# Patient Record
Sex: FEMALE | Race: WHITE | NOT HISPANIC OR LATINO | Employment: UNEMPLOYED | URBAN - METROPOLITAN AREA
[De-identification: names, ages, dates, MRNs, and addresses within clinical notes are randomized per-mention and may not be internally consistent; named-entity substitution may affect disease eponyms.]

---

## 2017-01-13 ENCOUNTER — HOSPITAL ENCOUNTER (OUTPATIENT)
Dept: MAMMOGRAPHY | Facility: MEDICAL CENTER | Age: 57
Discharge: HOME | End: 2017-01-13
Payer: COMMERCIAL

## 2017-01-13 DIAGNOSIS — R92.2 INCONCLUSIVE MAMMOGRAM: ICD-10-CM

## 2017-01-13 DIAGNOSIS — Z12.31 VISIT FOR SCREENING MAMMOGRAM: ICD-10-CM

## 2017-01-13 DIAGNOSIS — Z12.31 ENCOUNTER FOR SCREENING MAMMOGRAM FOR MALIGNANT NEOPLASM OF BREAST: ICD-10-CM

## 2017-01-13 PROCEDURE — G0202 SCR MAMMO BI INCL CAD: HCPCS

## 2017-01-13 PROCEDURE — 77063 BREAST TOMOSYNTHESIS BI: CPT

## 2017-01-16 DIAGNOSIS — N63.0 BREAST LUMP: ICD-10-CM

## 2017-01-18 ENCOUNTER — HOSPITAL ENCOUNTER (OUTPATIENT)
Dept: ULTRASOUND IMAGING | Facility: CLINIC | Age: 57
Discharge: HOME | End: 2017-01-18
Payer: COMMERCIAL

## 2017-01-18 DIAGNOSIS — N63.0 BREAST LUMP: ICD-10-CM

## 2017-01-18 PROCEDURE — 76642 ULTRASOUND BREAST LIMITED: CPT

## 2017-01-26 ENCOUNTER — GENERIC CONVERSION - ENCOUNTER (OUTPATIENT)
Dept: OTHER | Facility: OTHER | Age: 57
End: 2017-01-26

## 2017-02-27 ENCOUNTER — ALLSCRIPTS OFFICE VISIT (OUTPATIENT)
Dept: OTHER | Facility: OTHER | Age: 57
End: 2017-02-27

## 2017-03-28 ENCOUNTER — GENERIC CONVERSION - ENCOUNTER (OUTPATIENT)
Dept: OTHER | Facility: OTHER | Age: 57
End: 2017-03-28

## 2017-04-28 ENCOUNTER — LAB (OUTPATIENT)
Dept: LAB | Facility: CLINIC | Age: 57
End: 2017-04-28
Payer: COMMERCIAL

## 2017-04-28 ENCOUNTER — TRANSCRIBE ORDERS (OUTPATIENT)
Dept: LAB | Facility: CLINIC | Age: 57
End: 2017-04-28

## 2017-04-28 DIAGNOSIS — R30.0 DYSURIA: ICD-10-CM

## 2017-04-28 LAB
BACTERIA UR QL AUTO: NORMAL /HPF
BILIRUB UR QL STRIP: NEGATIVE
CLARITY UR: CLEAR
COLOR UR: YELLOW
GLUCOSE UR STRIP-MCNC: NEGATIVE MG/DL
HGB UR QL STRIP.AUTO: NEGATIVE
HYALINE CASTS #/AREA URNS LPF: NORMAL /LPF
KETONES UR STRIP-MCNC: NEGATIVE MG/DL
LEUKOCYTE ESTERASE UR QL STRIP: ABNORMAL
NITRITE UR QL STRIP: NEGATIVE
NON-SQ EPI CELLS URNS QL MICRO: NORMAL /HPF
PH UR STRIP.AUTO: 6.5 [PH] (ref 4.5–8)
PROT UR STRIP-MCNC: NEGATIVE MG/DL
RBC #/AREA URNS AUTO: NORMAL /HPF
SP GR UR STRIP.AUTO: 1.01 (ref 1–1.03)
UROBILINOGEN UR QL STRIP.AUTO: 0.2 E.U./DL
WBC #/AREA URNS AUTO: NORMAL /HPF

## 2017-04-28 PROCEDURE — 87086 URINE CULTURE/COLONY COUNT: CPT

## 2017-04-28 PROCEDURE — 81001 URINALYSIS AUTO W/SCOPE: CPT

## 2017-04-29 LAB — BACTERIA UR CULT: NORMAL

## 2017-05-12 ENCOUNTER — ALLSCRIPTS OFFICE VISIT (OUTPATIENT)
Dept: OTHER | Facility: OTHER | Age: 57
End: 2017-05-12

## 2017-05-25 ENCOUNTER — GENERIC CONVERSION - ENCOUNTER (OUTPATIENT)
Dept: OTHER | Facility: OTHER | Age: 57
End: 2017-05-25

## 2017-06-19 ENCOUNTER — ALLSCRIPTS OFFICE VISIT (OUTPATIENT)
Dept: OTHER | Facility: OTHER | Age: 57
End: 2017-06-19

## 2017-08-09 DIAGNOSIS — M25.551 PAIN IN RIGHT HIP: ICD-10-CM

## 2017-08-09 DIAGNOSIS — Z13.220 ENCOUNTER FOR SCREENING FOR LIPOID DISORDERS: ICD-10-CM

## 2017-08-09 DIAGNOSIS — Z13.1 ENCOUNTER FOR SCREENING FOR DIABETES MELLITUS: ICD-10-CM

## 2017-08-10 ENCOUNTER — GENERIC CONVERSION - ENCOUNTER (OUTPATIENT)
Dept: OTHER | Facility: OTHER | Age: 57
End: 2017-08-10

## 2017-08-10 ENCOUNTER — APPOINTMENT (OUTPATIENT)
Dept: LAB | Facility: CLINIC | Age: 57
End: 2017-08-10
Payer: COMMERCIAL

## 2017-08-10 ENCOUNTER — TRANSCRIBE ORDERS (OUTPATIENT)
Dept: LAB | Facility: CLINIC | Age: 57
End: 2017-08-10

## 2017-08-10 DIAGNOSIS — Z13.1 ENCOUNTER FOR SCREENING FOR DIABETES MELLITUS: ICD-10-CM

## 2017-08-10 DIAGNOSIS — Z13.220 ENCOUNTER FOR SCREENING FOR LIPOID DISORDERS: ICD-10-CM

## 2017-08-10 LAB
CHOLEST SERPL-MCNC: 190 MG/DL (ref 50–200)
EST. AVERAGE GLUCOSE BLD GHB EST-MCNC: 97 MG/DL
HBA1C MFR BLD: 5 % (ref 4.2–6.3)
HDLC SERPL-MCNC: 81 MG/DL (ref 40–60)
LDLC SERPL CALC-MCNC: 100 MG/DL (ref 0–100)
TRIGL SERPL-MCNC: 43 MG/DL

## 2017-08-10 PROCEDURE — 83036 HEMOGLOBIN GLYCOSYLATED A1C: CPT

## 2017-08-10 PROCEDURE — 80061 LIPID PANEL: CPT

## 2017-08-10 PROCEDURE — 36415 COLL VENOUS BLD VENIPUNCTURE: CPT

## 2017-08-17 ENCOUNTER — ALLSCRIPTS OFFICE VISIT (OUTPATIENT)
Dept: OTHER | Facility: OTHER | Age: 57
End: 2017-08-17

## 2017-08-22 ENCOUNTER — APPOINTMENT (OUTPATIENT)
Dept: PHYSICAL THERAPY | Facility: CLINIC | Age: 57
End: 2017-08-22
Payer: COMMERCIAL

## 2017-08-22 DIAGNOSIS — M25.551 PAIN IN RIGHT HIP: ICD-10-CM

## 2017-08-22 PROCEDURE — 97162 PT EVAL MOD COMPLEX 30 MIN: CPT

## 2017-08-24 ENCOUNTER — APPOINTMENT (OUTPATIENT)
Dept: PHYSICAL THERAPY | Facility: CLINIC | Age: 57
End: 2017-08-24
Payer: COMMERCIAL

## 2017-08-24 PROCEDURE — 97110 THERAPEUTIC EXERCISES: CPT

## 2017-08-29 ENCOUNTER — APPOINTMENT (OUTPATIENT)
Dept: PHYSICAL THERAPY | Facility: CLINIC | Age: 57
End: 2017-08-29
Payer: COMMERCIAL

## 2017-09-06 ENCOUNTER — APPOINTMENT (OUTPATIENT)
Dept: PHYSICAL THERAPY | Facility: CLINIC | Age: 57
End: 2017-09-06
Payer: COMMERCIAL

## 2017-09-06 PROCEDURE — 97110 THERAPEUTIC EXERCISES: CPT

## 2017-09-06 PROCEDURE — 97140 MANUAL THERAPY 1/> REGIONS: CPT

## 2017-09-07 ENCOUNTER — APPOINTMENT (OUTPATIENT)
Dept: PHYSICAL THERAPY | Facility: CLINIC | Age: 57
End: 2017-09-07
Payer: COMMERCIAL

## 2017-09-07 PROCEDURE — 97110 THERAPEUTIC EXERCISES: CPT

## 2017-09-07 PROCEDURE — 97140 MANUAL THERAPY 1/> REGIONS: CPT

## 2017-09-08 ENCOUNTER — APPOINTMENT (OUTPATIENT)
Dept: PHYSICAL THERAPY | Facility: CLINIC | Age: 57
End: 2017-09-08
Payer: COMMERCIAL

## 2017-09-12 ENCOUNTER — APPOINTMENT (OUTPATIENT)
Dept: PHYSICAL THERAPY | Facility: CLINIC | Age: 57
End: 2017-09-12
Payer: COMMERCIAL

## 2017-09-12 PROCEDURE — 97140 MANUAL THERAPY 1/> REGIONS: CPT

## 2017-09-12 PROCEDURE — 97110 THERAPEUTIC EXERCISES: CPT

## 2017-09-13 ENCOUNTER — APPOINTMENT (OUTPATIENT)
Dept: PHYSICAL THERAPY | Facility: CLINIC | Age: 57
End: 2017-09-13
Payer: COMMERCIAL

## 2017-09-14 ENCOUNTER — GENERIC CONVERSION - ENCOUNTER (OUTPATIENT)
Dept: OTHER | Facility: OTHER | Age: 57
End: 2017-09-14

## 2017-09-14 ENCOUNTER — APPOINTMENT (OUTPATIENT)
Dept: PHYSICAL THERAPY | Facility: CLINIC | Age: 57
End: 2017-09-14
Payer: COMMERCIAL

## 2017-09-14 PROCEDURE — 97110 THERAPEUTIC EXERCISES: CPT

## 2017-09-18 ENCOUNTER — APPOINTMENT (OUTPATIENT)
Dept: PHYSICAL THERAPY | Facility: CLINIC | Age: 57
End: 2017-09-18
Payer: COMMERCIAL

## 2017-09-18 PROCEDURE — 97110 THERAPEUTIC EXERCISES: CPT

## 2017-09-21 ENCOUNTER — APPOINTMENT (OUTPATIENT)
Dept: PHYSICAL THERAPY | Facility: CLINIC | Age: 57
End: 2017-09-21
Payer: COMMERCIAL

## 2017-09-21 PROCEDURE — G8979 MOBILITY GOAL STATUS: HCPCS

## 2017-09-21 PROCEDURE — G8980 MOBILITY D/C STATUS: HCPCS

## 2017-09-21 PROCEDURE — 97110 THERAPEUTIC EXERCISES: CPT

## 2017-09-22 ENCOUNTER — GENERIC CONVERSION - ENCOUNTER (OUTPATIENT)
Dept: OTHER | Facility: OTHER | Age: 57
End: 2017-09-22

## 2017-10-02 ENCOUNTER — ALLSCRIPTS OFFICE VISIT (OUTPATIENT)
Dept: OTHER | Facility: OTHER | Age: 57
End: 2017-10-02

## 2017-11-17 ENCOUNTER — GENERIC CONVERSION - ENCOUNTER (OUTPATIENT)
Dept: OTHER | Facility: OTHER | Age: 57
End: 2017-11-17

## 2017-11-22 ENCOUNTER — TRANSCRIBE ORDERS (OUTPATIENT)
Dept: ADMINISTRATIVE | Facility: HOSPITAL | Age: 57
End: 2017-11-22

## 2017-11-22 ENCOUNTER — APPOINTMENT (OUTPATIENT)
Dept: LAB | Facility: CLINIC | Age: 57
End: 2017-11-22
Payer: COMMERCIAL

## 2017-11-22 ENCOUNTER — TRANSCRIBE ORDERS (OUTPATIENT)
Dept: LAB | Facility: CLINIC | Age: 57
End: 2017-11-22

## 2017-11-22 DIAGNOSIS — E04.1 NONTOXIC UNINODULAR GOITER: Primary | ICD-10-CM

## 2017-11-22 DIAGNOSIS — E04.1 NONTOXIC UNINODULAR GOITER: ICD-10-CM

## 2017-11-22 LAB — TSH SERPL DL<=0.05 MIU/L-ACNC: 0.51 UIU/ML (ref 0.36–3.74)

## 2017-11-22 PROCEDURE — 84443 ASSAY THYROID STIM HORMONE: CPT

## 2017-11-22 PROCEDURE — 36415 COLL VENOUS BLD VENIPUNCTURE: CPT

## 2017-11-28 ENCOUNTER — HOSPITAL ENCOUNTER (OUTPATIENT)
Dept: ULTRASOUND IMAGING | Facility: CLINIC | Age: 57
Discharge: HOME | End: 2017-11-28
Payer: COMMERCIAL

## 2017-11-28 DIAGNOSIS — E04.1 NONTOXIC UNINODULAR GOITER: ICD-10-CM

## 2017-11-28 PROCEDURE — 76536 US EXAM OF HEAD AND NECK: CPT

## 2018-01-11 NOTE — RESULT NOTES
Message   call, no lyme back yet, otherwise labs ok     Verified Results  (1) COMPREHENSIVE METABOLIC PANEL [id number] 40:84HZ [facility] Order Number: [id number]      National Kidney Disease Education Program recommendations are as follows:  GFR calculation is accurate only with a steady state creatinine  Chronic Kidney disease less than 60 ml/min/1 73 sq  meters  Kidney failure less than 15 ml/min/1 73 sq  meters  Test Name Result Flag Reference   GLUCOSE,RANDM 86 mg/dL     If the patient is fasting, the ADA then defines impaired fasting glucose as > 100 mg/dL and diabetes as > or equal to 123 mg/dL  SODIUM 140 mmol/L  136-145   POTASSIUM 4 2 mmol/L  3 5-5 3   CHLORIDE 106 mmol/L  100-108   CARBON DIOXIDE 29 mmol/L  21-32   ANION GAP (CALC) 5 mmol/L  4-13   BLOOD UREA NITROGEN 15 mg/dL  5-25   CREATININE 0 61 mg/dL  0 60-1 30   Standardized to IDMS reference method   CALCIUM 9 1 mg/dL  8 3-10 1   BILI, TOTAL 0 50 mg/dL  0 20-1 00   ALK PHOSPHATAS 69 U/L     ALT (SGPT) 30 U/L  12-78   AST(SGOT) 12 U/L  5-45   ALBUMIN 3 9 g/dL  3 5-5 0   TOTAL PROTEIN 7 0 g/dL  6 4-8 2   eGFR Non-African American      >60 0 ml/min/1 73sq m     (1) LIPID PANEL, FASTING 10Pwd2770 09:49AM [facility] Order Number: [id number]      Triglyceride:         Normal              <150 mg/dl       Borderline High    150-199 mg/dl       High               200-499 mg/dl       Very High          >499 mg/dl  Cholesterol:         Desirable        <200 mg/dl      Borderline High  200-239 mg/dl      High             >239 mg/dl  HDL Cholesterol:        High    >59 mg/dL      Low     <41 mg/dL  LDL CALCULATED:    This screening LDL is a calculated result  It does not have the accuracy of the Direct Measured LDL in the monitoring of patients with hyperlipidemia and/or statin therapy  Direct Measure LDL (HXK264) must be ordered separately in these patients       Test Name Result Flag Reference   CHOLESTEROL 191 mg/dL    HDL,DIRECT 68 mg/dL H 40-60   LDL CHOLESTEROL CALCULATED 109 mg/dL H 0-100   TRIGLYCERIDES 71 mg/dL  <=150     (1) CBC/PLT/DIFF 74FRU4403 09:49AM [facility] Order Number: [id number]     Order Number: [id number]     Test Name Result Flag Reference   WBC COUNT 4 64 Thousand/uL  4 31-10 16   RBC COUNT 4 57 Million/uL  3 81-5 12   HEMOGLOBIN 14 6 g/dL  11 5-15 4   HEMATOCRIT 43 0 %  34 8-46  1   MCV 94 fL  82-98   MCH 31 9 pg  26 8-34 3   MCHC 34 0 g/dL  31 4-37 4   RDW 12 5 %  11 6-15 1   MPV 10 5 fL  8 9-12 7   PLATELET COUNT 029 Thousands/uL  149-390   nRBC AUTOMATED 0 /100 WBCs     NEUTROPHILS RELATIVE PERCENT 60 %  43-75   LYMPHOCYTES RELATIVE PERCENT 26 %  14-44   MONOCYTES RELATIVE PERCENT 10 %  4-12   EOSINOPHILS RELATIVE PERCENT 4 %  0-6   BASOPHILS RELATIVE PERCENT 0 %  0-1   NEUTROPHILS ABSOLUTE COUNT 2 80 Thousands/µL  1 85-7 62   LYMPHOCYTES ABSOLUTE COUNT 1 19 Thousands/µL  0 60-4 47   MONOCYTES ABSOLUTE COUNT 0 45 Thousand/µL  0 17-1 22   EOSINOPHILS ABSOLUTE COUNT 0 18 Thousand/µL  0 00-0 61   BASOPHILS ABSOLUTE COUNT 0 01 Thousands/µL  0 00-0 10

## 2018-01-12 NOTE — MISCELLANEOUS
Message   Recorded as Task   Date: 12/02/2016 11:17 AM, Created By: Nicole Mack   Task Name: Med Renewal Request   Assigned To: Mine Hahn   Regarding Patient: Jaya Castle, Status: Active   Sabas De Jesus - 02 Dec 2016 11:17 AM     TASK CREATED  Caller: Self; [phone] (Home); [phone] x,,,,, (Work)  requested refills ,sent to [facility] to sign off        Active Problems    1  Abdominal pain (789 00) (R10 9)   2  Anxiety (300 00) (F41 9)   3  Arthropathy (716 90) (M12 9)   4  Dense breasts (793 82) (R92 2)   5  Dysfunction of both eustachian tubes (381 81) (H69 83)   6  Encounter for routine gynecological examination (V72 31) (Z01 419)   7  Encounter for screening colonoscopy (V76 51) (Z12 11)   8  Encounter for screening mammogram for malignant neoplasm of breast (V76 12)   (Z12 31)   9  Gastroenteritis (558 9) (K52 9)   10  Glaucoma (365 9) (H40 9)   11  Ingrown toenail (703 0) (L60 0)   12  Menopause ovarian failure (256 39) (E28 39)   13  Nausea (787 02) (R11 0)   14  Screening for diabetes mellitus (V77 1) (Z13 1)   15  Screening for hyperlipidemia (V77 91) (Z13 220)   16  Seasonal allergies (477 9) (J30 2)   17  Thyroid disorder (246 9) (E07 9)   18  Thyroid goiter (240 9) (E04 9)   19  Vulvar atrophy (624 1) (N90 5)    Current Meds   1  Calcium TABS; Therapy: (Recorded:19Knr0137) to Recorded   2  Centrum Silver TABS; Therapy: (Recorded:48Yan6915) to Recorded   3  Chondroitin Sulfate Complex CAPS; Therapy: (Recorded:78Nlc0250) to Recorded   4  Estrace 0 1 MG/GM Vaginal Cream; USE TWICE WEEKLY AS DIRECTED; Therapy: 81GET5832 to (Last Rx:89Ueq9597)  Requested for: 43Luv4021 Ordered   5  Glucosamine CAPS; Therapy: (Recorded:74Slb1652) to Recorded   6  Hyoscyamine Sulfate 0 125 MG Oral Tablet; 1-2 q 4 h prn abdominal pain; Therapy: 22CID2963 to (Last Rx:39Xmn3766)  Requested for: 67KEA3493 Ordered   7   PARoxetine HCl - 10 MG Oral Tablet; TAKE 1 AND 1/2 TABLETS DAILY Requested for:   46BRV8553; Last Rx:70Kzb3861 Ordered   8  Promethazine HCl - 25 MG Rectal Suppository; INSERT 1 SUPPOSITORY RECTALLY   TWICE DAILY AS NEEDED FOR NAUSEA; Therapy: 46JSZ3256 to (Evaluate:41Lfq4443)  Requested for: 37Hkf4000; Last   Rx:99Fjw5005 Ordered   9  Timolol Maleate 0 25 % Ophthalmic Solution; Therapy: (Recorded:18Jan2016) to Recorded   10   Vagifem 10 MCG Vaginal Tablet; 1 TABLET TWICE A WEEK  Requested for: 77Yfx7129;    Last Rx:37Und0496 Ordered    Allergies    1  miconazole    Plan  Anxiety    · PARoxetine HCl - 10 MG Oral Tablet (Paxil); TAKE 1 AND 1/2 TABLETS DAILY    Signatures   Electronically signed by : Sarah Recinos, ; Dec  2 2016 11:17AM EST                       (Author)

## 2018-01-12 NOTE — MISCELLANEOUS
Message   Recorded as Task   Date: 05/25/2017 10:37 AM, Created By: Janice Kellogg   Task Name: Med Renewal Request   Assigned To: More Gannon   Regarding Patient: Kurt Nunn, Status: Active   Comment:    Janice Kellogg - 25 May 2017 10:37 AM     TASK CREATED  Caller: Self; Renew Medication; [phone] (Home); [phone] x,,,,, (Work)  Patient needs a prescription of her Premarin sent to the [facility] in [facility]  More Gannon - 25 May 2017 10:46 AM     TASK EDITED  resent to pharm, was "recorded" instead of being sent        Active Problems    1  Abdominal pain (789 00) (R10 9)   2  Anxiety (300 00) (F41 9)   3  Arthropathy (716 90) (M12 9)   4  Breast lump (611 72) (N63)   5  Candidiasis of female genitalia (112 1) (B37 3)   6  Dense breasts (793 82) (R92 2)   7  Dermatitis (692 9) (L30 9)   8  Dysfunction of both eustachian tubes (381 81) (H69 83)   9  Dysuria (788 1) (R30 0)   10  Gastroenteritis (558 9) (K52 9)   11  Glaucoma (365 9) (H40 9)   12  Ingrown toenail (703 0) (L60 0)   13  Menopause ovarian failure (256 39) (E28 39)   14  Migraine with aura and without status migrainosus, not intractable (346 00) (G43 109)   15  Nausea (787 02) (R11 0)   16  Seasonal allergies (477 9) (J30 2)   17  Thyroid disorder (246 9) (E07 9)   18  Thyroid goiter (240 9) (E04 9)   19  Travel advice encounter (V65 49) (Z71 89)   20  Vulvar atrophy (624 1) (N90 5)    Current Meds   1  AcetaZOLAMIDE 125 MG Oral Tablet; TAKE 1 TABLET ONCE DAILY; Therapy: 57LGB0048 to (Evaluate:08Apr2017)  Requested for: 94Kks7279; Last   Rx:98Fzg6138 Ordered   2  Calcium TABS; Therapy: (Recorded:56Zqv4798) to Recorded   3  Centrum Silver TABS; Therapy: (Recorded:34Tke4623) to Recorded   4  Chondroitin Sulfate Complex CAPS; Therapy: (Recorded:58Lcd4119) to Recorded   5  Fluconazole 150 MG Oral Tablet (Diflucan); Take 1 tablet now and repeat again in 3   days;    Therapy: 64HXF1788 to (Evaluate:06Aij9807)  Requested for: 41RNG0070; Last   Rx:12May2017 Ordered   6  Glucosamine CAPS; Therapy: (Recorded:25Oyc4091) to Recorded   7  Hyoscyamine Sulfate 0 125 MG Oral Tablet; 1-2 q 4 h prn abdominal pain; Therapy: 30SID6585 to (Last Rx:12Jnv0240)  Requested for: 36FSN3049 Ordered   8  Mometasone Furoate 0 1 % External Cream; APPLY SPARINGLY TO THE AFFECTED   AREA(S) TWICE DAILY; Therapy: 58KXT9215 to (Last Rx:85Bjd0771)  Requested for: 27Feb2017 Ordered   9  Paxil TABS (PARoxetine HCl); 5mg; Therapy: (Recorded:12May2017) to Recorded   10  Premarin 0 625 MG/GM Vaginal Cream; Insert 0 5 g intravaginally twice per week; Therapy: 59ZSL3442 to (Evaluate:01Qzi0019); Last Rx:12May2017 Ordered   11  SUMAtriptan Succinate 50 MG Oral Tablet; take 1 tablet for migraine relief  may repeat    every 2 hours  max 200mg/day; Therapy: 87OCB7854 to (Evaluate:70Dpi4934)  Requested for: 10IIK9163; Last    Rx:02Mar2017 Ordered   12  Timolol Maleate 0 25 % Ophthalmic Solution;     Therapy: (Recorded:18Jan2016) to Recorded    Allergies    1  miconazole    Plan  Candidiasis of female genitalia    · Premarin 0 625 MG/GM Vaginal Cream; Insert 0 5 g intravaginally twice per week    Signatures   Electronically signed by : La Neal, ; May 25 2017 10:46AM EST                       (Author)

## 2018-01-13 VITALS
SYSTOLIC BLOOD PRESSURE: 112 MMHG | WEIGHT: 172 LBS | HEIGHT: 70 IN | DIASTOLIC BLOOD PRESSURE: 74 MMHG | BODY MASS INDEX: 24.62 KG/M2

## 2018-01-13 VITALS
DIASTOLIC BLOOD PRESSURE: 64 MMHG | WEIGHT: 170 LBS | SYSTOLIC BLOOD PRESSURE: 100 MMHG | BODY MASS INDEX: 24.34 KG/M2 | HEIGHT: 70 IN

## 2018-01-13 DIAGNOSIS — Z12.31 ENCOUNTER FOR SCREENING MAMMOGRAM FOR MALIGNANT NEOPLASM OF BREAST: ICD-10-CM

## 2018-01-13 DIAGNOSIS — R92.2 INCONCLUSIVE MAMMOGRAM: ICD-10-CM

## 2018-01-14 VITALS
HEIGHT: 70 IN | SYSTOLIC BLOOD PRESSURE: 110 MMHG | HEART RATE: 68 BPM | OXYGEN SATURATION: 97 % | WEIGHT: 167 LBS | BODY MASS INDEX: 23.91 KG/M2 | DIASTOLIC BLOOD PRESSURE: 60 MMHG

## 2018-01-14 VITALS
RESPIRATION RATE: 14 BRPM | HEIGHT: 70 IN | HEART RATE: 74 BPM | WEIGHT: 168 LBS | DIASTOLIC BLOOD PRESSURE: 60 MMHG | OXYGEN SATURATION: 99 % | BODY MASS INDEX: 24.05 KG/M2 | SYSTOLIC BLOOD PRESSURE: 102 MMHG

## 2018-01-14 NOTE — RESULT NOTES
Verified Results  (1) LIPID PANEL, FASTING 10Aug2017 08:44AM Ivon[facility] Dove Order Number: [id number]     Test Name Result Flag Reference   CHOLESTEROL 190 mg/dL     HDL,DIRECT 81 mg/dL H 40-60   Specimen collection should occur prior to Metamizole administration due to the potential for falsley depressed results  LDL CHOLESTEROL CALCULATED 100 mg/dL  0-100   Triglyceride:        Normal ??? ??? ??? ??? ??? ??? ??? <150 mg/dl   ??? ??? ???Borderline High ??? ??? 150-199 mg/dl   ??? ??? ? ?? High ??? ??? ??? ??? ??? ??? ??? 200-499 mg/dl   ??? ??? ? ??Very High ??? ??? ??? ??? ??? >499 mg/dl      Cholesterol:       Desirable ??? ??? ??? ??? <200 mg/dl   ??? ??? Borderline High ??? 200-239 mg/dl   ??? ??? High ??? ??? ??? ??? ??? ??? >239 mg/dl      HDL Cholesterol:       High ??? ???>59 mg/dL   ??? ??? Low ??? ??? <41 mg/dL      This screening LDL is a calculated result  It does not have the accuracy of the Direct Measured LDL in the monitoring of patients with hyperlipidemia and/or statin therapy  Direct Measure LDL (WVM828) must be ordered separately in these patients  TRIGLYCERIDES 43 mg/dL  <=150   Specimen collection should occur prior to N-Acetylcysteine or Metamizole administration due to the potential for falsely depressed results  (1) HEMOGLOBIN A1C 10Aug2017 08:44AM Ivonne Dove Order Number: [id number]     Test Name Result Flag Reference   HEMOGLOBIN A1C 5 0 %  4 2-6 3   EST  AVG   GLUCOSE 97 mg/dl

## 2018-01-15 ENCOUNTER — HOSPITAL ENCOUNTER (OUTPATIENT)
Dept: MAMMOGRAPHY | Facility: CLINIC | Age: 58
Discharge: HOME | End: 2018-01-15
Payer: COMMERCIAL

## 2018-01-15 DIAGNOSIS — R92.2 INCONCLUSIVE MAMMOGRAM: ICD-10-CM

## 2018-01-15 DIAGNOSIS — Z12.31 ENCOUNTER FOR SCREENING MAMMOGRAM FOR MALIGNANT NEOPLASM OF BREAST: ICD-10-CM

## 2018-01-15 PROCEDURE — 77067 SCR MAMMO BI INCL CAD: CPT

## 2018-01-15 PROCEDURE — 77063 BREAST TOMOSYNTHESIS BI: CPT

## 2018-01-17 NOTE — MISCELLANEOUS
Message   Recorded as Task   Date: 08/01/2016 03:01 PM, Created By: Yue Fabian   Task Name: Med Renewal Request   Assigned To: Erica Lund   Regarding Patient: Sudhakar Hernandez, Status: In Progress   Zack Olsen - 01 Aug 2016 3:01 PM     TASK CREATED  Caller: Self; [phone] (Home); [phone] x,,,,, (Work)  pt lmom - she needs refills on estrace and vagifem  pharmacy is [facility] order  Inocencio Knox - 36 Aug 2016 8:24 AM     TASK IN PROGRESS   Inocencio Knox - 02 Aug 2016 8:26 AM     TASK EDITED                 rx sent to mail order  Inocencio Knox - 44 Aug 2016 8:29 AM     TASK EDITED                 rx sent to [facility] mail order  Active Problems    1  Abdominal pain (789 00) (R10 9)   2  Anxiety (300 00) (F41 9)   3  Arthropathy (716 90) (M12 9)   4  Dense breasts (793 82) (R92 2)   5  Dysfunction of both eustachian tubes (381 81) (H69 83)   6  Encounter for routine gynecological examination (V72 31) (Z01 419)   7  Encounter for screening colonoscopy (V76 51) (Z12 11)   8  Encounter for screening mammogram for malignant neoplasm of breast (V76 12)   (Z12 31)   9  Gastroenteritis (558 9) (K52 9)   10  Glaucoma (365 9) (H40 9)   11  Ingrown toenail (703 0) (L60 0)   12  Menopause ovarian failure (256 39) (E28 39)   13  Nausea (787 02) (R11 0)   14  Screening for diabetes mellitus (V77 1) (Z13 1)   15  Screening for hyperlipidemia (V77 91) (Z13 220)   16  Seasonal allergies (477 9) (J30 2)   17  Thyroid disorder (246 9) (E07 9)   18  Thyroid goiter (240 9) (E04 9)   19  Vulvar atrophy (624 1) (N90 5)    Current Meds   1  Calcium TABS; Therapy: (Recorded:94Tgm1549) to Recorded   2  Centrum Silver TABS; Therapy: (Recorded:20Csj1422) to Recorded   3  Chondroitin Sulfate Complex CAPS; Therapy: (Recorded:48Jpn9682) to Recorded   4  Estrace 0 1 MG/GM Vaginal Cream; USE TWICE WEEKLY AS DIRECTED; Therapy: 90WMU5489 to (Evaluate:10Ppi5859);  Last Rx:06Jul2016 Ordered 5  Glucosamine CAPS; Therapy: (Recorded:01Vbd5191) to Recorded   6  Hyoscyamine Sulfate 0 125 MG Oral Tablet; 1-2 q 4 h prn abdominal pain; Therapy: 22GRA1365 to (Last Rx:82Ogp0088)  Requested for: 58FHU8822 Ordered   7  PARoxetine HCl - 10 MG Oral Tablet (Paxil); TAKE 1 AND 1/2 TABLETS DAILY    Requested for: 23KMS5384; Last Rx:32Eac1007 Ordered   8  Promethazine HCl - 25 MG Rectal Suppository; INSERT 1 SUPPOSITORY RECTALLY   TWICE DAILY AS NEEDED FOR NAUSEA; Therapy: 46KPZ0779 to (Evaluate:70Zhu1393)  Requested for: 13Ugs7989; Last   Rx:95Vdw9646 Ordered   9  Timolol Maleate 0 25 % Ophthalmic Solution; Therapy: (Recorded:39Lby2183) to Recorded   10  Vagifem 10 MCG Vaginal Tablet; 1 TABLET TWICE A WEEK  Requested for: 78Krq8671;    Last Rx:39Mtk7683 Ordered    Allergies    1  miconazole    Plan  Menopause ovarian failure    · Vagifem 10 MCG Vaginal Tablet; 1 TABLET TWICE A WEEK  Vulvar atrophy    · Estrace 0 1 MG/GM Vaginal Cream; USE TWICE WEEKLY AS DIRECTED    Signatures   Electronically signed by : Christoph Otoole LPN;  Aug  2 2016  8:29AM EST                       (Author)

## 2018-01-18 NOTE — MISCELLANEOUS
Message   Recorded as Task   Date: 09/14/2017 11:53 AM, Created By: Elisa Farias   Task Name: Medical Complaint Callback   Assigned To: Lenin Bunn   Regarding Patient: Aleksandra Kennedy, Status: In Progress   Comment:    Janice Basilio - 14 Sep 2017 11:53 AM     TASK CREATED  Pt called office today, left voicemail message  Pt saw Dr Gregoria Mtz on 6/19/17, next appointment scheduled for 10/2/17  Pt was previously diagnosed with Candidiasis of female genitalia  Pt states she believes she is currently experiencing yeast infection symptoms  Pt wants to know if she needs to make an appointment or can she receive a prescription? Pt can be reached @ [phone]  Thank you! Radha Lopez - 14 Sep 2017 11:59 AM     TASK IN PROGRESS   Radha Lopez - 14 Sep 2017 12:42 PM     TASK EDITED  Spoke with pt - Symptoms started Friday after coming back from the beach  She has itching, burning during urination and see a white patch on the inside of her vagina  No discharge or odor  She did use cortisone at first but stopped using it  Has not used anything else since she is allergic to Monistat  She would like an Rx  Please advise  Thanks! Joselin Alaniz - 14 Sep 2017 1:11 PM     TASK REPLIED TO: Previously Assigned To Joselin Alaniz                      terconazole nightly x 3 nights    or diflucon 150 one tablet   Radha Lopez - 14 Sep 2017 1:22 PM     TASK EDITED  Spoke with pt - she would like the cream - Rx'd terconazole to her pharmacy [facility] - gave directions on use  Active Problems    1  Arthropathy (716 90) (M12 9)   2  Dense breasts (793 82) (R92 2)   3  Glaucoma (365 9) (H40 9)   4  Menopause ovarian failure (256 39) (E28 39)   5  Migraine with aura and without status migrainosus, not intractable (346 00) (G43 109)   6  Right hip pain (719 45) (M25 551)   7  Screening for hyperlipidemia (V77 91) (Z13 220)   8  Seasonal allergies (477 9) (J30 2)   9   Special screening examination for diabetes mellitus (V77 1) (Z13 1)   10  Thyroid disorder (246 9) (E07 9)   11  Thyroid goiter (240 9) (E04 9)   12  Travel advice encounter (V65 49) (Z71 89)   13  Vaginal irritation (623 9) (N89 8)   14  Vulvar atrophy (624 1) (N90 5)    Current Meds   1  Calcium TABS; Therapy: (Recorded:96Mnz0831) to Recorded   2  Centrum Silver TABS; Therapy: (Recorded:88Iwl2924) to Recorded   3  Chondroitin Sulfate Complex CAPS; Therapy: (Recorded:14Umn0574) to Recorded   4  Diclofenac-Misoprostol 75-0 2 MG Oral Tablet Delayed Release; Take 1 tablet every 12   hours as needed for pain  Take with food; Therapy: 06ZSJ1080 to (Jairon Wang)  Requested for: 17Aug2017; Last   Rx:17Aug2017 Ordered   5  Diclofenac-Misoprostol 75-0 2 MG Oral Tablet Delayed Release; Take 1 tablet every 12   hours as needed for pain  Take with food; Therapy: 78GVF1288 to (Evaluate:41Crs2936)  Requested for: 17Aug2017; Last   Rx:17Aug2017 Ordered   6  Glucosamine CAPS; Therapy: (Recorded:91Ebi1483) to Recorded   7  Mometasone Furoate 0 1 % External Cream; APPLY SPARINGLY TO THE AFFECTED   AREA(S) TWICE DAILY; Therapy: 85ANI4361 to (Last Rx:85Xhm0087)  Requested for: 44Qjf0587 Ordered   8  Paxil TABS (PARoxetine HCl); 5mg; Therapy: (Recorded:67Xda6787) to Recorded   9  Premarin 0 625 MG/GM Vaginal Cream; Insert 0 5 g intravaginally twice per week; Therapy: 49XEC3623 to (Evaluate:12Ekn7098)  Requested for: 20Jun2017; Last   Rx:89Rci6806 Ordered   10  Rizatriptan Benzoate 5 MG Oral Tablet; TAKE 1 TABLET AT ONSET OF HEADACHE  MAY REPEAT EVERY 2 HOURS AS NEEDED  MAXIMUM 3 TABLETS IN 24 HOURS; Therapy: 39Rdy8846 to (Evaluate:83Aey0518)  Requested for: 11Ufx4926; Last    Rx:55Rgi4448 Ordered   11  Timolol Maleate 0 25 % Ophthalmic Solution;     Therapy: (Recorded:18Jan2016) to Recorded    Allergies    1  miconazole    Plan  Vaginal irritation    · Terconazole 0 8 % Vaginal Cream; INSERT 1 APPLICATORFUL  INTRAVAGINALLY AT BEDTIME NIGHTLY FOR 3 CONSECUTIVE DAYS    Signatures   Electronically signed by : Savanna Judge, ; Sep 14 2017  1:22PM EST                       (Author)

## 2018-01-18 NOTE — PROGRESS NOTES
Chief Complaint  Here for annual flu shot  Given right deltoid without difficulty  Active Problems    1  Glaucoma (365 9) (H40 9)   2  Ingrown toenail (703 0) (L60 0)    Current Meds   1  Timolol Maleate 0 25 % Ophthalmic Solution; Therapy: (Recorded:18Jan2016) to Recorded    Allergies    1  MetroGel-Vaginal GEL    Vitals  Signs [Data Includes: Current Encounter]    Temperature: 98 2 F, Tympanic  Heart Rate: 78  Pulse Quality: Normal  Respiration: 18  Systolic: 647, Sitting  Diastolic: 60, Sitting  Height: 5 ft 10 in  Weight: 165 lb   BMI Calculated: 23 68  BSA Calculated: 1 92  O2 Saturation: 98, RA    Assessment    1  Encounter for preventive health examination (V70 0) (Z00 00)    Plan  Health Maintenance    · Fluzone Quadrivalent 0 5 ML Intramuscular Suspension Prefilled Syringe;  INJECT 0 5  ML Intramuscular;  To Be Done: 00AOE1588    Future Appointments    Date/Time Provider Specialty Site   02/23/2016 02:30 PM Leyla Shore DO Family Medicine [address]     Signatures   Electronically signed by : Lisandra Baker, [facility]; Jan 18 2016  4:40PM EST                       (Author)    Electronically signed by : Scott Cushing, D O ; Jan 19 2016  7:38PM EST                       (Co-author)

## 2018-01-18 NOTE — MISCELLANEOUS
Message   Recorded as Task   Date: 01/26/2017 02:24 PM, Created By: Mandy Werner   Task Name: Call Back   Assigned To: Ashley Seay   Regarding Patient: Aris Ortega, Status: In Progress   Comment:    Nata Renteria - 26 Jan 2017 2:24 PM     TASK CREATED  Caller: Self; Other; [phone] (Home); [phone] x,,,,, (Work)  pt of [facility], she is experiencing some vaginal itching, pls call [phone]   Efe Leija - 26 Jan 2017 3:18 PM     TASK IN PROGRESS   Efe Heladio - 26 Jan 2017 3:23 PM     TASK EDITED  Pt has rednes, irritation and itching outside of vagina  No odor , no dsch  Pt will get hydrocortisone cream - otc and apply it bid  If no better by Mon, ov        Active Problems    1  Abdominal pain (789 00) (R10 9)   2  Anxiety (300 00) (F41 9)   3  Arthropathy (716 90) (M12 9)   4  Breast lump (611 72) (N63)   5  Dense breasts (793 82) (R92 2)   6  Dysfunction of both eustachian tubes (381 81) (H69 83)   7  Encounter for routine gynecological examination (V72 31) (Z01 419)   8  Encounter for screening colonoscopy (V76 51) (Z12 11)   9  Encounter for screening mammogram for malignant neoplasm of breast (V76 12)   (Z12 31)   10  Gastroenteritis (558 9) (K52 9)   11  Glaucoma (365 9) (H40 9)   12  Ingrown toenail (703 0) (L60 0)   13  Menopause ovarian failure (256 39) (E28 39)   14  Nausea (787 02) (R11 0)   15  Screening for diabetes mellitus (V77 1) (Z13 1)   16  Screening for hyperlipidemia (V77 91) (Z13 220)   17  Seasonal allergies (477 9) (J30 2)   18  Thyroid disorder (246 9) (E07 9)   19  Thyroid goiter (240 9) (E04 9)   20  Vulvar atrophy (624 1) (N90 5)    Current Meds   1  Calcium TABS; Therapy: (Recorded:45Hia6674) to Recorded   2  Centrum Silver TABS; Therapy: (Recorded:59Tcg9725) to Recorded   3  Chondroitin Sulfate Complex CAPS; Therapy: (Recorded:26Bjt0579) to Recorded   4  Estrace 0 1 MG/GM Vaginal Cream; USE TWICE WEEKLY AS DIRECTED;    Therapy: 49ANM2719 to (Last Rx:22Yjg0493)  Requested for: 04Eox5747 Ordered   5  Glucosamine CAPS; Therapy: (Recorded:60Nbj4502) to Recorded   6  Hyoscyamine Sulfate 0 125 MG Oral Tablet; 1-2 q 4 h prn abdominal pain; Therapy: 61SWY2993 to (Last Rx:34Snn5385)  Requested for: 22ZIP7980 Ordered   7  PARoxetine HCl - 10 MG Oral Tablet (Paxil); TAKE 1 AND 1/2 TABLETS DAILY    Requested for: 97Bnm2261; Last Rx:67Swp5677 Ordered   8  Promethazine HCl - 25 MG Rectal Suppository; INSERT 1 SUPPOSITORY RECTALLY   TWICE DAILY AS NEEDED FOR NAUSEA; Therapy: 80QFA1497 to (Evaluate:51Usx8966)  Requested for: 96Wcr2544; Last   Rx:74Ceo8190 Ordered   9  Timolol Maleate 0 25 % Ophthalmic Solution; Therapy: (Recorded:84Atc4744) to Recorded   10  Vagifem 10 MCG Vaginal Tablet; 1 TABLET TWICE A WEEK  Requested for: 63Nqd1903;    Last Rx:13Pre0507 Ordered    Allergies    1  miconazole    Signatures   Electronically signed by :  Kendall Juarez, ; Jan 26 2017  3:23PM EST                       (Author)

## 2018-01-22 VITALS — TEMPERATURE: 98 F

## 2018-01-22 VITALS
WEIGHT: 173 LBS | DIASTOLIC BLOOD PRESSURE: 72 MMHG | HEIGHT: 70 IN | SYSTOLIC BLOOD PRESSURE: 116 MMHG | BODY MASS INDEX: 24.77 KG/M2

## 2018-02-09 ENCOUNTER — TELEPHONE (OUTPATIENT)
Dept: FAMILY MEDICINE CLINIC | Facility: CLINIC | Age: 58
End: 2018-02-09

## 2018-02-09 DIAGNOSIS — G43.109 MIGRAINE WITH AURA AND WITHOUT STATUS MIGRAINOSUS, NOT INTRACTABLE: Primary | ICD-10-CM

## 2018-02-09 RX ORDER — RIZATRIPTAN BENZOATE 5 MG/1
1 TABLET ORAL DAILY PRN
COMMUNITY
Start: 2017-08-23 | End: 2018-02-09 | Stop reason: SDUPTHER

## 2018-02-09 RX ORDER — RIZATRIPTAN BENZOATE 5 MG/1
TABLET ORAL
Qty: 9 TABLET | Refills: 5 | Status: SHIPPED | OUTPATIENT
Start: 2018-02-09 | End: 2018-02-14 | Stop reason: SDUPTHER

## 2018-02-14 DIAGNOSIS — G43.109 MIGRAINE WITH AURA AND WITHOUT STATUS MIGRAINOSUS, NOT INTRACTABLE: ICD-10-CM

## 2018-02-15 RX ORDER — RIZATRIPTAN BENZOATE 5 MG/1
TABLET ORAL
Qty: 9 TABLET | Refills: 1 | Status: SHIPPED | OUTPATIENT
Start: 2018-02-15 | End: 2018-08-08 | Stop reason: CLARIF

## 2018-06-11 ENCOUNTER — OFFICE VISIT (OUTPATIENT)
Dept: FAMILY MEDICINE CLINIC | Facility: CLINIC | Age: 58
End: 2018-06-11
Payer: COMMERCIAL

## 2018-06-11 VITALS
TEMPERATURE: 99 F | DIASTOLIC BLOOD PRESSURE: 60 MMHG | HEART RATE: 83 BPM | OXYGEN SATURATION: 96 % | SYSTOLIC BLOOD PRESSURE: 102 MMHG
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
## 2018-06-11 PROCEDURE — 99213 OFFICE O/P EST LOW 20 MIN: CPT | Performed by: NURSE PRACTITIONER

## 2018-06-11 RX ORDER — PAROXETINE HYDROCHLORIDE 40 MG/1
5 TABLET, FILM COATED ORAL
COMMUNITY
End: 2018-08-08 | Stop reason: CLARIF

## 2018-06-11 RX ORDER — MULTIVIT WITH MINERALS/LUTEIN
TABLET ORAL
COMMUNITY

## 2018-06-11 RX ORDER — GLIMEPIRIDE 2 MG/1
1 TABLET ORAL 2 TIMES DAILY
COMMUNITY
End: 2020-10-12

## 2018-06-11 RX ORDER — KETOCONAZOLE 20 MG/G
CREAM TOPICAL
COMMUNITY
Start: 2018-04-17 | End: 2019-04-08 | Stop reason: ALTCHOICE

## 2018-06-11 RX ORDER — MOMETASONE FUROATE 1 MG/G
CREAM TOPICAL 2 TIMES DAILY
COMMUNITY
Start: 2017-02-27 | End: 2018-08-08 | Stop reason: ALTCHOICE

## 2018-06-11 RX ORDER — HYDROCORTISONE ACETATE 0.5 %
CREAM (GRAM) TOPICAL
COMMUNITY
End: 2019-04-08 | Stop reason: SDUPTHER

## 2018-06-11 NOTE — ASSESSMENT & PLAN NOTE
To begin triamcinolone ointment every 12 hours for 5 days  To continue antihistamine and cool compress for additional relief  Follow-up if no improvement by the end of this week

## 2018-06-11 NOTE — PROGRESS NOTES
Assessment/Plan:    Bug bites  To begin triamcinolone ointment every 12 hours for 5 days  To continue antihistamine and cool compress for additional relief  Follow-up if no improvement by the end of this week  Diagnoses and all orders for this visit:    Bug bite, initial encounter  -     triamcinolone (KENALOG) 0 1 % ointment; Apply topically 2 (two) times a day    Other orders  -     Calcium 150 MG TABS; Take by mouth  -     Multiple Vitamins-Minerals (CENTRUM SILVER) tablet; Take by mouth  -     Chondroitin Sulfate-Vit C-Mn (CHONDROITIN SULFATE COMPLEX) 400-60-2 5 MG CAPS; Take by mouth  -     Glucosamine-Chondroit-Vit C-Mn (GLUCOSAMINE 1500 COMPLEX) CAPS; Take by mouth  -     mometasone (ELOCON) 0 1 % cream; Apply topically 2 (two) times a day  -     PARoxetine (PAXIL) 40 MG tablet; Take 5 mg by mouth    -     conjugated estrogens (PREMARIN) vaginal cream; Insert into the vagina  -     terconazole (TERAZOL 3) 0 8 % vaginal cream; Insert into the vagina  -     timolol (TIMOPTIC) 0 25 % ophthalmic solution; Apply to eye  -     hydrocortisone 2 5 % cream;   -     ketoconazole (NIZORAL) 2 % cream;           Subjective:      Patient ID: Jolan Bosworth is a 62 y o  female  Colby Ards presents reporting a pruritic rash she thinks may be due to a bug bite in her right AC region  This has been present for the past 4 days  She has been applying over-the-counter hydrocortisone cream and taking an antihistamine  This is provided minimal relief  Denies fever, recent illness, new personal care products, new medications, her household members with similar symptoms          The following portions of the patient's history were reviewed and updated as appropriate: She   Patient Active Problem List    Diagnosis Date Noted    Bug bites 06/11/2018    Migraine with aura and without status migrainosus, not intractable 02/27/2017    Thyroid disorder 02/23/2016     Current Outpatient Prescriptions   Medication Sig Dispense Refill    Calcium 150 MG TABS Take by mouth      Chondroitin Sulfate-Vit C-Mn (CHONDROITIN SULFATE COMPLEX) 400-60-2 5 MG CAPS Take by mouth      conjugated estrogens (PREMARIN) vaginal cream Insert into the vagina      Multiple Vitamins-Minerals (CENTRUM SILVER) tablet Take by mouth      PARoxetine (PAXIL) 40 MG tablet Take 5 mg by mouth        rizatriptan (MAXALT) 5 MG tablet TAKE 1 TABLET AT ONSET OF HEADACHE MAY REPEAT EVERY 2 HOURS AS NEEDED MAX 3 TABLETS IN 24 HRS 9 tablet 1    timolol (TIMOPTIC) 0 25 % ophthalmic solution Apply to eye      Glucosamine-Chondroit-Vit C-Mn (GLUCOSAMINE 1500 COMPLEX) CAPS Take by mouth      hydrocortisone 2 5 % cream       ketoconazole (NIZORAL) 2 % cream       mometasone (ELOCON) 0 1 % cream Apply topically 2 (two) times a day      ondansetron (ZOFRAN) 4 mg tablet Take 1 tablet (4 mg total) by mouth every 6 (six) hours  12 tablet 0    terconazole (TERAZOL 3) 0 8 % vaginal cream Insert into the vagina      triamcinolone (KENALOG) 0 1 % ointment Apply topically 2 (two) times a day 30 g 0     No current facility-administered medications for this visit  She is allergic to other       Review of Systems   Constitutional: Negative  Respiratory: Negative  Cardiovascular: Negative  Musculoskeletal: Negative  Skin: Positive for rash  Neurological: Positive for headaches  Psychiatric/Behavioral: Negative  /60   Pulse 83   Temp 99 °F (37 2 °C)   SpO2 96%     Objective:     Physical Exam   Constitutional: She is oriented to person, place, and time  She appears well-developed and well-nourished  HENT:   Head: Normocephalic and atraumatic  Eyes: Conjunctivae are normal    Neck: Neck supple  Neurological: She is alert and oriented to person, place, and time  Skin:   Presence of erythematous linear rash in right [facility]   Psychiatric: She has a normal mood and affect   Her behavior is normal  Judgment and thought content normal

## 2018-08-08 ENCOUNTER — OFFICE VISIT (OUTPATIENT)
Dept: FAMILY MEDICINE CLINIC | Facility: CLINIC | Age: 58
End: 2018-08-08
Payer: COMMERCIAL

## 2018-08-08 VITALS
DIASTOLIC BLOOD PRESSURE: 68 MMHG | HEART RATE: 85 BPM | RESPIRATION RATE: 18 BRPM | BODY MASS INDEX: 23.91 KG/M2 | OXYGEN SATURATION: 98 % | WEIGHT: 167 LBS | SYSTOLIC BLOOD PRESSURE: 116 MMHG | HEIGHT: 70 IN

## 2018-08-08 DIAGNOSIS — Z13.220 SCREENING FOR LIPID DISORDERS: ICD-10-CM

## 2018-08-08 DIAGNOSIS — M25.551 PAIN OF RIGHT HIP JOINT: ICD-10-CM

## 2018-08-08 DIAGNOSIS — E07.9 THYROID DISORDER: ICD-10-CM

## 2018-08-08 DIAGNOSIS — G43.109 MIGRAINE WITH AURA AND WITHOUT STATUS MIGRAINOSUS, NOT INTRACTABLE: Primary | ICD-10-CM

## 2018-08-08 DIAGNOSIS — Z00.00 WELL ADULT EXAM: ICD-10-CM

## 2018-08-08 DIAGNOSIS — Z23 NEED FOR TDAP VACCINATION: ICD-10-CM

## 2018-08-08 DIAGNOSIS — R23.2 HOT FLASHES: ICD-10-CM

## 2018-08-08 DIAGNOSIS — Z13.1 SCREENING FOR DIABETES MELLITUS: ICD-10-CM
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
## 2018-08-08 PROCEDURE — 99396 PREV VISIT EST AGE 40-64: CPT | Performed by: NURSE PRACTITIONER

## 2018-08-08 PROCEDURE — 90715 TDAP VACCINE 7 YRS/> IM: CPT

## 2018-08-08 PROCEDURE — 90471 IMMUNIZATION ADMIN: CPT

## 2018-08-08 RX ORDER — CYCLOBENZAPRINE HCL 5 MG
10 TABLET ORAL 3 TIMES DAILY PRN
Qty: 30 TABLET | Refills: 0 | Status: SHIPPED | OUTPATIENT
Start: 2018-08-08 | End: 2019-04-08 | Stop reason: ALTCHOICE

## 2018-08-08 RX ORDER — DICLOFENAC SODIUM AND MISOPROSTOL 75; 200 MG/1; UG/1
1 TABLET, DELAYED RELEASE ORAL 2 TIMES DAILY
Qty: 30 TABLET | Refills: 0 | Status: SHIPPED | OUTPATIENT
Start: 2018-08-08 | End: 2019-04-08 | Stop reason: ALTCHOICE

## 2018-08-08 RX ORDER — PAROXETINE 10 MG/1
TABLET, FILM COATED ORAL
Qty: 30 TABLET | Refills: 1 | Status: SHIPPED | OUTPATIENT
Start: 2018-08-08 | End: 2019-04-25 | Stop reason: SDUPTHER

## 2018-08-08 RX ORDER — ELETRIPTAN HYDROBROMIDE 40 MG/1
40 TABLET, FILM COATED ORAL ONCE AS NEEDED
Qty: 6 TABLET | Refills: 0 | Status: SHIPPED | OUTPATIENT
Start: 2018-08-08 | End: 2018-08-23

## 2018-08-08 NOTE — PROGRESS NOTES
Subjective:     Patient ID: Jolan Bosworth is a 62 y o  female  Patient presents for an Work Physical Exam  The patient reports no problems  Domingo Levy presents for a physical   She is up-to-date on her colonoscopy  She is due for her Pap test this year and has an upcoming appointment for her mammogram  She is connected with the optometrist and dentist   She is seen at the dentist every 6 months  She tries to eat healthy and exercise most days of the week  She is requesting refills on some medication as well as lab work  She is not up-to-date on her tetanus  Doitzel Levy does has a history of a goiter; she is connected to the endocrinologist Dr Laura Kumar and seen annually  Was on Synthroid briefly several years ago  Denies difficulty swallowing  Domingo Levy also has a history of migraines  She is currently taking rizatriptan which is to provide relief  However recently she will need to take 2 rizatriptan as well as an ibuprofen to abort her headache  She has taken Replax in the past which has been very  Review of Systems   Constitutional: Negative  HENT: Negative  Eyes: Negative  Respiratory: Negative  Cardiovascular: Negative  Gastrointestinal: Negative  Endocrine: Negative  Genitourinary: Negative  Musculoskeletal:        Intermittent right hip pain   Skin: Negative  Allergic/Immunologic: Negative  Neurological: Positive for headaches  Hematological: Negative  Psychiatric/Behavioral: Negative            The following portions of the patient's history were reviewed and updated as appropriate: She   Patient Active Problem List    Diagnosis Date Noted    Hot flashes 08/08/2018    Screening for lipid disorders 08/08/2018    Screening for diabetes mellitus 08/08/2018    Well adult exam 08/08/2018    Need for Tdap vaccination 08/08/2018    Migraine with aura and without status migrainosus, not intractable 02/27/2017    Thyroid disorder 02/23/2016 Current Outpatient Prescriptions   Medication Sig Dispense Refill    Calcium 150 MG TABS Take by mouth      Chondroitin Sulfate-Vit C-Mn (CHONDROITIN SULFATE COMPLEX) 400-60-2 5 MG CAPS Take by mouth      conjugated estrogens (PREMARIN) vaginal cream Insert into the vagina      Glucosamine-Chondroit-Vit C-Mn (GLUCOSAMINE 1500 COMPLEX) CAPS Take by mouth      hydrocortisone 2 5 % cream       ketoconazole (NIZORAL) 2 % cream       Multiple Vitamins-Minerals (CENTRUM SILVER) tablet Take by mouth      terconazole (TERAZOL 3) 0 8 % vaginal cream Insert into the vagina      timolol (TIMOPTIC) 0 25 % ophthalmic solution Apply to eye      cyclobenzaprine (FLEXERIL) 5 mg tablet Take 2 tablets (10 mg total) by mouth 3 (three) times a day as needed for muscle spasms 30 tablet 0    diclofenac-misoprostol (ARTHROTEC) 75-0 2 MG per tablet Take 1 tablet by mouth 2 (two) times a day 30 tablet 0    eletriptan (RELPAX) 40 MG tablet Take 1 tablet (40 mg total) by mouth once as needed for migraine for up to 1 dose may repeat in 2 hours if necessary 6 tablet 0    PARoxetine (PAXIL) 10 mg tablet Take 1/2 tab daily 30 tablet 1     No current facility-administered medications for this visit  She is allergic to other and pollen extract       No results found for this or any previous visit      No exam data present    /68   Pulse 85   Resp 18   Ht 5' 10" (1 778 m)   Wt 75 8 kg (167 lb)   SpO2 98%   BMI 23 96 kg/m²   Social History     Social History    Marital status: /Civil Union     Spouse name: N/A    Number of children: N/A    Years of education: N/A     Occupational History          Retired     Social History Main Topics    Smoking status: Never Smoker    Smokeless tobacco: Never Used    Alcohol use Yes      Comment: occasionally    Drug use: No    Sexual activity: Not on file     Other Topics Concern    Not on file     Social History Narrative    Activities:  Dance    Always uses seat belt    Exercise:  Walking    Lives with      Past Medical History:   Diagnosis Date    Anxiety     last assessed 02/27/2017    Breast lump      last assessed 01/16/2017    Dermatitis     last assessed 02/27/2017    Disease of thyroid gland     last assessed 02/23/2016    Dysuria     last assessed 04/28/2017    ETD (Eustachian tube dysfunction), bilateral     last assessed 02/23/2016     Family History   Problem Relation Age of Onset    Breast cancer Mother     Cancer Father     Hypertension Father     Heart disease Maternal Grandmother       Past Surgical History:   Procedure Laterality Date    TOOTH EXTRACTION      TUBAL LIGATION         Current Outpatient Prescriptions:     Calcium 150 MG TABS, Take by mouth, Disp: , Rfl:     Chondroitin Sulfate-Vit C-Mn (CHONDROITIN SULFATE COMPLEX) 400-60-2 5 MG CAPS, Take by mouth, Disp: , Rfl:     conjugated estrogens (PREMARIN) vaginal cream, Insert into the vagina, Disp: , Rfl:     Glucosamine-Chondroit-Vit C-Mn (GLUCOSAMINE 1500 COMPLEX) CAPS, Take by mouth, Disp: , Rfl:     hydrocortisone 2 5 % cream, , Disp: , Rfl:     ketoconazole (NIZORAL) 2 % cream, , Disp: , Rfl:     Multiple Vitamins-Minerals (CENTRUM SILVER) tablet, Take by mouth, Disp: , Rfl:     terconazole (TERAZOL 3) 0 8 % vaginal cream, Insert into the vagina, Disp: , Rfl:     timolol (TIMOPTIC) 0 25 % ophthalmic solution, Apply to eye, Disp: , Rfl:     cyclobenzaprine (FLEXERIL) 5 mg tablet, Take 2 tablets (10 mg total) by mouth 3 (three) times a day as needed for muscle spasms, Disp: 30 tablet, Rfl: 0    diclofenac-misoprostol (ARTHROTEC) 75-0 2 MG per tablet, Take 1 tablet by mouth 2 (two) times a day, Disp: 30 tablet, Rfl: 0    eletriptan (RELPAX) 40 MG tablet, Take 1 tablet (40 mg total) by mouth once as needed for migraine for up to 1 dose may repeat in 2 hours if necessary, Disp: 6 tablet, Rfl: 0    PARoxetine (PAXIL) 10 mg tablet, Take 1/2 tab daily, Disp: 30 tablet, Rfl: 1      Colonoscopy  UTD  Pap Test 2015, has an upcoming appointment this year  Dexa Scan       Screening Labs: Ordered today    Preventive screening:   Last mammogram: normal Date: 2017        Objective:     Physical Exam   Constitutional: She is oriented to person, place, and time  She appears well-developed and well-nourished  No distress  HENT:   Head: Normocephalic and atraumatic  Right Ear: External ear normal    Left Ear: External ear normal    Nose: Nose normal    Mouth/Throat: Oropharynx is clear and moist    Eyes: Conjunctivae and EOM are normal  Pupils are equal, round, and reactive to light  Neck: Trachea normal and normal range of motion  Neck supple  Thyromegaly present  Cardiovascular: Normal rate, regular rhythm and normal heart sounds  No murmur heard  Pulmonary/Chest: Effort normal and breath sounds normal  No respiratory distress  She has no wheezes  She has no rales  She exhibits no tenderness  Abdominal: Soft  Bowel sounds are normal  She exhibits no distension and no mass  There is no tenderness  There is no rebound and no guarding  Musculoskeletal: Normal range of motion  She exhibits no edema, tenderness or deformity  Lymphadenopathy:     She has no cervical adenopathy  Neurological: She is alert and oriented to person, place, and time  No cranial nerve deficit  Skin: Skin is warm and dry  No rash noted  No erythema  No pallor  Psychiatric: She has a normal mood and affect  Her behavior is normal  Judgment and thought content normal    Nursing note and vitals reviewed  Assessment/Plan:   Well adult exam   Tdap administered today  Up-to-date on colonoscopy  Keep upcoming appointments for mammogram and Pap test     Migraine with aura and without status migrainosus, not intractable  To stop rizatriptan for now  To try Replax for abortive treatment migraines  Follow-up if no improvement in headaches      Thyroid disorder    To obtain thyroid function studies and continue care with endocrinologist

## 2018-08-08 NOTE — ASSESSMENT & PLAN NOTE
Tdap administered today  Up-to-date on colonoscopy      Keep upcoming appointments for mammogram and Pap test

## 2018-08-08 NOTE — ASSESSMENT & PLAN NOTE
To stop rizatriptan for now  To try Replax for abortive treatment migraines  Follow-up if no improvement in headaches

## 2018-08-10 ENCOUNTER — LAB (OUTPATIENT)
Dept: LAB | Facility: CLINIC | Age: 58
End: 2018-08-10
Payer: COMMERCIAL

## 2018-08-10 ENCOUNTER — TRANSCRIBE ORDERS (OUTPATIENT)
Dept: LAB | Facility: CLINIC | Age: 58
End: 2018-08-10

## 2018-08-10 DIAGNOSIS — Z13.220 SCREENING FOR LIPID DISORDERS: ICD-10-CM

## 2018-08-10 DIAGNOSIS — Z13.1 SCREENING FOR DIABETES MELLITUS: ICD-10-CM

## 2018-08-10 DIAGNOSIS — Z13.220 SCREENING FOR LIPOID DISORDERS: Primary | ICD-10-CM

## 2018-08-10 DIAGNOSIS — E07.9 THYROID DISORDER: ICD-10-CM

## 2018-08-10 DIAGNOSIS — Z13.220 SCREENING FOR LIPOID DISORDERS: ICD-10-CM

## 2018-08-10 LAB
ALBUMIN SERPL BCP-MCNC: 3.9 G/DL (ref 3.5–5)
ALP SERPL-CCNC: 51 U/L (ref 46–116)
ALT SERPL W P-5'-P-CCNC: 23 U/L (ref 12–78)
ANION GAP SERPL CALCULATED.3IONS-SCNC: 4 MMOL/L (ref 4–13)
AST SERPL W P-5'-P-CCNC: 14 U/L (ref 5–45)
BILIRUB SERPL-MCNC: 0.51 MG/DL (ref 0.2–1)
BUN SERPL-MCNC: 16 MG/DL (ref 5–25)
CALCIUM SERPL-MCNC: 9 MG/DL (ref 8.3–10.1)
CHLORIDE SERPL-SCNC: 106 MMOL/L (ref 100–108)
CHOLEST SERPL-MCNC: 184 MG/DL (ref 50–200)
CO2 SERPL-SCNC: 28 MMOL/L (ref 21–32)
CREAT SERPL-MCNC: 0.64 MG/DL (ref 0.6–1.3)
EST. AVERAGE GLUCOSE BLD GHB EST-MCNC: 91 MG/DL
GFR SERPL CREATININE-BSD FRML MDRD: 99 ML/MIN/1.73SQ M
GLUCOSE P FAST SERPL-MCNC: 81 MG/DL (ref 65–99)
HBA1C MFR BLD: 4.8 % (ref 4.2–6.3)
HDLC SERPL-MCNC: 76 MG/DL (ref 40–60)
LDLC SERPL CALC-MCNC: 95 MG/DL (ref 0–100)
POTASSIUM SERPL-SCNC: 4 MMOL/L (ref 3.5–5.3)
PROT SERPL-MCNC: 7 G/DL (ref 6.4–8.2)
SODIUM SERPL-SCNC: 138 MMOL/L (ref 136–145)
TRIGL SERPL-MCNC: 63 MG/DL
TSH SERPL DL<=0.05 MIU/L-ACNC: 0.79 UIU/ML (ref 0.36–3.74)

## 2018-08-10 PROCEDURE — 84443 ASSAY THYROID STIM HORMONE: CPT

## 2018-08-10 PROCEDURE — 80061 LIPID PANEL: CPT

## 2018-08-10 PROCEDURE — 83036 HEMOGLOBIN GLYCOSYLATED A1C: CPT

## 2018-08-10 PROCEDURE — 80053 COMPREHEN METABOLIC PANEL: CPT

## 2018-08-10 PROCEDURE — 36415 COLL VENOUS BLD VENIPUNCTURE: CPT

## 2018-08-23 DIAGNOSIS — G43.909 MIGRAINE WITHOUT STATUS MIGRAINOSUS, NOT INTRACTABLE, UNSPECIFIED MIGRAINE TYPE: Primary | ICD-10-CM

## 2018-08-23 RX ORDER — RIZATRIPTAN BENZOATE 5 MG/1
5 TABLET ORAL ONCE AS NEEDED
Qty: 30 TABLET | Refills: 3 | Status: SHIPPED | OUTPATIENT
Start: 2018-08-23 | End: 2018-08-27 | Stop reason: SDUPTHER

## 2018-08-23 NOTE — TELEPHONE ENCOUNTER
Pt stated that she called shortly after last visit because the new migraine med was not covered  Do not see any notes in the computer  She is requesting old medication    The Relpex is not covered

## 2018-08-27 ENCOUNTER — TELEPHONE (OUTPATIENT)
Dept: FAMILY MEDICINE CLINIC | Facility: CLINIC | Age: 58
End: 2018-08-27

## 2018-08-27 DIAGNOSIS — G43.909 MIGRAINE WITHOUT STATUS MIGRAINOSUS, NOT INTRACTABLE, UNSPECIFIED MIGRAINE TYPE: ICD-10-CM

## 2018-08-27 DIAGNOSIS — G43.109 MIGRAINE WITH AURA AND WITHOUT STATUS MIGRAINOSUS, NOT INTRACTABLE: Primary | ICD-10-CM

## 2018-08-27 RX ORDER — RIZATRIPTAN BENZOATE 10 MG/1
10 TABLET, ORALLY DISINTEGRATING ORAL ONCE AS NEEDED
Qty: 30 TABLET | Refills: 0 | Status: SHIPPED | OUTPATIENT
Start: 2018-08-27 | End: 2018-08-27 | Stop reason: SDUPTHER

## 2018-08-27 RX ORDER — RIZATRIPTAN BENZOATE 5 MG/1
5 TABLET ORAL ONCE AS NEEDED
Qty: 30 TABLET | Refills: 0 | Status: SHIPPED | OUTPATIENT
Start: 2018-08-27 | End: 2018-08-27 | Stop reason: SDUPTHER

## 2018-08-27 RX ORDER — RIZATRIPTAN BENZOATE 10 MG/1
10 TABLET ORAL ONCE AS NEEDED
Qty: 30 TABLET | Refills: 0 | Status: SHIPPED | OUTPATIENT
Start: 2018-08-27 | End: 2019-01-31 | Stop reason: ALTCHOICE

## 2018-08-27 RX ORDER — RIZATRIPTAN BENZOATE 10 MG/1
10 TABLET, ORALLY DISINTEGRATING ORAL ONCE AS NEEDED
Qty: 30 TABLET | Refills: 0 | Status: SHIPPED | OUTPATIENT
Start: 2018-08-27 | End: 2018-08-27 | Stop reason: CLARIF

## 2018-08-27 RX ORDER — ELETRIPTAN HYDROBROMIDE 20 MG/1
20 TABLET, FILM COATED ORAL ONCE AS NEEDED
Qty: 30 TABLET | Refills: 0 | Status: SHIPPED | OUTPATIENT
Start: 2018-08-27 | End: 2019-01-30 | Stop reason: SDUPTHER

## 2018-08-27 RX ORDER — RIZATRIPTAN BENZOATE 5 MG/1
10 TABLET ORAL ONCE AS NEEDED
Qty: 30 TABLET | Refills: 0 | Status: SHIPPED | OUTPATIENT
Start: 2018-08-27 | End: 2018-08-27 | Stop reason: DRUGHIGH

## 2018-08-27 NOTE — TELEPHONE ENCOUNTER
Pt would like you to send a weeks of the Maxalt to [facility]  She would also like you to call her she has some questions

## 2018-08-27 NOTE — TELEPHONE ENCOUNTER
Spoke with patient  Made aware sent Maxalt to [facility]   Patient would like to try Eletriptan in as she feels as though this works better  Last time med was not covered by her insurance, will send this and proceed with prior off as needed  [facility] requested

## 2018-10-02 PROBLEM — Z13.1 SCREENING FOR DIABETES MELLITUS: Status: RESOLVED | Noted: 2018-08-08 | Resolved: 2018-10-02

## 2018-10-02 PROBLEM — Z13.220 SCREENING FOR LIPID DISORDERS: Status: RESOLVED | Noted: 2018-08-08 | Resolved: 2018-10-02

## 2018-10-02 PROBLEM — Z23 NEED FOR TDAP VACCINATION: Status: RESOLVED | Noted: 2018-08-08 | Resolved: 2018-10-02

## 2018-10-02 PROBLEM — Z00.00 WELL ADULT EXAM: Status: RESOLVED | Noted: 2018-08-08 | Resolved: 2018-10-02

## 2018-10-03 ENCOUNTER — ANNUAL EXAM (OUTPATIENT)
Dept: OBGYN CLINIC | Age: 58
End: 2018-10-03
Payer: COMMERCIAL

## 2018-10-03 VITALS
SYSTOLIC BLOOD PRESSURE: 90 MMHG | HEIGHT: 70 IN | BODY MASS INDEX: 24.12 KG/M2 | DIASTOLIC BLOOD PRESSURE: 62 MMHG | WEIGHT: 168.5 LBS

## 2018-10-03 DIAGNOSIS — Z01.419 ENCOUNTER FOR GYNECOLOGICAL EXAMINATION WITHOUT ABNORMAL FINDING: Primary | ICD-10-CM

## 2018-10-03 DIAGNOSIS — N95.2 ATROPHIC VULVOVAGINITIS: ICD-10-CM

## 2018-10-03 DIAGNOSIS — R92.2 DENSE BREAST: ICD-10-CM

## 2018-10-03 DIAGNOSIS — Z12.31 ENCOUNTER FOR SCREENING MAMMOGRAM FOR MALIGNANT NEOPLASM OF BREAST: ICD-10-CM

## 2018-10-03 PROCEDURE — 99396 PREV VISIT EST AGE 40-64: CPT | Performed by: OBSTETRICS & GYNECOLOGY

## 2018-10-03 RX ORDER — ESTRADIOL 0.1 MG/G
1 CREAM VAGINAL 2 TIMES WEEKLY
Qty: 42.5 G | Refills: 3 | Status: SHIPPED | OUTPATIENT
Start: 2018-10-04 | End: 2019-10-14 | Stop reason: SDUPTHER

## 2018-10-03 NOTE — PROGRESS NOTES
Assessment/Plan:    Encounter for gynecological examination without abnormal finding  Pap/HPV current, due   Mammogram ordered  Colonoscopy current    Encourage healthy diet, exercise, Calcium 1200mg per day and at least 800 iu Vitamin D daily  Topical estrogen for vulvovaginal atrophy         Diagnoses and all orders for this visit:    Encounter for gynecological examination without abnormal finding    Encounter for screening mammogram for malignant neoplasm of breast  -     Mammo screening bilateral w 3d & cad; Future    Dense breast  -     Mammo screening bilateral w 3d & cad; Future    Atrophic vulvovaginitis  -     estradiol (ESTRACE) 0 1 mg/g vaginal cream; Insert 1 g into the vagina 2 (two) times a week          Subjective:      Patient ID: Ju Block is a 62 y o  female  Patient here for yearly exam  No current complaints at this time  Age of first period: 15years old  (1miscarriage)  Lmp: patient is   Last pap: 8/14/15 negative,HPV- (due )  Last mammo: 1/15/18 dense breast BR2 benign (3d)  Colonoscopy: 6/25/15 (due )  Patient is not a smoker  Patient is a social drinker  Patient exercises  Would like to switch back to estrace  Daughter and son-in-law have moved back here from [address]  Has 12month old son  Gynecologic Exam   The patient's pertinent negatives include no genital itching, genital lesions, genital odor, genital rash, pelvic pain, vaginal bleeding or vaginal discharge  The patient is experiencing no pain  Pertinent negatives include no chills, constipation, diarrhea, fever, nausea, painful intercourse, sore throat, urgency or vomiting  She is sexually active  She is postmenopausal        The following portions of the patient's history were reviewed and updated as appropriate:   She  has a past medical history of Anxiety; Breast lump ( ); Dermatitis;  Disease of thyroid gland; Dysuria; and ETD (Eustachian tube dysfunction), bilateral   She   Patient Active Problem List    Diagnosis Date Noted    Encounter for gynecological examination without abnormal finding 10/03/2018    Hot flashes 08/08/2018    Migraine with aura and without status migrainosus, not intractable 02/27/2017    Thyroid disorder 02/23/2016     She  has a past surgical history that includes Tooth extraction and Tubal ligation  Her family history includes Breast cancer in her mother; Cancer in her father; Heart disease in her maternal grandmother; Hypertension in her father  She  reports that she has never smoked  She has never used smokeless tobacco  She reports that she drinks alcohol  She reports that she does not use drugs    Current Outpatient Prescriptions   Medication Sig Dispense Refill    Calcium 150 MG TABS Take by mouth      Chondroitin Sulfate-Vit C-Mn (CHONDROITIN SULFATE COMPLEX) 400-60-2 5 MG CAPS Take by mouth      conjugated estrogens (PREMARIN) vaginal cream Insert into the vagina      eletriptan (RELPAX) 20 MG tablet Take 1 tablet (20 mg total) by mouth once as needed for migraine for up to 1 dose may repeat in 2 hours if necessary 30 tablet 0    Glucosamine-Chondroit-Vit C-Mn (GLUCOSAMINE 1500 COMPLEX) CAPS Take by mouth      hydrocortisone 2 5 % cream       Multiple Vitamins-Minerals (CENTRUM SILVER) tablet Take by mouth      PARoxetine (PAXIL) 10 mg tablet Take 1/2 tab daily 30 tablet 1    rizatriptan (MAXALT) 10 MG tablet Take 1 tablet (10 mg total) by mouth once as needed for migraine for up to 1 dose May repeat in 2 hours if needed 30 tablet 0    terconazole (TERAZOL 3) 0 8 % vaginal cream Insert into the vagina      timolol (TIMOPTIC) 0 25 % ophthalmic solution Apply to eye      cyclobenzaprine (FLEXERIL) 5 mg tablet Take 2 tablets (10 mg total) by mouth 3 (three) times a day as needed for muscle spasms (Patient not taking: Reported on 10/3/2018 ) 30 tablet 0    diclofenac-misoprostol (ARTHROTEC) 75-0 2 MG per tablet Take 1 tablet by mouth 2 (two) times a day (Patient not taking: Reported on 10/3/2018 ) 30 tablet 0    [START ON 10/4/2018] estradiol (ESTRACE) 0 1 mg/g vaginal cream Insert 1 g into the vagina 2 (two) times a week 42 5 g 3    ketoconazole (NIZORAL) 2 % cream        No current facility-administered medications for this visit  Current Outpatient Prescriptions on File Prior to Visit   Medication Sig    Calcium 150 MG TABS Take by mouth    Chondroitin Sulfate-Vit C-Mn (CHONDROITIN SULFATE COMPLEX) 400-60-2 5 MG CAPS Take by mouth    conjugated estrogens (PREMARIN) vaginal cream Insert into the vagina    eletriptan (RELPAX) 20 MG tablet Take 1 tablet (20 mg total) by mouth once as needed for migraine for up to 1 dose may repeat in 2 hours if necessary    Glucosamine-Chondroit-Vit C-Mn (GLUCOSAMINE 1500 COMPLEX) CAPS Take by mouth    hydrocortisone 2 5 % cream     Multiple Vitamins-Minerals (CENTRUM SILVER) tablet Take by mouth    PARoxetine (PAXIL) 10 mg tablet Take 1/2 tab daily    rizatriptan (MAXALT) 10 MG tablet Take 1 tablet (10 mg total) by mouth once as needed for migraine for up to 1 dose May repeat in 2 hours if needed    terconazole (TERAZOL 3) 0 8 % vaginal cream Insert into the vagina    timolol (TIMOPTIC) 0 25 % ophthalmic solution Apply to eye    cyclobenzaprine (FLEXERIL) 5 mg tablet Take 2 tablets (10 mg total) by mouth 3 (three) times a day as needed for muscle spasms (Patient not taking: Reported on 10/3/2018 )    diclofenac-misoprostol (ARTHROTEC) 75-0 2 MG per tablet Take 1 tablet by mouth 2 (two) times a day (Patient not taking: Reported on 10/3/2018 )    ketoconazole (NIZORAL) 2 % cream      No current facility-administered medications on file prior to visit  She is allergic to other and pollen extract       Review of Systems   Constitutional: Negative for activity change, appetite change, chills, fatigue and fever  HENT: Negative for rhinorrhea, sneezing and sore throat      Eyes: Negative for visual disturbance  Respiratory: Negative for cough, shortness of breath and wheezing  Cardiovascular: Negative for chest pain, palpitations and leg swelling  Gastrointestinal: Negative for abdominal distention, constipation, diarrhea, nausea and vomiting  Genitourinary: Negative for difficulty urinating, pelvic pain, urgency and vaginal discharge  Neurological: Negative for syncope and light-headedness  Objective:      BP 90/62 (BP Location: Left arm, Patient Position: Sitting, Cuff Size: Standard)   Ht 5' 10" (1 778 m)   Wt 76 4 kg (168 lb 8 oz)   LMP  (LMP Unknown)   Breastfeeding? No   BMI 24 18 kg/m²          Physical Exam   Genitourinary: No breast swelling, tenderness, discharge or bleeding  There is no rash, tenderness, lesion or injury on the right labia  There is no rash, tenderness, lesion or injury on the left labia  Uterus is not deviated, not enlarged, not fixed and not tender  Cervix exhibits no motion tenderness, no discharge and no friability  Right adnexum displays no mass, no tenderness and no fullness  Left adnexum displays no mass, no tenderness and no fullness  No bleeding in the vagina  No vaginal discharge found     Genitourinary Comments: Thin, atrophic vaginal mucosa

## 2018-10-03 NOTE — PATIENT INSTRUCTIONS
Wellness Visit for Adults   WHAT YOU NEED TO KNOW:   What is a wellness visit? A wellness visit is when you see your healthcare provider to get screened for health problems  You can also get advice on how to stay healthy  Write down your questions so you remember to ask them  Ask your healthcare provider how often you should have a wellness visit  What happens at a wellness visit? Your healthcare provider will ask about your health, and your family history of health problems  This includes high blood pressure, heart disease, and cancer  He or she will ask if you have symptoms that concern you, if you smoke, and about your mood  You may also be asked about your intake of medicines, supplements, food, and alcohol  Any of the following may be done:  · Your weight  will be checked  Your height may also be checked so your body mass index (BMI) can be calculated  Your BMI shows if you are at a healthy weight  · Your blood pressure  and heart rate will be checked  Your temperature may also be checked  · Blood and urine tests  may be done  Blood tests may be done to check your cholesterol levels  Abnormal cholesterol levels increase your risk for heart disease and stroke  You may also need a blood or urine test to check for diabetes if you are at increased risk  Urine tests may be done to look for signs of an infection or kidney disease  · A physical exam  includes checking your heartbeat and lungs with a stethoscope  Your healthcare provider may also check your skin to look for sun damage  · Screening tests  may be recommended  A screening test is done to check for diseases that may not cause symptoms  The screening tests you may need depend on your age, gender, family history, and lifestyle habits  For example, colorectal screening may be recommended if you are 48years old or older  What screening tests do I need if I am a woman? · A Pap smear  is used to screen for cervical cancer   Pap smears are usually done every 3 to 5 years depending on your age  You may need them more often if you have had abnormal Pap smear test results in the past  Ask your healthcare provider how often you should have a Pap smear  · A mammogram  is an x-ray of your breasts to screen for breast cancer  Experts recommend mammograms every 2 years starting at age 48 years  You may need a mammogram at age 52 years or younger if you have an increased risk for breast cancer  Talk to your healthcare provider about when you should start having mammograms and how often you need them  What vaccines might I need? · Get an influenza vaccine  every year  The influenza vaccine protects you from the flu  Several types of viruses cause the flu  The viruses change over time, so new vaccines are made each year  · Get a tetanus-diphtheria (Td) booster vaccine  every 10 years  This vaccine protects you against tetanus and diphtheria  Tetanus is a severe infection that may cause painful muscle spasms and lockjaw  Diphtheria is a severe bacterial infection that causes a thick covering in the back of your mouth and throat  · Get a human papillomavirus (HPV) vaccine  if you are female and aged 23 to 32 or male 23 to 24 and never received it  This vaccine protects you from HPV infection  HPV is the most common infection spread by sexual contact  HPV may also cause vaginal, penile, and anal cancers  · Get a pneumococcal vaccine  if you are aged 72 years or older  The pneumococcal vaccine is an injection given to protect you from pneumococcal disease  Pneumococcal disease is an infection caused by pneumococcal bacteria  The infection may cause pneumonia, meningitis, or an ear infection  · Get a shingles vaccine  if you are aged 61 or older, even if you have had shingles before  The shingles vaccine is an injection to protect you from the varicella-zoster virus  This is the same virus that causes chickenpox   Shingles is a painful rash that develops in people who had chickenpox or have been exposed to the virus  How can I eat healthy? My Plate is a model for planning healthy meals  It shows the types and amounts of foods that should go on your plate  Fruits and vegetables make up about half of your plate, and grains and protein make up the other half  A serving of dairy is included on the side of your plate  The amount of calories and serving sizes you need depends on your age, gender, weight, and height  Examples of healthy foods are listed below:  · Eat a variety of vegetables  such as dark green, red, and orange vegetables  You can also include canned vegetables low in sodium (salt) and frozen vegetables without added butter or sauces  · Eat a variety of fresh fruits , canned fruit in 100% juice, frozen fruit, and dried fruit  · Include whole grains  At least half of the grains you eat should be whole grains  Examples include whole-wheat bread, wheat pasta, brown rice, and whole-grain cereals such as oatmeal     · Eat a variety of protein foods such as seafood (fish and shellfish), lean meat, and poultry without skin (turkey and chicken)  Examples of lean meats include pork leg, shoulder, or tenderloin, and beef round, sirloin, tenderloin, and extra lean ground beef  Other protein foods include eggs and egg substitutes, beans, peas, soy products, nuts, and seeds  · Choose low-fat dairy products such as skim or 1% milk or low-fat yogurt, cheese, and cottage cheese  · Limit unhealthy fats  such as butter, hard margarine, and shortening  How much exercise do I need? Exercise at least 30 minutes per day on most days of the week  Some examples of exercise include walking, biking, dancing, and swimming  You can also fit in more physical activity by taking the stairs instead of the elevator or parking farther away from stores  Include muscle strengthening activities 2 days each week  Regular exercise provides many health benefits  It helps you manage your weight, and decreases your risk for type 2 diabetes, heart disease, stroke, and high blood pressure  Exercise can also help improve your mood  Ask your healthcare provider about the best exercise plan for you  What are some general health and safety guidelines I should follow? · Do not smoke  Nicotine and other chemicals in cigarettes and cigars can cause lung damage  Ask your healthcare provider for information if you currently smoke and need help to quit  E-cigarettes or smokeless tobacco still contain nicotine  Talk to your healthcare provider before you use these products  · Limit alcohol  A drink of alcohol is 12 ounces of beer, 5 ounces of wine, or 1½ ounces of liquor  · Lose weight, if needed  Being overweight increases your risk of certain health conditions  These include heart disease, high blood pressure, type 2 diabetes, and certain types of cancer  · Protect your skin  Do not sunbathe or use tanning beds  Use sunscreen with a SPF 15 or higher  Apply sunscreen at least 15 minutes before you go outside  Reapply sunscreen every 2 hours  Wear protective clothing, hats, and sunglasses when you are outside  · Drive safely  Always wear your seatbelt  Make sure everyone in your car wears a seatbelt  A seatbelt can save your life if you are in an accident  Do not use your cell phone when you are driving  This could distract you and cause an accident  Pull over if you need to make a call or send a text message  · Practice safe sex  Use latex condoms if are sexually active and have more than one partner  Your healthcare provider may recommend screening tests for sexually transmitted infections (STIs)  · Wear helmets, lifejackets, and protective gear  Always wear a helmet when you ride a bike or motorcycle, go skiing, or play sports that could cause a head injury  Wear protective equipment when you play sports   Wear a lifejacket when you are on a boat or doing water sports  CARE AGREEMENT:   You have the right to help plan your care  Learn about your health condition and how it may be treated  Discuss treatment options with your caregivers to decide what care you want to receive  You always have the right to refuse treatment  The above information is an  only  It is not intended as medical advice for individual conditions or treatments  Talk to your doctor, nurse or pharmacist before following any medical regimen to see if it is safe and effective for you  © 2017 [address] Information is for End User's use only and may not be sold, redistributed or otherwise used for commercial purposes  All illustrations and images included in CareNotes® are the copyrighted property of A D A M , Inc  or [employer]

## 2018-10-03 NOTE — ASSESSMENT & PLAN NOTE
Pap/HPV current, due 2020  Mammogram ordered  Colonoscopy current    Encourage healthy diet, exercise, Calcium 1200mg per day and at least 800 iu Vitamin D daily      Topical estrogen for vulvovaginal atrophy

## 2019-01-16 ENCOUNTER — HOSPITAL ENCOUNTER (OUTPATIENT)
Dept: MAMMOGRAPHY | Facility: CLINIC | Age: 59
Discharge: HOME | End: 2019-01-16
Payer: COMMERCIAL

## 2019-01-16 VITALS — WEIGHT: 168 LBS | HEIGHT: 70 IN | BODY MASS INDEX: 24.05 KG/M2

## 2019-01-16 DIAGNOSIS — R92.2 DENSE BREAST: ICD-10-CM

## 2019-01-16 DIAGNOSIS — Z12.31 ENCOUNTER FOR SCREENING MAMMOGRAM FOR MALIGNANT NEOPLASM OF BREAST: ICD-10-CM

## 2019-01-16 PROCEDURE — 77063 BREAST TOMOSYNTHESIS BI: CPT

## 2019-01-16 PROCEDURE — 77067 SCR MAMMO BI INCL CAD: CPT

## 2019-01-30 DIAGNOSIS — G43.109 MIGRAINE WITH AURA AND WITHOUT STATUS MIGRAINOSUS, NOT INTRACTABLE: ICD-10-CM

## 2019-01-30 NOTE — TELEPHONE ENCOUNTER
Patient wants the generic for Relpax called into [facility] mail order  She is actually interested if you can call her personally

## 2019-01-31 ENCOUNTER — TELEPHONE (OUTPATIENT)
Dept: FAMILY MEDICINE CLINIC | Facility: CLINIC | Age: 59
End: 2019-01-31

## 2019-01-31 DIAGNOSIS — G43.109 MIGRAINE WITH AURA AND WITHOUT STATUS MIGRAINOSUS, NOT INTRACTABLE: ICD-10-CM

## 2019-01-31 RX ORDER — ELETRIPTAN HYDROBROMIDE 40 MG/1
40 TABLET, FILM COATED ORAL ONCE AS NEEDED
Qty: 6 TABLET | Refills: 0 | Status: SHIPPED | OUTPATIENT
Start: 2019-01-31 | End: 2019-01-31 | Stop reason: SDUPTHER

## 2019-01-31 RX ORDER — ELETRIPTAN HYDROBROMIDE 20 MG/1
20 TABLET, FILM COATED ORAL ONCE AS NEEDED
Qty: 15 TABLET | Refills: 0 | Status: SHIPPED | OUTPATIENT
Start: 2019-01-31 | End: 2019-01-31

## 2019-01-31 RX ORDER — ELETRIPTAN HYDROBROMIDE 40 MG/1
40 TABLET, FILM COATED ORAL ONCE AS NEEDED
Qty: 16 TABLET | Refills: 0 | Status: SHIPPED | OUTPATIENT
Start: 2019-01-31 | End: 2019-02-18

## 2019-01-31 NOTE — TELEPHONE ENCOUNTER
10:08 Lashaun Newsome MA spoke with insurance, will need #16 tabs to be sent in for 1 month supply  Will resend now  No prior auth needed

## 2019-01-31 NOTE — TELEPHONE ENCOUNTER
T/c to patient as requested  Requesting refill of eletriptan 40 mg for migraines  Would like sent to [facility] pharmacy  Will send now  To make aware if no improvement of symptoms  Spoke pharmacy, medication will require prior auth  Will proceed with prior auth

## 2019-02-07 NOTE — TELEPHONE ENCOUNTER
Called the pharmacy they stated that the med is still coming up as auth needed, I will call the insurancecompany

## 2019-02-07 NOTE — TELEPHONE ENCOUNTER
Patient called in regards to medication she would like to know the status on her prior auth     Patient spoke to pharmacy in regards to [employer] and they said it should be covered once [employer] is done    Please advise patient of status

## 2019-02-07 NOTE — TELEPHONE ENCOUNTER
I called the insurance company again and they said that in fact the patient does need a PA so I asked if they can email me the form and they will e-mail it so that we can do it

## 2019-02-14 ENCOUNTER — TELEPHONE (OUTPATIENT)
Dept: FAMILY MEDICINE CLINIC | Facility: CLINIC | Age: 59
End: 2019-02-14

## 2019-02-14 NOTE — TELEPHONE ENCOUNTER
Christa from [facility] called and for prescription  eltriptan, this has been denied,  Wants her to try naratriptan first   [phone]

## 2019-02-14 NOTE — TELEPHONE ENCOUNTER
The PA was denied they would like the patient to try something else, I looked into goodrx the cheapest I found it was 60 dollars at [address]  I left a message for the patient to let her know that it was denied

## 2019-02-18 DIAGNOSIS — G43.109 MIGRAINE WITH AURA AND WITHOUT STATUS MIGRAINOSUS, NOT INTRACTABLE: ICD-10-CM

## 2019-02-18 DIAGNOSIS — G43.109 MIGRAINE WITH AURA AND WITHOUT STATUS MIGRAINOSUS, NOT INTRACTABLE: Primary | ICD-10-CM

## 2019-02-18 RX ORDER — NARATRIPTAN 2.5 MG/1
2.5 TABLET ORAL ONCE AS NEEDED
Qty: 5 TABLET | Refills: 0 | Status: SHIPPED | OUTPATIENT
Start: 2019-02-18 | End: 2019-02-18 | Stop reason: SDUPTHER

## 2019-02-18 NOTE — TELEPHONE ENCOUNTER
Ins will not pay for eletriptan  They will however pay for naratriptine 2 5mg  Can you send her in a rx? Only for a few days worth because she said if this medication does not work they will pay for the other med

## 2019-02-18 NOTE — TELEPHONE ENCOUNTER
Spoke with the patient she is aware, patient wanted to know if she needs to take this medication with some sort of aspirin or advil  Patient would like the med sent to her home so I requested to have [facility] to [facility]

## 2019-02-18 NOTE — TELEPHONE ENCOUNTER
I sent in the prescription, it had to be printed  Please leave it on my desk for me to sign if needed  If not, please fax to her pharmacy   Thank you

## 2019-02-20 NOTE — TELEPHONE ENCOUNTER
The medication was printed not sent to [facility] she would like it sent to the mail order pharm    Lm for the patient to let her know regarding not taking any other meds

## 2019-02-21 RX ORDER — NARATRIPTAN 2.5 MG/1
2.5 TABLET ORAL ONCE AS NEEDED
Qty: 5 TABLET | Refills: 0 | Status: SHIPPED | OUTPATIENT
Start: 2019-02-21 | End: 2019-04-08 | Stop reason: ALTCHOICE

## 2019-03-01 ENCOUNTER — TELEPHONE (OUTPATIENT)
Dept: FAMILY MEDICINE CLINIC | Facility: CLINIC | Age: 59
End: 2019-03-01

## 2019-03-01 NOTE — TELEPHONE ENCOUNTER
Patient called requesting a referral to see Dr Gato Bacon for her mirgraines so that she can try to get the medication that she needs    Patient would like a call once the referral is in

## 2019-03-04 DIAGNOSIS — G43.109 MIGRAINE WITH AURA AND WITHOUT STATUS MIGRAINOSUS, NOT INTRACTABLE: Primary | ICD-10-CM

## 2019-04-08 ENCOUNTER — CONSULT (OUTPATIENT)
Dept: NEUROLOGY | Facility: CLINIC | Age: 59
End: 2019-04-08
Payer: COMMERCIAL

## 2019-04-08 VITALS
WEIGHT: 172 LBS | HEART RATE: 66 BPM | DIASTOLIC BLOOD PRESSURE: 80 MMHG | SYSTOLIC BLOOD PRESSURE: 110 MMHG | BODY MASS INDEX: 24.08 KG/M2 | HEIGHT: 71 IN

## 2019-04-08 DIAGNOSIS — G43.109 MIGRAINE WITH AURA AND WITHOUT STATUS MIGRAINOSUS, NOT INTRACTABLE: Primary | ICD-10-CM

## 2019-04-08 PROCEDURE — 99243 OFF/OP CNSLTJ NEW/EST LOW 30: CPT | Performed by: PSYCHIATRY & NEUROLOGY

## 2019-04-08 RX ORDER — RIZATRIPTAN BENZOATE 5 MG/1
2 TABLET ORAL DAILY PRN
COMMUNITY
Start: 2019-03-01 | End: 2019-04-08

## 2019-04-08 RX ORDER — BRIMONIDINE TARTRATE/TIMOLOL 0.2%-0.5%
DROPS OPHTHALMIC (EYE) 2 TIMES DAILY
Refills: 3 | COMMUNITY
Start: 2019-01-08 | End: 2021-03-29

## 2019-04-08 RX ORDER — ELETRIPTAN HYDROBROMIDE 40 MG/1
40 TABLET, FILM COATED ORAL ONCE AS NEEDED
Qty: 36 TABLET | Refills: 3 | Status: SHIPPED | OUTPATIENT
Start: 2019-04-08 | End: 2020-10-12 | Stop reason: SDUPTHER

## 2019-04-25 DIAGNOSIS — N95.1 MENOPAUSAL SYMPTOMS: Primary | ICD-10-CM

## 2019-04-25 RX ORDER — PAROXETINE 10 MG/1
TABLET, FILM COATED ORAL
Qty: 30 TABLET | Refills: 2 | Status: SHIPPED | OUTPATIENT
Start: 2019-04-25 | End: 2019-08-29 | Stop reason: SDUPTHER

## 2019-08-29 ENCOUNTER — OFFICE VISIT (OUTPATIENT)
Dept: FAMILY MEDICINE CLINIC | Facility: CLINIC | Age: 59
End: 2019-08-29
Payer: COMMERCIAL

## 2019-08-29 VITALS
DIASTOLIC BLOOD PRESSURE: 68 MMHG | BODY MASS INDEX: 24.08 KG/M2 | WEIGHT: 172 LBS | TEMPERATURE: 99.7 F | RESPIRATION RATE: 16 BRPM | HEIGHT: 71 IN | SYSTOLIC BLOOD PRESSURE: 100 MMHG | HEART RATE: 80 BPM

## 2019-08-29 DIAGNOSIS — Z23 ENCOUNTER FOR IMMUNIZATION: ICD-10-CM

## 2019-08-29 DIAGNOSIS — Z11.9 SCREENING EXAMINATION FOR UNSPECIFIED INFECTIOUS DISEASE: ICD-10-CM

## 2019-08-29 DIAGNOSIS — Z13.29 SCREENING FOR THYROID DISORDER: ICD-10-CM

## 2019-08-29 DIAGNOSIS — Z13.6 SCREENING FOR CARDIOVASCULAR CONDITION: ICD-10-CM

## 2019-08-29 DIAGNOSIS — N95.1 MENOPAUSAL SYMPTOMS: ICD-10-CM

## 2019-08-29 DIAGNOSIS — Z00.00 WELL ADULT EXAM: Primary | ICD-10-CM

## 2019-08-29 DIAGNOSIS — Z13.1 SCREENING FOR DIABETES MELLITUS: ICD-10-CM

## 2019-08-29 PROCEDURE — 90632 HEPA VACCINE ADULT IM: CPT

## 2019-08-29 PROCEDURE — 90471 IMMUNIZATION ADMIN: CPT

## 2019-08-29 PROCEDURE — 99396 PREV VISIT EST AGE 40-64: CPT | Performed by: NURSE PRACTITIONER

## 2019-08-29 RX ORDER — CIPROFLOXACIN 500 MG/1
500 TABLET, FILM COATED ORAL EVERY 24 HOURS
Qty: 3 TABLET | Refills: 0 | Status: SHIPPED | OUTPATIENT
Start: 2019-08-29 | End: 2019-09-01

## 2019-08-29 RX ORDER — PAROXETINE 10 MG/1
5 TABLET, FILM COATED ORAL DAILY
Qty: 30 TABLET | Refills: 2 | Status: SHIPPED | OUTPATIENT
Start: 2019-08-29 | End: 2020-10-12 | Stop reason: SDUPTHER

## 2019-08-29 NOTE — PROGRESS NOTES
Subjective:     Patient ID: Yolande Arteaga is a 62 y o  female  Patient presents for an Work Physical Exam  The patient reports no problems  Yareli Swartz presents for a physical    She obtains dental cleaning and eye exams every 6 months  She tries to exercise regularly and eat healthy  Yareli Swartz is up-to-date on her mammogram and due for a Pap test   Her last colonoscopy was 4-5 years ago  UTD on tdap  She is going to be traveling to [place] [facility], [facility], and [facility] on cruise in October  She will be traveling #15 days total    Yareli Swartz would like titers done to ensure she is immune to measles, mumps, rubella, varicella, and polio  She is thinking about typhoid and malaria prophylaxis  She would like to obtain hepatitis A today  Review of Systems   Constitutional: Negative  HENT: Negative  Eyes: Negative  Respiratory: Negative  Cardiovascular: Negative  Gastrointestinal:         Occasional constipation   Endocrine:          Occasional hot flashes   Musculoskeletal: Negative  Skin: Negative  Allergic/Immunologic: Negative  Neurological: Negative  Hematological: Negative  Psychiatric/Behavioral: Negative            The following portions of the patient's history were reviewed and updated as appropriate:   She   Patient Active Problem List    Diagnosis Date Noted    Screening examination for unspecified infectious disease 08/29/2019    Screening for thyroid disorder 08/29/2019    Screening for cardiovascular condition 08/29/2019    Screening for diabetes mellitus 08/29/2019    Encounter for gynecological examination without abnormal finding 10/03/2018    Hot flashes 08/08/2018    Well adult exam 08/08/2018    Migraine with aura and without status migrainosus, not intractable 02/27/2017    Thyroid disorder 02/23/2016     Current Outpatient Medications   Medication Sig Dispense Refill    CALCIUM PO Take by mouth daily       COMBIGAN 0 2-0 5 % 2 (two) times a day  3    eletriptan (RELPAX) 40 MG tablet Take 1 tablet (40 mg total) by mouth once as needed for migraine for up to 1 dose may repeat in 2 hours if necessary 36 tablet 3    estradiol (ESTRACE) 0 1 mg/g vaginal cream Insert 1 g into the vagina 2 (two) times a week 42 5 g 3    Glucosamine-Chondroitin (GLUCOSAMINE CHONDR COMPLEX PO) Take by mouth daily      Multiple Vitamins-Minerals (CENTRUM SILVER) tablet Take by mouth      PARoxetine (PAXIL) 10 mg tablet Take 0 5 tablets (5 mg total) by mouth daily 30 tablet 2    timolol (TIMOPTIC) 0 25 % ophthalmic solution Administer 1 drop to the right eye 2 (two) times a day       ciprofloxacin (CIPRO) 500 mg tablet Take 1 tablet (500 mg total) by mouth every 24 hours for 3 days 3 tablet 0     No current facility-administered medications for this visit  She is allergic to other and pollen extract       No results found for this or any previous visit      No exam data present    /68   Pulse 80   Temp 99 7 °F (37 6 °C) (Tympanic)   Resp 16   Ht 5' 10 75" (1 797 m)   Wt 78 kg (172 lb)   LMP  (LMP Unknown)   BMI 24 16 kg/m²   Social History     Socioeconomic History    Marital status: /Civil Union     Spouse name: Not on file    Number of children: Not on file    Years of education: Not on file    Highest education level: Not on file   Occupational History     Comment: Retired   Social Needs    Financial resource strain: Not on file    Food insecurity:     Worry: Not on file     Inability: Not on file   [employer] needs:     Medical: Not on file     Non-medical: Not on file   Tobacco Use    Smoking status: Never Smoker    Smokeless tobacco: Never Used   Substance and Sexual Activity    Alcohol use: Yes     Comment: occasionally    Drug use: No    Sexual activity: Yes     Partners: Male     Birth control/protection: Post-menopausal   Lifestyle    Physical activity:     Days per week: Not on file     Minutes per session: Not on file    Stress: Not on file   Relationships    Social connections:     Talks on phone: Not on file     Gets together: Not on file     Attends Gnosticist service: Not on file     Active member of club or organization: Not on file     Attends meetings of clubs or organizations: Not on file     Relationship status: Not on file    Intimate partner violence:     Fear of current or ex partner: Not on file     Emotionally abused: Not on file     Physically abused: Not on file     Forced sexual activity: Not on file   Other Topics Concern    Not on file   Social History Narrative    Activities:  Dance    Always uses seat belt    Exercise:  Walking    Lives with      Past Medical History:   Diagnosis Date    Anxiety     last assessed 02/27/2017    Breast lump      last assessed 01/16/2017    Dermatitis     last assessed 02/27/2017    Disease of thyroid gland     last assessed 02/23/2016    Dysuria     last assessed 04/28/2017    ETD (Eustachian tube dysfunction), bilateral     last assessed 02/23/2016     Family History   Problem Relation Age of Onset    Breast cancer Mother 52    Cancer Father     Hypertension Father     Rectal cancer Father     Migraines Father     Heart disease Maternal Grandmother     Throat cancer Brother       Past Surgical History:   Procedure Laterality Date    BREAST BIOPSY Right     2 areas benign    TOOTH EXTRACTION      TUBAL LIGATION         Current Outpatient Medications:     CALCIUM PO, Take by mouth daily , Disp: , Rfl:     COMBIGAN 0 2-0 5 %, 2 (two) times a day, Disp: , Rfl: 3    eletriptan (RELPAX) 40 MG tablet, Take 1 tablet (40 mg total) by mouth once as needed for migraine for up to 1 dose may repeat in 2 hours if necessary, Disp: 36 tablet, Rfl: 3    estradiol (ESTRACE) 0 1 mg/g vaginal cream, Insert 1 g into the vagina 2 (two) times a week, Disp: 42 5 g, Rfl: 3    Glucosamine-Chondroitin (GLUCOSAMINE CHONDR COMPLEX PO), Take by mouth daily, Disp: , Rfl:     Multiple Vitamins-Minerals (CENTRUM SILVER) tablet, Take by mouth, Disp: , Rfl:     PARoxetine (PAXIL) 10 mg tablet, Take 0 5 tablets (5 mg total) by mouth daily, Disp: 30 tablet, Rfl: 2    timolol (TIMOPTIC) 0 25 % ophthalmic solution, Administer 1 drop to the right eye 2 (two) times a day , Disp: , Rfl:     ciprofloxacin (CIPRO) 500 mg tablet, Take 1 tablet (500 mg total) by mouth every 24 hours for 3 days, Disp: 3 tablet, Rfl: 0      Colonoscopy 4-5 years ago; normal       Screening Labs obtained 1 year ago    Preventive screening:  Last mammogram: normal Date: 1/2019  Last pap: normal Date: 2015      Objective:     Physical Exam   Constitutional: She is oriented to person, place, and time  She appears well-developed and well-nourished  No distress  HENT:   Head: Normocephalic and atraumatic  Right Ear: External ear normal    Left Ear: External ear normal    Nose: Nose normal    Mouth/Throat: Oropharynx is clear and moist    Eyes: Pupils are equal, round, and reactive to light  Conjunctivae and EOM are normal    Neck: Normal range of motion  Neck supple  Cardiovascular: Normal rate, regular rhythm and normal heart sounds  No murmur heard  Pulmonary/Chest: Effort normal and breath sounds normal  No respiratory distress  She has no wheezes  She has no rales  She exhibits no tenderness  Abdominal: Soft  Bowel sounds are normal  She exhibits no distension and no mass  There is no tenderness  There is no rebound and no guarding  Musculoskeletal: Normal range of motion  She exhibits no edema, tenderness or deformity  Lymphadenopathy:     She has no cervical adenopathy  Neurological: She is alert and oriented to person, place, and time  No cranial nerve deficit  Skin: Skin is warm and dry  No rash noted  No erythema  No pallor  Psychiatric: She has a normal mood and affect  Her behavior is normal  Judgment and thought content normal    Nursing note and vitals reviewed          Assessment/Plan:   Well adult exam  Up-to-date on mammogram and colonoscopy  Keep upcoming appointment with gynecologist in the fall  To obtain labs, will call with results  Follow-up as needed or in 1 year  Screening examination for unspecified infectious disease  Advised to obtain titers to ensure immunity  Discussed checking with insurance prior to ensure coverage  Hep A #1 administered today

## 2019-09-11 ENCOUNTER — APPOINTMENT (OUTPATIENT)
Dept: LAB | Facility: CLINIC | Age: 59
End: 2019-09-11
Payer: COMMERCIAL

## 2019-09-11 DIAGNOSIS — Z11.9 SCREENING EXAMINATION FOR UNSPECIFIED INFECTIOUS DISEASE: ICD-10-CM

## 2019-09-11 DIAGNOSIS — Z13.1 SCREENING FOR DIABETES MELLITUS: ICD-10-CM

## 2019-09-11 DIAGNOSIS — Z13.29 SCREENING FOR THYROID DISORDER: ICD-10-CM

## 2019-09-11 DIAGNOSIS — Z13.6 SCREENING FOR CARDIOVASCULAR CONDITION: ICD-10-CM

## 2019-09-11 LAB
ALBUMIN SERPL BCP-MCNC: 3.8 G/DL (ref 3.5–5)
ALP SERPL-CCNC: 56 U/L (ref 46–116)
ALT SERPL W P-5'-P-CCNC: 23 U/L (ref 12–78)
ANION GAP SERPL CALCULATED.3IONS-SCNC: 3 MMOL/L (ref 4–13)
AST SERPL W P-5'-P-CCNC: 12 U/L (ref 5–45)
BASOPHILS # BLD AUTO: 0.01 THOUSANDS/ΜL (ref 0–0.1)
BASOPHILS NFR BLD AUTO: 0 % (ref 0–1)
BILIRUB SERPL-MCNC: 0.59 MG/DL (ref 0.2–1)
BUN SERPL-MCNC: 16 MG/DL (ref 5–25)
CALCIUM SERPL-MCNC: 8.9 MG/DL (ref 8.3–10.1)
CHLORIDE SERPL-SCNC: 106 MMOL/L (ref 100–108)
CHOLEST SERPL-MCNC: 188 MG/DL (ref 50–200)
CO2 SERPL-SCNC: 27 MMOL/L (ref 21–32)
CREAT SERPL-MCNC: 0.64 MG/DL (ref 0.6–1.3)
EOSINOPHIL # BLD AUTO: 0.12 THOUSAND/ΜL (ref 0–0.61)
EOSINOPHIL NFR BLD AUTO: 3 % (ref 0–6)
ERYTHROCYTE [DISTWIDTH] IN BLOOD BY AUTOMATED COUNT: 12.4 % (ref 11.6–15.1)
GFR SERPL CREATININE-BSD FRML MDRD: 99 ML/MIN/1.73SQ M
GLUCOSE P FAST SERPL-MCNC: 84 MG/DL (ref 65–99)
HCT VFR BLD AUTO: 42.6 % (ref 34.8–46.1)
HDLC SERPL-MCNC: 67 MG/DL (ref 40–60)
HGB BLD-MCNC: 14 G/DL (ref 11.5–15.4)
IMM GRANULOCYTES # BLD AUTO: 0.01 THOUSAND/UL (ref 0–0.2)
IMM GRANULOCYTES NFR BLD AUTO: 0 % (ref 0–2)
LDLC SERPL CALC-MCNC: 107 MG/DL (ref 0–100)
LYMPHOCYTES # BLD AUTO: 1.16 THOUSANDS/ΜL (ref 0.6–4.47)
LYMPHOCYTES NFR BLD AUTO: 30 % (ref 14–44)
MCH RBC QN AUTO: 31.3 PG (ref 26.8–34.3)
MCHC RBC AUTO-ENTMCNC: 32.9 G/DL (ref 31.4–37.4)
MCV RBC AUTO: 95 FL (ref 82–98)
MONOCYTES # BLD AUTO: 0.36 THOUSAND/ΜL (ref 0.17–1.22)
MONOCYTES NFR BLD AUTO: 9 % (ref 4–12)
NEUTROPHILS # BLD AUTO: 2.24 THOUSANDS/ΜL (ref 1.85–7.62)
NEUTS SEG NFR BLD AUTO: 58 % (ref 43–75)
NRBC BLD AUTO-RTO: 0 /100 WBCS
PLATELET # BLD AUTO: 214 THOUSANDS/UL (ref 149–390)
PMV BLD AUTO: 10.8 FL (ref 8.9–12.7)
POTASSIUM SERPL-SCNC: 4.1 MMOL/L (ref 3.5–5.3)
PROT SERPL-MCNC: 6.8 G/DL (ref 6.4–8.2)
RBC # BLD AUTO: 4.47 MILLION/UL (ref 3.81–5.12)
SODIUM SERPL-SCNC: 136 MMOL/L (ref 136–145)
TRIGL SERPL-MCNC: 69 MG/DL
TSH SERPL DL<=0.05 MIU/L-ACNC: 0.53 UIU/ML (ref 0.36–3.74)
WBC # BLD AUTO: 3.9 THOUSAND/UL (ref 4.31–10.16)

## 2019-09-11 PROCEDURE — 80053 COMPREHEN METABOLIC PANEL: CPT

## 2019-09-11 PROCEDURE — 36415 COLL VENOUS BLD VENIPUNCTURE: CPT

## 2019-09-11 PROCEDURE — 85025 COMPLETE CBC W/AUTO DIFF WBC: CPT

## 2019-09-11 PROCEDURE — 80061 LIPID PANEL: CPT

## 2019-09-11 PROCEDURE — 84443 ASSAY THYROID STIM HORMONE: CPT

## 2019-10-09 ENCOUNTER — TELEPHONE (OUTPATIENT)
Dept: FAMILY MEDICINE CLINIC | Facility: CLINIC | Age: 59
End: 2019-10-09

## 2019-10-09 DIAGNOSIS — A01.00: Primary | ICD-10-CM

## 2019-10-09 NOTE — TELEPHONE ENCOUNTER
The medication is called vivotif 4 tablets to [facility] [phone]    She needs to start as soon as possible

## 2019-10-09 NOTE — TELEPHONE ENCOUNTER
Written for qty of 14  Typically it is 4 capsules    Please clarify quantity   [facility]: [phone], 0

## 2019-10-11 ENCOUNTER — IMMUNIZATIONS (OUTPATIENT)
Dept: FAMILY MEDICINE CLINIC | Facility: CLINIC | Age: 59
End: 2019-10-11
Payer: COMMERCIAL

## 2019-10-11 DIAGNOSIS — Z23 ENCOUNTER FOR IMMUNIZATION: ICD-10-CM

## 2019-10-11 PROCEDURE — 90471 IMMUNIZATION ADMIN: CPT

## 2019-10-11 PROCEDURE — 90682 RIV4 VACC RECOMBINANT DNA IM: CPT

## 2019-10-14 ENCOUNTER — ANNUAL EXAM (OUTPATIENT)
Dept: OBGYN CLINIC | Facility: CLINIC | Age: 59
End: 2019-10-14
Payer: COMMERCIAL

## 2019-10-14 VITALS
BODY MASS INDEX: 23.8 KG/M2 | DIASTOLIC BLOOD PRESSURE: 64 MMHG | WEIGHT: 170 LBS | SYSTOLIC BLOOD PRESSURE: 102 MMHG | HEIGHT: 71 IN

## 2019-10-14 DIAGNOSIS — N95.2 ATROPHIC VULVOVAGINITIS: ICD-10-CM

## 2019-10-14 DIAGNOSIS — Z12.31 ENCOUNTER FOR SCREENING MAMMOGRAM FOR MALIGNANT NEOPLASM OF BREAST: ICD-10-CM

## 2019-10-14 DIAGNOSIS — Z01.419 ENCOUNTER FOR GYNECOLOGICAL EXAMINATION WITHOUT ABNORMAL FINDING: Primary | ICD-10-CM

## 2019-10-14 DIAGNOSIS — R92.2 DENSE BREAST: ICD-10-CM

## 2019-10-14 PROCEDURE — 99396 PREV VISIT EST AGE 40-64: CPT | Performed by: OBSTETRICS & GYNECOLOGY

## 2019-10-14 RX ORDER — ESTRADIOL 0.1 MG/G
1 CREAM VAGINAL 2 TIMES WEEKLY
Qty: 42.5 G | Refills: 3 | Status: SHIPPED | OUTPATIENT
Start: 2019-10-14 | End: 2020-11-11 | Stop reason: SDUPTHER

## 2019-10-14 NOTE — PROGRESS NOTES
Assessment/Plan:    Encounter for gynecological examination without abnormal finding  Pap/HPV current  Mammogram ordered  Colonoscopy current      Encourage healthy diet, exercise, Calcium 1200mg per day and at least 800 iu Vitamin D daily  Diagnoses and all orders for this visit:    Encounter for gynecological examination without abnormal finding    Encounter for screening mammogram for malignant neoplasm of breast  -     Mammo screening bilateral w 3d & cad; Future    Dense breast  -     Mammo screening bilateral w 3d & cad; Future    Atrophic vulvovaginitis  -     estradiol (ESTRACE) 0 1 mg/g vaginal cream; Insert 1 g into the vagina 2 (two) times a week          Subjective:      Patient ID: Shahram Young is a 61 y o  female  Patient here for yearly exam  No current complaints at this time  Age of first period: 15years old  (1miscarriage)  Lmp: patient is   Last pap: 8/14/15 negative,HPV- (due )  Last mammo: 19 dense breast BR1- (3D)  Colonoscopy: 6/25/15 (due )  Patient is not a smoker  Patient is a social drinker  Patient exercises  Son, wife and grandson now live closer (used to be in [place])  Patrick Zavala is 2  So much danis when Renée Monique speaks of him  Gynecologic Exam   The patient's pertinent negatives include no genital itching, genital lesions, genital odor, genital rash, pelvic pain, vaginal bleeding or vaginal discharge  The patient is experiencing no pain  Associated symptoms include painful intercourse  Pertinent negatives include no chills, constipation, diarrhea, fever, frequency, nausea, sore throat, urgency or vomiting  She is sexually active   She is postmenopausal        The following portions of the patient's history were reviewed and updated as appropriate:   She  has a past medical history of Anxiety, Breast lump ( ), Dermatitis, Disease of thyroid gland, Dysuria, and ETD (Eustachian tube dysfunction), bilateral   She   Patient Active Problem List Diagnosis Date Noted    Screening examination for unspecified infectious disease 08/29/2019    Screening for thyroid disorder 08/29/2019    Screening for cardiovascular condition 08/29/2019    Screening for diabetes mellitus 08/29/2019    Encounter for gynecological examination without abnormal finding 10/03/2018    Hot flashes 08/08/2018    Well adult exam 08/08/2018    Migraine with aura and without status migrainosus, not intractable 02/27/2017    Thyroid disorder 02/23/2016     She  has a past surgical history that includes Tooth extraction; Tubal ligation; and Breast biopsy (Right)  Her family history includes Breast cancer (age of onset: 52) in her mother; Cancer in her father; Heart disease in her maternal grandmother; Hypertension in her father; Migraines in her father; Rectal cancer in her father; Throat cancer in her brother  She  reports that she has never smoked  She has never used smokeless tobacco  She reports that she drinks alcohol  She reports that she does not use drugs    Current Outpatient Medications   Medication Sig Dispense Refill    CALCIUM PO Take by mouth daily       COMBIGAN 0 2-0 5 % 2 (two) times a day  3    eletriptan (RELPAX) 40 MG tablet Take 1 tablet (40 mg total) by mouth once as needed for migraine for up to 1 dose may repeat in 2 hours if necessary 36 tablet 3    estradiol (ESTRACE) 0 1 mg/g vaginal cream Insert 1 g into the vagina 2 (two) times a week 42 5 g 3    Glucosamine-Chondroitin (GLUCOSAMINE CHONDR COMPLEX PO) Take by mouth daily      Multiple Vitamins-Minerals (CENTRUM SILVER) tablet Take by mouth      PARoxetine (PAXIL) 10 mg tablet Take 0 5 tablets (5 mg total) by mouth daily 30 tablet 2    timolol (TIMOPTIC) 0 25 % ophthalmic solution Administer 1 drop to the right eye 2 (two) times a day       typhoid live vaccine (VIVOTIF) DR capsule Take 1 capsule by mouth every other day (Patient not taking: Reported on 10/14/2019) 14 capsule 0     No current facility-administered medications for this visit  Current Outpatient Medications on File Prior to Visit   Medication Sig    CALCIUM PO Take by mouth daily     COMBIGAN 0 2-0 5 % 2 (two) times a day    eletriptan (RELPAX) 40 MG tablet Take 1 tablet (40 mg total) by mouth once as needed for migraine for up to 1 dose may repeat in 2 hours if necessary    Glucosamine-Chondroitin (GLUCOSAMINE CHONDR COMPLEX PO) Take by mouth daily    Multiple Vitamins-Minerals (CENTRUM SILVER) tablet Take by mouth    PARoxetine (PAXIL) 10 mg tablet Take 0 5 tablets (5 mg total) by mouth daily    timolol (TIMOPTIC) 0 25 % ophthalmic solution Administer 1 drop to the right eye 2 (two) times a day     [DISCONTINUED] estradiol (ESTRACE) 0 1 mg/g vaginal cream Insert 1 g into the vagina 2 (two) times a week    typhoid live vaccine (VIVOTIF) DR capsule Take 1 capsule by mouth every other day (Patient not taking: Reported on 10/14/2019)     No current facility-administered medications on file prior to visit  She is allergic to other and pollen extract       Review of Systems   Constitutional: Negative for activity change, appetite change, chills, fatigue and fever  HENT: Negative for rhinorrhea, sneezing and sore throat  Eyes: Negative for visual disturbance  Respiratory: Negative for cough, shortness of breath and wheezing  Cardiovascular: Negative for chest pain, palpitations and leg swelling  Gastrointestinal: Negative for abdominal distention, constipation, diarrhea, nausea and vomiting  Genitourinary: Negative for difficulty urinating, frequency, pelvic pain, urgency and vaginal discharge  Neurological: Negative for syncope and light-headedness  Objective:      /64 (BP Location: Left arm, Patient Position: Sitting, Cuff Size: Standard)   Ht 5' 10 75" (1 797 m)   Wt 77 1 kg (170 lb)   LMP  (LMP Unknown)   Breastfeeding?  No   BMI 23 88 kg/m²          Physical Exam   Constitutional: She appears well-developed and well-nourished  No distress  Pulmonary/Chest: Right breast exhibits no inverted nipple, no mass, no nipple discharge, no skin change and no tenderness  Left breast exhibits no inverted nipple, no mass, no nipple discharge, no skin change and no tenderness  No breast tenderness, discharge or bleeding  Breasts are symmetrical    Abdominal: Soft  Bowel sounds are normal  She exhibits no distension and no mass  There is no tenderness  There is no rebound and no guarding  Genitourinary: No breast tenderness, discharge or bleeding  There is no rash, tenderness, lesion or injury on the right labia  There is no rash, tenderness, lesion or injury on the left labia  Uterus is not deviated, not enlarged, not fixed and not tender  Cervix exhibits no motion tenderness, no discharge and no friability  Right adnexum displays no mass, no tenderness and no fullness  Left adnexum displays no mass, no tenderness and no fullness  No bleeding in the vagina  No vaginal discharge found  Genitourinary Comments: Urethral meatus- no lesions, non tender  Urethra non tender  Bladder non tender  Thin, atrophic vaginal mucosa   Skin: Skin is warm and dry  No rash noted  She is not diaphoretic  No erythema  No pallor  Psychiatric: She has a normal mood and affect

## 2019-10-14 NOTE — ASSESSMENT & PLAN NOTE
Pap/HPV current  Mammogram ordered  Colonoscopy current      Encourage healthy diet, exercise, Calcium 1200mg per day and at least 800 iu Vitamin D daily

## 2019-10-14 NOTE — PATIENT INSTRUCTIONS

## 2020-01-14 ENCOUNTER — TELEPHONE (OUTPATIENT)
Dept: FAMILY MEDICINE CLINIC | Facility: CLINIC | Age: 60
End: 2020-01-14

## 2020-01-14 NOTE — TELEPHONE ENCOUNTER
Requesting a insurance referral for a ophthalmologist       [facility]  Dr Nabil Greene  Phone: [phone]

## 2020-02-03 ENCOUNTER — HOSPITAL ENCOUNTER (OUTPATIENT)
Dept: MAMMOGRAPHY | Facility: CLINIC | Age: 60
Discharge: HOME | End: 2020-02-03
Payer: COMMERCIAL

## 2020-02-03 VITALS — BODY MASS INDEX: 23.8 KG/M2 | WEIGHT: 170 LBS | HEIGHT: 71 IN

## 2020-02-03 DIAGNOSIS — R92.2 DENSE BREAST: ICD-10-CM

## 2020-02-03 DIAGNOSIS — Z12.31 ENCOUNTER FOR SCREENING MAMMOGRAM FOR MALIGNANT NEOPLASM OF BREAST: ICD-10-CM

## 2020-02-03 PROCEDURE — 77063 BREAST TOMOSYNTHESIS BI: CPT

## 2020-02-03 PROCEDURE — 77067 SCR MAMMO BI INCL CAD: CPT

## 2020-02-14 ENCOUNTER — TELEPHONE (OUTPATIENT)
Dept: OBGYN CLINIC | Facility: CLINIC | Age: 60
End: 2020-02-14

## 2020-02-14 NOTE — TELEPHONE ENCOUNTER
Spoke with Pt today via phone call  Pt states "she has been having light "pinkish" vaginal spotting/discharge twice yesterday, none today "  Pt further states she was told to contact physician if she every had spotting after menopause  Pt scheduled for an appointment on 2/17/20 @ 11:20 am with Guillermo Winn [facility] office), instructed to arrive by 11:05 am   Reiterated to Pt to continue to monitor symptoms, if symptoms return/worsen to call back office

## 2020-02-17 ENCOUNTER — OFFICE VISIT (OUTPATIENT)
Dept: OBGYN CLINIC | Facility: CLINIC | Age: 60
End: 2020-02-17
Payer: COMMERCIAL

## 2020-02-17 VITALS
HEIGHT: 71 IN | SYSTOLIC BLOOD PRESSURE: 102 MMHG | WEIGHT: 169 LBS | DIASTOLIC BLOOD PRESSURE: 70 MMHG | BODY MASS INDEX: 23.66 KG/M2

## 2020-02-17 DIAGNOSIS — N84.1 POLYP AT CERVICAL OS: ICD-10-CM

## 2020-02-17 DIAGNOSIS — N95.0 PMB (POSTMENOPAUSAL BLEEDING): Primary | ICD-10-CM

## 2020-02-17 PROBLEM — N93.9 ABNORMAL UTERINE BLEEDING (AUB): Status: ACTIVE | Noted: 2020-02-17

## 2020-02-17 PROCEDURE — 3008F BODY MASS INDEX DOCD: CPT | Performed by: NURSE PRACTITIONER

## 2020-02-17 PROCEDURE — 1036F TOBACCO NON-USER: CPT | Performed by: NURSE PRACTITIONER

## 2020-02-17 PROCEDURE — 99213 OFFICE O/P EST LOW 20 MIN: CPT | Performed by: NURSE PRACTITIONER

## 2020-02-17 RX ORDER — DORZOLAMIDE HYDROCHLORIDE AND TIMOLOL MALEATE 20; 5 MG/ML; MG/ML
SOLUTION/ DROPS OPHTHALMIC
COMMUNITY
Start: 2019-11-20 | End: 2020-10-12

## 2020-02-17 RX ORDER — BESIFLOXACIN 6 MG/ML
SUSPENSION OPHTHALMIC
COMMUNITY
Start: 2020-01-30 | End: 2021-03-29

## 2020-02-17 NOTE — PATIENT INSTRUCTIONS
Endometrial Biopsy   WHAT YOU NEED TO KNOW:   Endometrial biopsy is a procedure to remove a tissue sample from the lining of your uterus  This procedure is done through your vagina  HOW TO PREPARE:   Before your procedure:   · Write down the correct date, time, and location of your procedure  · Ask your healthcare provider if you will be able to drive home after the procedure  You may need to have someone drive you home  · Ask your caregiver if you need to stop using aspirin or any other prescribed or over-the-counter medicine before your procedure or surgery  · Bring your medicine bottles or a list of your medicines when you see your caregiver  Tell your caregiver if you are allergic to any medicine  Tell your caregiver if you use any herbs, food supplements, or over-the-counter medicine  · Tell your caregiver if you know or think you might be pregnant  · Tell your healthcare provider if you have a blood disorder or have had a bleeding problem in the past      · You may need to take an NSAID before your procedure to lessen the pain  Follow your healthcare provider's instruction on when to take it  The day of your procedure:   · You or a close family member will be asked to sign a legal document called a consent form  It gives caregivers permission to do the procedure or surgery  It also explains the problems that may happen, and your choices  Make sure all your questions are answered before you sign this form  · You may need a blood or urine test before your procedure to make sure you are not pregnant  Talk to your healthcare provider about these or other tests you may need  Write down the date, time, and location for each test   WHAT WILL HAPPEN:   What will happen: You will be awake during the procedure  You will lie on a table and put your feet in stirrups  Your healthcare provider may do a pelvic exam first to check for any problems   An ultrasound or hysteroscope (tube with a light and a camera on the end) may be used before the procedure to see inside the uterus to find the best spot to get the tissue sample  Your healthcare provider will then insert a speculum into your vagina  This is the same tool used during a pap smear  The speculum allows your healthcare provider to see inside your vagina to your cervix  He may need to numb your cervix  Your healthcare provider will insert a small tube into your vagina and cervix to remove a piece of tissue from the lining of your uterus  The tissue sample will be sent to a lab to be tested  After your procedure:  Do not get up until your healthcare provider says it is okay  When your healthcare provider sees that you are okay, you may go home  CONTACT YOUR HEALTHCARE PROVIDER IF:   · You cannot make it to your procedure  · You have a fever  · You get a cold or the flu  · You have questions or concerns about your procedure  SEEK CARE IMMEDIATELY IF:   · The problems for which you are having the procedure get worse  RISKS:   You could get an infection after your procedure  Your uterus may be damaged which can cause heavy bleeding and pain  If this happens, you may need surgery to repair the damage  If this procedure is not done, your signs and symptoms may get worse or a serious condition such as cancer could be missed  CARE AGREEMENT:   You have the right to help plan your care  Learn about your health condition and how it may be treated  Discuss treatment options with your caregivers to decide what care you want to receive  You always have the right to refuse treatment  © 2017 [address] Information is for End User's use only and may not be sold, redistributed or otherwise used for commercial purposes  All illustrations and images included in CareNotes® are the copyrighted property of [employer] , [employer]  or [employer]  The above information is an  only   It is not intended as medical advice for individual conditions or treatments  Talk to your doctor, nurse or pharmacist before following any medical regimen to see if it is safe and effective for you

## 2020-02-17 NOTE — PROGRESS NOTES
Diagnoses and all orders for this visit:    1  Post Menopausal bleeding (PMB)/2  Polyp at cervical os  Believe bleeding is from polyp, immediate blood with touching during assessment  Pt declined EMB today, wants to see DEWAYNE  Reviewed that next visit may not be until a few months  Also open area at labia on left side, pt believes occurred during intercourse  Continue with estradiol as ordered, can complete Ultrasound and if earlier appt is needed will discuss with KTM  Other orders  -     Cancel: Tissue Exam  -     dorzolamide-timolol (COSOPT) 22 3-6 8 MG/ML ophthalmic solution  -     BESIVANCE 0 6 % SUSP          All questions and concerns answered  Call with any questions  Pleasant 61 y o  female presents today with c/o pale, pink vaginal on Thursday morning during one occurence  She reports using her estradiol the night before  She has needed an EMB in the past for "fibroids" she states results were benign with previous GYN provider  She was instructed by DEWAYNE to report any vaginal bleeding  She is using her estradiol twice weekly as directed  She denies any vaginal, urinary symptoms, pelvic pain or cramping, F/C  Vitals:    02/17/20 1124   BP: 102/70   Weight: 76 7 kg (169 lb)   Height: 5' 10 75" (1 797 m)     Body mass index is 23 74 kg/m²      Allergies   Allergen Reactions    Other Itching and Other (See Comments)     Monostat    Pollen Extract Itching, Swelling and Sneezing       Past Medical History:   Diagnosis Date    Anxiety     last assessed 02/27/2017    Breast lump      last assessed 01/16/2017    Dermatitis     last assessed 02/27/2017    Disease of thyroid gland     last assessed 02/23/2016    Dysuria     last assessed 04/28/2017    ETD (Eustachian tube dysfunction), bilateral     last assessed 02/23/2016     Past Surgical History:   Procedure Laterality Date    BREAST BIOPSY Right 2009    2 areas benign    TOOTH EXTRACTION      TUBAL LIGATION       Family History   Problem Relation Age of Onset    Breast cancer Mother 52    Cancer Father     Hypertension Father     Rectal cancer Father    Judah Thomas Migraines Father     Heart disease Maternal Grandmother     Endometrial cancer Maternal Grandmother         unknown age   Judah Thomas Throat cancer Brother     No Known Problems Sister     No Known Problems Daughter     No Known Problems Paternal Grandmother     No Known Problems Paternal Aunt      Social History     Tobacco Use    Smoking status: Never Smoker    Smokeless tobacco: Never Used   Substance Use Topics    Alcohol use: Yes     Comment: occasionally    Drug use: No       Current Outpatient Medications:     BESIVANCE 0 6 % SUSP, , Disp: , Rfl:     CALCIUM PO, Take by mouth daily , Disp: , Rfl:     COMBIGAN 0 2-0 5 %, 2 (two) times a day, Disp: , Rfl: 3    dorzolamide-timolol (COSOPT) 22 3-6 8 MG/ML ophthalmic solution, , Disp: , Rfl:     eletriptan (RELPAX) 40 MG tablet, Take 1 tablet (40 mg total) by mouth once as needed for migraine for up to 1 dose may repeat in 2 hours if necessary, Disp: 36 tablet, Rfl: 3    estradiol (ESTRACE) 0 1 mg/g vaginal cream, Insert 1 g into the vagina 2 (two) times a week, Disp: 42 5 g, Rfl: 3    Glucosamine-Chondroitin (GLUCOSAMINE CHONDR COMPLEX PO), Take by mouth daily, Disp: , Rfl:     Multiple Vitamins-Minerals (CENTRUM SILVER) tablet, Take by mouth, Disp: , Rfl:     PARoxetine (PAXIL) 10 mg tablet, Take 0 5 tablets (5 mg total) by mouth daily, Disp: 30 tablet, Rfl: 2    timolol (TIMOPTIC) 0 25 % ophthalmic solution, Administer 1 drop to the right eye 2 (two) times a day , Disp: , Rfl:     OB History    Para Term  AB Living   5 4 4   1 4   SAB TAB Ectopic Multiple Live Births   1       4      # Outcome Date GA Lbr Guerrero/2nd Weight Sex Delivery Anes PTL Lv   5 SAB            4 Term            3 Term            2 Term            1 Term               Obstetric Comments   Menarche: 15 y/o        Review of Systems Review of Systems   Constitutional: Negative for chills, fatigue, fever and unexpected weight change  Respiratory: Negative for shortness of breath  Gastrointestinal: Negative for anal bleeding, blood in stool, constipation and diarrhea  Genitourinary: Negative for difficulty urinating, dysuria and hematuria  OBGyn Exam     Physical Exam   Constitutional: She appears well-developed and well-nourished  No distress  Head: Normocephalic  Neck: Normal range of motion  Neck supple  Pulmonary: Effort normal  Lung sounds clear  Cardiac: S1 & S2, Regular, rate and rhythm  Abdominal: Soft  Non-tender  Pelvic exam was performed with patient supine  No labial fusion, mild thinning w/o leukoplakia  There is no rash, tenderness, lesion or injury on the right labia  There is no rash, tenderness, lesion, ++ injury on the left labia  Uterus is not deviated, not enlarged, not fixed and not tender  Cervix exhibits no motion tenderness, no discharge and + friability, Small polyp flush with os, bleeding when touched  Right adnexum displays no mass, no tenderness and no fullness  Left adnexum displays no mass, no tenderness and no fullness  No erythema or tenderness in the vagina, R/T vaginal dryness  Mild signs of atrophy, w/o erosion, shortening and tightening of vaginal canal  No foreign body in the vagina  No signs of injury around the vagina  No vaginal discharge found  Prolapse: Moderate pelvic floor weakness    Lymphadenopathy:          Right: No inguinal adenopathy present  Left: No inguinal adenopathy present

## 2020-03-02 ENCOUNTER — HOSPITAL ENCOUNTER (OUTPATIENT)
Dept: ULTRASOUND IMAGING | Facility: HOSPITAL | Age: 60
Discharge: HOME | End: 2020-03-02
Payer: COMMERCIAL

## 2020-03-02 DIAGNOSIS — N84.1 POLYP AT CERVICAL OS: ICD-10-CM

## 2020-03-02 PROCEDURE — 76830 TRANSVAGINAL US NON-OB: CPT

## 2020-03-02 PROCEDURE — 76856 US EXAM PELVIC COMPLETE: CPT

## 2020-03-03 ENCOUNTER — PROCEDURE VISIT (OUTPATIENT)
Dept: OBGYN CLINIC | Facility: CLINIC | Age: 60
End: 2020-03-03
Payer: COMMERCIAL

## 2020-03-03 VITALS
DIASTOLIC BLOOD PRESSURE: 74 MMHG | BODY MASS INDEX: 23.66 KG/M2 | HEIGHT: 71 IN | WEIGHT: 169 LBS | SYSTOLIC BLOOD PRESSURE: 118 MMHG | RESPIRATION RATE: 14 BRPM

## 2020-03-03 DIAGNOSIS — N84.1 POLYP AT CERVICAL OS: Primary | ICD-10-CM

## 2020-03-03 PROBLEM — Z13.6 SCREENING FOR CARDIOVASCULAR CONDITION: Status: RESOLVED | Noted: 2019-08-29 | Resolved: 2020-03-03

## 2020-03-03 PROBLEM — Z00.00 WELL ADULT EXAM: Status: RESOLVED | Noted: 2018-08-08 | Resolved: 2020-03-03

## 2020-03-03 PROBLEM — Z11.9 SCREENING EXAMINATION FOR UNSPECIFIED INFECTIOUS DISEASE: Status: RESOLVED | Noted: 2019-08-29 | Resolved: 2020-03-03

## 2020-03-03 PROBLEM — Z13.1 SCREENING FOR DIABETES MELLITUS: Status: RESOLVED | Noted: 2019-08-29 | Resolved: 2020-03-03

## 2020-03-03 PROBLEM — N93.9 ABNORMAL UTERINE BLEEDING (AUB): Status: RESOLVED | Noted: 2020-02-17 | Resolved: 2020-03-03

## 2020-03-03 PROBLEM — Z13.29 SCREENING FOR THYROID DISORDER: Status: RESOLVED | Noted: 2019-08-29 | Resolved: 2020-03-03

## 2020-03-03 PROCEDURE — 88305 TISSUE EXAM BY PATHOLOGIST: CPT | Performed by: PATHOLOGY

## 2020-03-03 PROCEDURE — 57500 BIOPSY OF CERVIX: CPT | Performed by: OBSTETRICS & GYNECOLOGY

## 2020-03-03 PROCEDURE — 88344 IMHCHEM/IMCYTCHM EA MLT ANTB: CPT | Performed by: PATHOLOGY

## 2020-03-03 NOTE — PROGRESS NOTES
Assessment/Plan:         Diagnoses and all orders for this visit:    Polyp at cervical os  Comments:  sessile polyp, friable  Removed using Ga ward's applied          Subjective:      Patient ID: Charlette Lloyd is a 61 y o  female  61year old female  , Patient is here for a small polyp removal    Patient was last seen on 2020 for Postmenopausal bleeding   Patient is on Estradiol twice weekly   Patient last mammogram was on 2020 results were negative   Episode of postmenopausal bleeding - on exam cervical polyp is noted  At this time declines EB  Her preliminary ultrasound shows 3mm endometrial stripe    Colposcopy  Date/Time: 3/3/2020 1:59 PM  Performed by: Jerrica Nj MD  Authorized by: Jerrica Nj MD     Consent:     Consent obtained:  Verbal    Consent given by:  Patient and parent    Procedural risks discussed:  Bleeding, infection and repeat procedure    Patient questions answered: yes      Patient agrees, verbalizes understanding, and wants to proceed: yes    Indication:     Indications: cervical polyp, PMB  Procedure:     Procedure: Biopsy of cervix only      Procedure comment:  Removal of sessile polyp    Butler speculum was placed in the vagina: yes      Biopsy(s): yes      Location:  6 oclock position was location of polyp    Specimen to pathology: yes    Post-procedure:     Findings: Polyps      Patient tolerance of procedure:   Tolerated well, no immediate complications      The following portions of the patient's history were reviewed and updated as appropriate:   She  has a past medical history of Anxiety, Breast lump ( ), Dermatitis, Disease of thyroid gland, Dysuria, and ETD (Eustachian tube dysfunction), bilateral   She   Patient Active Problem List    Diagnosis Date Noted    Polyp at cervical os 2020    Encounter for gynecological examination without abnormal finding 10/03/2018    Hot flashes 2018    Migraine with aura and without status migrainosus, not intractable 02/27/2017    Thyroid disorder 02/23/2016     She  has a past surgical history that includes Tooth extraction; Tubal ligation; and Breast biopsy (Right, 2009)  Her family history includes Breast cancer (age of onset: 52) in her mother; Cancer in her father; Endometrial cancer in her maternal grandmother; Heart disease in her maternal grandmother; Hypertension in her father; Migraines in her father; No Known Problems in her daughter, paternal aunt, paternal grandmother, and sister; Rectal cancer in her father; Throat cancer in her brother  She  reports that she has never smoked  She has never used smokeless tobacco  She reports that she drinks alcohol  She reports that she does not use drugs  Current Outpatient Medications   Medication Sig Dispense Refill    BESIVANCE 0 6 % SUSP       CALCIUM PO Take by mouth daily       COMBIGAN 0 2-0 5 % 2 (two) times a day  3    dorzolamide-timolol (COSOPT) 22 3-6 8 MG/ML ophthalmic solution       eletriptan (RELPAX) 40 MG tablet Take 1 tablet (40 mg total) by mouth once as needed for migraine for up to 1 dose may repeat in 2 hours if necessary 36 tablet 3    estradiol (ESTRACE) 0 1 mg/g vaginal cream Insert 1 g into the vagina 2 (two) times a week 42 5 g 3    Glucosamine-Chondroitin (GLUCOSAMINE CHONDR COMPLEX PO) Take by mouth daily      Multiple Vitamins-Minerals (CENTRUM SILVER) tablet Take by mouth      PARoxetine (PAXIL) 10 mg tablet Take 0 5 tablets (5 mg total) by mouth daily 30 tablet 2    timolol (TIMOPTIC) 0 25 % ophthalmic solution Administer 1 drop to the right eye 2 (two) times a day        No current facility-administered medications for this visit        Current Outpatient Medications on File Prior to Visit   Medication Sig    BESIVANCE 0 6 % SUSP     CALCIUM PO Take by mouth daily     COMBIGAN 0 2-0 5 % 2 (two) times a day    dorzolamide-timolol (COSOPT) 22 3-6 8 MG/ML ophthalmic solution     eletriptan (RELPAX) 40 MG tablet Take 1 tablet (40 mg total) by mouth once as needed for migraine for up to 1 dose may repeat in 2 hours if necessary    estradiol (ESTRACE) 0 1 mg/g vaginal cream Insert 1 g into the vagina 2 (two) times a week    Glucosamine-Chondroitin (GLUCOSAMINE CHONDR COMPLEX PO) Take by mouth daily    Multiple Vitamins-Minerals (CENTRUM SILVER) tablet Take by mouth    PARoxetine (PAXIL) 10 mg tablet Take 0 5 tablets (5 mg total) by mouth daily    timolol (TIMOPTIC) 0 25 % ophthalmic solution Administer 1 drop to the right eye 2 (two) times a day      No current facility-administered medications on file prior to visit  She is allergic to other and pollen extract       Review of Systems      Objective:      /74 (BP Location: Right arm, Patient Position: Sitting, Cuff Size: Standard)   Resp 14   Ht 5' 10 5" (1 791 m)   Wt 76 7 kg (169 lb)   LMP  (LMP Unknown)   BMI 23 91 kg/m²          Physical Exam

## 2020-03-06 ENCOUNTER — TELEPHONE (OUTPATIENT)
Dept: OBGYN CLINIC | Facility: CLINIC | Age: 60
End: 2020-03-06

## 2020-03-06 NOTE — TELEPHONE ENCOUNTER
Pt req tissue result which I told her was still in process  She is waiting for further instructions re u/s which is complete Following review by [facility]

## 2020-03-16 ENCOUNTER — OFFICE VISIT (OUTPATIENT)
Dept: FAMILY MEDICINE CLINIC | Facility: CLINIC | Age: 60
End: 2020-03-16
Payer: COMMERCIAL

## 2020-03-16 VITALS
HEART RATE: 84 BPM | TEMPERATURE: 97.9 F | SYSTOLIC BLOOD PRESSURE: 108 MMHG | BODY MASS INDEX: 23.32 KG/M2 | HEIGHT: 71 IN | WEIGHT: 166.6 LBS | OXYGEN SATURATION: 98 % | RESPIRATION RATE: 18 BRPM | DIASTOLIC BLOOD PRESSURE: 68 MMHG

## 2020-03-16 DIAGNOSIS — H25.9 AGE-RELATED CATARACT OF BOTH EYES, UNSPECIFIED AGE-RELATED CATARACT TYPE: ICD-10-CM

## 2020-03-16 DIAGNOSIS — Z78.9 HEPATITIS A IMMUNE: ICD-10-CM

## 2020-03-16 DIAGNOSIS — E07.9 THYROID DISORDER: ICD-10-CM

## 2020-03-16 DIAGNOSIS — H40.9 GLAUCOMA, UNSPECIFIED GLAUCOMA TYPE, UNSPECIFIED LATERALITY: ICD-10-CM

## 2020-03-16 DIAGNOSIS — Z01.818 PREOP EXAMINATION: Primary | ICD-10-CM

## 2020-03-16 PROBLEM — H26.9 CATARACT: Status: ACTIVE | Noted: 2020-03-16

## 2020-03-16 PROCEDURE — 90471 IMMUNIZATION ADMIN: CPT

## 2020-03-16 PROCEDURE — 90632 HEPA VACCINE ADULT IM: CPT

## 2020-03-16 PROCEDURE — 3008F BODY MASS INDEX DOCD: CPT | Performed by: NURSE PRACTITIONER

## 2020-03-16 PROCEDURE — PREOP: Performed by: NURSE PRACTITIONER

## 2020-03-16 NOTE — PROGRESS NOTES
FAMILY MEDICINE PRE-OPERATIVE EVALUATION  [facility] [address]    NAME: Eddie Costa  AGE: 61 y o  SEX: female  : 1960     DATE: 3/16/2020     Internal Medicine Pre-Operative Evaluation:     Chief Complaint: Pre-operative Evaluation     Surgery: Cataract surgery    Anticipated Date of Surgery: 3/31/20 left eye 20 right eye  Referring Provider: Roshan Bob DO        History of Present Illness:     Eddie Costa is a 61 y o  female who presents to the office today for a preoperative consultation at the request of surgeon, Dr Lucrecia Johnson, who plans on performing cataract surgergy  Planned anesthesia is monitored anestheia  Patient has a bleeding risk of: no recent abnormal bleeding  Patient does not have objections to receiving blood products if needed  Current anti-platelet/anti-coagulation medications that the patient is prescribed includes: none  Assessment of Chronic Conditions:   - thyroid disorder-followed by endocrinology      Assessment of Cardiac Risk:  · Denies unstable or severe angina or MI in the last 6 weeks or history of stent placement in the last year   · Denies decompensated heart failure (e g  New onset heart failure, NYHA functional class IV heart failure, or worsening existing heart failure)  · Denies significant arrhythmias such as high grade AV block, symptomatic ventricular arrhythmia, newly recognized ventricular tachycardia, supraventricular tachycardia with resting heart rate >100, or symptomatic bradycardia  · Denies severe heart valve disease including aortic stenosis or symptomatic mitral stenosis     Exercise Capacity:  · Able to walk 4 blocks without symptoms?: Yes  · Able to walk 2 flights without symptoms?: Yes    Prior Anesthesia Reactions: No     Personal history of venous thromboembolic disease? No    History of steroid use for >2 weeks within last year?  No    STOP-BANG Sleep Apnea Screening Questionnaire:      Do you SNORE loudly (louder than talking or loud enough to be heard through closed doors)? No = 0 point   Do you often feel TIRED, fatigued, or sleepy during daytime? No = 0 point   Has anyone OBSERVED you stop breathing during your sleep? No = 0 point   Do you have or are you being treated for high blood pressure? No = 0 point   BMI more than 35 kg/m2? No = 0 point   AGE over 48years old? Yes = 1 point   NECK circumference > 16 inches (40 cm)? No = 0 point   Male GENDER? No = 0 point   TOTAL SCORE 1 = LOW risk of BRET       Review of Systems:     Review of Systems   Constitutional: Negative  HENT: Negative  Eyes:        Blurry vision bilaterally, worse in left eye    Respiratory: Negative  Cardiovascular: Negative  Gastrointestinal: Negative  Endocrine: Negative  Genitourinary: Negative  Musculoskeletal: Negative  Skin: Negative  Allergic/Immunologic: Negative  Neurological: Negative  Hematological: Negative  Psychiatric/Behavioral: Negative  Problem List:     Patient Active Problem List   Diagnosis    Migraine with aura and without status migrainosus, not intractable    Thyroid disorder    Hot flashes    Encounter for gynecological examination without abnormal finding    Polyp at cervical os    Glaucoma    Cataract    Preop examination        Allergies:      Allergies   Allergen Reactions    Other Itching and Other (See Comments)     Monostat    Pollen Extract Itching, Swelling and Sneezing        Current Medications:       Current Outpatient Medications:     BESIVANCE 0 6 % SUSP, , Disp: , Rfl:     CALCIUM PO, Take by mouth daily , Disp: , Rfl:     COMBIGAN 0 2-0 5 %, 2 (two) times a day, Disp: , Rfl: 3    dorzolamide-timolol (COSOPT) 22 3-6 8 MG/ML ophthalmic solution, , Disp: , Rfl:     eletriptan (RELPAX) 40 MG tablet, Take 1 tablet (40 mg total) by mouth once as needed for migraine for up to 1 dose may repeat in 2 hours if necessary, Disp: 36 tablet, Rfl: 3    estradiol (ESTRACE) 0 1 mg/g vaginal cream, Insert 1 g into the vagina 2 (two) times a week, Disp: 42 5 g, Rfl: 3    Glucosamine-Chondroitin (GLUCOSAMINE CHONDR COMPLEX PO), Take by mouth daily, Disp: , Rfl:     Multiple Vitamins-Minerals (CENTRUM SILVER) tablet, Take by mouth, Disp: , Rfl:     PARoxetine (PAXIL) 10 mg tablet, Take 0 5 tablets (5 mg total) by mouth daily, Disp: 30 tablet, Rfl: 2    timolol (TIMOPTIC) 0 25 % ophthalmic solution, Administer 1 drop to the right eye 2 (two) times a day , Disp: , Rfl:      Past History:     Past Medical History:   Diagnosis Date    Anxiety     last assessed 02/27/2017    Breast lump      last assessed 01/16/2017    Dermatitis     last assessed 02/27/2017    Disease of thyroid gland     last assessed 02/23/2016    Dysuria     last assessed 04/28/2017    ETD (Eustachian tube dysfunction), bilateral     last assessed 02/23/2016        Past Surgical History:   Procedure Laterality Date    BREAST BIOPSY Right 2009    2 areas benign    TOOTH EXTRACTION      TUBAL LIGATION          Family History   Problem Relation Age of Onset    Breast cancer Mother 52    Cancer Father     Hypertension Father     Rectal cancer Father     Migraines Father     Heart disease Maternal Grandmother     Endometrial cancer Maternal Grandmother         unknown age   Normisaura [place] Throat cancer Brother     No Known Problems Sister     No Known Problems Daughter     No Known Problems Paternal Grandmother     No Known Problems Paternal Aunt         Social History     Socioeconomic History    Marital status: /Civil Union     Spouse name: Not on file    Number of children: Not on file    Years of education: Not on file    Highest education level: Not on file   Occupational History     Comment: Retired   Social Needs    Financial resource strain: Not on file    Food insecurity:     Worry: Not on file     Inability: Not on file   Cynthia Phelps Transportation needs:     Medical: Not on file     Non-medical: Not on file   Tobacco Use    Smoking status: Never Smoker    Smokeless tobacco: Never Used   Substance and Sexual Activity    Alcohol use: Yes     Comment: occasionally    Drug use: No    Sexual activity: Yes     Partners: Male     Birth control/protection: Post-menopausal   Lifestyle    Physical activity:     Days per week: Not on file     Minutes per session: Not on file    Stress: Not on file   Relationships    Social connections:     Talks on phone: Not on file     Gets together: Not on file     Attends Yazidi service: Not on file     Active member of club or organization: Not on file     Attends meetings of clubs or organizations: Not on file     Relationship status: Not on file    Intimate partner violence:     Fear of current or ex partner: Not on file     Emotionally abused: Not on file     Physically abused: Not on file     Forced sexual activity: Not on file   Other Topics Concern    Not on file   Social History Narrative    Activities:  Dance    Always uses seat belt    Exercise:  Walking    Lives with         Physical Exam:      /68   Pulse 84   Temp 97 9 °F (36 6 °C) (Tympanic)   Resp 18   Ht 5' 10 5" (1 791 m)   Wt 75 6 kg (166 lb 9 6 oz)   LMP  (LMP Unknown)   SpO2 98%   BMI 23 57 kg/m²     Physical Exam   Constitutional: She is oriented to person, place, and time  She appears well-developed and well-nourished  No distress  HENT:   Head: Normocephalic and atraumatic  Right Ear: External ear normal    Left Ear: External ear normal    Nose: Nose normal    Mouth/Throat: Oropharynx is clear and moist    Eyes: Pupils are equal, round, and reactive to light  Conjunctivae, EOM and lids are normal    Cataracts B/L    Neck: Normal range of motion  Neck supple  Thyromegaly present  Cardiovascular: Normal rate, regular rhythm and normal heart sounds  No murmur heard    Pulmonary/Chest: Effort normal and breath sounds normal  No respiratory distress  She has no wheezes  She has no rales  She exhibits no tenderness  Abdominal: Soft  Bowel sounds are normal  She exhibits no distension and no mass  There is no tenderness  There is no rebound and no guarding  Musculoskeletal: Normal range of motion  She exhibits no edema, tenderness or deformity  Lymphadenopathy:     She has no cervical adenopathy  Neurological: She is alert and oriented to person, place, and time  No cranial nerve deficit  Skin: Skin is warm and dry  Capillary refill takes less than 2 seconds  No rash noted  No erythema  No pallor  Psychiatric: She has a normal mood and affect  Her behavior is normal  Judgment and thought content normal    Nursing note and vitals reviewed  Data:     Pre-operative work-up    Laboratory Results: none obtained    EKG: I have personally reviewed pertinent reports  EKG normal    Chest x-ray: not obtained            Assessment:     1  Preop examination     2  Age-related cataract of both eyes, unspecified age-related cataract type     3  Glaucoma, unspecified glaucoma type, unspecified laterality     4  Thyroid disorder     5  Hepatitis A immune  HEPATITIS A VACCINE ADULT IM        Plan:     61 y o  female with planned surgery: cataract surgery  Cardiac Risk Estimation: per the Revised Cardiac Risk Index (Circ  100:[id number], 1999), the patient's risk factors for cardiac complications include no hsitory of cardiovascular disease , putting her in: RCI RISK CLASS I (0 risk factors, risk of major cardiac compl  appr  0 5%)  1  Further preoperative workup as follows:   - None; no further preoperative work-up is required    2  Medication Management/Recommendations:   - None, continue medication regimen including morning of surgery, with sip of water    3  Prophylaxis for cardiac events with perioperative beta-blockers: should be considered, specific regimen per anesthesia      4  Patient requires further consultation with: None    Clearance  Patient is CLEARED for surgery without any additional cardiac testing       ELY Dela Cruz  [address] [address]  Phone#  [phone]  Fax#  [phone]

## 2020-06-04 ENCOUNTER — DOCUMENTATION (OUTPATIENT)
Dept: GASTROENTEROLOGY | Facility: CLINIC | Age: 60
End: 2020-06-04

## 2020-08-24 ENCOUNTER — OFFICE VISIT (OUTPATIENT)
Dept: FAMILY MEDICINE CLINIC | Facility: CLINIC | Age: 60
End: 2020-08-24
Payer: COMMERCIAL

## 2020-08-24 VITALS
WEIGHT: 164 LBS | TEMPERATURE: 99.8 F | BODY MASS INDEX: 22.96 KG/M2 | HEIGHT: 71 IN | OXYGEN SATURATION: 97 % | DIASTOLIC BLOOD PRESSURE: 70 MMHG | SYSTOLIC BLOOD PRESSURE: 106 MMHG | HEART RATE: 104 BPM | RESPIRATION RATE: 16 BRPM

## 2020-08-24 DIAGNOSIS — N30.01 ACUTE CYSTITIS WITH HEMATURIA: Primary | ICD-10-CM

## 2020-08-24 PROBLEM — Z01.818 PREOP EXAMINATION: Status: RESOLVED | Noted: 2020-03-16 | Resolved: 2020-08-24

## 2020-08-24 PROBLEM — Z01.419 ENCOUNTER FOR GYNECOLOGICAL EXAMINATION WITHOUT ABNORMAL FINDING: Status: RESOLVED | Noted: 2018-10-03 | Resolved: 2020-08-24

## 2020-08-24 LAB
SL AMB  POCT GLUCOSE, UA: ABNORMAL
SL AMB LEUKOCYTE ESTERASE,UA: 70
SL AMB POCT BILIRUBIN,UA: ABNORMAL
SL AMB POCT BLOOD,UA: 50
SL AMB POCT CLARITY,UA: ABNORMAL
SL AMB POCT COLOR,UA: ABNORMAL
SL AMB POCT KETONES,UA: 5
SL AMB POCT NITRITE,UA: ABNORMAL
SL AMB POCT PH,UA: 5
SL AMB POCT SPECIFIC GRAVITY,UA: 1.02
SL AMB POCT URINE PROTEIN: 30
SL AMB POCT UROBILINOGEN: 0.2

## 2020-08-24 PROCEDURE — 99213 OFFICE O/P EST LOW 20 MIN: CPT | Performed by: NURSE PRACTITIONER

## 2020-08-24 PROCEDURE — 87086 URINE CULTURE/COLONY COUNT: CPT | Performed by: NURSE PRACTITIONER

## 2020-08-24 PROCEDURE — 81002 URINALYSIS NONAUTO W/O SCOPE: CPT | Performed by: NURSE PRACTITIONER

## 2020-08-24 PROCEDURE — 1036F TOBACCO NON-USER: CPT | Performed by: NURSE PRACTITIONER

## 2020-08-24 PROCEDURE — 3008F BODY MASS INDEX DOCD: CPT | Performed by: NURSE PRACTITIONER

## 2020-08-24 RX ORDER — SULFAMETHOXAZOLE AND TRIMETHOPRIM 800; 160 MG/1; MG/1
1 TABLET ORAL EVERY 12 HOURS SCHEDULED
Qty: 6 TABLET | Refills: 0 | Status: SHIPPED | OUTPATIENT
Start: 2020-08-24 | End: 2020-08-27

## 2020-08-24 NOTE — PROGRESS NOTES
Assessment/Plan:    Acute cystitis with hematuria  Will send urine culture and follow  To begin Bactrim 1 tablet every 12 hours for 3 days  May continue Tylenol every 4-6 hours  Counseled on the importance of staying well hydrated and avoiding bladder irritants  Follow-up if no improvement in 3 days or sooner if symptoms worsen  Diagnoses and all orders for this visit:    Acute cystitis with hematuria  -     POCT urine dip  -     Urine culture; Future  -     Urine culture  -     sulfamethoxazole-trimethoprim (BACTRIM DS) 800-160 mg per tablet; Take 1 tablet by mouth every 12 (twelve) hours for 3 days          Subjective:      Patient ID: Wallace Warren is a 61 y o  female  [employer] presents reporting urinary frequency that started yesterday  She is also experiencing a fever and low back pain associated with her symptoms  T-max= 100 5° yesterday  [employer] day Drop has been treating her symptoms with Tylenol, the last dose was 8:00 a m  This morning  Denies flank pain, hematuria, dysuria, nausea, vomiting, or diarrhea        The following portions of the patient's history were reviewed and updated as appropriate:   She   Patient Active Problem List    Diagnosis Date Noted    Acute cystitis with hematuria 08/24/2020    Glaucoma 03/16/2020    Cataract 03/16/2020    Polyp at cervical os 02/17/2020    Hot flashes 08/08/2018    Migraine with aura and without status migrainosus, not intractable 02/27/2017    Thyroid disorder 02/23/2016     Current Outpatient Medications   Medication Sig Dispense Refill    CALCIUM PO Take by mouth daily       COMBIGAN 0 2-0 5 % 2 (two) times a day  3    eletriptan (RELPAX) 40 MG tablet Take 1 tablet (40 mg total) by mouth once as needed for migraine for up to 1 dose may repeat in 2 hours if necessary 36 tablet 3    estradiol (ESTRACE) 0 1 mg/g vaginal cream Insert 1 g into the vagina 2 (two) times a week 42 5 g 3    Glucosamine-Chondroitin (GLUCOSAMINE CHONDR COMPLEX PO) Take by mouth daily      Multiple Vitamins-Minerals (CENTRUM SILVER) tablet Take by mouth      PARoxetine (PAXIL) 10 mg tablet Take 0 5 tablets (5 mg total) by mouth daily 30 tablet 2    timolol (TIMOPTIC) 0 25 % ophthalmic solution Administer 1 drop to the right eye 2 (two) times a day       BESIVANCE 0 6 % SUSP       dorzolamide-timolol (COSOPT) 22 3-6 8 MG/ML ophthalmic solution       sulfamethoxazole-trimethoprim (BACTRIM DS) 800-160 mg per tablet Take 1 tablet by mouth every 12 (twelve) hours for 3 days 6 tablet 0     No current facility-administered medications for this visit  She is allergic to other and pollen extract       Review of Systems   HENT: Negative  Respiratory: Negative  Cardiovascular: Negative  Neurological: Negative  Psychiatric/Behavioral: Negative  /70   Pulse 104   Temp 99 8 °F (37 7 °C)   Resp 16   Ht 5' 10 5" (1 791 m)   Wt 74 4 kg (164 lb)   LMP  (LMP Unknown)   SpO2 97%   BMI 23 20 kg/m²     Objective:     Physical Exam  Vitals signs and nursing note reviewed  Constitutional:       General: She is not in acute distress  Appearance: She is well-developed and normal weight  She is ill-appearing (mildly)  She is not toxic-appearing or diaphoretic  HENT:      Head: Normocephalic and atraumatic  Eyes:      Conjunctiva/sclera: Conjunctivae normal    Neck:      Musculoskeletal: Neck supple  Cardiovascular:      Rate and Rhythm: Regular rhythm  Heart sounds: Normal heart sounds  No murmur  Comments: Mild tachycardia   Pulmonary:      Effort: Pulmonary effort is normal  No respiratory distress  Breath sounds: Normal breath sounds  No wheezing or rales  Chest:      Chest wall: No tenderness  Abdominal:      General: Abdomen is flat  Bowel sounds are normal       Palpations: Abdomen is soft  Tenderness: There is abdominal tenderness in the suprapubic area   There is no right CVA tenderness, left CVA tenderness, guarding or rebound  Neurological:      Mental Status: She is alert and oriented to person, place, and time  Psychiatric:         Behavior: Behavior normal          Thought Content:  Thought content normal          Judgment: Judgment normal            Results for orders placed or performed in visit on 08/24/20   POCT urine dip   Result Value Ref Range    LEUKOCYTE ESTERASE,UA 70     NITRITE,UA -     SL AMB POCT UROBILINOGEN 0 2     POCT URINE PROTEIN 30      PH,UA 5 0     BLOOD,UA 50     SPECIFIC GRAVITY,UA 1 025     KETONES,UA 5     BILIRUBIN,UA -     GLUCOSE, UA -      COLOR,UA dark yellow     CLARITY,UA cloudy

## 2020-08-24 NOTE — ASSESSMENT & PLAN NOTE
Will send urine culture and follow  To begin Bactrim 1 tablet every 12 hours for 3 days  May continue Tylenol every 4-6 hours  Counseled on the importance of staying well hydrated and avoiding bladder irritants  Follow-up if no improvement in 3 days or sooner if symptoms worsen

## 2020-08-26 ENCOUNTER — TELEPHONE (OUTPATIENT)
Dept: FAMILY MEDICINE CLINIC | Facility: CLINIC | Age: 60
End: 2020-08-26

## 2020-08-26 DIAGNOSIS — N30.01 ACUTE CYSTITIS WITH HEMATURIA: Primary | ICD-10-CM

## 2020-08-26 LAB — BACTERIA UR CULT: NORMAL

## 2020-08-26 RX ORDER — CIPROFLOXACIN 500 MG/1
500 TABLET, FILM COATED ORAL EVERY 12 HOURS SCHEDULED
Qty: 10 TABLET | Refills: 0 | Status: SHIPPED | OUTPATIENT
Start: 2020-08-26 | End: 2020-08-27 | Stop reason: SDUPTHER

## 2020-08-26 NOTE — TELEPHONE ENCOUNTER
Called patient and gave her the message  She will  the Cipro and start it tomorrow, since she took the other Rx already today

## 2020-08-26 NOTE — TELEPHONE ENCOUNTER
Patient would like to get on something else before tomorrow when [facility] is in  She said she has been on Cipro before, she was not positive but believes it was 500 mg  The Bactrim is not working for her  Patient also would like to go over her Urinalysis results with someone  Can you help?

## 2020-08-26 NOTE — TELEPHONE ENCOUNTER
Her urine culture came back positive for a bacterial infection  I can send her the cipro   Use probiotic or eat yogurt while taking antibiotics to prevent abdominal discomfort  Thanks

## 2020-08-26 NOTE — TELEPHONE ENCOUNTER
Can you send Cipro 500 mg to [facility] pharmacy [place]  instead of [facility]  Sandip Serrato put it in today, but to the wrong pharmacy

## 2020-08-26 NOTE — TELEPHONE ENCOUNTER
Patient is requesting another medication  Low grade fever,  Fullness and burning feeling   Please advise, Thank you

## 2020-08-27 DIAGNOSIS — N30.01 ACUTE CYSTITIS WITH HEMATURIA: ICD-10-CM

## 2020-08-27 RX ORDER — CIPROFLOXACIN 500 MG/1
500 TABLET, FILM COATED ORAL EVERY 12 HOURS SCHEDULED
Qty: 10 TABLET | Refills: 0 | Status: SHIPPED | OUTPATIENT
Start: 2020-08-27 | End: 2020-09-01

## 2020-10-12 ENCOUNTER — OFFICE VISIT (OUTPATIENT)
Dept: FAMILY MEDICINE CLINIC | Facility: CLINIC | Age: 60
End: 2020-10-12
Payer: COMMERCIAL

## 2020-10-12 VITALS
WEIGHT: 166.6 LBS | OXYGEN SATURATION: 97 % | HEART RATE: 89 BPM | DIASTOLIC BLOOD PRESSURE: 76 MMHG | RESPIRATION RATE: 16 BRPM | SYSTOLIC BLOOD PRESSURE: 110 MMHG | HEIGHT: 71 IN | BODY MASS INDEX: 23.32 KG/M2

## 2020-10-12 DIAGNOSIS — N95.1 MENOPAUSAL SYMPTOMS: ICD-10-CM

## 2020-10-12 DIAGNOSIS — Z23 NEED FOR INFLUENZA VACCINATION: Primary | ICD-10-CM

## 2020-10-12 DIAGNOSIS — G43.109 MIGRAINE WITH AURA AND WITHOUT STATUS MIGRAINOSUS, NOT INTRACTABLE: ICD-10-CM

## 2020-10-12 PROCEDURE — 90682 RIV4 VACC RECOMBINANT DNA IM: CPT

## 2020-10-12 PROCEDURE — 90471 IMMUNIZATION ADMIN: CPT

## 2020-10-12 RX ORDER — ELETRIPTAN HYDROBROMIDE 40 MG/1
40 TABLET, FILM COATED ORAL ONCE AS NEEDED
Qty: 36 TABLET | Refills: 1 | Status: SHIPPED | OUTPATIENT
Start: 2020-10-12 | End: 2020-11-20

## 2020-10-12 RX ORDER — PAROXETINE 10 MG/1
5 TABLET, FILM COATED ORAL DAILY
Qty: 90 TABLET | Refills: 1 | Status: SHIPPED | OUTPATIENT
Start: 2020-10-12 | End: 2021-03-29

## 2020-10-15 ENCOUNTER — OFFICE VISIT (OUTPATIENT)
Dept: FAMILY MEDICINE CLINIC | Facility: CLINIC | Age: 60
End: 2020-10-15
Payer: COMMERCIAL

## 2020-10-15 VITALS
DIASTOLIC BLOOD PRESSURE: 62 MMHG | OXYGEN SATURATION: 96 % | TEMPERATURE: 99.7 F | WEIGHT: 170 LBS | SYSTOLIC BLOOD PRESSURE: 90 MMHG | HEIGHT: 71 IN | HEART RATE: 90 BPM | BODY MASS INDEX: 23.8 KG/M2 | RESPIRATION RATE: 16 BRPM

## 2020-10-15 DIAGNOSIS — E07.9 THYROID DISORDER: ICD-10-CM

## 2020-10-15 DIAGNOSIS — Z00.00 ANNUAL PHYSICAL EXAM: Primary | ICD-10-CM

## 2020-10-15 PROBLEM — N30.01 ACUTE CYSTITIS WITH HEMATURIA: Status: RESOLVED | Noted: 2020-08-24 | Resolved: 2020-10-15

## 2020-10-15 PROBLEM — H26.9 CATARACT: Status: RESOLVED | Noted: 2020-03-16 | Resolved: 2020-10-15

## 2020-10-15 PROBLEM — R23.2 HOT FLASHES: Status: RESOLVED | Noted: 2018-08-08 | Resolved: 2020-10-15

## 2020-10-15 PROBLEM — H40.9 GLAUCOMA: Status: RESOLVED | Noted: 2020-03-16 | Resolved: 2020-10-15

## 2020-10-15 PROCEDURE — 99396 PREV VISIT EST AGE 40-64: CPT | Performed by: NURSE PRACTITIONER

## 2020-10-26 ENCOUNTER — ANNUAL EXAM (OUTPATIENT)
Dept: OBGYN CLINIC | Facility: CLINIC | Age: 60
End: 2020-10-26
Payer: COMMERCIAL

## 2020-10-26 VITALS
TEMPERATURE: 97.4 F | SYSTOLIC BLOOD PRESSURE: 118 MMHG | WEIGHT: 168 LBS | BODY MASS INDEX: 23.76 KG/M2 | DIASTOLIC BLOOD PRESSURE: 78 MMHG

## 2020-10-26 DIAGNOSIS — Z01.419 ENCOUNTER FOR GYNECOLOGICAL EXAMINATION WITHOUT ABNORMAL FINDING: Primary | ICD-10-CM

## 2020-10-26 DIAGNOSIS — Z12.31 ENCOUNTER FOR SCREENING MAMMOGRAM FOR MALIGNANT NEOPLASM OF BREAST: ICD-10-CM

## 2020-10-26 PROCEDURE — 87624 HPV HI-RISK TYP POOLED RSLT: CPT | Performed by: OBSTETRICS & GYNECOLOGY

## 2020-10-26 PROCEDURE — G0145 SCR C/V CYTO,THINLAYER,RESCR: HCPCS | Performed by: OBSTETRICS & GYNECOLOGY

## 2020-10-26 PROCEDURE — 99396 PREV VISIT EST AGE 40-64: CPT | Performed by: OBSTETRICS & GYNECOLOGY

## 2020-10-27 LAB
HPV HR 12 DNA CVX QL NAA+PROBE: NEGATIVE
HPV16 DNA CVX QL NAA+PROBE: NEGATIVE
HPV18 DNA CVX QL NAA+PROBE: NEGATIVE

## 2020-11-02 ENCOUNTER — LAB (OUTPATIENT)
Dept: LAB | Facility: CLINIC | Age: 60
End: 2020-11-02
Payer: COMMERCIAL

## 2020-11-02 DIAGNOSIS — E07.9 THYROID DISORDER: ICD-10-CM

## 2020-11-02 LAB
ALBUMIN SERPL BCP-MCNC: 4 G/DL (ref 3.5–5)
ALP SERPL-CCNC: 62 U/L (ref 46–116)
ALT SERPL W P-5'-P-CCNC: 34 U/L (ref 12–78)
ANION GAP SERPL CALCULATED.3IONS-SCNC: 4 MMOL/L (ref 4–13)
AST SERPL W P-5'-P-CCNC: 19 U/L (ref 5–45)
BACTERIA UR QL AUTO: ABNORMAL /HPF
BASOPHILS # BLD AUTO: 0.01 THOUSANDS/ΜL (ref 0–0.1)
BASOPHILS NFR BLD AUTO: 0 % (ref 0–1)
BILIRUB SERPL-MCNC: 0.68 MG/DL (ref 0.2–1)
BILIRUB UR QL STRIP: NEGATIVE
BUN SERPL-MCNC: 16 MG/DL (ref 5–25)
CALCIUM SERPL-MCNC: 9.2 MG/DL (ref 8.3–10.1)
CHLORIDE SERPL-SCNC: 105 MMOL/L (ref 100–108)
CHOLEST SERPL-MCNC: 210 MG/DL (ref 50–200)
CLARITY UR: CLEAR
CO2 SERPL-SCNC: 30 MMOL/L (ref 21–32)
COLOR UR: YELLOW
CREAT SERPL-MCNC: 0.61 MG/DL (ref 0.6–1.3)
EOSINOPHIL # BLD AUTO: 0.11 THOUSAND/ΜL (ref 0–0.61)
EOSINOPHIL NFR BLD AUTO: 2 % (ref 0–6)
ERYTHROCYTE [DISTWIDTH] IN BLOOD BY AUTOMATED COUNT: 12.2 % (ref 11.6–15.1)
GFR SERPL CREATININE-BSD FRML MDRD: 99 ML/MIN/1.73SQ M
GLUCOSE P FAST SERPL-MCNC: 86 MG/DL (ref 65–99)
GLUCOSE UR STRIP-MCNC: NEGATIVE MG/DL
HCT VFR BLD AUTO: 46.4 % (ref 34.8–46.1)
HDLC SERPL-MCNC: 92 MG/DL
HGB BLD-MCNC: 15 G/DL (ref 11.5–15.4)
HGB UR QL STRIP.AUTO: NEGATIVE
HYALINE CASTS #/AREA URNS LPF: ABNORMAL /LPF
IMM GRANULOCYTES # BLD AUTO: 0.01 THOUSAND/UL (ref 0–0.2)
IMM GRANULOCYTES NFR BLD AUTO: 0 % (ref 0–2)
KETONES UR STRIP-MCNC: NEGATIVE MG/DL
LDLC SERPL CALC-MCNC: 105 MG/DL (ref 0–100)
LEUKOCYTE ESTERASE UR QL STRIP: ABNORMAL
LYMPHOCYTES # BLD AUTO: 1.14 THOUSANDS/ΜL (ref 0.6–4.47)
LYMPHOCYTES NFR BLD AUTO: 25 % (ref 14–44)
MCH RBC QN AUTO: 31.4 PG (ref 26.8–34.3)
MCHC RBC AUTO-ENTMCNC: 32.3 G/DL (ref 31.4–37.4)
MCV RBC AUTO: 97 FL (ref 82–98)
MONOCYTES # BLD AUTO: 0.39 THOUSAND/ΜL (ref 0.17–1.22)
MONOCYTES NFR BLD AUTO: 9 % (ref 4–12)
NEUTROPHILS # BLD AUTO: 2.83 THOUSANDS/ΜL (ref 1.85–7.62)
NEUTS SEG NFR BLD AUTO: 64 % (ref 43–75)
NITRITE UR QL STRIP: NEGATIVE
NON-SQ EPI CELLS URNS QL MICRO: ABNORMAL /HPF
NRBC BLD AUTO-RTO: 0 /100 WBCS
PH UR STRIP.AUTO: 7.5 [PH]
PLATELET # BLD AUTO: 239 THOUSANDS/UL (ref 149–390)
PMV BLD AUTO: 10.5 FL (ref 8.9–12.7)
POTASSIUM SERPL-SCNC: 4.2 MMOL/L (ref 3.5–5.3)
PROT SERPL-MCNC: 7.4 G/DL (ref 6.4–8.2)
PROT UR STRIP-MCNC: NEGATIVE MG/DL
RBC # BLD AUTO: 4.77 MILLION/UL (ref 3.81–5.12)
RBC #/AREA URNS AUTO: ABNORMAL /HPF
SODIUM SERPL-SCNC: 139 MMOL/L (ref 136–145)
SP GR UR STRIP.AUTO: 1.01 (ref 1–1.03)
TRIGL SERPL-MCNC: 67 MG/DL
TSH SERPL DL<=0.05 MIU/L-ACNC: 0.67 UIU/ML (ref 0.36–3.74)
UROBILINOGEN UR QL STRIP.AUTO: 0.2 E.U./DL
WBC # BLD AUTO: 4.49 THOUSAND/UL (ref 4.31–10.16)
WBC #/AREA URNS AUTO: ABNORMAL /HPF

## 2020-11-02 PROCEDURE — 80061 LIPID PANEL: CPT

## 2020-11-02 PROCEDURE — 85025 COMPLETE CBC W/AUTO DIFF WBC: CPT

## 2020-11-02 PROCEDURE — 80053 COMPREHEN METABOLIC PANEL: CPT

## 2020-11-02 PROCEDURE — 36415 COLL VENOUS BLD VENIPUNCTURE: CPT

## 2020-11-02 PROCEDURE — 81001 URINALYSIS AUTO W/SCOPE: CPT | Performed by: NURSE PRACTITIONER

## 2020-11-02 PROCEDURE — 84443 ASSAY THYROID STIM HORMONE: CPT

## 2020-11-03 LAB
LAB AP GYN PRIMARY INTERPRETATION: NORMAL
Lab: NORMAL

## 2020-11-04 ENCOUNTER — TELEPHONE (OUTPATIENT)
Dept: OBGYN CLINIC | Facility: CLINIC | Age: 60
End: 2020-11-04

## 2020-11-11 DIAGNOSIS — N95.2 ATROPHIC VULVOVAGINITIS: ICD-10-CM

## 2020-11-11 RX ORDER — ESTRADIOL 0.1 MG/G
1 CREAM VAGINAL 2 TIMES WEEKLY
Qty: 42.5 G | Refills: 1 | Status: SHIPPED | OUTPATIENT
Start: 2020-11-12 | End: 2021-03-10 | Stop reason: SDUPTHER

## 2020-11-20 ENCOUNTER — TELEPHONE (OUTPATIENT)
Dept: NEUROLOGY | Facility: CLINIC | Age: 60
End: 2020-11-20

## 2020-11-20 DIAGNOSIS — G43.109 MIGRAINE WITH AURA AND WITHOUT STATUS MIGRAINOSUS, NOT INTRACTABLE: ICD-10-CM

## 2020-11-20 RX ORDER — ELETRIPTAN HYDROBROMIDE 40 MG/1
TABLET, FILM COATED ORAL
Qty: 16 TABLET | Refills: 0 | Status: SHIPPED | OUTPATIENT
Start: 2020-11-20 | End: 2020-11-22 | Stop reason: SDUPTHER

## 2020-11-22 DIAGNOSIS — G43.109 MIGRAINE WITH AURA AND WITHOUT STATUS MIGRAINOSUS, NOT INTRACTABLE: ICD-10-CM

## 2020-11-22 RX ORDER — ELETRIPTAN HYDROBROMIDE 40 MG/1
40 TABLET, FILM COATED ORAL ONCE AS NEEDED
Qty: 16 TABLET | Refills: 3 | Status: SHIPPED | OUTPATIENT
Start: 2020-11-22 | End: 2021-05-14 | Stop reason: SDUPTHER

## 2020-11-23 ENCOUNTER — TELEPHONE (OUTPATIENT)
Dept: NEUROLOGY | Facility: CLINIC | Age: 60
End: 2020-11-23

## 2020-11-26 ENCOUNTER — NURSE TRIAGE (OUTPATIENT)
Dept: OTHER | Facility: OTHER | Age: 60
End: 2020-11-26

## 2020-11-26 DIAGNOSIS — K57.92 DIVERTICULITIS: Primary | ICD-10-CM

## 2020-11-26 RX ORDER — ONDANSETRON 4 MG/1
4 TABLET, ORALLY DISINTEGRATING ORAL EVERY 6 HOURS PRN
Qty: 20 TABLET | Refills: 0 | Status: SHIPPED | OUTPATIENT
Start: 2020-11-26

## 2020-11-26 RX ORDER — METRONIDAZOLE 500 MG/1
500 TABLET ORAL EVERY 12 HOURS SCHEDULED
Qty: 14 TABLET | Refills: 0 | Status: SHIPPED | OUTPATIENT
Start: 2020-11-26 | End: 2020-12-03

## 2020-11-26 RX ORDER — CIPROFLOXACIN 500 MG/1
500 TABLET, FILM COATED ORAL EVERY 12 HOURS SCHEDULED
Qty: 20 TABLET | Refills: 0 | Status: SHIPPED | OUTPATIENT
Start: 2020-11-26 | End: 2020-12-06

## 2020-11-26 RX ORDER — HYOSCYAMINE SULFATE 0.125 MG
0.12 TABLET ORAL EVERY 4 HOURS PRN
Qty: 20 TABLET | Refills: 0 | Status: SHIPPED | OUTPATIENT
Start: 2020-11-26 | End: 2021-12-14 | Stop reason: SDUPTHER

## 2020-12-10 ENCOUNTER — OFFICE VISIT (OUTPATIENT)
Dept: GASTROENTEROLOGY | Facility: CLINIC | Age: 60
End: 2020-12-10
Payer: COMMERCIAL

## 2020-12-10 VITALS
DIASTOLIC BLOOD PRESSURE: 62 MMHG | SYSTOLIC BLOOD PRESSURE: 104 MMHG | HEART RATE: 93 BPM | WEIGHT: 165 LBS | BODY MASS INDEX: 23.34 KG/M2

## 2020-12-10 DIAGNOSIS — K57.92 ACUTE DIVERTICULITIS: Primary | ICD-10-CM

## 2020-12-10 DIAGNOSIS — K21.9 GASTROESOPHAGEAL REFLUX DISEASE, UNSPECIFIED WHETHER ESOPHAGITIS PRESENT: ICD-10-CM

## 2020-12-10 PROCEDURE — 99214 OFFICE O/P EST MOD 30 MIN: CPT | Performed by: PHYSICIAN ASSISTANT

## 2021-02-08 ENCOUNTER — HOSPITAL ENCOUNTER (OUTPATIENT)
Dept: MAMMOGRAPHY | Facility: CLINIC | Age: 61
Discharge: HOME | End: 2021-02-08
Payer: COMMERCIAL

## 2021-02-08 DIAGNOSIS — Z12.31 ENCOUNTER FOR SCREENING MAMMOGRAM FOR MALIGNANT NEOPLASM OF BREAST: ICD-10-CM

## 2021-02-08 PROCEDURE — 77067 SCR MAMMO BI INCL CAD: CPT

## 2021-02-08 PROCEDURE — 77063 BREAST TOMOSYNTHESIS BI: CPT

## 2021-03-10 DIAGNOSIS — N95.2 ATROPHIC VULVOVAGINITIS: ICD-10-CM

## 2021-03-10 RX ORDER — ESTRADIOL 0.1 MG/G
1 CREAM VAGINAL 2 TIMES WEEKLY
Qty: 42.5 G | Refills: 0 | Status: SHIPPED | OUTPATIENT
Start: 2021-03-11 | End: 2021-07-07

## 2021-03-10 NOTE — TELEPHONE ENCOUNTER
Pt would like a refill on her Estrace cream until her mail order comes in  She would like it sent to the [facility] pharmacy in [facility] [place]

## 2021-03-27 ENCOUNTER — NURSE TRIAGE (OUTPATIENT)
Dept: OTHER | Facility: OTHER | Age: 61
End: 2021-03-27

## 2021-03-27 ENCOUNTER — OFFICE VISIT (OUTPATIENT)
Dept: URGENT CARE | Facility: CLINIC | Age: 61
End: 2021-03-27
Payer: COMMERCIAL

## 2021-03-27 VITALS
OXYGEN SATURATION: 94 % | WEIGHT: 165 LBS | HEIGHT: 71 IN | BODY MASS INDEX: 23.1 KG/M2 | HEART RATE: 97 BPM | TEMPERATURE: 98.6 F | RESPIRATION RATE: 18 BRPM | DIASTOLIC BLOOD PRESSURE: 72 MMHG | SYSTOLIC BLOOD PRESSURE: 110 MMHG

## 2021-03-27 DIAGNOSIS — N30.00 ACUTE CYSTITIS WITHOUT HEMATURIA: ICD-10-CM

## 2021-03-27 DIAGNOSIS — R53.83 FATIGUE, UNSPECIFIED TYPE: Primary | ICD-10-CM

## 2021-03-27 LAB
SL AMB  POCT GLUCOSE, UA: ABNORMAL
SL AMB LEUKOCYTE ESTERASE,UA: ABNORMAL
SL AMB POCT BILIRUBIN,UA: ABNORMAL
SL AMB POCT BLOOD,UA: ABNORMAL
SL AMB POCT CLARITY,UA: CLEAR
SL AMB POCT COLOR,UA: YELLOW
SL AMB POCT KETONES,UA: ABNORMAL
SL AMB POCT NITRITE,UA: ABNORMAL
SL AMB POCT PH,UA: 6.5
SL AMB POCT SPECIFIC GRAVITY,UA: 1
SL AMB POCT URINE PROTEIN: ABNORMAL
SL AMB POCT UROBILINOGEN: ABNORMAL

## 2021-03-27 PROCEDURE — 81002 URINALYSIS NONAUTO W/O SCOPE: CPT | Performed by: PHYSICIAN ASSISTANT

## 2021-03-27 PROCEDURE — G0382 LEV 3 HOSP TYPE B ED VISIT: HCPCS | Performed by: PHYSICIAN ASSISTANT

## 2021-03-27 RX ORDER — CEPHALEXIN 500 MG/1
500 CAPSULE ORAL EVERY 12 HOURS SCHEDULED
Qty: 14 CAPSULE | Refills: 0 | Status: SHIPPED | OUTPATIENT
Start: 2021-03-27 | End: 2021-03-29 | Stop reason: ALTCHOICE

## 2021-03-27 NOTE — TELEPHONE ENCOUNTER
Regarding: abdominal/ back pain, fever  ----- Message from Joyce Rosales RN sent at 3/27/2021 12:55 PM EDT -----  "I have a temperature, cramping abdominal pain, and dull back pain  Genevieve had diverticulitis in the past  But maybe its a UTI    Do I need to be seen?"

## 2021-03-27 NOTE — PROGRESS NOTES
[facility]        NAME: Nancy Rich is a 61 y o  female  : 1960    MRN: [id number]  DATE: 2021  TIME: 3:06 PM    Assessment and Plan   Fatigue, unspecified type [R53 [id number]]  1  Fatigue, unspecified type  POCT urine dip   2  Acute cystitis without hematuria  cephalexin (KEFLEX) 500 mg capsule     Urine dip was positive    Patient Instructions   Patient Instructions   Antibiotic twice a day for 7 days   If you get worse go to the ER   Follow up with PCP if not improving   ER if vomiting, worse left lower quadrant pain or increased fever         Follow up with PCP in 3-5 days  Proceed to  ER if symptoms worsen  Chief Complaint     Chief Complaint   Patient presents with    Fever     Pt c/o fever, lower back pain, abdominal pain, and fatigue x 1 day  History of Present Illness       The patient is a 40-year-old female presenting with fever, low back pain, abdominal pain and fatigue x  3 day  The pt noticed a temp of 99 last night  Reported feeling worse at night  The back pain is low back pain and low  Abdominal pain is central and with pushing on it  Feels crampy  Did have BM today  Does admit she may be constipation  Denies urinary symptoms  Does admit that she gets UTI's that do present like this  She has had them in the past with no urinary symptoms  Review of Systems   Review of Systems   Constitutional: Positive for fatigue and fever  Negative for activity change, appetite change and chills  HENT: Negative for congestion, rhinorrhea, sinus pressure, sinus pain and sore throat  Respiratory: Negative for cough, chest tightness and shortness of breath  Cardiovascular: Negative for chest pain and palpitations  Gastrointestinal: Positive for abdominal pain  Negative for diarrhea, nausea and vomiting  Genitourinary: Negative for decreased urine volume, difficulty urinating, dysuria, pelvic pain and urgency  Musculoskeletal: Positive for back pain (LBP)  Negative for arthralgias and myalgias  Skin: Negative for color change and pallor  Neurological: Negative for headaches           Current Medications       Current Outpatient Medications:     BESIVANCE 0 6 % SUSP, , Disp: , Rfl:     BIOTIN PO, Take by mouth, Disp: , Rfl:     CALCIUM PO, Take by mouth daily , Disp: , Rfl:     cephalexin (KEFLEX) 500 mg capsule, Take 1 capsule (500 mg total) by mouth every 12 (twelve) hours for 7 days, Disp: 14 capsule, Rfl: 0    COMBIGAN 0 2-0 5 %, 2 (two) times a day, Disp: , Rfl: 3    eletriptan (RELPAX) 40 MG tablet, Take 1 tablet (40 mg total) by mouth once as needed for migraine for up to 1 dose may repeat in 2 hours if necessary (Patient not taking: Reported on 3/27/2021), Disp: 16 tablet, Rfl: 3    estradiol (ESTRACE) 0 1 mg/g vaginal cream, Insert 1 g into the vagina 2 (two) times a week ,Please Courrier up to DNS:Net (Patient not taking: Reported on 3/27/2021), Disp: 42 5 g, Rfl: 0    Glucosamine-Chondroitin (GLUCOSAMINE CHONDR COMPLEX PO), Take by mouth daily, Disp: , Rfl:     hyoscyamine (ANASPAZ,LEVSIN) 0 125 MG tablet, Take 1 tablet (0 125 mg total) by mouth every 4 (four) hours as needed for cramping (Patient not taking: Reported on 3/27/2021), Disp: 20 tablet, Rfl: 0    Multiple Vitamins-Minerals (CENTRUM SILVER) tablet, Take by mouth, Disp: , Rfl:     ondansetron (ZOFRAN-ODT) 4 mg disintegrating tablet, Take 1 tablet (4 mg total) by mouth every 6 (six) hours as needed for nausea or vomiting (Patient not taking: Reported on 3/27/2021), Disp: 20 tablet, Rfl: 0    PARoxetine (PAXIL) 10 mg tablet, Take 0 5 tablets (5 mg total) by mouth daily, Disp: 90 tablet, Rfl: 1    Current Allergies     Allergies as of 03/27/2021 - Reviewed 03/27/2021   Allergen Reaction Noted    Other Itching and Other (See Comments) 07/22/2016    Pollen extract Itching, Swelling, and Sneezing 08/08/2018            The following portions of the patient's history were reviewed and updated as appropriate: allergies, current medications, past family history, past medical history, past social history, past surgical history and problem list      Past Medical History:   Diagnosis Date    Anxiety     last assessed 02/27/2017    Breast lump      last assessed 01/16/2017    Dermatitis     last assessed 02/27/2017    Disease of thyroid gland     last assessed 02/23/2016    Diverticulitis 11/2020    Dysuria     last assessed 04/28/2017    ETD (Eustachian tube dysfunction), bilateral     last assessed 02/23/2016       Past Surgical History:   Procedure Laterality Date    BREAST BIOPSY Right 2009    2 areas benign    TOOTH EXTRACTION      TUBAL LIGATION         Family History   Problem Relation Age of Onset    Breast cancer Mother 52    Cancer Father     Hypertension Father     Rectal cancer Father     Migraines Father     Heart disease Maternal Grandmother     Endometrial cancer Maternal Grandmother         unknown age   Willena [place] Throat cancer Brother     No Known Problems Sister     No Known Problems Daughter     No Known Problems Paternal Grandmother     No Known Problems Paternal Aunt          Medications have been verified  Objective   /72 (BP Location: Left arm, Patient Position: Sitting, Cuff Size: Standard)   Pulse 97   Temp 98 6 °F (37 °C)   Resp 18   Ht 5' 10 5" (1 791 m)   Wt 74 8 kg (165 lb)   LMP  (LMP Unknown)   SpO2 94%   BMI 23 34 kg/m²        Physical Exam     Physical Exam  Vitals signs reviewed  Constitutional:       General: She is not in acute distress  Appearance: Normal appearance  She is normal weight  She is not ill-appearing, toxic-appearing or diaphoretic  HENT:      Head: Normocephalic and atraumatic  Cardiovascular:      Rate and Rhythm: Normal rate and regular rhythm  Heart sounds: Normal heart sounds  No murmur  No friction rub  No gallop  Pulmonary:      Effort: Pulmonary effort is normal  No respiratory distress  Breath sounds: Normal breath sounds  No stridor  No wheezing, rhonchi or rales  Chest:      Chest wall: No tenderness  Abdominal:      General: Abdomen is flat  Bowel sounds are normal  There is no distension  Palpations: Abdomen is soft  There is no mass  Tenderness: There is abdominal tenderness (Lower quadrants )  There is no right CVA tenderness, left CVA tenderness, guarding or rebound  Hernia: No hernia is present  Musculoskeletal: Normal range of motion  Skin:     General: Skin is warm and dry  Capillary Refill: Capillary refill takes less than 2 seconds  Neurological:      Mental Status: She is alert

## 2021-03-27 NOTE — TELEPHONE ENCOUNTER
Reason for Disposition   [1] MODERATE pain (e g , interferes with normal activities) AND [2] pain comes and goes (cramps) AND [3] present > 24 hours  (Exception: pain with Vomiting or Diarrhea - see that Guideline)    Answer Assessment - Initial Assessment Questions  1  LOCATION: "Where does it hurt?"       Lower left and central     2  RADIATION: "Does the pain shoot anywhere else?" (e g , chest, back)      Towards the back  Dull back pain     3  ONSET: "When did the pain begin?" (e g , minutes, hours or days ago)       Thursday     4  SUDDEN: "Gradual or sudden onset?"      Gradual     5  PATTERN "Does the pain come and go, or is it constant?"     - If constant: "Is it getting better, staying the same, or worsening?"       (Note: Constant means the pain never goes away completely; most serious pain is constant and it progresses)      - If intermittent: "How long does it last?" "Do you have pain now?"      (Note: Intermittent means the pain goes away completely between bouts)      Always a dull pain but at times there is abdominal cramping    6  SEVERITY: "How bad is the pain?"  (e g , Scale 1-10; mild, moderate, or severe)    - MILD (1-3): doesn't interfere with normal activities, abdomen soft and not tender to touch     - MODERATE (4-7): interferes with normal activities or awakens from sleep, tender to touch     - SEVERE (8-10): excruciating pain, doubled over, unable to do any normal activities       8/10 at it's worst but the dull pain that is constant is mild    7  RECURRENT SYMPTOM: "Have you ever had this type of abdominal pain before?" If so, ask: "When was the last time?" and "What happened that time?"       Reports history of diverticulitis and uti     8  CAUSE: "What do you think is causing the abdominal pain?"      UTI and Diverticulitis but not sure     9  RELIEVING/AGGRAVATING FACTORS: "What makes it better or worse?" (e g , movement, antacids, bowel movement)     Denies     10   OTHER SYMPTOMS: "Has there been any vomiting, diarrhea, constipation, or urine problems?"        Temperature was 100 and fatigue      Protocols used: ABDOMINAL PAIN - [facility]

## 2021-03-27 NOTE — PATIENT INSTRUCTIONS
Antibiotic twice a day for 7 days   If you get worse go to the ER   Follow up with PCP if not improving   ER if vomiting, worse left lower quadrant pain or increased fever

## 2021-03-29 ENCOUNTER — OFFICE VISIT (OUTPATIENT)
Dept: FAMILY MEDICINE CLINIC | Facility: CLINIC | Age: 61
End: 2021-03-29
Payer: COMMERCIAL

## 2021-03-29 VITALS
TEMPERATURE: 99 F | HEIGHT: 71 IN | WEIGHT: 165 LBS | OXYGEN SATURATION: 98 % | SYSTOLIC BLOOD PRESSURE: 100 MMHG | HEART RATE: 101 BPM | BODY MASS INDEX: 23.1 KG/M2 | DIASTOLIC BLOOD PRESSURE: 62 MMHG | RESPIRATION RATE: 14 BRPM

## 2021-03-29 DIAGNOSIS — N30.00 ACUTE CYSTITIS WITHOUT HEMATURIA: Primary | ICD-10-CM

## 2021-03-29 DIAGNOSIS — E07.9 THYROID DISORDER: ICD-10-CM

## 2021-03-29 PROBLEM — Z00.00 ANNUAL PHYSICAL EXAM: Status: RESOLVED | Noted: 2020-10-15 | Resolved: 2021-03-29

## 2021-03-29 PROBLEM — Z01.419 ENCOUNTER FOR GYNECOLOGICAL EXAMINATION WITHOUT ABNORMAL FINDING: Status: RESOLVED | Noted: 2020-10-26 | Resolved: 2021-03-29

## 2021-03-29 PROCEDURE — 99214 OFFICE O/P EST MOD 30 MIN: CPT | Performed by: NURSE PRACTITIONER

## 2021-03-29 RX ORDER — TIMOLOL MALEATE 5 MG/ML
SOLUTION/ DROPS OPHTHALMIC
COMMUNITY
Start: 2021-02-04

## 2021-03-29 RX ORDER — CIPROFLOXACIN 500 MG/1
500 TABLET, FILM COATED ORAL EVERY 12 HOURS SCHEDULED
Qty: 6 TABLET | Refills: 0 | Status: SHIPPED | OUTPATIENT
Start: 2021-03-29 | End: 2021-04-01

## 2021-03-29 RX ORDER — PREDNISOLONE ACETATE 10 MG/ML
SUSPENSION/ DROPS OPHTHALMIC
COMMUNITY
Start: 2021-02-12 | End: 2022-03-17

## 2021-03-29 NOTE — PROGRESS NOTES
Assessment/Plan:    Acute cystitis without hematuria    Urine culture not obtained and patient continues to have symptoms  To stop Keflex and begin Cipro 500 mg every 12 hours for 3 days  Counseled the importance of staying well hydrated, having regular bowel movements, and voiding after intercourse  Follow-up if no improvement in 3 days or sooner if symptoms worsen  Thyroid nodule    To proceed with thyroid ultrasound  Advised to make an appointment for a follow-up with the endocrinologist        Diagnoses and all orders for this visit:    Acute cystitis without hematuria  -     ciprofloxacin (CIPRO) 500 mg tablet; Take 1 tablet (500 mg total) by mouth every 12 (twelve) hours for 3 days    Thyroid disorder  -     US thyroid; Future  -     Ambulatory referral to Endocrinology; Future    Other orders  -     timolol (TIMOPTIC) 0 5 % ophthalmic solution  -     prednisoLONE acetate (PRED FORTE) 1 % ophthalmic suspension          Subjective:      Patient ID: Dwight Hart is a 61 y o  female  Laverta Craft presents for a follow-up  She was evaluated at an urgent care on 03/27/2021 and diagnosed with the UTI  She was experiencing urinary frequency, lower abdominal pain, fever, chills, and back pain  She was started on Keflex  A urine culture was not obtained  [facility] reports her symptoms have improved but she continues to have chills, feel unwell, have abdominal discomfort and some urinary frequency  She also has a history of thyroid nodules  Her last thyroid ultrasound was in 2017  She is also connected with the endocrinologist but has not been seen recently        The following portions of the patient's history were reviewed and updated as appropriate:   She   Patient Active Problem List    Diagnosis Date Noted    Acute cystitis without hematuria 08/24/2020    Polyp at cervical os 02/17/2020    Migraine with aura and without status migrainosus, not intractable 02/27/2017    Thyroid nodule 02/23/2016     Current Outpatient Medications   Medication Sig Dispense Refill    BIOTIN PO Take by mouth      CALCIUM PO Take by mouth daily       ciprofloxacin (CIPRO) 500 mg tablet Take 1 tablet (500 mg total) by mouth every 12 (twelve) hours for 3 days 6 tablet 0    eletriptan (RELPAX) 40 MG tablet Take 1 tablet (40 mg total) by mouth once as needed for migraine for up to 1 dose may repeat in 2 hours if necessary (Patient not taking: Reported on 3/27/2021) 16 tablet 3    estradiol (ESTRACE) 0 1 mg/g vaginal cream Insert 1 g into the vagina 2 (two) times a week ,Please Courrier up to "Mobile Location, IP" (Patient not taking: Reported on 3/27/2021) 42 5 g 0    Glucosamine-Chondroitin (GLUCOSAMINE CHONDR COMPLEX PO) Take by mouth daily      hyoscyamine (ANASPAZ,LEVSIN) 0 125 MG tablet Take 1 tablet (0 125 mg total) by mouth every 4 (four) hours as needed for cramping (Patient not taking: Reported on 3/27/2021) 20 tablet 0    Multiple Vitamins-Minerals (CENTRUM SILVER) tablet Take by mouth      ondansetron (ZOFRAN-ODT) 4 mg disintegrating tablet Take 1 tablet (4 mg total) by mouth every 6 (six) hours as needed for nausea or vomiting (Patient not taking: Reported on 3/27/2021) 20 tablet 0    prednisoLONE acetate (PRED FORTE) 1 % ophthalmic suspension       timolol (TIMOPTIC) 0 5 % ophthalmic solution        No current facility-administered medications for this visit  She is allergic to other and pollen extract       Review of Systems   Constitutional: Positive for chills and fever  HENT: Negative for trouble swallowing  Respiratory: Negative  Cardiovascular: Negative  Gastrointestinal: Positive for abdominal pain  Genitourinary: Positive for frequency and urgency  Neurological: Negative  Psychiatric/Behavioral: Negative            /62   Pulse 101   Temp 99 °F (37 2 °C) (Tympanic)   Resp 14   Ht 5' 10 5" (1 791 m)   Wt 74 8 kg (165 lb)   LMP  (LMP Unknown)   SpO2 98%   BMI 23 34 kg/m²     Objective:     Physical Exam  Vitals signs and nursing note reviewed  Constitutional:       General: She is not in acute distress  Appearance: Normal appearance  She is well-developed  She is not ill-appearing, toxic-appearing or diaphoretic  HENT:      Head: Normocephalic and atraumatic  Eyes:      Conjunctiva/sclera: Conjunctivae normal    Neck:      Musculoskeletal: Neck supple  Thyroid: Thyromegaly present  No thyroid tenderness  Cardiovascular:      Rate and Rhythm: Normal rate and regular rhythm  Heart sounds: Normal heart sounds  No murmur  Pulmonary:      Effort: Pulmonary effort is normal  No respiratory distress  Breath sounds: Normal breath sounds  No wheezing or rales  Chest:      Chest wall: No tenderness  Abdominal:      General: Abdomen is flat  Palpations: Abdomen is soft  Tenderness: There is abdominal tenderness in the suprapubic area  There is no right CVA tenderness or left CVA tenderness  Lymphadenopathy:      Cervical: No cervical adenopathy  Neurological:      General: No focal deficit present  Mental Status: She is alert and oriented to person, place, and time  Psychiatric:         Mood and Affect: Mood normal          Behavior: Behavior normal          Thought Content:  Thought content normal          Judgment: Judgment normal

## 2021-03-29 NOTE — ASSESSMENT & PLAN NOTE
Urine culture not obtained and patient continues to have symptoms  To stop Keflex and begin Cipro 500 mg every 12 hours for 3 days  Counseled the importance of staying well hydrated, having regular bowel movements, and voiding after intercourse  Follow-up if no improvement in 3 days or sooner if symptoms worsen

## 2021-03-29 NOTE — ASSESSMENT & PLAN NOTE
To proceed with thyroid ultrasound      Advised to make an appointment for a follow-up with the endocrinologist

## 2021-04-13 ENCOUNTER — HOSPITAL ENCOUNTER (OUTPATIENT)
Dept: ULTRASOUND IMAGING | Facility: HOSPITAL | Age: 61
Discharge: HOME | End: 2021-04-13
Payer: COMMERCIAL

## 2021-04-13 DIAGNOSIS — E07.9 THYROID DISORDER: ICD-10-CM

## 2021-04-13 PROCEDURE — 76536 US EXAM OF HEAD AND NECK: CPT

## 2021-05-13 ENCOUNTER — TRANSCRIBE ORDERS (OUTPATIENT)
Dept: ADMINISTRATIVE | Facility: HOSPITAL | Age: 61
End: 2021-05-13

## 2021-05-13 ENCOUNTER — APPOINTMENT (OUTPATIENT)
Dept: LAB | Facility: HOSPITAL | Age: 61
End: 2021-05-13
Attending: PODIATRIST
Payer: COMMERCIAL

## 2021-05-13 DIAGNOSIS — Z00.8 OTHER SPECIFIED GENERAL MEDICAL EXAMINATION: ICD-10-CM

## 2021-05-13 DIAGNOSIS — B35.1 ONYCHOMYCOSIS: Primary | ICD-10-CM

## 2021-05-13 DIAGNOSIS — B35.1 ONYCHOMYCOSIS: ICD-10-CM

## 2021-05-13 LAB
ALBUMIN SERPL BCP-MCNC: 3.8 G/DL (ref 3.5–5)
ALP SERPL-CCNC: 61 U/L (ref 46–116)
ALT SERPL W P-5'-P-CCNC: 29 U/L (ref 12–78)
AST SERPL W P-5'-P-CCNC: 15 U/L (ref 5–45)
BILIRUB DIRECT SERPL-MCNC: 0.09 MG/DL (ref 0–0.2)
BILIRUB SERPL-MCNC: 0.29 MG/DL (ref 0.2–1)
PROT SERPL-MCNC: 7.1 G/DL (ref 6.4–8.2)

## 2021-05-13 PROCEDURE — 80076 HEPATIC FUNCTION PANEL: CPT

## 2021-05-13 PROCEDURE — 36415 COLL VENOUS BLD VENIPUNCTURE: CPT

## 2021-05-14 DIAGNOSIS — G43.109 MIGRAINE WITH AURA AND WITHOUT STATUS MIGRAINOSUS, NOT INTRACTABLE: ICD-10-CM

## 2021-05-14 RX ORDER — ELETRIPTAN HYDROBROMIDE 40 MG/1
40 TABLET, FILM COATED ORAL ONCE AS NEEDED
Qty: 16 TABLET | Refills: 3 | Status: SHIPPED | OUTPATIENT
Start: 2021-05-14

## 2021-07-07 DIAGNOSIS — N95.2 ATROPHIC VULVOVAGINITIS: ICD-10-CM

## 2021-07-07 RX ORDER — ESTRADIOL 0.1 MG/G
CREAM VAGINAL
Qty: 42.5 G | Refills: 1 | Status: SHIPPED | OUTPATIENT
Start: 2021-07-07 | End: 2022-04-11

## 2021-07-26 ENCOUNTER — TELEPHONE (OUTPATIENT)
Dept: FAMILY MEDICINE CLINIC | Facility: CLINIC | Age: 61
End: 2021-07-26

## 2021-07-27 NOTE — TELEPHONE ENCOUNTER
Please call patient to make aware that sent a refill of the prior dose she had been on  I would like to check in with her in 4 weeks to see how she is doing   Thank you

## 2021-08-10 ENCOUNTER — TELEPHONE (OUTPATIENT)
Dept: FAMILY MEDICINE CLINIC | Facility: CLINIC | Age: 61
End: 2021-08-10

## 2021-08-11 ENCOUNTER — APPOINTMENT (OUTPATIENT)
Dept: LAB | Facility: HOSPITAL | Age: 61
End: 2021-08-11

## 2021-08-11 DIAGNOSIS — Z00.8 OTHER SPECIFIED GENERAL MEDICAL EXAMINATION: ICD-10-CM

## 2021-08-11 LAB
CHOLEST SERPL-MCNC: 193 MG/DL (ref 50–200)
EST. AVERAGE GLUCOSE BLD GHB EST-MCNC: 94 MG/DL
HBA1C MFR BLD: 4.9 %
HDLC SERPL-MCNC: 80 MG/DL
LDLC SERPL CALC-MCNC: 103 MG/DL (ref 0–100)
NONHDLC SERPL-MCNC: 113 MG/DL
TRIGL SERPL-MCNC: 49 MG/DL

## 2021-08-11 PROCEDURE — 80061 LIPID PANEL: CPT

## 2021-08-11 PROCEDURE — 36415 COLL VENOUS BLD VENIPUNCTURE: CPT

## 2021-08-11 PROCEDURE — 83036 HEMOGLOBIN GLYCOSYLATED A1C: CPT

## 2021-09-09 ENCOUNTER — OFFICE VISIT (OUTPATIENT)
Dept: FAMILY MEDICINE CLINIC | Facility: CLINIC | Age: 61
End: 2021-09-09
Payer: COMMERCIAL

## 2021-09-09 ENCOUNTER — TELEPHONE (OUTPATIENT)
Dept: FAMILY MEDICINE CLINIC | Facility: CLINIC | Age: 61
End: 2021-09-09

## 2021-09-09 VITALS
DIASTOLIC BLOOD PRESSURE: 70 MMHG | BODY MASS INDEX: 23.69 KG/M2 | SYSTOLIC BLOOD PRESSURE: 102 MMHG | HEART RATE: 72 BPM | TEMPERATURE: 98.6 F | WEIGHT: 169.2 LBS | OXYGEN SATURATION: 98 % | HEIGHT: 71 IN | RESPIRATION RATE: 16 BRPM

## 2021-09-09 DIAGNOSIS — R23.2 HOT FLASHES: Primary | ICD-10-CM

## 2021-09-09 DIAGNOSIS — G47.00 INSOMNIA, UNSPECIFIED TYPE: ICD-10-CM

## 2021-09-09 PROBLEM — N30.00 ACUTE CYSTITIS WITHOUT HEMATURIA: Status: RESOLVED | Noted: 2020-08-24 | Resolved: 2021-09-09

## 2021-09-09 PROCEDURE — 99214 OFFICE O/P EST MOD 30 MIN: CPT | Performed by: NURSE PRACTITIONER

## 2021-09-09 RX ORDER — TRAZODONE HYDROCHLORIDE 50 MG/1
50 TABLET ORAL
Qty: 90 TABLET | Refills: 0 | Status: SHIPPED | OUTPATIENT
Start: 2021-09-09 | End: 2021-09-09 | Stop reason: SDUPTHER

## 2021-09-09 RX ORDER — TRAZODONE HYDROCHLORIDE 50 MG/1
50 TABLET ORAL
Qty: 10 TABLET | Refills: 0 | Status: SHIPPED | OUTPATIENT
Start: 2021-09-09 | End: 2021-09-09 | Stop reason: SDUPTHER

## 2021-09-09 NOTE — TELEPHONE ENCOUNTER
Patient called stating [facility] does not have T[facility]   When you sent the second one to [facility] it canceled the one at [facility]

## 2021-09-09 NOTE — ASSESSMENT & PLAN NOTE
Hot flashes have resolved with low-dose of Paxil  To continue new current dosage  Counseled on limiting caffeine, keeping bedroom cool, and layering blankets as needed

## 2021-09-09 NOTE — PROGRESS NOTES
Assessment/Plan:    Hot flashes    Hot flashes have resolved with low-dose of Paxil  To continue new current dosage  Counseled on limiting caffeine, keeping bedroom cool, and layering blankets as needed  Insomnia    To begin trazodone 50 mg as needed for sleep  Counseled on good sleep hygiene  Follow-up if no improvement in 1 month or sooner if symptoms worsen  Diagnoses and all orders for this visit:    Hot flashes    Insomnia, unspecified type  -     Discontinue: traZODone (DESYREL) 50 mg tablet; Take 1 tablet (50 mg total) by mouth daily at bedtime as needed for sleep  -     traZODone (DESYREL) 50 mg tablet; Take 1 tablet (50 mg total) by mouth daily at bedtime as needed for sleep    Other orders  -     Black Pepper-Turmeric 3-500 MG CAPS; Take by mouth  -     Cranberry 465 MG CAPS; Take by mouth          Subjective:      Patient ID: Sherry Layne is a 61 y o  female  Willadean Crumble presents for a follow-up  She recently resumed Paxil 5 mg daily which greatly improved her hot flashes  She also has had difficulty staying asleep  She typically falls asleep without issue but will wake up anywhere between 1:00 a m  and 4 a m  and has a very hard time falling back to sleep she  She has tried over-the-counter melatonin with minimal improvement        The following portions of the patient's history were reviewed and updated as appropriate: She   Patient Active Problem List    Diagnosis Date Noted    Insomnia 09/09/2021    Polyp at cervical os 02/17/2020    Hot flashes 08/08/2018    Migraine with aura and without status migrainosus, not intractable 02/27/2017    Thyroid nodule 02/23/2016     Current Outpatient Medications   Medication Sig Dispense Refill    BIOTIN PO Take by mouth      Black Pepper-Turmeric 3-500 MG CAPS Take by mouth      CALCIUM PO Take by mouth daily       Cranberry 465 MG CAPS Take by mouth      eletriptan (RELPAX) 40 MG tablet Take 1 tablet (40 mg total) by mouth once as needed for migraine for up to 1 dose may repeat in 2 hours if necessary 16 tablet 3    estradiol (ESTRACE) 0 1 mg/g vaginal cream INSERT 1 GRAM INTO THE VAGINA TWICE WEEKLY 42 5 g 1    Multiple Vitamins-Minerals (CENTRUM SILVER) tablet Take by mouth      PARoxetine (PAXIL) 10 mg tablet Take 1/2 tab daily  30 tablet 0    Glucosamine-Chondroitin (GLUCOSAMINE CHONDR COMPLEX PO) Take by mouth daily (Patient not taking: Reported on 9/9/2021)      hyoscyamine (ANASPAZ,LEVSIN) 0 125 MG tablet Take 1 tablet (0 125 mg total) by mouth every 4 (four) hours as needed for cramping (Patient not taking: Reported on 3/27/2021) 20 tablet 0    ondansetron (ZOFRAN-ODT) 4 mg disintegrating tablet Take 1 tablet (4 mg total) by mouth every 6 (six) hours as needed for nausea or vomiting (Patient not taking: Reported on 3/27/2021) 20 tablet 0    prednisoLONE acetate (PRED FORTE) 1 % ophthalmic suspension       timolol (TIMOPTIC) 0 5 % ophthalmic solution       traZODone (DESYREL) 50 mg tablet Take 1 tablet (50 mg total) by mouth daily at bedtime as needed for sleep 90 tablet 0     No current facility-administered medications for this visit  She is allergic to other and pollen extract       Review of Systems   Constitutional: Negative  Respiratory: Negative  Cardiovascular: Negative  Gastrointestinal: Negative  Musculoskeletal: Negative  Psychiatric/Behavioral: Positive for sleep disturbance  /70   Pulse 72   Temp 98 6 °F (37 °C)   Resp 16   Ht 5' 10 5" (1 791 m)   Wt 76 7 kg (169 lb 3 2 oz)   LMP  (LMP Unknown)   SpO2 98%   BMI 23 93 kg/m²     Objective:     Physical Exam  Vitals and nursing note reviewed  Constitutional:       General: She is not in acute distress  Appearance: Normal appearance  She is well-developed  She is not ill-appearing, toxic-appearing or diaphoretic  HENT:      Head: Normocephalic and atraumatic     Eyes:      Conjunctiva/sclera: Conjunctivae normal    Cardiovascular:      Rate and Rhythm: Normal rate and regular rhythm  Heart sounds: Normal heart sounds  No murmur heard  Pulmonary:      Effort: Pulmonary effort is normal  No respiratory distress  Breath sounds: Normal breath sounds  No wheezing or rales  Chest:      Chest wall: No tenderness  Musculoskeletal:      Cervical back: Neck supple  Neurological:      General: No focal deficit present  Mental Status: She is alert and oriented to person, place, and time  Psychiatric:         Mood and Affect: Mood normal          Behavior: Behavior normal          Thought Content:  Thought content normal          Judgment: Judgment normal

## 2021-09-09 NOTE — ASSESSMENT & PLAN NOTE
To begin trazodone 50 mg as needed for sleep  Counseled on good sleep hygiene  Follow-up if no improvement in 1 month or sooner if symptoms worsen

## 2021-09-23 DIAGNOSIS — G47.00 INSOMNIA, UNSPECIFIED TYPE: ICD-10-CM

## 2021-09-23 RX ORDER — TRAZODONE HYDROCHLORIDE 50 MG/1
50 TABLET ORAL
Qty: 10 TABLET | Refills: 0 | Status: SHIPPED | OUTPATIENT
Start: 2021-09-23 | End: 2021-10-04 | Stop reason: SDUPTHER

## 2021-10-04 DIAGNOSIS — G47.00 INSOMNIA, UNSPECIFIED TYPE: ICD-10-CM

## 2021-10-04 RX ORDER — TRAZODONE HYDROCHLORIDE 50 MG/1
50 TABLET ORAL
Qty: 10 TABLET | Refills: 0 | Status: SHIPPED | OUTPATIENT
Start: 2021-10-04 | End: 2022-03-17

## 2021-10-12 ENCOUNTER — CLINICAL SUPPORT (OUTPATIENT)
Dept: FAMILY MEDICINE CLINIC | Facility: CLINIC | Age: 61
End: 2021-10-12
Payer: COMMERCIAL

## 2021-10-12 DIAGNOSIS — Z23 IMMUNIZATION DUE: Primary | ICD-10-CM

## 2021-10-12 PROCEDURE — 90471 IMMUNIZATION ADMIN: CPT

## 2021-10-12 PROCEDURE — 90682 RIV4 VACC RECOMBINANT DNA IM: CPT

## 2021-10-28 ENCOUNTER — OFFICE VISIT (OUTPATIENT)
Dept: FAMILY MEDICINE CLINIC | Facility: CLINIC | Age: 61
End: 2021-10-28
Payer: COMMERCIAL

## 2021-10-28 VITALS
HEIGHT: 71 IN | SYSTOLIC BLOOD PRESSURE: 98 MMHG | WEIGHT: 165.6 LBS | BODY MASS INDEX: 23.18 KG/M2 | TEMPERATURE: 98.5 F | HEART RATE: 75 BPM | OXYGEN SATURATION: 98 % | RESPIRATION RATE: 18 BRPM | DIASTOLIC BLOOD PRESSURE: 62 MMHG

## 2021-10-28 DIAGNOSIS — M25.542 ARTHRALGIA OF BOTH HANDS: ICD-10-CM

## 2021-10-28 DIAGNOSIS — Z00.00 ANNUAL PHYSICAL EXAM: Primary | ICD-10-CM

## 2021-10-28 DIAGNOSIS — M25.541 ARTHRALGIA OF BOTH HANDS: ICD-10-CM

## 2021-10-28 DIAGNOSIS — E04.1 THYROID NODULE: ICD-10-CM

## 2021-10-28 PROCEDURE — 99396 PREV VISIT EST AGE 40-64: CPT | Performed by: NURSE PRACTITIONER

## 2021-11-02 ENCOUNTER — ANNUAL EXAM (OUTPATIENT)
Dept: OBGYN CLINIC | Facility: CLINIC | Age: 61
End: 2021-11-02
Payer: COMMERCIAL

## 2021-11-02 VITALS — BODY MASS INDEX: 23.93 KG/M2 | SYSTOLIC BLOOD PRESSURE: 132 MMHG | DIASTOLIC BLOOD PRESSURE: 80 MMHG | WEIGHT: 169.2 LBS

## 2021-11-02 DIAGNOSIS — Z01.419 ENCOUNTER FOR ANNUAL ROUTINE GYNECOLOGICAL EXAMINATION: Primary | ICD-10-CM

## 2021-11-02 DIAGNOSIS — Z01.419 ENCOUNTER FOR GYNECOLOGICAL EXAMINATION WITHOUT ABNORMAL FINDING: ICD-10-CM

## 2021-11-02 DIAGNOSIS — Z12.31 SCREENING MAMMOGRAM, ENCOUNTER FOR: ICD-10-CM

## 2021-11-02 PROCEDURE — 99396 PREV VISIT EST AGE 40-64: CPT | Performed by: OBSTETRICS & GYNECOLOGY

## 2021-12-13 ENCOUNTER — TELEPHONE (OUTPATIENT)
Dept: OBGYN CLINIC | Facility: CLINIC | Age: 61
End: 2021-12-13

## 2021-12-13 DIAGNOSIS — N64.4 SORENESS BREAST: Primary | ICD-10-CM

## 2021-12-14 DIAGNOSIS — K57.92 DIVERTICULITIS: ICD-10-CM

## 2021-12-14 DIAGNOSIS — N95.1 MENOPAUSAL SYMPTOMS: ICD-10-CM

## 2021-12-14 RX ORDER — HYOSCYAMINE SULFATE 0.125 MG
0.12 TABLET ORAL EVERY 4 HOURS PRN
Qty: 20 TABLET | Refills: 0 | Status: SHIPPED | OUTPATIENT
Start: 2021-12-14

## 2021-12-14 RX ORDER — PAROXETINE 10 MG/1
TABLET, FILM COATED ORAL
Qty: 30 TABLET | Refills: 0 | Status: SHIPPED | OUTPATIENT
Start: 2021-12-14 | End: 2022-06-22 | Stop reason: ALTCHOICE

## 2021-12-28 ENCOUNTER — HOSPITAL ENCOUNTER (OUTPATIENT)
Dept: MAMMOGRAPHY | Facility: CLINIC | Age: 61
Discharge: HOME | End: 2021-12-28
Payer: COMMERCIAL

## 2021-12-28 ENCOUNTER — HOSPITAL ENCOUNTER (OUTPATIENT)
Dept: ULTRASOUND IMAGING | Facility: CLINIC | Age: 61
Discharge: HOME | End: 2021-12-28
Payer: COMMERCIAL

## 2021-12-28 VITALS — HEIGHT: 70 IN | BODY MASS INDEX: 24.2 KG/M2 | WEIGHT: 169 LBS

## 2021-12-28 DIAGNOSIS — N64.4 PAIN OF LEFT BREAST: ICD-10-CM

## 2021-12-28 DIAGNOSIS — N64.4 BREAST PAIN: ICD-10-CM

## 2021-12-28 PROCEDURE — 76642 ULTRASOUND BREAST LIMITED: CPT

## 2021-12-28 PROCEDURE — G0279 TOMOSYNTHESIS, MAMMO: HCPCS

## 2021-12-28 PROCEDURE — 77065 DX MAMMO INCL CAD UNI: CPT

## 2022-01-17 ENCOUNTER — TELEPHONE (OUTPATIENT)
Dept: FAMILY MEDICINE CLINIC | Facility: CLINIC | Age: 62
End: 2022-01-17

## 2022-01-17 NOTE — TELEPHONE ENCOUNTER
----- Message from Catalino Sandoval sent at 1/17/2022 10:52 AM EST -----  Regarding: FW: Positive Covid test  Please respond to patient   ----- Message -----  From: Esthercarrol Rosenberg  Sent: 1/17/2022  10:07 AM EST  To: , #  Subject: Positive Covid test                              Hi Alyse  I tested + Covid on 12/29, mild case, asymptomatic about 1 week after test  I am flying to [facility] this Saturday and need documentation of being assymptomatic in case I test + again for 1/29  return flight  Do I need to see you or maybe virtual/ phone will suffice? I know you must be swamp d w/ Omicron  Please let me know  Hope you are well    Shruthi Blair

## 2022-01-17 NOTE — TELEPHONE ENCOUNTER
Telephone call to patient  Reports she tested positive for Covid shortly after [place]  Her symptoms were very mild  Her symptoms have since resolved  Denies fever, chills, cough, or shortness of breath  She is requesting a note stating that she is asymptomatic as she is going to be traveling internationally the end of the week  Will provide node as requested

## 2022-01-17 NOTE — LETTER
Deedee Morgan [address] [address]  [phone]  Dept: [phone]    January 17, 2022    Patient: Ju Block  YOB: 1960    To whom it May concern  Patient recently tested positive for COVID on 12/29/2021  Her symptoms have completely resolved  She is asymptomatic and feeling well        Sincerely,    ELY Roman

## 2022-03-15 ENCOUNTER — TELEPHONE (OUTPATIENT)
Dept: FAMILY MEDICINE CLINIC | Facility: CLINIC | Age: 62
End: 2022-03-15

## 2022-03-15 ENCOUNTER — TELEPHONE (OUTPATIENT)
Dept: GASTROENTEROLOGY | Facility: CLINIC | Age: 62
End: 2022-03-15

## 2022-03-15 ENCOUNTER — CLINICAL SUPPORT (OUTPATIENT)
Dept: FAMILY MEDICINE CLINIC | Facility: CLINIC | Age: 62
End: 2022-03-15
Payer: COMMERCIAL

## 2022-03-15 DIAGNOSIS — R30.0 DYSURIA: Primary | ICD-10-CM

## 2022-03-15 LAB
SL AMB  POCT GLUCOSE, UA: ABNORMAL
SL AMB LEUKOCYTE ESTERASE,UA: 70
SL AMB POCT BILIRUBIN,UA: ABNORMAL
SL AMB POCT BLOOD,UA: ABNORMAL
SL AMB POCT CLARITY,UA: ABNORMAL
SL AMB POCT COLOR,UA: YELLOW
SL AMB POCT KETONES,UA: ABNORMAL
SL AMB POCT NITRITE,UA: ABNORMAL
SL AMB POCT PH,UA: 6
SL AMB POCT SPECIFIC GRAVITY,UA: 1.02
SL AMB POCT URINE PROTEIN: ABNORMAL
SL AMB POCT UROBILINOGEN: 3.5

## 2022-03-15 PROCEDURE — 87086 URINE CULTURE/COLONY COUNT: CPT | Performed by: NURSE PRACTITIONER

## 2022-03-15 PROCEDURE — 81002 URINALYSIS NONAUTO W/O SCOPE: CPT

## 2022-03-15 NOTE — TELEPHONE ENCOUNTER
Patient called to schedule a colonoscopy as she is past due  She also indicated possibly added and EGD due to some heartburn  Called back and left message that we can schedule the colonoscopy, but if she wants to add the EGD she would need to come in for an office visit to document the need for the procedure as there is no documentation on file and an EGD is not a preventative procedure that we can do without notes

## 2022-03-15 NOTE — TELEPHONE ENCOUNTER
Patient called in because [facility] is [facility]   [facility]   Nor do I see it in medication tab

## 2022-03-17 ENCOUNTER — OFFICE VISIT (OUTPATIENT)
Dept: FAMILY MEDICINE CLINIC | Facility: CLINIC | Age: 62
End: 2022-03-17
Payer: COMMERCIAL

## 2022-03-17 VITALS
HEART RATE: 79 BPM | TEMPERATURE: 98.2 F | SYSTOLIC BLOOD PRESSURE: 100 MMHG | DIASTOLIC BLOOD PRESSURE: 70 MMHG | OXYGEN SATURATION: 98 % | HEIGHT: 70 IN | WEIGHT: 168.4 LBS | RESPIRATION RATE: 16 BRPM | BODY MASS INDEX: 24.11 KG/M2

## 2022-03-17 DIAGNOSIS — R39.9 LOWER URINARY TRACT SYMPTOMS: Primary | ICD-10-CM

## 2022-03-17 PROBLEM — Z01.419 ENCOUNTER FOR GYNECOLOGICAL EXAMINATION WITHOUT ABNORMAL FINDING: Status: RESOLVED | Noted: 2018-10-03 | Resolved: 2022-03-17

## 2022-03-17 LAB — BACTERIA UR CULT: NORMAL

## 2022-03-17 PROCEDURE — 99213 OFFICE O/P EST LOW 20 MIN: CPT | Performed by: NURSE PRACTITIONER

## 2022-03-17 NOTE — PROGRESS NOTES
Assessment/Plan:    Lower urinary tract symptoms  Will await culture results  To continue Bactrim for now  Counseled the importance of staying well hydrated and avoiding bladder irritants  Provided referral to Urogynecology  Diagnoses and all orders for this visit:    Lower urinary tract symptoms  -     Ambulatory Referral to Urogynecology; Future          Subjective:      Patient ID: Yue Zepeda is a 64 y o  female  Sandra Ocean presents for a follow-up  She was started on Bactrim on 03/15/2022 after presenting with urinary frequency, urinary incontinence, fatigue, and lower back pain  We are still awaiting the results of the cough culture  She had had minimal improvement in her symptoms since starting Bactrim  She is requesting referral to Urogynecology as she feels that she experiences urinary frequency often as well as UTIs despite methods to try to prevent them        The following portions of the patient's history were reviewed and updated as appropriate: She   Patient Active Problem List    Diagnosis Date Noted    Lower urinary tract symptoms 03/17/2022    Insomnia 09/09/2021    Polyp at cervical os 02/17/2020    Hot flashes 08/08/2018    Migraine with aura and without status migrainosus, not intractable 02/27/2017    Thyroid nodule 02/23/2016     Current Outpatient Medications   Medication Sig Dispense Refill    BIOTIN PO Take by mouth      Black Pepper-Turmeric 3-500 MG CAPS Take by mouth      CALCIUM PO Take by mouth daily       Cranberry 465 MG CAPS Take by mouth      eletriptan (RELPAX) 40 MG tablet Take 1 tablet (40 mg total) by mouth once as needed for migraine for up to 1 dose may repeat in 2 hours if necessary 16 tablet 3    estradiol (ESTRACE) 0 1 mg/g vaginal cream INSERT 1 GRAM INTO THE VAGINA TWICE WEEKLY 42 5 g 1    Glucosamine-Chondroitin (GLUCOSAMINE CHONDR COMPLEX PO) Take by mouth daily       hyoscyamine (ANASPAZ,LEVSIN) 0 125 MG tablet Take 1 tablet (0 125 mg total) by mouth every 4 (four) hours as needed for cramping 20 tablet 0    Multiple Vitamins-Minerals (CENTRUM SILVER) tablet Take by mouth      ondansetron (ZOFRAN-ODT) 4 mg disintegrating tablet Take 1 tablet (4 mg total) by mouth every 6 (six) hours as needed for nausea or vomiting 20 tablet 0    PARoxetine (PAXIL) 10 mg tablet Take 1/2 tab daily  30 tablet 0    sulfamethoxazole-trimethoprim (BACTRIM DS) 800-160 mg per tablet Take 1 tablet by mouth every 12 (twelve) hours for 5 days 10 tablet 0    timolol (TIMOPTIC) 0 5 % ophthalmic solution        No current facility-administered medications for this visit  She is allergic to other and pollen extract       Review of Systems   Respiratory: Negative  Genitourinary: Positive for frequency  Negative for dysuria  Musculoskeletal: Positive for back pain  Neurological: Negative  Psychiatric/Behavioral: Negative  /70   Pulse 79   Temp 98 2 °F (36 8 °C) (Tympanic)   Resp 16   Ht 5' 10" (1 778 m)   Wt 76 4 kg (168 lb 6 4 oz)   LMP  (LMP Unknown)   SpO2 98%   BMI 24 16 kg/m²     Objective:     Physical Exam  Vitals and nursing note reviewed  Constitutional:       General: She is not in acute distress  Appearance: Normal appearance  She is well-developed  She is not ill-appearing, toxic-appearing or diaphoretic  HENT:      Head: Normocephalic and atraumatic  Eyes:      Conjunctiva/sclera: Conjunctivae normal    Cardiovascular:      Rate and Rhythm: Normal rate and regular rhythm  Heart sounds: Normal heart sounds  No murmur heard  Pulmonary:      Effort: Pulmonary effort is normal  No respiratory distress  Breath sounds: Normal breath sounds  No wheezing or rales  Chest:      Chest wall: No tenderness  Abdominal:      General: Abdomen is flat  Bowel sounds are normal       Palpations: Abdomen is soft  Tenderness: There is no right CVA tenderness or left CVA tenderness     Musculoskeletal: Cervical back: Neck supple  Neurological:      Mental Status: She is alert and oriented to person, place, and time  Psychiatric:         Mood and Affect: Mood normal          Behavior: Behavior normal          Thought Content:  Thought content normal          Judgment: Judgment normal

## 2022-03-17 NOTE — ASSESSMENT & PLAN NOTE
Will await culture results  To continue Bactrim for now  Counseled the importance of staying well hydrated and avoiding bladder irritants  Provided referral to Urogynecology

## 2022-03-18 ENCOUNTER — TELEPHONE (OUTPATIENT)
Dept: OBGYN CLINIC | Facility: CLINIC | Age: 62
End: 2022-03-18

## 2022-03-18 ENCOUNTER — TELEPHONE (OUTPATIENT)
Dept: FAMILY MEDICINE CLINIC | Facility: CLINIC | Age: 62
End: 2022-03-18

## 2022-03-18 ENCOUNTER — PATIENT MESSAGE (OUTPATIENT)
Dept: OBGYN CLINIC | Facility: CLINIC | Age: 62
End: 2022-03-18

## 2022-03-18 NOTE — TELEPHONE ENCOUNTER
----- Message from Ankur Laureano sent at 3/18/2022  2:30 PM EDT -----  Regarding: Uro gynecologist refferal  Kirt Alaniz  I have been having some urologic issues  Saw Sis Lopez about what I though may be a UTI, but have been having symptoms ( lower back pain, frequent urination) for a couple of months  She referred me to Dr Esther Owens  I just wanted to touch base to find out if there is a different Dr you refer to  I can them a make a decision  I would need to see a [facility]'s provider  Thanks    Ankur Laureano

## 2022-03-21 ENCOUNTER — TELEPHONE (OUTPATIENT)
Dept: FAMILY MEDICINE CLINIC | Facility: CLINIC | Age: 62
End: 2022-03-21

## 2022-03-21 ENCOUNTER — CLINICAL SUPPORT (OUTPATIENT)
Dept: FAMILY MEDICINE CLINIC | Facility: CLINIC | Age: 62
End: 2022-03-21
Payer: COMMERCIAL

## 2022-03-21 DIAGNOSIS — R30.0 DYSURIA: ICD-10-CM

## 2022-03-21 DIAGNOSIS — N30.00 ACUTE CYSTITIS WITHOUT HEMATURIA: Primary | ICD-10-CM

## 2022-03-21 LAB
SL AMB  POCT GLUCOSE, UA: ABNORMAL
SL AMB LEUKOCYTE ESTERASE,UA: ABNORMAL
SL AMB POCT BILIRUBIN,UA: ABNORMAL
SL AMB POCT BLOOD,UA: ABNORMAL
SL AMB POCT CLARITY,UA: ABNORMAL
SL AMB POCT COLOR,UA: YELLOW
SL AMB POCT KETONES,UA: ABNORMAL
SL AMB POCT NITRITE,UA: ABNORMAL
SL AMB POCT PH,UA: 6
SL AMB POCT SPECIFIC GRAVITY,UA: 1.01
SL AMB POCT URINE PROTEIN: ABNORMAL
SL AMB POCT UROBILINOGEN: ABNORMAL

## 2022-03-21 PROCEDURE — 81002 URINALYSIS NONAUTO W/O SCOPE: CPT

## 2022-03-21 PROCEDURE — 87086 URINE CULTURE/COLONY COUNT: CPT | Performed by: NURSE PRACTITIONER

## 2022-03-21 NOTE — TELEPHONE ENCOUNTER
Patient called reporting continued low back pain, low-grade fever, and pelvic pressure  Recent urine culture showed contamination  Patient presented to the office today for another urine dip and culture  She completed her Bactrim yesterday as prescribed  To begin Cipro 500 mg b i d  To make an appointment with Urogynecology as previously advised  Counseled the importance of staying well hydrated in the avoidance of bladder irritants  Follow-up if no improvement on Thursday or sooner if symptoms worsen  Patient verbalized agreement understanding plan of care, denies additional concerns

## 2022-03-22 LAB — BACTERIA UR CULT: NORMAL

## 2022-03-31 ENCOUNTER — TELEPHONE (OUTPATIENT)
Dept: OBGYN CLINIC | Facility: CLINIC | Age: 62
End: 2022-03-31

## 2022-03-31 DIAGNOSIS — Z12.31 ENCOUNTER FOR SCREENING MAMMOGRAM FOR MALIGNANT NEOPLASM OF BREAST: Primary | ICD-10-CM

## 2022-03-31 NOTE — TELEPHONE ENCOUNTER
Put in new mammo script  That other will  before she is due for the next one  Let pt know it was entered

## 2022-03-31 NOTE — TELEPHONE ENCOUNTER
Pt is requesting a new mammo script   I saw one in her chart that expires in November, but when she called central scheduling they said they dont have one     Please advise

## 2022-04-11 DIAGNOSIS — N95.2 ATROPHIC VULVOVAGINITIS: ICD-10-CM

## 2022-04-11 RX ORDER — ESTRADIOL 0.1 MG/G
CREAM VAGINAL
Qty: 42.5 G | Refills: 0 | Status: SHIPPED | OUTPATIENT
Start: 2022-04-11 | End: 2022-06-22 | Stop reason: ALTCHOICE

## 2022-06-01 PROCEDURE — 87086 URINE CULTURE/COLONY COUNT: CPT | Performed by: OBSTETRICS & GYNECOLOGY

## 2022-06-02 ENCOUNTER — OFFICE VISIT (OUTPATIENT)
Dept: GASTROENTEROLOGY | Facility: CLINIC | Age: 62
End: 2022-06-02
Payer: COMMERCIAL

## 2022-06-02 ENCOUNTER — LAB REQUISITION (OUTPATIENT)
Dept: LAB | Facility: HOSPITAL | Age: 62
End: 2022-06-02
Payer: COMMERCIAL

## 2022-06-02 VITALS
SYSTOLIC BLOOD PRESSURE: 124 MMHG | HEIGHT: 70 IN | DIASTOLIC BLOOD PRESSURE: 84 MMHG | WEIGHT: 169.6 LBS | HEART RATE: 77 BPM | BODY MASS INDEX: 24.28 KG/M2

## 2022-06-02 DIAGNOSIS — N30.10 INTERSTITIAL CYSTITIS (CHRONIC) WITHOUT HEMATURIA: ICD-10-CM

## 2022-06-02 DIAGNOSIS — Z12.11 COLON CANCER SCREENING: ICD-10-CM

## 2022-06-02 DIAGNOSIS — K21.9 GASTROESOPHAGEAL REFLUX DISEASE WITHOUT ESOPHAGITIS: Primary | ICD-10-CM

## 2022-06-02 PROCEDURE — 99244 OFF/OP CNSLTJ NEW/EST MOD 40: CPT | Performed by: INTERNAL MEDICINE

## 2022-06-02 NOTE — PROGRESS NOTES
Consultation - [facility]  Shahram Young 1960 64 y o  female     ASSESSMENT @ PLAN:   She is a 63-year-old female that has occasional heartburn that has responded to omeprazole 2 week course who also needs colon cancer screening  She had a colonoscopy with me in 2015 with no polyps and diverticulosis  Her father had rectal cancer  1 do EGD and colonoscopy to investigate    2 she will take Pepcid intermittently for her heartburn    Chief Complaint:  GERD and colon cancer screening    HPI:  She is a 63-year-old female that needs an endoscopy colonoscopy  She has occasional heartburn  She has no regurgitation dysphagia globus sensation she has rare nausea no abdominal pain or chest pain  She was having heartburn and took omeprazole for 2 weeks and it did work  She has no other peptic symptoms  She did a colonoscopy with me in June of 2015  She had left-sided diverticulosis  Her father did have rectal cancer  She has active and healthy needs a high-fiber diet  REVIEW OF SYSTEMS:     CONSTITUTIONAL: Denies any fever, chills, or rigors  Good appetite, and no recent weight loss  HEENT: No earache or tinnitus  Denies hearing loss or visual disturbances  CARDIOVASCULAR: No chest pain or palpitations  RESPIRATORY: Denies any cough, hemoptysis, shortness of breath or dyspnea on exertion  GASTROINTESTINAL: As noted in the History of Present Illness  GENITOURINARY: No problems with urination  Denies any hematuria or dysuria  NEUROLOGIC: No dizziness or vertigo, denies headaches  MUSCULOSKELETAL: Denies any muscle or joint pain  SKIN: Denies skin rashes or itching  ENDOCRINE: Denies excessive thirst  Denies intolerance to heat or cold  PSYCHOSOCIAL: Denies depression or anxiety  Denies any recent memory loss       Past Medical History:   Diagnosis Date    Anxiety     last assessed 02/27/2017    Breast lump      last assessed 01/16/2017    Dermatitis     last assessed 02/27/2017    Disease of thyroid gland     last assessed 02/23/2016    Diverticulitis 11/2020    Dysuria     last assessed 04/28/2017    ETD (Eustachian tube dysfunction), bilateral     last assessed 02/23/2016      Past Surgical History:   Procedure Laterality Date    BREAST BIOPSY Right 2009    2 areas benign    TOOTH EXTRACTION      TUBAL LIGATION       Social History     Socioeconomic History    Marital status: /Civil Union     Spouse name: Not on file    Number of children: Not on file    Years of education: Not on file    Highest education level: Not on file   Occupational History     Comment: Retired   Tobacco Use    Smoking status: Never Smoker    Smokeless tobacco: Never Used   Vaping Use    Vaping Use: Never used   Substance and Sexual Activity    Alcohol use: Yes     Comment: occasionally    Drug use: No    Sexual activity: Yes     Partners: Male     Birth control/protection: Post-menopausal   Other Topics Concern    Not on file   Social History Narrative    Activities:  Dance    Always uses seat belt    Exercise:  Walking    Lives with      Social Determinants of Health     Financial Resource Strain: Not on file   Food Insecurity: Not on file   Transportation Needs: Not on file   Physical Activity: Not on file   Stress: Not on file   Social Connections: Not on file   Intimate Partner Violence: Not on file   Housing Stability: Not on file     Family History   Problem Relation Age of Onset    Breast cancer Mother 52    Cancer Father     Hypertension Father     Rectal cancer Father     Migraines Father     Heart disease Maternal Grandmother     Endometrial cancer Maternal Grandmother         unknown age   [employer] Throat cancer Brother     No Known Problems Sister     No Known Problems Daughter     No Known Problems Paternal Grandmother     No Known Problems Paternal Aunt      Other and Pollen extract  Current Outpatient Medications   Medication Sig Dispense Refill    BIOTIN PO Take by mouth      Black Pepper-Turmeric 3-500 MG CAPS Take by mouth      CALCIUM PO Take by mouth daily       Cranberry 465 MG CAPS Take by mouth      eletriptan (RELPAX) 40 MG tablet Take 1 tablet (40 mg total) by mouth once as needed for migraine for up to 1 dose may repeat in 2 hours if necessary 16 tablet 3    estradiol (ESTRACE) 0 1 mg/g vaginal cream INSERT 1 GRAM INTO THE VAGINA TWICE WEEKLY 42 5 g 0    Glucosamine-Chondroitin (GLUCOSAMINE CHONDR COMPLEX PO) Take by mouth daily       hyoscyamine (ANASPAZ,LEVSIN) 0 125 MG tablet Take 1 tablet (0 125 mg total) by mouth every 4 (four) hours as needed for cramping 20 tablet 0    Multiple Vitamins-Minerals (CENTRUM SILVER) tablet Take by mouth      ondansetron (ZOFRAN-ODT) 4 mg disintegrating tablet Take 1 tablet (4 mg total) by mouth every 6 (six) hours as needed for nausea or vomiting 20 tablet 0    timolol (TIMOPTIC) 0 5 % ophthalmic solution       PARoxetine (PAXIL) 10 mg tablet Take 1/2 tab daily  (Patient not taking: Reported on 6/2/2022) 30 tablet 0     No current facility-administered medications for this visit  Blood pressure 124/84, pulse 77, height 5' 10" (1 778 m), weight 76 9 kg (169 lb 9 6 oz), not currently breastfeeding  PHYSICAL EXAM:     General Appearance:   Alert, cooperative, no distress, appears stated age    HEENT:   Normocephalic, atraumatic, anicteric      Neck:  Supple, symmetrical, trachea midline, no adenopathy;    thyroid: no enlargement/tenderness/nodules; no carotid  bruit or JVD    Lungs:   Clear to auscultation bilaterally; no rales, rhonchi or wheezing; respirations unlabored    Heart[de-identified]   S1 and S2 normal; regular rate and rhythm; no murmur, rub, or gallop     Abdomen:   Soft, non-tender, non-distended; normal bowel sounds; no masses, no organomegaly    Genitalia:   Deferred    Rectal:   Deferred    Extremities:  No cyanosis, clubbing or edema    Pulses:  2+ and symmetric all extremities    Skin:  Skin color, texture, turgor normal, no rashes or lesions    Lymph nodes:  No palpable cervical, axillary or inguinal lymphadenopathy        Lab Results   Component Value Date    WBC 4 49 11/02/2020    HGB 15 0 11/02/2020    HCT 46 4 (H) 11/02/2020    MCV 97 11/02/2020     11/02/2020     Lab Results   Component Value Date    CALCIUM 9 2 11/02/2020    K 4 2 11/02/2020    CO2 30 11/02/2020     11/02/2020    BUN 16 11/02/2020    CREATININE 0 61 11/02/2020     Lab Results   Component Value Date    ALT 29 05/13/2021    AST 15 05/13/2021    ALKPHOS 61 05/13/2021     No results found for: INR, PROTIME

## 2022-06-03 LAB — BACTERIA UR CULT: NORMAL

## 2022-06-17 ENCOUNTER — TELEPHONE (OUTPATIENT)
Dept: GASTROENTEROLOGY | Facility: CLINIC | Age: 62
End: 2022-06-17

## 2022-06-17 NOTE — TELEPHONE ENCOUNTER
Dr Mckinnon [facility] pt  Patient called, scheduled EGD/colonoscopy 8/15/22  As early as possible

## 2022-06-20 NOTE — TELEPHONE ENCOUNTER
Updated GI schedule    Scheduled date of EGD/colonoscopy (as of today):8/15/22  Physician performing EGD/colonoscopy: Evelio  Location of EGD/colonoscopy:[facility] bowel prep reviewed with patient:miralax/dulcolax  Instructions reviewed with patient by:Janice JI  Clearances:   none

## 2022-06-22 ENCOUNTER — OFFICE VISIT (OUTPATIENT)
Dept: FAMILY MEDICINE CLINIC | Facility: CLINIC | Age: 62
End: 2022-06-22
Payer: COMMERCIAL

## 2022-06-22 VITALS
OXYGEN SATURATION: 97 % | DIASTOLIC BLOOD PRESSURE: 68 MMHG | HEART RATE: 95 BPM | BODY MASS INDEX: 23.99 KG/M2 | TEMPERATURE: 98.9 F | SYSTOLIC BLOOD PRESSURE: 122 MMHG | WEIGHT: 167.6 LBS | HEIGHT: 70 IN

## 2022-06-22 DIAGNOSIS — R05.9 COUGH: ICD-10-CM

## 2022-06-22 DIAGNOSIS — R09.81 NASAL CONGESTION: ICD-10-CM

## 2022-06-22 DIAGNOSIS — R50.9 FEVER, UNSPECIFIED FEVER CAUSE: ICD-10-CM

## 2022-06-22 DIAGNOSIS — J01.10 ACUTE NON-RECURRENT FRONTAL SINUSITIS: Primary | ICD-10-CM

## 2022-06-22 PROCEDURE — 99214 OFFICE O/P EST MOD 30 MIN: CPT

## 2022-06-22 RX ORDER — CONJUGATED ESTROGENS 0.62 MG/G
CREAM VAGINAL
COMMUNITY
Start: 2022-06-07

## 2022-06-22 RX ORDER — AMOXICILLIN AND CLAVULANATE POTASSIUM 875; 125 MG/1; MG/1
1 TABLET, FILM COATED ORAL EVERY 12 HOURS SCHEDULED
Qty: 20 TABLET | Refills: 0 | Status: SHIPPED | OUTPATIENT
Start: 2022-06-22 | End: 2022-07-02

## 2022-06-22 NOTE — PROGRESS NOTES
Assessment/Plan:     Problem List Items Addressed This Visit    None     Visit Diagnoses     Acute non-recurrent frontal sinusitis    -  Primary    Start taking antibiotic finished all the doses even if you feel better    Relevant Medications    amoxicillin-clavulanate (Augmentin) 875-125 mg per tablet    Fever, unspecified fever cause        Continue to use Tylenol/Motrin as needed    Relevant Medications    amoxicillin-clavulanate (Augmentin) 875-125 mg per tablet    Nasal congestion        Continue to use nasal spray    Relevant Medications    amoxicillin-clavulanate (Augmentin) 875-125 mg per tablet    Cough                Subjective:      Patient ID: Alfred Gutierrez is a 64 y o  female  Patient presents to the office complaining of sinus congestion cough and fevers that started few days ago  She does have history of sinusitis  She is worried because her daughter supposed to her baby today  She took at home 2 COVID test came back negative  Cough  This is a new problem  The current episode started in the past 7 days  The problem has been waxing and waning  The cough is productive of sputum  Associated symptoms include ear pain, a fever, headaches, nasal congestion and a sore throat (improved )  Pertinent negatives include no chest pain, chills, ear congestion, heartburn, hemoptysis, myalgias, postnasal drip, rash, rhinorrhea, shortness of breath, sweats, weight loss or wheezing  Nothing aggravates the symptoms  Risk factors: babysitting her grandchildren recently  The treatment provided mild relief  There is no history of asthma, bronchiectasis, bronchitis, COPD, emphysema, environmental allergies or pneumonia         The following portions of the patient's history were reviewed and updated as appropriate:   Past Medical History:  She has a past medical history of Anxiety, Breast lump ( ), Dermatitis, Disease of thyroid gland, Diverticulitis (11/2020), Dysuria, and ETD (Eustachian tube dysfunction), bilateral ,  _______________________________________________________________________  Medical Problems:  does not have any pertinent problems on file ,  _______________________________________________________________________  Past Surgical History:   has a past surgical history that includes Tooth extraction; Tubal ligation; and Breast biopsy (Right, 2009)  ,  _______________________________________________________________________  Family History:  family history includes Breast cancer (age of onset: 52) in her mother; Cancer in her father; Endometrial cancer in her maternal grandmother; Heart disease in her maternal grandmother; Hypertension in her father; Migraines in her father; No Known Problems in her daughter, paternal aunt, paternal grandmother, and sister; Rectal cancer in her father; Throat cancer in her brother ,  _______________________________________________________________________  Social History:   reports that she has never smoked  She has never used smokeless tobacco  She reports current alcohol use  She reports that she does not use drugs  ,  _______________________________________________________________________  Allergies:  is allergic to other and pollen extract     _______________________________________________________________________  Current Outpatient Medications   Medication Sig Dispense Refill    amoxicillin-clavulanate (Augmentin) 875-125 mg per tablet Take 1 tablet by mouth every 12 (twelve) hours for 10 days 20 tablet 0    BIOTIN PO Take by mouth      Black Pepper-Turmeric 3-500 MG CAPS Take by mouth      CALCIUM PO Take by mouth daily       Cranberry 465 MG CAPS Take by mouth      eletriptan (RELPAX) 40 MG tablet Take 1 tablet (40 mg total) by mouth once as needed for migraine for up to 1 dose may repeat in 2 hours if necessary 16 tablet 3    Glucosamine-Chondroitin (GLUCOSAMINE CHONDR COMPLEX PO) Take by mouth daily       hyoscyamine (ANASPAZ,LEVSIN) 0 125 MG tablet Take 1 tablet (0 125 mg total) by mouth every 4 (four) hours as needed for cramping 20 tablet 0    Multiple Vitamins-Minerals (CENTRUM SILVER) tablet Take by mouth      ondansetron (ZOFRAN-ODT) 4 mg disintegrating tablet Take 1 tablet (4 mg total) by mouth every 6 (six) hours as needed for nausea or vomiting 20 tablet 0    timolol (TIMOPTIC) 0 5 % ophthalmic solution       Premarin vaginal cream        No current facility-administered medications for this visit      _______________________________________________________________________  Review of Systems   Constitutional: Positive for fatigue and fever  Negative for chills and weight loss  HENT: Positive for ear pain and sore throat (improved )  Negative for postnasal drip and rhinorrhea  Eyes: Negative for pain and visual disturbance  Respiratory: Positive for cough  Negative for hemoptysis, shortness of breath and wheezing  Cardiovascular: Negative for chest pain and palpitations  Gastrointestinal: Negative for heartburn  Genitourinary: Negative for dysuria and hematuria  Musculoskeletal: Negative for arthralgias, back pain and myalgias  Skin: Negative for color change and rash  Allergic/Immunologic: Negative for environmental allergies  Neurological: Positive for headaches  Negative for seizures and syncope  All other systems reviewed and are negative  Objective:  Vitals:    06/22/22 0951   BP: 122/68   Pulse: 95   Temp: 98 9 °F (37 2 °C)   SpO2: 97%   Weight: 76 kg (167 lb 9 6 oz)   Height: 5' 10" (1 778 m)     Body mass index is 24 05 kg/m²  Physical Exam  Vitals and nursing note reviewed  Constitutional:       General: She is not in acute distress  Appearance: Normal appearance  She is not ill-appearing  HENT:      Head: Normocephalic  Right Ear: Tympanic membrane, ear canal and external ear normal       Left Ear: Tympanic membrane, ear canal and external ear normal       Nose: Congestion present  Mouth/Throat:      Mouth: Mucous membranes are moist    Eyes:      Conjunctiva/sclera: Conjunctivae normal    Cardiovascular:      Rate and Rhythm: Normal rate and regular rhythm  Pulses: Normal pulses  Heart sounds: Normal heart sounds  Pulmonary:      Effort: Pulmonary effort is normal  No respiratory distress  Breath sounds: Normal breath sounds  No wheezing  Musculoskeletal:         General: No swelling or tenderness  Normal range of motion  Cervical back: Normal range of motion  No tenderness  Right lower leg: No edema  Left lower leg: No edema  Lymphadenopathy:      Cervical: No cervical adenopathy  Skin:     General: Skin is warm and dry  Neurological:      Mental Status: She is alert and oriented to person, place, and time  Psychiatric:         Mood and Affect: Mood normal          Behavior: Behavior normal          Thought Content:  Thought content normal          Judgment: Judgment normal

## 2022-06-22 NOTE — PATIENT INSTRUCTIONS
Sinusitis   AMBULATORY CARE:   Sinusitis  is inflammation or infection of your sinuses  Sinusitis is most often caused by a virus  Acute sinusitis may last up to 12 weeks  Chronic sinusitis lasts longer than 12 weeks  Recurrent sinusitis means you have 4 or more infections in 1 year  Common signs and symptoms:   Fever    Pain, pressure, redness, or swelling around the forehead, cheeks, or eyes    Thick yellow or green discharge from your nose    Tenderness when you touch your face over your sinuses    Dry cough that happens mostly at night or when you lie down    Headache and face pain that is worse when you lean forward    Tooth pain, or pain when you chew    Seek care immediately if:   You have trouble breathing or wheezing that is getting worse  You have a stiff neck, a fever, or a bad headache  You cannot open your eye  Your eyeball bulges out or you cannot move your eye  You are more sleepy than normal, or you notice changes in your ability to think, move, or talk  You have swelling of your forehead or scalp  Call your doctor if:   You have vision changes, such as double vision  Your eye and eyelid are red, swollen, and painful  Your symptoms do not improve or go away after 10 days  You have nausea and are vomiting  Your nose is bleeding  You have questions or concerns about your condition or care  Medicines: Your symptoms may go away on their own  Your healthcare provider may recommend watchful waiting for up to 10 days before starting antibiotics  You may need any of the following:  Acetaminophen  decreases pain and fever  It is available without a doctor's order  Ask how much to take and how often to take it  Follow directions  Read the labels of all other medicines you are using to see if they also contain acetaminophen, or ask your doctor or pharmacist  Acetaminophen can cause liver damage if not taken correctly   Do not use more than 4 grams (4,000 milligrams) total of acetaminophen in one day  NSAIDs , such as ibuprofen, help decrease swelling, pain, and fever  This medicine is available with or without a doctor's order  NSAIDs can cause stomach bleeding or kidney problems in certain people  If you take blood thinner medicine, always ask your healthcare provider if NSAIDs are safe for you  Always read the medicine label and follow directions  Nasal steroid sprays  may help decrease inflammation in your nose and sinuses  Decongestants  help reduce swelling and drain mucus in the nose and sinuses  They may help you breathe easier  Antihistamines  help dry mucus in the nose and relieve sneezing  Antibiotics  help treat or prevent a bacterial infection  Self-care:   Rinse your sinuses as directed  Use a sinus rinse device to rinse your nasal passages with a saline (salt water) solution or distilled water  Do not use tap water  This will help thin the mucus in your nose and rinse away pollen and dirt  It will also help reduce swelling so you can breathe normally  Use a humidifier  to increase air moisture in your home  This may make it easier for you to breathe and help decrease your cough  Sleep with your head elevated  Place an extra pillow under your head before you go to sleep to help your sinuses drain  Drink liquids as directed  Ask your healthcare provider how much liquid to drink each day and which liquids are best for you  Liquids will thin the mucus in your nose and help it drain  Avoid drinks that contain alcohol or caffeine  Do not smoke, and avoid secondhand smoke  Nicotine and other chemicals in cigarettes and cigars can make your symptoms worse  Ask your healthcare provider for information if you currently smoke and need help to quit  E-cigarettes or smokeless tobacco still contain nicotine  Talk to your healthcare provider before you use these products      Prevent the spread of germs:   Wash your hands often with soap and water  Wash your hands after you use the bathroom, change a child's diaper, or sneeze  Wash your hands before you prepare or eat food  Stay away from people who are sick  Some germs spread easily and quickly through contact  Follow up with your doctor as directed: You may be referred to an ear, nose, and throat specialist  Write down your questions so you remember to ask them during your visits  © Copyright [employer] 2022 Information is for End User's use only and may not be sold, redistributed or otherwise used for commercial purposes  All illustrations and images included in CareNotes® are the copyrighted property of A D A M , Inc  or [facility] Philip Bahena   The above information is an  only  It is not intended as medical advice for individual conditions or treatments  Talk to your doctor, nurse or pharmacist before following any medical regimen to see if it is safe and effective for you

## 2022-07-28 ENCOUNTER — HOSPITAL ENCOUNTER (OUTPATIENT)
Dept: MAMMOGRAPHY | Facility: CLINIC | Age: 62
Discharge: HOME | End: 2022-07-28
Payer: COMMERCIAL

## 2022-07-28 VITALS — WEIGHT: 167 LBS | HEIGHT: 70 IN | BODY MASS INDEX: 23.91 KG/M2

## 2022-07-28 DIAGNOSIS — Z12.31 ENCOUNTER FOR SCREENING MAMMOGRAM FOR MALIGNANT NEOPLASM OF BREAST: ICD-10-CM

## 2022-07-28 PROCEDURE — 77067 SCR MAMMO BI INCL CAD: CPT

## 2022-07-28 PROCEDURE — 77063 BREAST TOMOSYNTHESIS BI: CPT

## 2022-08-14 RX ORDER — SODIUM CHLORIDE, SODIUM LACTATE, POTASSIUM CHLORIDE, CALCIUM CHLORIDE 600; 310; 30; 20 MG/100ML; MG/100ML; MG/100ML; MG/100ML
125 INJECTION, SOLUTION INTRAVENOUS CONTINUOUS
Status: CANCELLED | OUTPATIENT
Start: 2022-08-14

## 2022-08-15 ENCOUNTER — ANESTHESIA (OUTPATIENT)
Dept: GASTROENTEROLOGY | Facility: HOSPITAL | Age: 62
End: 2022-08-15

## 2022-08-15 ENCOUNTER — ANESTHESIA EVENT (OUTPATIENT)
Dept: GASTROENTEROLOGY | Facility: HOSPITAL | Age: 62
End: 2022-08-15

## 2022-08-15 ENCOUNTER — HOSPITAL ENCOUNTER (OUTPATIENT)
Dept: GASTROENTEROLOGY | Facility: HOSPITAL | Age: 62
Setting detail: OUTPATIENT SURGERY
Discharge: HOME | End: 2022-08-15
Attending: INTERNAL MEDICINE | Admitting: INTERNAL MEDICINE
Payer: COMMERCIAL

## 2022-08-15 ENCOUNTER — TELEPHONE (OUTPATIENT)
Dept: FAMILY MEDICINE CLINIC | Facility: CLINIC | Age: 62
End: 2022-08-15

## 2022-08-15 VITALS
SYSTOLIC BLOOD PRESSURE: 128 MMHG | DIASTOLIC BLOOD PRESSURE: 74 MMHG | BODY MASS INDEX: 24.11 KG/M2 | TEMPERATURE: 97.7 F | HEIGHT: 70 IN | OXYGEN SATURATION: 99 % | RESPIRATION RATE: 16 BRPM | WEIGHT: 168.43 LBS | HEART RATE: 73 BPM

## 2022-08-15 DIAGNOSIS — Z12.11 COLON CANCER SCREENING: ICD-10-CM

## 2022-08-15 DIAGNOSIS — N95.1 MENOPAUSAL SYMPTOMS: Primary | ICD-10-CM

## 2022-08-15 DIAGNOSIS — K21.9 GASTROESOPHAGEAL REFLUX DISEASE WITHOUT ESOPHAGITIS: ICD-10-CM

## 2022-08-15 PROCEDURE — 88305 TISSUE EXAM BY PATHOLOGIST: CPT | Performed by: STUDENT IN AN ORGANIZED HEALTH CARE EDUCATION/TRAINING PROGRAM

## 2022-08-15 PROCEDURE — 88313 SPECIAL STAINS GROUP 2: CPT | Performed by: STUDENT IN AN ORGANIZED HEALTH CARE EDUCATION/TRAINING PROGRAM

## 2022-08-15 PROCEDURE — 88342 IMHCHEM/IMCYTCHM 1ST ANTB: CPT | Performed by: STUDENT IN AN ORGANIZED HEALTH CARE EDUCATION/TRAINING PROGRAM

## 2022-08-15 PROCEDURE — 45385 COLONOSCOPY W/LESION REMOVAL: CPT | Performed by: INTERNAL MEDICINE

## 2022-08-15 PROCEDURE — 43239 EGD BIOPSY SINGLE/MULTIPLE: CPT | Performed by: INTERNAL MEDICINE

## 2022-08-15 RX ORDER — SODIUM CHLORIDE, SODIUM LACTATE, POTASSIUM CHLORIDE, CALCIUM CHLORIDE 600; 310; 30; 20 MG/100ML; MG/100ML; MG/100ML; MG/100ML
125 INJECTION, SOLUTION INTRAVENOUS CONTINUOUS
Status: DISCONTINUED | OUTPATIENT
Start: 2022-08-15 | End: 2022-08-19 | Stop reason: HOSPADM

## 2022-08-15 RX ORDER — OMEPRAZOLE 20 MG/1
20 CAPSULE, DELAYED RELEASE ORAL DAILY
Qty: 30 CAPSULE | Refills: 4 | Status: SHIPPED | OUTPATIENT
Start: 2022-08-15

## 2022-08-15 RX ORDER — PAROXETINE 10 MG/1
10 TABLET, FILM COATED ORAL DAILY
Qty: 90 TABLET | Refills: 1 | Status: SHIPPED | OUTPATIENT
Start: 2022-08-15 | End: 2022-08-16 | Stop reason: SDUPTHER

## 2022-08-15 RX ORDER — LIDOCAINE HYDROCHLORIDE 10 MG/ML
INJECTION, SOLUTION EPIDURAL; INFILTRATION; INTRACAUDAL; PERINEURAL AS NEEDED
Status: DISCONTINUED | OUTPATIENT
Start: 2022-08-15 | End: 2022-08-15

## 2022-08-15 RX ORDER — PROPOFOL 10 MG/ML
INJECTION, EMULSION INTRAVENOUS AS NEEDED
Status: DISCONTINUED | OUTPATIENT
Start: 2022-08-15 | End: 2022-08-15

## 2022-08-15 RX ADMIN — SODIUM CHLORIDE, SODIUM LACTATE, POTASSIUM CHLORIDE, AND CALCIUM CHLORIDE 125 ML/HR: .6; .31; .03; .02 INJECTION, SOLUTION INTRAVENOUS at 08:00

## 2022-08-15 RX ADMIN — PROPOFOL 50 MG: 10 INJECTION, EMULSION INTRAVENOUS at 09:24

## 2022-08-15 RX ADMIN — PROPOFOL 50 MG: 10 INJECTION, EMULSION INTRAVENOUS at 09:19

## 2022-08-15 RX ADMIN — SODIUM CHLORIDE, SODIUM LACTATE, POTASSIUM CHLORIDE, AND CALCIUM CHLORIDE: .6; .31; .03; .02 INJECTION, SOLUTION INTRAVENOUS at 09:06

## 2022-08-15 RX ADMIN — LIDOCAINE HYDROCHLORIDE 50 MG: 10 INJECTION, SOLUTION EPIDURAL; INFILTRATION; INTRACAUDAL; PERINEURAL at 09:07

## 2022-08-15 RX ADMIN — PROPOFOL 150 MG: 10 INJECTION, EMULSION INTRAVENOUS at 09:08

## 2022-08-15 RX ADMIN — PROPOFOL 50 MG: 10 INJECTION, EMULSION INTRAVENOUS at 09:21

## 2022-08-15 NOTE — ANESTHESIA POSTPROCEDURE EVALUATION
Post-Op Assessment Note    CV Status:  Stable    Pain management: adequate     Mental Status:  Alert and awake   Hydration Status:  Euvolemic   PONV Controlled:  Controlled   Airway Patency:  Patent      Post Op Vitals Reviewed: Yes      Staff: CRNA         No complications documented      BP   135/67   Temp      Pulse  70   Resp   19   SpO2   100

## 2022-08-15 NOTE — H&P
History and Physical - [facility]  Wesley Salazar 64 y o  female MRN: [id number]                  HPI: Wesley Salazar is a 64y o  year old female who presents for endoscopy colonoscopy for evaluation of GERD family history of rectal cancer      REVIEW OF SYSTEMS: Per the HPI, and otherwise unremarkable  Historical Information   Past Medical History:   Diagnosis Date    Anxiety     medication for hot flasshes    Breast lump      last assessed 01/16/2017    Dermatitis     last assessed 02/27/2017    Disease of thyroid gland     last assessed 02/23/2016    Diverticulitis 11/2020    Dysuria     last assessed 04/28/2017    ETD (Eustachian tube dysfunction), bilateral     last assessed 02/23/2016     Past Surgical History:   Procedure Laterality Date    BREAST BIOPSY Right 2009    2 areas benign    COLONOSCOPY      TOOTH EXTRACTION      TUBAL LIGATION       Social History   Social History     Substance and Sexual Activity   Alcohol Use Yes    Comment: occasionally     Social History     Substance and Sexual Activity   Drug Use No     Social History     Tobacco Use   Smoking Status Never Smoker   Smokeless Tobacco Never Used     Family History   Problem Relation Age of Onset    Breast cancer Mother 52    Cancer Father     Hypertension Father     Rectal cancer Father     Migraines Father     Heart disease Maternal Grandmother     Endometrial cancer Maternal Grandmother         unknown age   Delcie Pranay Throat cancer Brother     No Known Problems Sister     No Known Problems Daughter     No Known Problems Paternal Grandmother     No Known Problems Paternal Aunt        Meds/Allergies     (Not in a hospital admission)      Allergies   Allergen Reactions    Other Itching and Other (See Comments)     Monostat    Pollen Extract Itching, Swelling and Sneezing       Objective     Blood pressure 109/65, pulse 76, temperature 98 1 °F (36 7 °C), resp   rate 12, height 5' 10" (1 778 m), weight 76 4 kg (168 lb 6 9 oz), SpO2 97 %, not currently breastfeeding  PHYSICAL EXAM    /65   Pulse 76   Temp 98 1 °F (36 7 °C)   Resp 12   Ht 5' 10" (1 778 m)   Wt 76 4 kg (168 lb 6 9 oz)   LMP  (LMP Unknown)   SpO2 97%   BMI 24 17 kg/m²       Gen: NAD  CV: RRR  CHEST: Clear  ABD: soft, NT/ND  EXT: no edema      ASSESSMENT/PLAN:  This is a 64y o  year old female here for endoscopy colonoscopy, and she is stable and optimized for her procedure

## 2022-08-15 NOTE — TELEPHONE ENCOUNTER
Patient is asking for a script for Paxil but I don't see on her med list as of now but she was on this last year  She said she is having hot flashes and disturbed sleep again   She would like it to go to [facility] please

## 2022-08-15 NOTE — ANESTHESIA PREPROCEDURE EVALUATION
Procedure:  COLONOSCOPY  EGD    She is a 80-year-old female that has occasional heartburn that has responded to omeprazole 2 week course who also needs colon cancer screening  She had a colonoscopy with me in 2015 with no polyps and diverticulosis  Her father had rectal cancer      1 do EGD and colonoscopy to investigate     2 she will take Pepcid intermittently for her heartburn     Chief Complaint:  GERD and colon cancer screening     HPI:  She is a 80-year-old female that needs an endoscopy colonoscopy  She has occasional heartburn  She has no regurgitation dysphagia globus sensation she has rare nausea no abdominal pain or chest pain  She was having heartburn and took omeprazole for 2 weeks and it did work  She has no other peptic symptoms  She did a colonoscopy with me in June of 2015  She had left-sided diverticulosis  Her father did have rectal cancer  She has active and healthy needs a high-fiber diet  Relevant Problems   CARDIO   (+) Migraine with aura and without status migrainosus, not intractable      GI/HEPATIC   (+) GERD (gastroesophageal reflux disease)      NEURO/PSYCH   (+) Migraine with aura and without status migrainosus, not intractable        Physical Exam    Airway    Mallampati score: III  TM Distance: >3 FB  Neck ROM: full     Dental       Cardiovascular  Cardiovascular exam normal    Pulmonary  Pulmonary exam normal     Other Findings      Migraine with aura and without status migrainosus, not intractable   Thyroid nodule   Hot flashes   Polyp at cervical os   Colon cancer screening   Insomnia   Lower urinary tract symptoms   GERD (gastroesophageal reflux disease)       Anesthesia Plan  ASA Score- 2     Anesthesia Type- IV sedation with anesthesia with ASA Monitors  Additional Monitors:   Airway Plan:           Plan Factors-Exercise tolerance (METS): >4 METS  Chart reviewed  Existing labs reviewed  Patient summary reviewed  Patient is not a current smoker  Induction- intravenous  Postoperative Plan-     Informed Consent- Anesthetic plan and risks discussed with patient  I personally reviewed this patient with the CRNA  Discussed and agreed on the Anesthesia Plan with the CRNA  Filemon Recinos

## 2022-08-16 DIAGNOSIS — N95.1 MENOPAUSAL SYMPTOMS: ICD-10-CM

## 2022-08-16 RX ORDER — PAROXETINE 10 MG/1
10 TABLET, FILM COATED ORAL DAILY
Qty: 90 TABLET | Refills: 0 | Status: SHIPPED | OUTPATIENT
Start: 2022-08-16

## 2022-09-06 DIAGNOSIS — K57.92 DIVERTICULITIS: ICD-10-CM

## 2022-09-06 DIAGNOSIS — G43.109 MIGRAINE WITH AURA AND WITHOUT STATUS MIGRAINOSUS, NOT INTRACTABLE: ICD-10-CM

## 2022-09-07 ENCOUNTER — APPOINTMENT (OUTPATIENT)
Dept: LAB | Facility: HOSPITAL | Age: 62
End: 2022-09-07
Payer: COMMERCIAL

## 2022-09-07 DIAGNOSIS — Z00.8 ENCOUNTER FOR OTHER GENERAL EXAMINATION: ICD-10-CM

## 2022-09-07 LAB
CHOLEST SERPL-MCNC: 165 MG/DL
EST. AVERAGE GLUCOSE BLD GHB EST-MCNC: 94 MG/DL
HBA1C MFR BLD: 4.9 %
HDLC SERPL-MCNC: 57 MG/DL
LDLC SERPL CALC-MCNC: 94 MG/DL (ref 0–100)
NONHDLC SERPL-MCNC: 108 MG/DL
TRIGL SERPL-MCNC: 71 MG/DL

## 2022-09-07 PROCEDURE — 83036 HEMOGLOBIN GLYCOSYLATED A1C: CPT

## 2022-09-07 PROCEDURE — 80061 LIPID PANEL: CPT

## 2022-09-07 PROCEDURE — 36415 COLL VENOUS BLD VENIPUNCTURE: CPT

## 2022-09-08 RX ORDER — HYOSCYAMINE SULFATE 0.125 MG
0.12 TABLET ORAL EVERY 4 HOURS PRN
Qty: 20 TABLET | Refills: 0 | Status: SHIPPED | OUTPATIENT
Start: 2022-09-08

## 2022-09-08 RX ORDER — ONDANSETRON 4 MG/1
4 TABLET, ORALLY DISINTEGRATING ORAL EVERY 6 HOURS PRN
Qty: 20 TABLET | Refills: 0 | Status: SHIPPED | OUTPATIENT
Start: 2022-09-08

## 2022-09-09 RX ORDER — ELETRIPTAN HYDROBROMIDE 40 MG/1
40 TABLET, FILM COATED ORAL ONCE AS NEEDED
Qty: 16 TABLET | Refills: 0 | OUTPATIENT
Start: 2022-09-09

## 2022-09-09 NOTE — TELEPHONE ENCOUNTER
Pt was last seen on 4/8/2019 by dr Keven Peraza   No follow up appt noted    She has been getting refills for Relpax,last sent on 5/14/21    Please advise

## 2022-09-09 NOTE — TELEPHONE ENCOUNTER
She will need a new patient appt (it has been 3 years since last seen) for any further refills or can contact her PCP

## 2022-09-19 ENCOUNTER — CLINICAL SUPPORT (OUTPATIENT)
Dept: FAMILY MEDICINE CLINIC | Facility: CLINIC | Age: 62
End: 2022-09-19
Payer: COMMERCIAL

## 2022-09-19 DIAGNOSIS — Z23 NEED FOR INFLUENZA VACCINATION: Primary | ICD-10-CM

## 2022-09-19 PROCEDURE — 90471 IMMUNIZATION ADMIN: CPT

## 2022-09-19 PROCEDURE — 90682 RIV4 VACC RECOMBINANT DNA IM: CPT

## 2022-10-03 ENCOUNTER — TELEPHONE (OUTPATIENT)
Dept: FAMILY MEDICINE CLINIC | Facility: CLINIC | Age: 62
End: 2022-10-03

## 2022-10-03 DIAGNOSIS — M25.559 HIP PAIN: Primary | ICD-10-CM

## 2022-10-03 DIAGNOSIS — G43.109 MIGRAINE WITH AURA AND WITHOUT STATUS MIGRAINOSUS, NOT INTRACTABLE: ICD-10-CM

## 2022-10-03 RX ORDER — CYCLOBENZAPRINE HCL 5 MG
5 TABLET ORAL 3 TIMES DAILY PRN
Qty: 20 TABLET | Refills: 0 | Status: SHIPPED | OUTPATIENT
Start: 2022-10-03 | End: 2022-11-14 | Stop reason: ALTCHOICE

## 2022-10-03 RX ORDER — ELETRIPTAN HYDROBROMIDE 40 MG/1
40 TABLET, FILM COATED ORAL ONCE AS NEEDED
Qty: 16 TABLET | Refills: 3 | Status: SHIPPED | OUTPATIENT
Start: 2022-10-03

## 2022-10-03 NOTE — TELEPHONE ENCOUNTER
I placed an order for physical therapy  If symptoms do not resolve after round of physical therapy, please have her make a follow-up appointment    Thank you

## 2022-10-03 NOTE — TELEPHONE ENCOUNTER
She is still having pain with the right hip  She has seen you in the past for this issue  Can you order PT or does she need an appointment

## 2022-10-10 ENCOUNTER — ESTABLISHED COMPREHENSIVE EXAM (OUTPATIENT)
Dept: URBAN - METROPOLITAN AREA CLINIC 6 | Facility: CLINIC | Age: 62
End: 2022-10-10

## 2022-10-10 ENCOUNTER — CLINICAL SUPPORT (OUTPATIENT)
Dept: FAMILY MEDICINE CLINIC | Facility: CLINIC | Age: 62
End: 2022-10-10
Payer: COMMERCIAL

## 2022-10-10 DIAGNOSIS — H40.1131: ICD-10-CM

## 2022-10-10 DIAGNOSIS — H43.813: ICD-10-CM

## 2022-10-10 DIAGNOSIS — Z23 NEED FOR COVID-19 VACCINE: Primary | ICD-10-CM

## 2022-10-10 DIAGNOSIS — Z96.1: ICD-10-CM

## 2022-10-10 DIAGNOSIS — H35.413: ICD-10-CM

## 2022-10-10 PROCEDURE — 91312 SARSCOV2 VAC BVL 30MCG/0.3ML: CPT

## 2022-10-10 PROCEDURE — 92015 DETERMINE REFRACTIVE STATE: CPT

## 2022-10-10 PROCEDURE — 92014 COMPRE OPH EXAM EST PT 1/>: CPT

## 2022-10-10 PROCEDURE — 0124A ADM SARSCV2 BVL 30MCG/.3ML B: CPT

## 2022-10-10 ASSESSMENT — VISUAL ACUITY
OS_GLARE: 20/60
OS_PH: 20/30-1
OD_SC: 20/60
OS_SC: 20/50+1
OD_GLARE: 20/80
OD_PH: 20/40
OU_SC: J1

## 2022-10-10 ASSESSMENT — TONOMETRY
OD_IOP_MMHG: 20
OD_IOP_MMHG: 19
OS_IOP_MMHG: 19

## 2022-10-11 PROBLEM — Z12.11 COLON CANCER SCREENING: Status: RESOLVED | Noted: 2020-10-26 | Resolved: 2022-10-11

## 2022-10-13 ENCOUNTER — EVALUATION (OUTPATIENT)
Dept: PHYSICAL THERAPY | Facility: CLINIC | Age: 62
End: 2022-10-13
Payer: COMMERCIAL

## 2022-10-13 DIAGNOSIS — M25.551 RIGHT HIP PAIN: Primary | ICD-10-CM

## 2022-10-13 DIAGNOSIS — M16.10 ARTHRITIS OF HIP: ICD-10-CM

## 2022-10-13 PROCEDURE — 97110 THERAPEUTIC EXERCISES: CPT

## 2022-10-13 PROCEDURE — 97161 PT EVAL LOW COMPLEX 20 MIN: CPT
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
## 2022-10-17 ENCOUNTER — OFFICE VISIT (OUTPATIENT)
Dept: PHYSICAL THERAPY | Facility: CLINIC | Age: 62
End: 2022-10-17
Payer: COMMERCIAL

## 2022-10-17 DIAGNOSIS — M16.10 ARTHRITIS OF HIP: ICD-10-CM

## 2022-10-17 DIAGNOSIS — M25.551 RIGHT HIP PAIN: Primary | ICD-10-CM

## 2022-10-17 PROCEDURE — 97110 THERAPEUTIC EXERCISES: CPT

## 2022-10-17 PROCEDURE — 97140 MANUAL THERAPY 1/> REGIONS: CPT

## 2022-10-17 NOTE — PROGRESS NOTES
Daily Note     Today's date: 10/17/2022  Patient name: Wallace Warren  : 1960  MRN: [id number]  Referring provider: Jerl Claude, CRNP  Dx:   Encounter Diagnosis     ICD-10-CM    1  Right hip pain  M25 551    2  Arthritis of hip  M16 10        Start Time: 1147  Stop Time: 1233  Total time in clinic (min): 46 minutes    Subjective: Pt states that she is feeling okay, she has been compliant with her HEP  Objective: See treatment diary below      Assessment: Tolerated initiation of POC well  PT focused on hip strengthening and hip mobility, pt responded favorably with decreased pain post session  PT will update HEP NV  Patient demonstrated fatigue post treatment, exhibited good technique with therapeutic exercises and would benefit from continued PT      Plan: Continue per plan of care  Progress treatment as tolerated  Precautions: Access Code: [email or web address]  URL: [email or web address]         Manuals 10/13 10/17           Hip LAD  SC           Mobilizations with belt             Psoas release  SC           TheraRoller  SC - quads, ITB, glute med                        Neuro Re-Ed             Clamshells  RTB 3"x20 ea           Butterfly bridge+ TA  3"x20                                                                            Ther Ex             Bike  10'            Hamstring stretch 3x30" supine            ITB stretch 3x30" supine            Quad stretch 3x30" prone            Leg press   85# 2x15           SL leg press  65# 3x10           Heel raises  Leg press 65# 30x           Hamstring curl              Prone bent knee hip extension  2x10 ea           SKFO  RTB 2x15 ea           Ther Activity                                       Gait Training                                       Modalities

## 2022-10-24 ENCOUNTER — OFFICE VISIT (OUTPATIENT)
Dept: PHYSICAL THERAPY | Facility: CLINIC | Age: 62
End: 2022-10-24
Payer: COMMERCIAL

## 2022-10-24 DIAGNOSIS — M16.10 ARTHRITIS OF HIP: ICD-10-CM

## 2022-10-24 DIAGNOSIS — M25.551 RIGHT HIP PAIN: Primary | ICD-10-CM

## 2022-10-24 PROCEDURE — 97110 THERAPEUTIC EXERCISES: CPT

## 2022-10-24 PROCEDURE — 97140 MANUAL THERAPY 1/> REGIONS: CPT

## 2022-10-24 PROCEDURE — 97112 NEUROMUSCULAR REEDUCATION: CPT

## 2022-10-24 NOTE — PROGRESS NOTES
Daily Note     Today's date: 10/24/2022  Patient name: Bakari Smith  : 1960  MRN: [id number]  Referring provider: ELY Brink  Dx:   Encounter Diagnosis     ICD-10-CM    1  Right hip pain  M25 551    2  Arthritis of hip  M16 10                   Subjective: Upon arrival, pt notes her R hip is achy  She reports compliance with HEP which has been helpful in relieving sx  Objective: See treatment diary below      Assessment:  Hypertonic in R psoas with TTP  Requires TC/VC to avoid trunk rotation during sidelying clamshells secondary to lack of R hip ER ROM  Pt completes charted exercises without c/o pain or discomfort throughout session  Pt would benefit from continued PT in order to improve R hip mobility and strength for improved function during daily activities  Plan: Continue per plan of care  Precautions: Access Code: [email or web address]  URL: [email or web address]         Manuals 10/13 10/17 10/24          Hip LAD  SC AF          Mobilizations with belt             Psoas release  SC AF          TheraRoller  SC - quads, ITB, glute med                        Neuro Re-Ed             Clamshells  RTB 3"x20 ea RTB 3"x20          Butterfly bridge+ TA  3"x20 3"x20                                                                           Ther Ex             Bike  10'  8'          Hamstring stretch 3x30" supine            ITB stretch 3x30" supine            Quad stretch 3x30" prone            Leg press   85# 2x15 85# 2x10          SL leg press  65# 3x10 65# 2x10          Heel raises  Leg press 65# 30x Leg 65# x30          Hamstring curl              Prone bent knee hip extension  2x10 ea 2x10 ea          SKFO  RTB 2x15 ea RTB 2x15 ea          Standing hip abd/ext   RTB 2x10          Ther Activity                                       Gait Training                                       Modalities

## 2022-11-11 ENCOUNTER — OFFICE VISIT (OUTPATIENT)
Dept: PHYSICAL THERAPY | Facility: CLINIC | Age: 62
End: 2022-11-11

## 2022-11-11 DIAGNOSIS — M25.551 RIGHT HIP PAIN: Primary | ICD-10-CM

## 2022-11-11 DIAGNOSIS — M16.10 ARTHRITIS OF HIP: ICD-10-CM

## 2022-11-11 NOTE — PROGRESS NOTES
Daily Note     Today's date: 2022  Patient name: Nadia Ness  : 1960  MRN: [id number]  Referring provider: ELY Bowers  Dx:   Encounter Diagnosis     ICD-10-CM    1  Right hip pain  M25 551    2  Arthritis of hip  M16 10                   Subjective: Pt reports she just went on a 2 week vacation where she was walking on unstable surfaces and for prolonged periods of times  She notes she did not experience R hip pain while doing this  Objective: See treatment diary below      Assessment: Some knee valgus with sidelying squats  Demonstrates hip hiking compensation during standing hip abd  Pt completes charted exercises without c/o pain or discomfort  Plan: Continue per plan of care  Precautions: Access Code: [email or web address]  URL: [email or web address]         Manuals 10/13 10/17 10/24 11/11         Hip LAD  SC AF AF         Mobilizations with belt             Psoas release  SC AF          TheraRoller  SC - quads, ITB, glute med                        Neuro Re-Ed             Clamshells  RTB 3"x20 ea RTB 3"x20          Butterfly bridge+ TA  3"x20 3"x20                                                                           Ther Ex             Bike  10'  8' 8'         Hamstring stretch 3x30" supine            ITB stretch 3x30" supine            Quad stretch 3x30" prone            Leg press   85# 2x15 85# 2x10 85# 3x10         SL leg press  65# 3x10 65# 2x10 65# 2x10         Heel raises  Leg press 65# 30x Leg 65# x30          Hamstring curl              Prone bent knee hip extension  2x10 ea 2x10 ea          SKFO  RTB 2x15 ea RTB 2x15 ea          Standing hip abd/ext   RTB 2x10 RTB 2x10         Lateral walking    RTB 2 laps         S/l TG squat    L10 2x10         Ther Activity                                       Gait Training                                       Modalities

## 2022-11-14 ENCOUNTER — OFFICE VISIT (OUTPATIENT)
Dept: FAMILY MEDICINE CLINIC | Facility: CLINIC | Age: 62
End: 2022-11-14

## 2022-11-14 ENCOUNTER — ANNUAL EXAM (OUTPATIENT)
Dept: OBGYN CLINIC | Facility: CLINIC | Age: 62
End: 2022-11-14

## 2022-11-14 VITALS
OXYGEN SATURATION: 98 % | SYSTOLIC BLOOD PRESSURE: 102 MMHG | TEMPERATURE: 98 F | RESPIRATION RATE: 14 BRPM | WEIGHT: 172 LBS | HEIGHT: 70 IN | HEART RATE: 78 BPM | DIASTOLIC BLOOD PRESSURE: 70 MMHG | BODY MASS INDEX: 24.62 KG/M2

## 2022-11-14 VITALS
HEIGHT: 70 IN | WEIGHT: 172.2 LBS | BODY MASS INDEX: 24.65 KG/M2 | DIASTOLIC BLOOD PRESSURE: 72 MMHG | SYSTOLIC BLOOD PRESSURE: 114 MMHG

## 2022-11-14 DIAGNOSIS — Z01.419 ENCOUNTER FOR GYNECOLOGICAL EXAMINATION WITHOUT ABNORMAL FINDING: ICD-10-CM

## 2022-11-14 DIAGNOSIS — Z12.31 ENCOUNTER FOR SCREENING MAMMOGRAM FOR MALIGNANT NEOPLASM OF BREAST: ICD-10-CM

## 2022-11-14 DIAGNOSIS — Z01.419 ENCOUNTER FOR ANNUAL ROUTINE GYNECOLOGICAL EXAMINATION: Primary | ICD-10-CM

## 2022-11-14 DIAGNOSIS — Z00.00 ANNUAL PHYSICAL EXAM: Primary | ICD-10-CM

## 2022-11-14 PROBLEM — R39.9 LOWER URINARY TRACT SYMPTOMS: Status: RESOLVED | Noted: 2022-03-17 | Resolved: 2022-11-14

## 2022-11-14 PROBLEM — G47.00 INSOMNIA: Status: RESOLVED | Noted: 2021-09-09 | Resolved: 2022-11-14

## 2022-11-14 NOTE — PROGRESS NOTES
Assessment/Plan:    Encounter for gynecological examination without abnormal finding  Pap/HPV current  Mammogram ordered  Colonoscopy current    Encourage healthy diet, exercise, Calcium 1200mg per day and at least 800 iu Vitamin D daily  Diagnoses and all orders for this visit:    Encounter for annual routine gynecological examination    Encounter for screening mammogram for malignant neoplasm of breast  -     Mammo screening bilateral w 3d & cad; Future    Encounter for gynecological examination without abnormal finding          Subjective:      Patient ID: Ruth Galvan is a 58 y o  female  Patient presents for a routine annual visit  Menarche- 14Y/O  Last Pap Smear- 10/26/20 neg/neg    Mammogram- 7/28/22 new order placed  Colonoscopy-8/15/22 recall 5 years  [id number]  Non smoker  Social drinker  Currently sexually active  Mother with breast cancer    No concerns/questions for today's visit    [facility] [place]    Bladder: urgency has improved with less calcium carbonate intake  Recent eval for hip pain - arthritis suspected  Gynecologic Exam  She reports no genital itching, genital lesions, genital odor, genital rash, pelvic pain, vaginal bleeding or vaginal discharge  The patient is experiencing no pain  Pertinent negatives include no chills, constipation, diarrhea, dysuria, fever, flank pain, frequency, headaches, nausea, painful intercourse, rash, sore throat, urgency or vomiting  She is sexually active         The following portions of the patient's history were reviewed and updated as appropriate:   She  has a past medical history of Anxiety, Breast lump ( ), Dermatitis, Disease of thyroid gland, Diverticulitis (11/2020), Dysuria, and ETD (Eustachian tube dysfunction), bilateral   She   Patient Active Problem List    Diagnosis Date Noted   • GERD (gastroesophageal reflux disease) 06/02/2022   • Annual physical exam 10/15/2020   • Polyp at cervical os 02/17/2020   • Encounter for gynecological examination without abnormal finding 10/03/2018   • Hot flashes 08/08/2018   • Migraine with aura and without status migrainosus, not intractable 02/27/2017   • Thyroid nodule 02/23/2016     She  has a past surgical history that includes Tooth extraction; Tubal ligation; Breast biopsy (Right, 2009); and Colonoscopy  Her family history includes Breast cancer (age of onset: 52) in her mother; Cancer in her father; Endometrial cancer in her maternal grandmother; Heart disease in her maternal grandmother; Hypertension in her father; Migraines in her father; No Known Problems in her daughter, paternal aunt, paternal grandmother, and sister; Rectal cancer in her father; Throat cancer in her brother  She  reports that she has never smoked  She has never used smokeless tobacco  She reports current alcohol use  She reports that she does not use drugs  Current Outpatient Medications   Medication Sig Dispense Refill   • BIOTIN PO Take by mouth     • Black Pepper-Turmeric 3-500 MG CAPS Take by mouth     • CALCIUM PO Take by mouth daily      • eletriptan (RELPAX) 40 MG tablet Take 1 tablet (40 mg total) by mouth once as needed for migraine for up to 1 dose may repeat in 2 hours if necessary 16 tablet 3   • Glucosamine-Chondroitin (GLUCOSAMINE CHONDR COMPLEX PO) Take by mouth daily      • hyoscyamine (ANASPAZ,LEVSIN) 0 125 MG tablet Take 1 tablet (0 125 mg total) by mouth every 4 (four) hours as needed for cramping 20 tablet 0   • Multiple Vitamins-Minerals (CENTRUM SILVER) tablet Take by mouth     • ondansetron (ZOFRAN-ODT) 4 mg disintegrating tablet Take 1 tablet (4 mg total) by mouth every 6 (six) hours as needed for nausea or vomiting 20 tablet 0   • PARoxetine (PAXIL) 10 mg tablet Take 1 tablet (10 mg total) by mouth daily 90 tablet 0   • Premarin vaginal cream      • timolol (TIMOPTIC) 0 5 % ophthalmic solution        No current facility-administered medications for this visit  Current Outpatient Medications on File Prior to Visit   Medication Sig   • BIOTIN PO Take by mouth   • Black Pepper-Turmeric 3-500 MG CAPS Take by mouth   • CALCIUM PO Take by mouth daily    • eletriptan (RELPAX) 40 MG tablet Take 1 tablet (40 mg total) by mouth once as needed for migraine for up to 1 dose may repeat in 2 hours if necessary   • Glucosamine-Chondroitin (GLUCOSAMINE CHONDR COMPLEX PO) Take by mouth daily    • hyoscyamine (ANASPAZ,LEVSIN) 0 125 MG tablet Take 1 tablet (0 125 mg total) by mouth every 4 (four) hours as needed for cramping   • Multiple Vitamins-Minerals (CENTRUM SILVER) tablet Take by mouth   • ondansetron (ZOFRAN-ODT) 4 mg disintegrating tablet Take 1 tablet (4 mg total) by mouth every 6 (six) hours as needed for nausea or vomiting   • PARoxetine (PAXIL) 10 mg tablet Take 1 tablet (10 mg total) by mouth daily   • Premarin vaginal cream    • timolol (TIMOPTIC) 0 5 % ophthalmic solution    • [DISCONTINUED] Cranberry 465 MG CAPS Take by mouth   • [DISCONTINUED] cyclobenzaprine (FLEXERIL) 5 mg tablet Take 1 tablet (5 mg total) by mouth 3 (three) times a day as needed for muscle spasms (Patient not taking: Reported on 11/14/2022)   • [DISCONTINUED] omeprazole (PriLOSEC) 20 mg delayed release capsule Take 1 capsule (20 mg total) by mouth daily (Patient not taking: Reported on 11/14/2022)     No current facility-administered medications on file prior to visit  She is allergic to pollen extract       Review of Systems   Constitutional: Negative for activity change, appetite change, chills, fatigue and fever  HENT: Negative for rhinorrhea, sneezing and sore throat  Eyes: Negative for visual disturbance  Respiratory: Negative for cough, shortness of breath and wheezing  Cardiovascular: Negative for chest pain, palpitations and leg swelling  Gastrointestinal: Negative for abdominal distention, constipation, diarrhea, nausea and vomiting     Genitourinary: Negative for difficulty urinating, dysuria, flank pain, frequency, pelvic pain, urgency and vaginal discharge  Skin: Negative for rash  Neurological: Negative for syncope, light-headedness and headaches  Objective:      /72 (BP Location: Left arm, Patient Position: Sitting, Cuff Size: Standard)   Ht 5' 10" (1 778 m)   Wt 78 1 kg (172 lb 3 2 oz)   LMP  (LMP Unknown)   BMI 24 71 kg/m²          Physical Exam  Constitutional:       General: She is not in acute distress  Appearance: She is well-developed  She is not diaphoretic  Chest:   Breasts: Breasts are symmetrical       Right: No inverted nipple, mass, nipple discharge, skin change or tenderness  Left: No inverted nipple, mass, nipple discharge, skin change or tenderness  Abdominal:      General: Bowel sounds are normal  There is no distension  Palpations: Abdomen is soft  There is no mass  Tenderness: There is no abdominal tenderness  There is no guarding or rebound  Genitourinary:     Labia:         Right: No rash, tenderness, lesion or injury  Left: No rash, tenderness, lesion or injury  Vagina: No vaginal discharge or bleeding  Cervix: No cervical motion tenderness, discharge or friability  Uterus: Not deviated, not enlarged, not fixed and not tender  Adnexa:         Right: No mass, tenderness or fullness  Left: No mass, tenderness or fullness  Comments: Urethral meatus- no lesions, non tender  Urethra non tender  Bladder non tender  Thin, atrophic vaginal mucosa  Skin:     General: Skin is warm and dry  Coloration: Skin is not pale  Findings: No erythema or rash

## 2022-11-14 NOTE — PATIENT INSTRUCTIONS

## 2022-11-14 NOTE — PATIENT INSTRUCTIONS
Begin Vitamin D 1000s Ius daily   Wellness Visit for Adults   AMBULATORY CARE:   A wellness visit  is when you see your healthcare provider to get screened for health problems  Your healthcare provider will also give you advice on how to stay healthy  Write down your questions so you remember to ask them  Ask your healthcare provider how often you should have a wellness visit  What happens at a wellness visit:  Your healthcare provider will ask about your health, and your family history of health problems  This includes high blood pressure, heart disease, and cancer  He or she will ask if you have symptoms that concern you, if you smoke, and about your mood  You may also be asked about your intake of medicines, supplements, food, and alcohol  Any of the following may be done:  · Your weight  will be checked  Your height may also be checked so your body mass index (BMI) can be calculated  Your BMI shows if you are at a healthy weight  · Your blood pressure  and heart rate will be checked  Your temperature may also be checked  · Blood and urine tests  may be done  Blood tests may be done to check your cholesterol levels  Abnormal cholesterol levels increase your risk for heart disease and stroke  You may also need a blood or urine test to check for diabetes if you are at increased risk  Urine tests may be done to look for signs of an infection or kidney disease  · A physical exam  includes checking your heartbeat and lungs with a stethoscope  Your healthcare provider may also check your skin to look for sun damage  · Screening tests  may be recommended  A screening test is done to check for diseases that may not cause symptoms  The screening tests you may need depend on your age, gender, family history, and lifestyle habits  For example, colorectal screening may be recommended if you are 48years old or older      Screening tests you need if you are a woman:   · A Pap smear  is used to screen for cervical cancer  Pap smears are usually done every 3 to 5 years depending on your age  You may need them more often if you have had abnormal Pap smear test results in the past  Ask your healthcare provider how often you should have a Pap smear  · A mammogram  is an x-ray of your breasts to screen for breast cancer  Experts recommend mammograms every 2 years starting at age 48 years  You may need a mammogram at age 52 years or younger if you have an increased risk for breast cancer  Talk to your healthcare provider about when you should start having mammograms and how often you need them  Vaccines you may need:   · Get an influenza vaccine  every year  The influenza vaccine protects you from the flu  Several types of viruses cause the flu  The viruses change over time, so new vaccines are made each year  · Get a tetanus-diphtheria (Td) booster vaccine  every 10 years  This vaccine protects you against tetanus and diphtheria  Tetanus is a severe infection that may cause painful muscle spasms and lockjaw  Diphtheria is a severe bacterial infection that causes a thick covering in the back of your mouth and throat  · Get a human papillomavirus (HPV) vaccine  if you are female and aged 23 to 32 or male 23 to 24 and never received it  This vaccine protects you from HPV infection  HPV is the most common infection spread by sexual contact  HPV may also cause vaginal, penile, and anal cancers  · Get a pneumococcal vaccine  if you are aged 72 years or older  The pneumococcal vaccine is an injection given to protect you from pneumococcal disease  Pneumococcal disease is an infection caused by pneumococcal bacteria  The infection may cause pneumonia, meningitis, or an ear infection  · Get a shingles vaccine  if you are 60 or older, even if you have had shingles before  The shingles vaccine is an injection to protect you from the varicella-zoster virus  This is the same virus that causes chickenpox   Shingles is a painful rash that develops in people who had chickenpox or have been exposed to the virus  How to eat healthy:  My Plate is a model for planning healthy meals  It shows the types and amounts of foods that should go on your plate  Fruits and vegetables make up about half of your plate, and grains and protein make up the other half  A serving of dairy is included on the side of your plate  The amount of calories and serving sizes you need depends on your age, gender, weight, and height  Examples of healthy foods are listed below:  · Eat a variety of vegetables  such as dark green, red, and orange vegetables  You can also include canned vegetables low in sodium (salt) and frozen vegetables without added butter or sauces  · Eat a variety of fresh fruits , canned fruit in 100% juice, frozen fruit, and dried fruit  · Include whole grains  At least half of the grains you eat should be whole grains  Examples include whole-wheat bread, wheat pasta, brown rice, and whole-grain cereals such as oatmeal     · Eat a variety of protein foods such as seafood (fish and shellfish), lean meat, and poultry without skin (turkey and chicken)  Examples of lean meats include pork leg, shoulder, or tenderloin, and beef round, sirloin, tenderloin, and extra lean ground beef  Other protein foods include eggs and egg substitutes, beans, peas, soy products, nuts, and seeds  · Choose low-fat dairy products such as skim or 1% milk or low-fat yogurt, cheese, and cottage cheese  · Limit unhealthy fats  such as butter, hard margarine, and shortening  Exercise:  Exercise at least 30 minutes per day on most days of the week  Some examples of exercise include walking, biking, dancing, and swimming  You can also fit in more physical activity by taking the stairs instead of the elevator or parking farther away from stores  Include muscle strengthening activities 2 days each week  Regular exercise provides many health benefits   It helps you manage your weight, and decreases your risk for type 2 diabetes, heart disease, stroke, and high blood pressure  Exercise can also help improve your mood  Ask your healthcare provider about the best exercise plan for you  General health and safety guidelines:   · Do not smoke  Nicotine and other chemicals in cigarettes and cigars can cause lung damage  Ask your healthcare provider for information if you currently smoke and need help to quit  E-cigarettes or smokeless tobacco still contain nicotine  Talk to your healthcare provider before you use these products  · Limit alcohol  A drink of alcohol is 12 ounces of beer, 5 ounces of wine, or 1½ ounces of liquor  · Lose weight, if needed  Being overweight increases your risk of certain health conditions  These include heart disease, high blood pressure, type 2 diabetes, and certain types of cancer  · Protect your skin  Do not sunbathe or use tanning beds  Use sunscreen with a SPF 15 or higher  Apply sunscreen at least 15 minutes before you go outside  Reapply sunscreen every 2 hours  Wear protective clothing, hats, and sunglasses when you are outside  · Drive safely  Always wear your seatbelt  Make sure everyone in your car wears a seatbelt  A seatbelt can save your life if you are in an accident  Do not use your cell phone when you are driving  This could distract you and cause an accident  Pull over if you need to make a call or send a text message  · Practice safe sex  Use latex condoms if are sexually active and have more than one partner  Your healthcare provider may recommend screening tests for sexually transmitted infections (STIs)  · Wear helmets, lifejackets, and protective gear  Always wear a helmet when you ride a bike or motorcycle, go skiing, or play sports that could cause a head injury  Wear protective equipment when you play sports  Wear a lifejacket when you are on a boat or doing water sports      © Copyright [employer] 2022 Information is for [employer] use only and may not be sold, redistributed or otherwise used for commercial purposes  All illustrations and images included in CareNotes® are the copyrighted property of A D A M , Inc  or Caleb Hogue  The above information is an  only  It is not intended as medical advice for individual conditions or treatments  Talk to your doctor, nurse or pharmacist before following any medical regimen to see if it is safe and effective for you

## 2022-11-14 NOTE — PROGRESS NOTES
[facility] [address]    NAME: Veto Hightower  AGE: 58 y o  SEX: female  : 1960     DATE: 2022     Assessment and Plan:     Problem List Items Addressed This Visit        Other    Annual physical exam - Primary     UTD with eye exam, dental cleaning, pap test, and colonoscopy  Other Visit Diagnoses     BMI 24 0-24 9, adult        To obtain labs, will call with results  Relevant Orders    CBC and differential    Comprehensive metabolic panel    TSH, 3rd generation with Free T4 reflex          Immunizations and preventive care screenings were discussed with patient today  Appropriate education was printed on patient's after visit summary  Counseling:  Alcohol/drug use: discussed moderation in alcohol intake, the recommendations for healthy alcohol use, and avoidance of illicit drug use  Dental Health: discussed importance of regular tooth brushing, flossing, and dental visits  Injury prevention: discussed safety/seat belts, safety helmets, smoke detectors, carbon dioxide detectors, and smoking near bedding or upholstery  Sexual health: discussed sexually transmitted diseases, partner selection, use of condoms, avoidance of unintended pregnancy, and contraceptive alternatives  · Exercise: the importance of regular exercise/physical activity was discussed  Recommend exercise 3-5 times per week for at least 30 minutes  Return in about 6 months (around 2023), or if symptoms worsen or fail to improve, for Annual physical      Chief Complaint:     Chief Complaint   Patient presents with   • Physical Exam      History of Present Illness:     Adult Annual Physical   Patient here for a comprehensive physical exam  The patient reports no problems  Diet and Physical Activity  · Diet/Nutrition: well balanced diet     · Exercise: vigorous cardiovascular exercise, strength training exercises and 3-4 times a week on average  Depression Screening  PHQ-2/9 Depression Screening    Little interest or pleasure in doing things: 0 - not at all  Feeling down, depressed, or hopeless: 0 - not at all  PHQ-2 Score: 0  PHQ-2 Interpretation: Negative depression screen       General Health  · Sleep: sleeps well  · Hearing: normal - bilateral   · Vision: most recent eye exam >1 year ago  · Dental: regular dental visits  /GYN Health  · Patient is: postmenopausal  · Last menstrual period:   · Contraceptive method:     Review of Systems:     Review of Systems   Constitutional: Negative  HENT: Negative  Eyes: Negative  Respiratory: Negative  Cardiovascular: Negative  Gastrointestinal: Negative  Endocrine: Negative  Genitourinary: Negative  Musculoskeletal: Positive for arthralgias  Right hip pain-improving with PT   Skin: Negative  Allergic/Immunologic: Negative  Neurological: Negative  Hematological: Negative  Psychiatric/Behavioral: Negative         Past Medical History:     Past Medical History:   Diagnosis Date   • Anxiety     medication for hot flasshes   • Breast lump      last assessed 01/16/2017   • Dermatitis     last assessed 02/27/2017   • Disease of thyroid gland     last assessed 02/23/2016   • Diverticulitis 11/2020   • Dysuria     last assessed 04/28/2017   • ETD (Eustachian tube dysfunction), bilateral     last assessed 02/23/2016      Past Surgical History:     Past Surgical History:   Procedure Laterality Date   • BREAST BIOPSY Right 2009    2 areas benign   • COLONOSCOPY     • TOOTH EXTRACTION     • TUBAL LIGATION        Social History:     Social History     Socioeconomic History   • Marital status: /Civil Union     Spouse name: None   • Number of children: None   • Years of education: None   • Highest education level: None   Occupational History     Comment: Retired   Tobacco Use   • Smoking status: Never Smoker   • Smokeless tobacco: Never Used   Vaping Use   • Vaping Use: Never used   Substance and Sexual Activity   • Alcohol use: Yes     Comment: occasionally   • Drug use: No   • Sexual activity: Yes     Partners: Male     Birth control/protection: Post-menopausal   Other Topics Concern   • None   Social History Narrative    Activities:  Dance    Always uses seat belt    Exercise:  Walking    Lives with      Social Determinants of Health     Financial Resource Strain: Not on file   Food Insecurity: Not on file   Transportation Needs: Not on file   Physical Activity: Not on file   Stress: Not on file   Social Connections: Not on file   Intimate Partner Violence: Not on file   Housing Stability: Not on file      Family History:     Family History   Problem Relation Age of Onset   • Breast cancer Mother 52   • Cancer Father    • Hypertension Father    • Rectal cancer Father    • Migraines Father    • No Known Problems Sister    • Throat cancer Brother    • Heart disease Maternal Grandmother    • Endometrial cancer Maternal Grandmother         unknown age   • No Known Problems Paternal Grandmother    • No Known Problems Daughter    • No Known Problems Paternal Aunt    • Ovarian cancer Neg Hx    • Uterine cancer Neg Hx    • Cervical cancer Neg Hx       Current Medications:     Current Outpatient Medications   Medication Sig Dispense Refill   • BIOTIN PO Take by mouth     • Black Pepper-Turmeric 3-500 MG CAPS Take by mouth     • CALCIUM PO Take by mouth daily      • eletriptan (RELPAX) 40 MG tablet Take 1 tablet (40 mg total) by mouth once as needed for migraine for up to 1 dose may repeat in 2 hours if necessary 16 tablet 3   • Glucosamine-Chondroitin (GLUCOSAMINE CHONDR COMPLEX PO) Take by mouth daily      • hyoscyamine (ANASPAZ,LEVSIN) 0 125 MG tablet Take 1 tablet (0 125 mg total) by mouth every 4 (four) hours as needed for cramping 20 tablet 0   • Multiple Vitamins-Minerals (CENTRUM SILVER) tablet Take by mouth     • PARoxetine (PAXIL) 10 mg tablet Take 1 tablet (10 mg total) by mouth daily 90 tablet 0   • timolol (TIMOPTIC) 0 5 % ophthalmic solution      • ondansetron (ZOFRAN-ODT) 4 mg disintegrating tablet Take 1 tablet (4 mg total) by mouth every 6 (six) hours as needed for nausea or vomiting 20 tablet 0   • Premarin vaginal cream        No current facility-administered medications for this visit  Allergies: Allergies   Allergen Reactions   • Pollen Extract Itching, Swelling and Sneezing      Physical Exam:     /70   Pulse 78   Temp 98 °F (36 7 °C)   Resp 14   Ht 5' 10" (1 778 m)   Wt 78 kg (172 lb)   LMP  (LMP Unknown)   SpO2 98%   BMI 24 68 kg/m²     Physical Exam  Vitals and nursing note reviewed  Constitutional:       General: She is not in acute distress  Appearance: Normal appearance  She is well-developed and normal weight  She is not ill-appearing, toxic-appearing or diaphoretic  HENT:      Head: Normocephalic and atraumatic  Right Ear: Tympanic membrane and external ear normal       Left Ear: Tympanic membrane and external ear normal       Nose: Nose normal       Mouth/Throat:      Mouth: Mucous membranes are moist       Pharynx: Oropharynx is clear  No oropharyngeal exudate  Eyes:      Conjunctiva/sclera: Conjunctivae normal       Pupils: Pupils are equal, round, and reactive to light  Neck:      Thyroid: Thyromegaly present  Cardiovascular:      Rate and Rhythm: Normal rate and regular rhythm  Heart sounds: Normal heart sounds  No murmur heard  Pulmonary:      Effort: Pulmonary effort is normal  No respiratory distress  Breath sounds: Normal breath sounds  No stridor  No wheezing or rales  Chest:      Chest wall: No tenderness  Abdominal:      General: Abdomen is flat  Bowel sounds are normal  There is no distension  Palpations: Abdomen is soft  There is no mass  Tenderness: There is no abdominal tenderness  There is no guarding or rebound  Hernia: No hernia is present  Musculoskeletal:         General: Normal range of motion  Cervical back: Normal range of motion and neck supple  Lymphadenopathy:      Cervical: No cervical adenopathy  Skin:     Capillary Refill: Capillary refill takes less than 2 seconds  Neurological:      General: No focal deficit present  Mental Status: She is alert and oriented to person, place, and time  Cranial Nerves: No cranial nerve deficit  Psychiatric:         Mood and Affect: Mood normal          Behavior: Behavior normal          Thought Content:  Thought content normal          Judgment: Judgment normal           Polly December, [address] [facility]

## 2022-11-15 ENCOUNTER — OFFICE VISIT (OUTPATIENT)
Dept: PHYSICAL THERAPY | Facility: CLINIC | Age: 62
End: 2022-11-15

## 2022-11-15 DIAGNOSIS — M25.551 RIGHT HIP PAIN: Primary | ICD-10-CM

## 2022-11-15 DIAGNOSIS — M16.10 ARTHRITIS OF HIP: ICD-10-CM

## 2022-11-15 NOTE — PROGRESS NOTES
Daily Note     Today's date: 11/15/2022  Patient name: Consuelo Salazar  : 1960  MRN: [id number]  Referring provider: ELY Phipps  Dx:   Encounter Diagnosis     ICD-10-CM    1  Right hip pain  M25 551    2  Arthritis of hip  M16 10        Start Time: 930  Stop Time: 7233  Total time in clinic (min): 25 minutes    Subjective: Pt reports that she is feeling really good in her hip  Objective: See treatment diary below      Assessment: Pt requested shortened session secondary to not feeling well  Pt able to perform leg press with increased resistance without increase in pain  PT will continue with R hip strengthening NV  Pt will continue to benefit from skilled PT in order to improve R hip strength, decrease pain, and improve functional tolerance  Plan: Continue per plan of care  Precautions: Access Code: [email or web address]  URL: [email or web address]         Manuals 10/13 10/17 10/24 11/11 11/15        Hip LAD  SC AF AF         Mobilizations with belt             Psoas release  SC AF          TheraRoller  SC - quads, ITB, glute med                        Neuro Re-Ed     11/15        Clamshells  RTB 3"x20 ea RTB 3"x20          Butterfly bridge+ TA  3"x20 3"x20                                                                           Ther Ex     11/15        Bike  10'  8' 8' 10'        Hamstring stretch 3x30" supine            ITB stretch 3x30" supine            Quad stretch 3x30" prone            Leg press   85# 2x15 85# 2x10 85# 3x10 95# 3x10        SL leg press  65# 3x10 65# 2x10 65# 2x10         Heel raises  Leg press 65# 30x Leg 65# x30          Hamstring curl              Prone bent knee hip extension  2x10 ea 2x10 ea          SKFO  RTB 2x15 ea RTB 2x15 ea          Standing hip abd/ext   RTB 2x10 RTB 2x10         Lateral walking    RTB 2 laps         S/l TG squat    L10 2x10         Ther Activity                                       Gait Training Modalities

## 2022-11-21 ENCOUNTER — OFFICE VISIT (OUTPATIENT)
Dept: PHYSICAL THERAPY | Facility: CLINIC | Age: 62
End: 2022-11-21

## 2022-11-21 DIAGNOSIS — M25.551 RIGHT HIP PAIN: Primary | ICD-10-CM

## 2022-11-21 DIAGNOSIS — M16.10 ARTHRITIS OF HIP: ICD-10-CM

## 2022-11-21 NOTE — PROGRESS NOTES
Daily Note     Today's date: 2022  Patient name: Vivienne Mar  : 1960  MRN: [id number]  Referring provider: ELY Gabriel  Dx:   Encounter Diagnosis     ICD-10-CM    1  Right hip pain  M25 551       2  Arthritis of hip  M16 10                      Subjective: Pt states her R hip is a little tight upon arrival but believes this is due to       Objective: See treatment diary below      Assessment: Knee valgus with single leg squat  L>R  Patient demonstrating lack of eccentric control during step downs  Stepping strategy utilized during SL balance activities  Patient demonstrating glut med fatigue with isometrics  Relief from hip tightness with manual therapy reported  Plan: Continue per plan of care  Precautions: Access Code: [email or web address]  URL: [email or web address]         Manuals 10/13 10/17 10/24 11/11 11/15 11/21       Hip LAD  SC AF AF  AF       Mobilizations with belt             Psoas release  SC AF          TheraRoller  SC - quads, ITB, glute med                        Neuro Re-Ed     11/15        Clamshells  RTB 3"x20 ea RTB 3"x20          Butterfly bridge+ TA  3"x20 3"x20          SLS      Foam 15"x5       Hip abd iso at wall      5"x5                                              Ther Ex     11/15        Bike  10'  8' 8' 10' 8'       Hamstring stretch 3x30" supine            ITB stretch 3x30" supine            Quad stretch 3x30" prone            Leg press   85# 2x15 85# 2x10 85# 3x10 95# 3x10 95# 3x10       SL leg press  65# 3x10 65# 2x10 65# 2x10  65# 3x10       Heel raises  Leg press 65# 30x Leg 65# x30          Hamstring curl              Prone bent knee hip extension  2x10 ea 2x10 ea          SKFO  RTB 2x15 ea RTB 2x15 ea          Standing hip abd/ext   RTB 2x10 RTB 2x10         Lateral walking    RTB 2 laps  GTB 5 laps       S/l TG squat    L10 2x10  L10 2x10       Lateral step down      4" 2x10       Ther Activity Gait Training                                       Modalities

## 2022-11-28 ENCOUNTER — EVALUATION (OUTPATIENT)
Dept: PHYSICAL THERAPY | Facility: CLINIC | Age: 62
End: 2022-11-28

## 2022-11-28 DIAGNOSIS — M16.10 ARTHRITIS OF HIP: ICD-10-CM

## 2022-11-28 DIAGNOSIS — M25.551 RIGHT HIP PAIN: Primary | ICD-10-CM

## 2022-11-28 NOTE — PROGRESS NOTES
PT Re-Evaluation     Today's date: 2022  Patient name: Gabrielle White  : 1960  MRN: [id number]  Referring provider: ELY Acosta  Dx:   Encounter Diagnosis     ICD-10-CM    1  Right hip pain  M25 551       2  Arthritis of hip  M16 10           Start Time: 935  Stop Time: 1017  Total time in clinic (min): 42 minutes    Assessment  Assessment details: Pt is a 58 y o  female presenting to PT services with c/o chronic R hip pain  Pt has been participating in PT for 7 weeks and has made improvement in regards to RLE strength, decreased frequency and intensity of pain, and improved functional ability  Pt remains limited by increased muscle tightness in R iliopsoas, R quadriceps, and R ITB, and pain with function  PT and pt have discussed and agreed that pt will continue to benefit from skilled physical therapy in order to improve RLE flexibility, decrease pain with function, and improve RLE hip flexor strength  Impairments: abnormal or restricted ROM, activity intolerance, impaired physical strength and pain with function    Goals  STG (4 weeks):  1  Pt will improve R hip abduction AROM to be symmetrical to L hip ONGOING  2  Pt will improve R hip knee flexion strength to be at least 4/5 GOAL MET  3  Pt will report pain at worst as 5/10 or less ONGOING    LTG (8 weeks):  1  Pt will be independent in HEP GOAL MET  2  Pt will improve R hip flexion AROM to be symmetrical to L hip ONGOING  3  Pt will improve R gluteus brittani and R hamstring strength to be at least 4+/5 GOAL MET  4   Pt will have no tenderness to palpation in R iliopsoas muscle     Plan  Patient would benefit from: skilled physical therapy  Planned modality interventions: cryotherapy, biofeedback, thermotherapy: hydrocollator packs, TENS and unattended electrical stimulation  Other planned modality interventions: MFDc, kinesiotape, IASTM  Planned therapy interventions: abdominal trunk stabilization, joint mobilization, manual therapy, massage, neuromuscular re-education, patient education, balance, postural training, strengthening, stretching, therapeutic activities, therapeutic exercise, home exercise program, flexibility and functional ROM exercises  Frequency: 1x week  Duration in weeks: 4  Plan of Care beginning date: 2022  Plan of Care expiration date: 2022  Treatment plan discussed with: patient        Subjective Evaluation    History of Present Illness  Mechanism of injury: Pt reports that she feels about 60% better since beginning PT  She states that it doesn't affect her function, but she is just in pain  Pt states that she mostly feels the pain in the morning and when it is cold, damp, and rainy  She states if she stretches first thing in the morning, she feels better  Pain  Current pain ratin  At best pain ratin  At worst pain ratin  Location: R lateral hip/joint   Quality: throbbing, tight and cramping  Alleviating factors: Advil prn, stretching, hot shower  Aggravating factors: stair climbing (rain, cold, sitting still)  Progression: improved    Social Support  Steps to enter house: yes (1 step, no hand rail)  Stairs in house: yes (1 flight, 1 hand rail )   Lives in: multiple-level home  Lives with: spouse (1 great jocelyne, 1 cat)    Employment status: not working  Hand dominance: right  Exercise history: walks 1-3 miles per day      Diagnostic Tests  Abnormal x-ray: per pt report in  mild OA in R hip  Treatments  Previous treatment: medication and physical therapy  Patient Goals  Patient goals for therapy: increased strength, decreased pain, improved balance and increased motion  Patient goal: "to not have pain and increase flexibility"        Objective     Palpation     Right   Hypertonic in the iliopsoas  Muscle spasm in the iliopsoas  Tenderness of the iliopsoas  Trigger point to iliopsoas       Active Range of Motion     Lumbar   Flexion:  WFL  Extension:  WFL  Left lateral flexion:  WFL  Right lateral flexion:  WFL  Left rotation:  WFL  Right rotation:  WFL  Left Hip   Flexion: 115 degrees   Abduction: 44 degrees     Right Hip   Flexion: 110 degrees   Abduction: 30 degrees     Strength/Myotome Testing     Left Hip   Planes of Motion   Flexion: 5  Extension: 5  Abduction: 5    Right Hip   Planes of Motion   Flexion: 4  Extension: 5  Abduction: 5    Left Knee   Flexion: 4+  Extension: 5    Right Knee   Flexion: 4+  Extension: 5    Left Ankle/Foot   Dorsiflexion: 5  Plantar flexion: 5    Right Ankle/Foot   Dorsiflexion: 5  Plantar flexion: 5    Tests     Right Hip   Positive FADIR, J sign and scour  Negative MITCH  Quinn: Positive  Additional Tests Details  Positive hamstring 90/90 LLE         Precautions: Access Code: [email or web address]  URL: [email or web address]       Manuals 10/13 10/17 10/24 11/11 11/15 11/21 11/28      Hip LAD  SC AF AF  AF       Mobilizations with belt             Psoas release  SC AF          TheraRoller  SC - quads, ITB, glute med                        Neuro Re-Ed     11/15  11/28      Clamshells  RTB 3"x20 ea RTB 3"x20          Butterfly bridge+ TA  3"x20 3"x20          SLS      Foam 15"x5       Hip abd iso at wall      5"x5                                              Ther Ex     11/15  11/28      Pt edu       Re-eval findings, POC, HEP update      Bike  10'  8' 8' 10' 8' 10'      Hamstring stretch 3x30" supine            ITB stretch 3x30" supine            Quad stretch 3x30" prone            Leg press   85# 2x15 85# 2x10 85# 3x10 95# 3x10 95# 3x10       SL leg press  65# 3x10 65# 2x10 65# 2x10  65# 3x10       Heel raises  Leg press 65# 30x Leg 65# x30          Hamstring curl              Prone bent knee hip extension  2x10 ea 2x10 ea          SKFO  RTB 2x15 ea RTB 2x15 ea          Standing hip abd/ext   RTB 2x10 RTB 2x10         Lateral walking    RTB 2 laps  GTB 5 laps       S/l TG squat    L10 2x10  L10 2x10       Lateral step down      4" 2x10       Ther Activity 11/28                                Gait Training                                       Modalities

## 2022-12-05 ENCOUNTER — OFFICE VISIT (OUTPATIENT)
Dept: PHYSICAL THERAPY | Facility: CLINIC | Age: 62
End: 2022-12-05

## 2022-12-05 DIAGNOSIS — M25.551 RIGHT HIP PAIN: Primary | ICD-10-CM

## 2022-12-05 DIAGNOSIS — M16.10 ARTHRITIS OF HIP: ICD-10-CM

## 2022-12-05 NOTE — PROGRESS NOTES
Daily Note     Today's date: 2022  Patient name: Zeynep Borja  : 1960  MRN: [id number]  Referring provider: ELY Owens  Dx:   Encounter Diagnosis     ICD-10-CM    1  Right hip pain  M25 551       2  Arthritis of hip  M16 10                      Subjective: Pt denies pain upon arrival but does report R hip "stiffness" upon waking in the morning  She reports she was able to participate in SPIN class recently  Objective: See treatment diary below      Assessment: Increased tenderness and tone in R psoas  Challenged with core stability exercises  Improved control of R knee valgus during CKC but there is knee valgus on LLE  Pt would benefit from continued PT in order to improve core and LE motor control and ROM for reduced pain during daily tasks  Plan: Continue per plan of care  Precautions: Access Code: [email or web address]  URL: [email or web address]       Manuals 10/13 10/17 10/24 11/11 11/15 11/21 11/28 12/5     Hip LAD  SC AF AF  AF       Mobilizations with belt             Psoas release  SC AF     AF     TheraRoller  SC - quads, ITB, glute med                        Neuro Re-Ed     11/15  11/28      Clamshells  RTB 3"x20 ea RTB 3"x20          Butterfly bridge+ TA  3"x20 3"x20          SLS      Foam 15"x5       Hip abd iso at wall      5"x5       STS c TB        GTB 2x10     q-ped firehydrant        2x10     q-ped alt ue/le        x10     Ther Ex     11/15  11/28      Pt edu       Re-eval findings, POC, HEP update      Bike  10'  8' 8' 10' 8' 10' 8' ROM     Hamstring stretch 3x30" supine            ITB stretch 3x30" supine            Quad stretch 3x30" prone       EOT 30"x3     Leg press   85# 2x15 85# 2x10 85# 3x10 95# 3x10 95# 3x10  105# 2x8     SL leg press  65# 3x10 65# 2x10 65# 2x10  65# 3x10  75# 2x10     Heel raises  Leg press 65# 30x Leg 65# x30          Hamstring curl              Prone bent knee hip extension  2x10 ea 2x10 ea          SKFO  RTB 2x15 ea RTB 2x15 ea          Standing hip abd/ext   RTB 2x10 RTB 2x10         Lateral walking    RTB 2 laps  GTB 5 laps       S/l TG squat    L10 2x10  L10 2x10       Lateral step down      4" 2x10       TG 1/2 kneel hip flexor stretch        10"x10     Ther Activity       11/28                                Gait Training                                       Modalities

## 2022-12-12 ENCOUNTER — APPOINTMENT (OUTPATIENT)
Dept: PHYSICAL THERAPY | Facility: CLINIC | Age: 62
End: 2022-12-12

## 2022-12-19 ENCOUNTER — OFFICE VISIT (OUTPATIENT)
Dept: PHYSICAL THERAPY | Facility: CLINIC | Age: 62
End: 2022-12-19

## 2022-12-19 DIAGNOSIS — M25.551 RIGHT HIP PAIN: Primary | ICD-10-CM

## 2022-12-19 DIAGNOSIS — M16.10 ARTHRITIS OF HIP: ICD-10-CM

## 2022-12-19 NOTE — PROGRESS NOTES
PT Discharge    Today's date: 2022  Patient name: Danny Salinas  : 1960  MRN: [id number]  Referring provider: ELY Barahona  Dx:   Encounter Diagnosis     ICD-10-CM    1  Right hip pain  M25 551       2  Arthritis of hip  M16 10           Start Time:   Stop Time: 1011  Total time in clinic (min): 40 minutes    Subjective: Pt states that she has been feeling good lately, she states she hasn't really had pain  She was walking a lot last week on flat ground and had no issue  Objective: See treatment diary below      Assessment:  Pt has made significant improvements since beginning PT  She has improved eccentric control of quadriceps and decreased pain with function  PT and pt have discussed and agreed that today will be pt's last day secondary to schedule conflicts with the holiday season  PT instructed pt to continue with HEP independently and informed pt that if she has any questions or concerns she is welcome to contact the facility at any time  Pt is discharged from skilled physical therapy at this time  Plan: Continue per plan of care  Precautions: Access Code: [email or web address]  URL: [email or web address]       Manuals 10/13 10/17 10/24 11/11 11/15 11/21 11/28 12/5 12/19    Hip LAD  SC AF AF  AF       Mobilizations with belt             Psoas release  SC AF     AF     TheraRoller  SC - quads, ITB, glute med                        Neuro Re-Ed     11/15  11/28      Clamshells  RTB 3"x20 ea RTB 3"x20          Butterfly bridge+ TA  3"x20 3"x20          SLS      Foam 15"x5       Hip abd iso at wall      5"x5       STS c TB        GTB 2x10 GTB 2x10    q-ped firehydrant        2x10     q-ped alt ue/le        x10     Plank c shoulder taps          30x ea    Ther Ex     11/15  11/28      Pt edu       Re-eval findings, POC, HEP update  HEP compliance     Bike  10'  8' 8' 10' 8' 10' 8' ROM 8' ROM    Hamstring stretch 3x30" supine            ITB stretch 3x30" supine Quad stretch 3x30" prone       EOT 30"x3     Leg press   85# 2x15 85# 2x10 85# 3x10 95# 3x10 95# 3x10  105# 2x8 115# 2x8    SL leg press  65# 3x10 65# 2x10 65# 2x10  65# 3x10  75# 2x10 85# 1x10    75# 1x10    Heel raises  Leg press 65# 30x Leg 65# x30          Hamstring curl              Prone bent knee hip extension  2x10 ea 2x10 ea          SKFO  RTB 2x15 ea RTB 2x15 ea          Standing hip abd/ext   RTB 2x10 RTB 2x10         Lateral walking    RTB 2 laps  GTB 5 laps   GTB 5 laps    S/l TG squat    L10 2x10  L10 2x10       Lateral step down      4" 2x10       TG 1/2 kneel hip flexor stretch        10"x10 10"x10                 Ther Activity       11/28                                Gait Training                                       Modalities

## 2022-12-28 ENCOUNTER — APPOINTMENT (OUTPATIENT)
Dept: PHYSICAL THERAPY | Facility: CLINIC | Age: 62
End: 2022-12-28

## 2022-12-29 ENCOUNTER — OFFICE VISIT (OUTPATIENT)
Dept: FAMILY MEDICINE CLINIC | Facility: CLINIC | Age: 62
End: 2022-12-29

## 2022-12-29 VITALS
DIASTOLIC BLOOD PRESSURE: 74 MMHG | HEIGHT: 70 IN | SYSTOLIC BLOOD PRESSURE: 120 MMHG | TEMPERATURE: 97.8 F | BODY MASS INDEX: 24.71 KG/M2

## 2022-12-29 DIAGNOSIS — J01.10 ACUTE NON-RECURRENT FRONTAL SINUSITIS: Primary | ICD-10-CM

## 2022-12-29 RX ORDER — DEXTROMETHORPHAN HYDROBROMIDE AND PROMETHAZINE HYDROCHLORIDE 15; 6.25 MG/5ML; MG/5ML
5 SOLUTION ORAL
Qty: 240 ML | Refills: 0 | Status: SHIPPED | OUTPATIENT
Start: 2022-12-29

## 2022-12-29 RX ORDER — ERYTHROMYCIN 5 MG/G
0.5 OINTMENT OPHTHALMIC
Qty: 3.5 G | Refills: 0 | Status: SHIPPED | OUTPATIENT
Start: 2022-12-29

## 2022-12-29 RX ORDER — AZITHROMYCIN 250 MG/1
TABLET, FILM COATED ORAL
Qty: 6 TABLET | Refills: 0 | Status: SHIPPED | OUTPATIENT
Start: 2022-12-29 | End: 2023-01-03

## 2022-12-29 RX ORDER — AMOXICILLIN AND CLAVULANATE POTASSIUM 875; 125 MG/1; MG/1
1 TABLET, FILM COATED ORAL EVERY 12 HOURS SCHEDULED
Qty: 20 TABLET | Refills: 0 | Status: SHIPPED | OUTPATIENT
Start: 2022-12-29 | End: 2023-01-08

## 2022-12-29 NOTE — PROGRESS NOTES
Assessment/Plan:         Problem List Items Addressed This Visit    None  Visit Diagnoses     Acute non-recurrent frontal sinusitis    -  Primary    Vit C, D and Zinc, hydrate and humidification  Nasal spray and augmentin twice daily for 10 days  deep breath and cough  Relevant Medications    amoxicillin-clavulanate (Augmentin) 875-125 mg per tablet    azithromycin (Zithromax) 250 mg tablet    Promethazine-DM (PHENERGAN-DM) 6 25-15 mg/5 mL oral syrup    erythromycin (ILOTYCIN) ophthalmic ointment            Subjective:      Patient ID: Liza Walker is a 58 y o  female  Eber Carrillo started getting sick 12/23/22, last night she woke up coughing and a lot of nasal congestion and sinus pressure  She Also is having eye crusting  She is taking sudafed during the day and mucinex night time  The following portions of the patient's history were reviewed and updated as appropriate:   Past Medical History:  She has a past medical history of Anxiety, Breast lump ( ), Dermatitis, Disease of thyroid gland, Diverticulitis (11/2020), Dysuria, and ETD (Eustachian tube dysfunction), bilateral ,  _______________________________________________________________________  Medical Problems:  does not have any pertinent problems on file ,  _______________________________________________________________________  Past Surgical History:   has a past surgical history that includes Tooth extraction; Tubal ligation; Breast biopsy (Right, 2009); and Colonoscopy  ,  _______________________________________________________________________  Family History:  family history includes Breast cancer (age of onset: 52) in her mother; Cancer in her father; Endometrial cancer in her maternal grandmother; Heart disease in her maternal grandmother; Hypertension in her father; Migraines in her father; No Known Problems in her daughter, paternal aunt, paternal grandmother, and sister; Rectal cancer in her father;  Throat cancer in her brother ,  _______________________________________________________________________  Social History:   reports that she has never smoked  She has never used smokeless tobacco  She reports current alcohol use  She reports that she does not use drugs  ,  _______________________________________________________________________  Allergies:  is allergic to pollen extract     _______________________________________________________________________  Current Outpatient Medications   Medication Sig Dispense Refill   • amoxicillin-clavulanate (Augmentin) 875-125 mg per tablet Take 1 tablet by mouth every 12 (twelve) hours for 10 days 20 tablet 0   • azithromycin (Zithromax) 250 mg tablet Take 2 tablets (500 mg total) by mouth daily for 1 day, THEN 1 tablet (250 mg total) daily for 4 days   6 tablet 0   • erythromycin (ILOTYCIN) ophthalmic ointment Administer 0 5 inches to both eyes daily at bedtime 3 5 g 0   • Promethazine-DM (PHENERGAN-DM) 6 25-15 mg/5 mL oral syrup Take 5 mL by mouth daily at bedtime 240 mL 0   • BIOTIN PO Take by mouth     • Black Pepper-Turmeric 3-500 MG CAPS Take by mouth     • CALCIUM PO Take by mouth daily      • eletriptan (RELPAX) 40 MG tablet Take 1 tablet (40 mg total) by mouth once as needed for migraine for up to 1 dose may repeat in 2 hours if necessary 16 tablet 3   • Glucosamine-Chondroitin (GLUCOSAMINE CHONDR COMPLEX PO) Take by mouth daily      • hyoscyamine (ANASPAZ,LEVSIN) 0 125 MG tablet Take 1 tablet (0 125 mg total) by mouth every 4 (four) hours as needed for cramping 20 tablet 0   • Multiple Vitamins-Minerals (CENTRUM SILVER) tablet Take by mouth     • ondansetron (ZOFRAN-ODT) 4 mg disintegrating tablet Take 1 tablet (4 mg total) by mouth every 6 (six) hours as needed for nausea or vomiting 20 tablet 0   • PARoxetine (PAXIL) 10 mg tablet Take 1 tablet (10 mg total) by mouth daily 90 tablet 0   • Premarin vaginal cream      • timolol (TIMOPTIC) 0 5 % ophthalmic solution        No current facility-administered medications for this visit      _______________________________________________________________________  Review of Systems   Constitutional: Positive for chills, diaphoresis, fatigue and fever  HENT: Positive for congestion, rhinorrhea, sinus pressure, sinus pain and sore throat  Negative for ear pain  Eyes: Positive for pain and redness  Negative for visual disturbance  Respiratory: Positive for cough  Negative for chest tightness, shortness of breath and wheezing  Cardiovascular: Negative for chest pain and palpitations  Gastrointestinal: Negative for abdominal pain, diarrhea, nausea and vomiting  Genitourinary: Negative for dysuria, frequency, hematuria and urgency  Musculoskeletal: Negative for arthralgias, back pain and myalgias  Skin: Negative for color change and rash  Neurological: Positive for dizziness, light-headedness and headaches  Negative for seizures and syncope  Psychiatric/Behavioral: Positive for sleep disturbance  All other systems reviewed and are negative  Objective:  Vitals:    12/29/22 0957   BP: 120/74   Temp: 97 8 °F (36 6 °C)   Height: 5' 10" (1 778 m)     Body mass index is 24 71 kg/m²  Physical Exam  Vitals and nursing note reviewed  Constitutional:       Appearance: Normal appearance  She is not ill-appearing  HENT:      Head: Normocephalic  Right Ear: Ear canal and external ear normal  There is no impacted cerumen  Tympanic membrane is erythematous  Left Ear: Ear canal and external ear normal  There is no impacted cerumen  Tympanic membrane is erythematous  Nose: Congestion present  Mouth/Throat:      Mouth: Mucous membranes are moist       Pharynx: Posterior oropharyngeal erythema present  Eyes:      Extraocular Movements: Extraocular movements intact  Cardiovascular:      Rate and Rhythm: Normal rate and regular rhythm  Heart sounds: Normal heart sounds  No murmur heard    Pulmonary:      Effort: Pulmonary effort is normal       Breath sounds: Rhonchi present  No wheezing  Abdominal:      Palpations: Abdomen is soft  Tenderness: There is no abdominal tenderness  Musculoskeletal:         General: Normal range of motion  Cervical back: Normal range of motion  Right lower leg: No edema  Left lower leg: No edema  Skin:     General: Skin is warm and dry  Neurological:      General: No focal deficit present  Mental Status: She is alert     Psychiatric:         Mood and Affect: Mood normal          Behavior: Behavior normal

## 2023-01-03 DIAGNOSIS — K21.9 GASTROESOPHAGEAL REFLUX DISEASE WITHOUT ESOPHAGITIS: Primary | ICD-10-CM

## 2023-01-03 RX ORDER — OMEPRAZOLE 20 MG/1
20 CAPSULE, DELAYED RELEASE ORAL DAILY
Qty: 30 CAPSULE | Refills: 1 | Status: SHIPPED | OUTPATIENT
Start: 2023-01-03 | End: 2023-03-04

## 2023-01-11 ENCOUNTER — APPOINTMENT (OUTPATIENT)
Dept: LAB | Facility: HOSPITAL | Age: 63
End: 2023-01-11

## 2023-01-11 LAB
ALBUMIN SERPL BCP-MCNC: 3.7 G/DL (ref 3.5–5)
ALP SERPL-CCNC: 68 U/L (ref 46–116)
ALT SERPL W P-5'-P-CCNC: 25 U/L (ref 12–78)
ANION GAP SERPL CALCULATED.3IONS-SCNC: 7 MMOL/L (ref 4–13)
AST SERPL W P-5'-P-CCNC: 25 U/L (ref 5–45)
BASOPHILS # BLD AUTO: 0.01 THOUSANDS/ÂΜL (ref 0–0.1)
BASOPHILS NFR BLD AUTO: 0 % (ref 0–1)
BILIRUB SERPL-MCNC: 0.37 MG/DL (ref 0.2–1)
BUN SERPL-MCNC: 15 MG/DL (ref 5–25)
CALCIUM SERPL-MCNC: 8.9 MG/DL (ref 8.3–10.1)
CHLORIDE SERPL-SCNC: 103 MMOL/L (ref 96–108)
CO2 SERPL-SCNC: 29 MMOL/L (ref 21–32)
CREAT SERPL-MCNC: 0.61 MG/DL (ref 0.6–1.3)
EOSINOPHIL # BLD AUTO: 0.13 THOUSAND/ÂΜL (ref 0–0.61)
EOSINOPHIL NFR BLD AUTO: 3 % (ref 0–6)
ERYTHROCYTE [DISTWIDTH] IN BLOOD BY AUTOMATED COUNT: 11.9 % (ref 11.6–15.1)
GFR SERPL CREATININE-BSD FRML MDRD: 97 ML/MIN/1.73SQ M
GLUCOSE P FAST SERPL-MCNC: 87 MG/DL (ref 65–99)
HCT VFR BLD AUTO: 40.5 % (ref 34.8–46.1)
HGB BLD-MCNC: 13.6 G/DL (ref 11.5–15.4)
IMM GRANULOCYTES # BLD AUTO: 0.01 THOUSAND/UL (ref 0–0.2)
IMM GRANULOCYTES NFR BLD AUTO: 0 % (ref 0–2)
LYMPHOCYTES # BLD AUTO: 1.14 THOUSANDS/ÂΜL (ref 0.6–4.47)
LYMPHOCYTES NFR BLD AUTO: 24 % (ref 14–44)
MCH RBC QN AUTO: 31.3 PG (ref 26.8–34.3)
MCHC RBC AUTO-ENTMCNC: 33.6 G/DL (ref 31.4–37.4)
MCV RBC AUTO: 93 FL (ref 82–98)
MONOCYTES # BLD AUTO: 0.33 THOUSAND/ÂΜL (ref 0.17–1.22)
MONOCYTES NFR BLD AUTO: 7 % (ref 4–12)
NEUTROPHILS # BLD AUTO: 3.2 THOUSANDS/ÂΜL (ref 1.85–7.62)
NEUTS SEG NFR BLD AUTO: 66 % (ref 43–75)
NRBC BLD AUTO-RTO: 0 /100 WBCS
PLATELET # BLD AUTO: 309 THOUSANDS/UL (ref 149–390)
PMV BLD AUTO: 9.5 FL (ref 8.9–12.7)
POTASSIUM SERPL-SCNC: 4.3 MMOL/L (ref 3.5–5.3)
PROT SERPL-MCNC: 7 G/DL (ref 6.4–8.4)
RBC # BLD AUTO: 4.35 MILLION/UL (ref 3.81–5.12)
SODIUM SERPL-SCNC: 139 MMOL/L (ref 135–147)
T4 FREE SERPL-MCNC: 0.98 NG/DL (ref 0.76–1.46)
TSH SERPL DL<=0.05 MIU/L-ACNC: 0.32 UIU/ML (ref 0.45–4.5)
WBC # BLD AUTO: 4.82 THOUSAND/UL (ref 4.31–10.16)

## 2023-03-02 ENCOUNTER — OFFICE VISIT (OUTPATIENT)
Dept: FAMILY MEDICINE CLINIC | Facility: CLINIC | Age: 63
End: 2023-03-02

## 2023-03-02 VITALS
WEIGHT: 169.4 LBS | HEART RATE: 77 BPM | SYSTOLIC BLOOD PRESSURE: 102 MMHG | HEIGHT: 70 IN | DIASTOLIC BLOOD PRESSURE: 74 MMHG | OXYGEN SATURATION: 97 % | TEMPERATURE: 97.9 F | BODY MASS INDEX: 24.25 KG/M2

## 2023-03-02 DIAGNOSIS — G89.29 CHRONIC HIP PAIN, RIGHT: ICD-10-CM

## 2023-03-02 DIAGNOSIS — M25.551 CHRONIC HIP PAIN, RIGHT: ICD-10-CM

## 2023-03-02 DIAGNOSIS — H10.32 ACUTE BACTERIAL CONJUNCTIVITIS OF LEFT EYE: Primary | ICD-10-CM

## 2023-03-02 PROBLEM — Z01.419 ENCOUNTER FOR GYNECOLOGICAL EXAMINATION WITHOUT ABNORMAL FINDING: Status: RESOLVED | Noted: 2018-10-03 | Resolved: 2023-03-02

## 2023-03-02 PROBLEM — Z00.00 ANNUAL PHYSICAL EXAM: Status: RESOLVED | Noted: 2020-10-15 | Resolved: 2023-03-02

## 2023-03-02 RX ORDER — TOBRAMYCIN 3 MG/ML
1 SOLUTION/ DROPS OPHTHALMIC
Qty: 1.3 ML | Refills: 0 | Status: SHIPPED | OUTPATIENT
Start: 2023-03-02 | End: 2023-03-07

## 2023-03-02 RX ORDER — MELOXICAM 15 MG/1
15 TABLET ORAL DAILY
Qty: 30 TABLET | Refills: 1 | Status: SHIPPED | OUTPATIENT
Start: 2023-03-02

## 2023-03-02 NOTE — ASSESSMENT & PLAN NOTE
To continue glucosamine and turmeric  Advised to add tart cherry  To stop naproxen and begin meloxicam 15 mg daily  To obtain x-ray, will call with results    Provided referral to the orthopedist   To follow-up after seen by specialist

## 2023-03-02 NOTE — PROGRESS NOTES
Assessment/Plan:    Acute bacterial conjunctivitis of left eye  To begin tobramycin to left eye  Counseled on using a cool compress for additional relief  Follow-up if no improvement in 1 week or sooner if symptoms worsen  Chronic hip pain, right  To continue glucosamine and turmeric  Advised to add tart cherry  To stop naproxen and begin meloxicam 15 mg daily  To obtain x-ray, will call with results  Provided referral to the orthopedist   To follow-up after seen by specialist        Diagnoses and all orders for this visit:    Acute bacterial conjunctivitis of left eye  -     tobramycin (TOBREX) 0 3 % SOLN; Administer 1 drop into the left eye every 4 (four) hours while awake for 5 days    Chronic hip pain, right  -     XR hip/pelv 2-3 vws right if performed; Future  -     Ambulatory Referral to Orthopedic Surgery; Future  -     meloxicam (Mobic) 15 mg tablet; Take 1 tablet (15 mg total) by mouth daily          Subjective:      Patient ID: Laura Holguin is a 58 y o  female  Tyler Thompson presents reporting a reddenned and itchy left eye for the past week  She feels as though it has been mildly swollen and crusted in the morning  Denies trauma to eye or deep eye pain  She also reports continued right hip pain  She underwent a course of physical therapy which improved but not completely alleviated the pain  She has been taking glucosamine and conjoint and and turmeric  She is also will take a muscle relaxer and naproxen which provides some relief  She notes that the cold weather worsens her symptoms  Requesting a referral to orthopedics        The following portions of the patient's history were reviewed and updated as appropriate: She   Patient Active Problem List    Diagnosis Date Noted   • Acute bacterial conjunctivitis of left eye 03/02/2023   • Chronic hip pain, right 03/02/2023   • GERD (gastroesophageal reflux disease) 06/02/2022   • Polyp at cervical os 02/17/2020   • Hot flashes 08/08/2018   • Migraine with aura and without status migrainosus, not intractable 02/27/2017   • Thyroid nodule 02/23/2016     Current Outpatient Medications   Medication Sig Dispense Refill   • BIOTIN PO Take by mouth     • Black Pepper-Turmeric 3-500 MG CAPS Take by mouth     • CALCIUM PO Take by mouth daily      • eletriptan (RELPAX) 40 MG tablet Take 1 tablet (40 mg total) by mouth once as needed for migraine for up to 1 dose may repeat in 2 hours if necessary 16 tablet 3   • erythromycin (ILOTYCIN) ophthalmic ointment Administer 0 5 inches to both eyes daily at bedtime 3 5 g 0   • Glucosamine-Chondroitin (GLUCOSAMINE CHONDR COMPLEX PO) Take by mouth daily      • hyoscyamine (ANASPAZ,LEVSIN) 0 125 MG tablet Take 1 tablet (0 125 mg total) by mouth every 4 (four) hours as needed for cramping 20 tablet 0   • meloxicam (Mobic) 15 mg tablet Take 1 tablet (15 mg total) by mouth daily 30 tablet 1   • Multiple Vitamins-Minerals (CENTRUM SILVER) tablet Take by mouth     • omeprazole (PriLOSEC) 20 mg delayed release capsule Take 1 capsule (20 mg total) by mouth daily 30 capsule 1   • ondansetron (ZOFRAN-ODT) 4 mg disintegrating tablet Take 1 tablet (4 mg total) by mouth every 6 (six) hours as needed for nausea or vomiting 20 tablet 0   • PARoxetine (PAXIL) 10 mg tablet Take 1 tablet (10 mg total) by mouth daily 90 tablet 0   • Premarin vaginal cream      • timolol (TIMOPTIC) 0 5 % ophthalmic solution      • tobramycin (TOBREX) 0 3 % SOLN Administer 1 drop into the left eye every 4 (four) hours while awake for 5 days 1 3 mL 0     No current facility-administered medications for this visit  She is allergic to pollen extract       Review of Systems   Constitutional: Negative  HENT: Positive for congestion and rhinorrhea  Eyes: Positive for discharge, redness and itching  Respiratory: Negative  Cardiovascular: Negative  Gastrointestinal: Negative      Musculoskeletal:        Left sided hip pain   Neurological: Negative  Psychiatric/Behavioral: Negative  /74 (BP Location: Left arm, Patient Position: Sitting, Cuff Size: Standard)   Pulse 77   Temp 97 9 °F (36 6 °C) (Tympanic)   Ht 5' 10" (1 778 m)   Wt 76 8 kg (169 lb 6 4 oz)   LMP  (LMP Unknown)   SpO2 97%   BMI 24 31 kg/m²     Objective:     Physical Exam  Vitals and nursing note reviewed  Constitutional:       Appearance: She is well-developed  HENT:      Head: Normocephalic and atraumatic  Eyes:      General: Vision grossly intact  Gaze aligned appropriately  Extraocular Movements: Extraocular movements intact  Conjunctiva/sclera: Conjunctivae normal       Comments: Left eye mildly injected, upper lid mildly swollen   Cardiovascular:      Rate and Rhythm: Normal rate and regular rhythm  Heart sounds: Normal heart sounds  No murmur heard  Pulmonary:      Effort: Pulmonary effort is normal  No respiratory distress  Breath sounds: Normal breath sounds  No wheezing or rales  Chest:      Chest wall: No tenderness  Musculoskeletal:      Cervical back: Neck supple  Neurological:      Mental Status: She is alert and oriented to person, place, and time  Psychiatric:         Behavior: Behavior normal          Thought Content:  Thought content normal          Judgment: Judgment normal

## 2023-03-02 NOTE — ASSESSMENT & PLAN NOTE
To begin tobramycin to left eye  Counseled on using a cool compress for additional relief  Follow-up if no improvement in 1 week or sooner if symptoms worsen

## 2023-03-12 DIAGNOSIS — K21.9 GASTROESOPHAGEAL REFLUX DISEASE WITHOUT ESOPHAGITIS: ICD-10-CM

## 2023-03-12 RX ORDER — OMEPRAZOLE 20 MG/1
CAPSULE, DELAYED RELEASE ORAL
Qty: 30 CAPSULE | Refills: 1 | Status: SHIPPED | OUTPATIENT
Start: 2023-03-12

## 2023-03-13 ENCOUNTER — HOSPITAL ENCOUNTER (OUTPATIENT)
Dept: RADIOLOGY | Facility: HOSPITAL | Age: 63
Discharge: HOME | End: 2023-03-13

## 2023-03-13 DIAGNOSIS — M25.551 CHRONIC HIP PAIN, RIGHT: ICD-10-CM

## 2023-03-13 DIAGNOSIS — G89.29 CHRONIC HIP PAIN, RIGHT: ICD-10-CM

## 2023-03-15 ENCOUNTER — PROBLEM (OUTPATIENT)
Dept: URBAN - METROPOLITAN AREA CLINIC 6 | Facility: CLINIC | Age: 63
End: 2023-03-15

## 2023-03-15 DIAGNOSIS — H02.882: ICD-10-CM

## 2023-03-15 DIAGNOSIS — H40.1131: ICD-10-CM

## 2023-03-15 DIAGNOSIS — H35.413: ICD-10-CM

## 2023-03-15 DIAGNOSIS — Z96.1: ICD-10-CM

## 2023-03-15 DIAGNOSIS — H43.813: ICD-10-CM

## 2023-03-15 DIAGNOSIS — H02.885: ICD-10-CM

## 2023-03-15 DIAGNOSIS — H00.14: ICD-10-CM

## 2023-03-15 PROCEDURE — 92012 INTRM OPH EXAM EST PATIENT: CPT

## 2023-03-15 ASSESSMENT — VISUAL ACUITY
OS_SC: 20/50
OS_PH: 20/25+1
OD_SC: 20/50+1
OD_PH: 20/30

## 2023-03-15 ASSESSMENT — TONOMETRY
OD_IOP_MMHG: 10
OS_IOP_MMHG: 13

## 2023-04-04 ENCOUNTER — OFFICE VISIT (OUTPATIENT)
Dept: ENDOCRINOLOGY | Age: 63
End: 2023-04-04

## 2023-04-04 VITALS
TEMPERATURE: 98 F | SYSTOLIC BLOOD PRESSURE: 104 MMHG | OXYGEN SATURATION: 97 % | HEART RATE: 74 BPM | HEIGHT: 71 IN | DIASTOLIC BLOOD PRESSURE: 72 MMHG | WEIGHT: 170 LBS | BODY MASS INDEX: 23.8 KG/M2

## 2023-04-04 DIAGNOSIS — E04.1 THYROID NODULE: Primary | ICD-10-CM

## 2023-04-04 RX ORDER — NEOMYCIN SULFATE, POLYMYXIN B SULFATE AND DEXAMETHASONE 3.5; 10000; 1 MG/ML; [USP'U]/ML; MG/ML
SUSPENSION/ DROPS OPHTHALMIC
COMMUNITY
Start: 2023-03-15

## 2023-04-04 NOTE — PROGRESS NOTES
Dmitry Monge 58 y o  female MRN: [id number]    Encounter: [id number]      Assessment/Plan     Assessment: This is a 58y o -year-old female for follow up on    1-thyroid nodules and low normal TSH level:   Her file was reviewed going back to 2015 and she has had similar problems since then  One time FNAB of left lower ( isthmus) nodule in 2015 was read as [place] II  She has no typical thyrotoxic symptoms  She has palpable left lower thyroid nodule and ? brisk DTRs otherwise she seems euthyroid  Her thyroid US is from 2 yrs ago and needs be repeated and if again read as stable then 3-5 yrs later  Plan:  Thyroid US now  TRab/ TSH in 6 months  CC:   Thyroid nodule and abnormal TSH    History of Present Illness     HPI:  See assessment    Review of Systems   Constitutional: Negative for appetite change, diaphoresis and unexpected weight change  HENT: Negative for trouble swallowing and voice change  Eyes: Negative for visual disturbance  Respiratory: Negative for chest tightness and shortness of breath  Cardiovascular: Negative for chest pain and palpitations  Gastrointestinal: Negative for constipation and diarrhea  Endocrine: Negative for heat intolerance  Genitourinary: Negative for difficulty urinating  Musculoskeletal: Positive for arthralgias  Right hip pain(+)   Skin: Negative for rash  Neurological: Negative for tremors and weakness  Psychiatric/Behavioral: Negative for sleep disturbance  The patient is not nervous/anxious          Historical Information   Past Medical History:   Diagnosis Date   • Acute cystitis    • Anxiety     medication for hot flasshes   • Breast lump      last assessed 01/16/2017   • Dermatitis     last assessed 02/27/2017   • Disease of thyroid gland     last assessed 02/23/2016   • Diverticulitis 11/2020   • Dysuria     last assessed 04/28/2017   • ETD (Eustachian tube dysfunction), bilateral     last assessed 02/23/2016   • Glaucoma Left   • Hematuria    • Polyp at cervical os      Past Surgical History:   Procedure Laterality Date   • BREAST BIOPSY Right 2009    2 areas benign   • CATARACT EXTRACTION Bilateral    • COLONOSCOPY     • TOOTH EXTRACTION     • TUBAL LIGATION       Social History   Social History     Substance and Sexual Activity   Alcohol Use Yes    Comment: occasionally     Social History     Substance and Sexual Activity   Drug Use No     Social History     Tobacco Use   Smoking Status Never   Smokeless Tobacco Never     Family History:   Family History   Problem Relation Age of Onset   • Breast cancer Mother 52   • Cancer Father    • Hypertension Father    • Rectal cancer Father    • Migraines Father    • No Known Problems Sister    • Throat cancer Brother    • Heart disease Maternal Grandmother    • Endometrial cancer Maternal Grandmother         unknown age   • No Known Problems Paternal Grandmother    • No Known Problems Daughter    • No Known Problems Paternal Aunt    • Ovarian cancer Neg Hx    • Uterine cancer Neg Hx    • Cervical cancer Neg Hx        Meds/Allergies   Current Outpatient Medications   Medication Sig Dispense Refill   • calcium citrate (CALCITRATE) 950 (200 Ca) MG tablet Take 1 tablet by mouth daily     • CALCIUM PO Take by mouth daily      • cholecalciferol (VITAMIN D3) 400 units tablet Take 400 Units by mouth daily     • eletriptan (RELPAX) 40 MG tablet Take 1 tablet (40 mg total) by mouth once as needed for migraine for up to 1 dose may repeat in 2 hours if necessary 16 tablet 3   • Glucosamine-Chondroitin (GLUCOSAMINE CHONDR COMPLEX PO) Take by mouth daily      • meloxicam (Mobic) 15 mg tablet Take 1 tablet (15 mg total) by mouth daily (Patient taking differently: Take 15 mg by mouth daily as needed) 30 tablet 1   • Multiple Vitamins-Minerals (CENTRUM SILVER) tablet Take by mouth     • omeprazole (PriLOSEC) 20 mg delayed release capsule TAKE 1 CAPSULE BY MOUTH EVERY DAY 30 capsule 1   • PARoxetine (PAXIL) "10 mg tablet Take 1 tablet (10 mg total) by mouth daily (Patient taking differently: Take 5 mg by mouth daily) 90 tablet 0   • Premarin vaginal cream      • timolol (TIMOPTIC) 0 5 % ophthalmic solution      • TURMERIC-MECHE PO Take by mouth     • neomycin-polymyxin-dexamethasone (MAXITROL) ophthalmic suspension PUT 1 DROP IN LEFT EYE 4 TIMES A DAY (Patient not taking: Reported on 4/4/2023)       No current facility-administered medications for this visit  Allergies   Allergen Reactions   • Pollen Extract Itching, Swelling and Sneezing       Objective   Vitals: Blood pressure 104/72, pulse 74, temperature 98 °F (36 7 °C), temperature source Temporal, height 5' 10 5\" (1 791 m), weight 77 1 kg (170 lb), SpO2 97 %, not currently breastfeeding  Physical Exam  Constitutional:       Appearance: Normal appearance  She is not ill-appearing  HENT:      Head: Normocephalic  Nose:      Comments: (-)tongue tremor  Mouth/Throat:      Mouth: Mucous membranes are moist    Eyes:      Extraocular Movements: Extraocular movements intact  Neck:        Comments: Left lower palpable not fixed nodule  Cardiovascular:      Rate and Rhythm: Normal rate and regular rhythm  Heart sounds: Normal heart sounds  No murmur heard  Pulmonary:      Breath sounds: Normal breath sounds  No wheezing or rales  Abdominal:      Palpations: Abdomen is soft  Tenderness: There is no abdominal tenderness  Musculoskeletal:      Right lower leg: No edema  Left lower leg: No edema  Skin:     General: Skin is warm  Comments: Palm erythema(+)   Neurological:      Mental Status: She is oriented to person, place, and time  Cranial Nerves: No cranial nerve deficit  Deep Tendon Reflexes: Reflexes abnormal    Psychiatric:         Behavior: Behavior normal          The history was obtained from the review of the chart, patient      Lab Results:   Lab Results   Component Value Date/Time    TSH 3RD GENERATON 0 317 " "(L) 01/11/2023 09:20 AM    Free T4 0 98 01/11/2023 09:20 AM       Imaging Studies:   Results for orders placed during the hospital encounter of 04/13/21    US thyroid    Impression  1  Stable thyroid nodules and diffuse enlargement of the left thyroid lobe  Reference: ACR Thyroid Imaging, Reporting and Data System (TI-RADS): White Paper of the [employer]  J AM Erickson Radiol 1188;89:613-849  (additional recommendations based on American Thyroid Association 2015 guidelines )      Workstation performed: [id number]      I have personally reviewed pertinent reports  Portions of the record may have been created with voice recognition software  Occasional wrong word or \"sound a like\" substitutions may have occurred due to the inherent limitations of voice recognition software  Read the chart carefully and recognize, using context, where substitutions have occurred    "

## 2023-05-01 PROBLEM — H10.32 ACUTE BACTERIAL CONJUNCTIVITIS OF LEFT EYE: Status: RESOLVED | Noted: 2023-03-02 | Resolved: 2023-05-01

## 2023-05-08 ENCOUNTER — HOSPITAL ENCOUNTER (OUTPATIENT)
Dept: ULTRASOUND IMAGING | Facility: HOSPITAL | Age: 63
Discharge: HOME | End: 2023-05-08

## 2023-05-08 DIAGNOSIS — E04.1 THYROID NODULE: ICD-10-CM

## 2023-07-06 ENCOUNTER — OFFICE VISIT (OUTPATIENT)
Dept: FAMILY MEDICINE CLINIC | Facility: CLINIC | Age: 63
End: 2023-07-06
Payer: COMMERCIAL

## 2023-07-06 VITALS
SYSTOLIC BLOOD PRESSURE: 110 MMHG | HEIGHT: 71 IN | WEIGHT: 169.2 LBS | BODY MASS INDEX: 23.69 KG/M2 | DIASTOLIC BLOOD PRESSURE: 80 MMHG | RESPIRATION RATE: 12 BRPM | TEMPERATURE: 97 F | OXYGEN SATURATION: 97 % | HEART RATE: 78 BPM

## 2023-07-06 DIAGNOSIS — R09.82 POST-NASAL DRIP: ICD-10-CM

## 2023-07-06 DIAGNOSIS — J02.0 STREP PHARYNGITIS: ICD-10-CM

## 2023-07-06 DIAGNOSIS — J02.9 SORE THROAT: ICD-10-CM

## 2023-07-06 DIAGNOSIS — R05.1 ACUTE COUGH: Primary | ICD-10-CM

## 2023-07-06 LAB — S PYO AG THROAT QL: POSITIVE

## 2023-07-06 PROCEDURE — 99214 OFFICE O/P EST MOD 30 MIN: CPT

## 2023-07-06 PROCEDURE — 87880 STREP A ASSAY W/OPTIC: CPT

## 2023-07-06 RX ORDER — AMOXICILLIN AND CLAVULANATE POTASSIUM 875; 125 MG/1; MG/1
1 TABLET, FILM COATED ORAL EVERY 12 HOURS SCHEDULED
Qty: 20 TABLET | Refills: 0 | Status: SHIPPED | OUTPATIENT
Start: 2023-07-06 | End: 2023-07-16

## 2023-07-06 RX ORDER — FLUTICASONE PROPIONATE 50 MCG
1 SPRAY, SUSPENSION (ML) NASAL DAILY
Qty: 16 G | Refills: 1 | Status: SHIPPED | OUTPATIENT
Start: 2023-07-06

## 2023-07-06 RX ORDER — BENZONATATE 200 MG/1
200 CAPSULE ORAL 3 TIMES DAILY PRN
Qty: 20 CAPSULE | Refills: 0 | Status: SHIPPED | OUTPATIENT
Start: 2023-07-06

## 2023-07-06 NOTE — PROGRESS NOTES
Assessment/Plan:         Problem List Items Addressed This Visit        Digestive    Strep pharyngitis     Strep A positive. Prescription for Augmentin sent to the pharmacy. Get a new toothbrush after being on antibiotic for 24 hours. Rest, increaseoral fluid intake. Take OTC medications. Follow-up for worsening or persistent symptoms. Parent verbalizes understanding regarding plan of care and all questions answered. Relevant Medications    amoxicillin-clavulanate (AUGMENTIN) 875-125 mg per tablet   Other Visit Diagnoses     Acute cough    -  Primary    May use Tessalon Perles up to 3 times a day. Stay hydrated. Hot tea with lemon and honey. Relevant Medications    benzonatate (TESSALON) 200 MG capsule    Sore throat        Relevant Orders    POCT rapid strepA (Completed)    Post-nasal drip        Start using Flonase daily. Relevant Medications    fluticasone (FLONASE) 50 mcg/act nasal spray            Subjective:      Patient ID: Dee Alvarado is a 58 y.o. female. Sore Throat   This is a new problem. The current episode started in the past 7 days. The problem has been waxing and waning. There has been no fever. The pain is mild. Associated symptoms include coughing. Pertinent negatives include no abdominal pain, congestion, diarrhea, drooling, ear discharge, ear pain, headaches, hoarse voice, plugged ear sensation, neck pain, shortness of breath, stridor, swollen glands, trouble swallowing or vomiting. She has had no exposure to strep or mono. She has tried nothing for the symptoms. The treatment provided mild relief.        The following portions of the patient's history were reviewed and updated as appropriate:   Past Medical History:  She has a past medical history of Acute cystitis, Anxiety, Breast lump (.), Dermatitis, Disease of thyroid gland, Diverticulitis (11/2020), Dysuria, ETD (Eustachian tube dysfunction), bilateral, Glaucoma, Hematuria, and Polyp at cervical os.,  _______________________________________________________________________  Medical Problems:  does not have any pertinent problems on file.,  _______________________________________________________________________  Past Surgical History:   has a past surgical history that includes Tooth extraction; Tubal ligation; Breast biopsy (Right, 2009); Colonoscopy; and Cataract extraction (Bilateral). ,  _______________________________________________________________________  Family History:  family history includes Breast cancer (age of onset: 52) in her mother; Cancer in her father; Endometrial cancer in her maternal grandmother; Heart disease in her maternal grandmother; Hypertension in her father; Migraines in her father; No Known Problems in her daughter, paternal aunt, paternal grandmother, and sister; Rectal cancer in her father; Throat cancer in her brother.,  _______________________________________________________________________  Social History:   reports that she has never smoked. She has never used smokeless tobacco. She reports current alcohol use. She reports that she does not use drugs. ,  _______________________________________________________________________  Allergies:  is allergic to pollen extract. .  _______________________________________________________________________  Current Outpatient Medications   Medication Sig Dispense Refill   • amoxicillin-clavulanate (AUGMENTIN) 875-125 mg per tablet Take 1 tablet by mouth every 12 (twelve) hours for 10 days 20 tablet 0   • benzonatate (TESSALON) 200 MG capsule Take 1 capsule (200 mg total) by mouth 3 (three) times a day as needed for cough 20 capsule 0   • calcium citrate (CALCITRATE) 950 (200 Ca) MG tablet Take 1 tablet by mouth daily     • CALCIUM PO Take by mouth daily      • cholecalciferol (VITAMIN D3) 400 units tablet Take 400 Units by mouth daily     • eletriptan (RELPAX) 40 MG tablet Take 1 tablet (40 mg total) by mouth once as needed for migraine for up to 1 dose may repeat in 2 hours if necessary 16 tablet 3   • fluticasone (FLONASE) 50 mcg/act nasal spray 1 spray into each nostril daily 16 g 1   • Glucosamine-Chondroitin (GLUCOSAMINE CHONDR COMPLEX PO) Take by mouth daily      • Multiple Vitamins-Minerals (CENTRUM SILVER) tablet Take by mouth     • omeprazole (PriLOSEC) 20 mg delayed release capsule TAKE 1 CAPSULE BY MOUTH EVERY DAY 30 capsule 1   • PARoxetine (PAXIL) 10 mg tablet Take 1 tablet (10 mg total) by mouth daily (Patient taking differently: Take 5 mg by mouth daily) 90 tablet 0   • Premarin vaginal cream      • timolol (TIMOPTIC) 0.5 % ophthalmic solution      • TURMERIC-MECHE PO Take by mouth     • meloxicam (Mobic) 7.5 mg tablet Take 1 tablet (7.5 mg total) by mouth daily 30 tablet 2     No current facility-administered medications for this visit.     _______________________________________________________________________  Review of Systems   Constitutional: Positive for fatigue. HENT: Positive for sore throat. Negative for congestion, drooling, ear discharge, ear pain, hoarse voice and trouble swallowing. Respiratory: Positive for cough. Negative for shortness of breath and stridor. Gastrointestinal: Negative for abdominal pain, diarrhea and vomiting. Musculoskeletal: Negative for neck pain. Skin: Positive for rash. Neurological: Negative for headaches. Objective:  Vitals:    07/06/23 0945   BP: 110/80   Pulse: 78   Resp: 12   Temp: (!) 97 °F (36.1 °C)   SpO2: 97%   Weight: 76.7 kg (169 lb 3.2 oz)   Height: 5' 10.5" (1.791 m)     Body mass index is 23.93 kg/m². Physical Exam  Vitals and nursing note reviewed. Constitutional:       General: She is not in acute distress. Appearance: Normal appearance. She is not ill-appearing. HENT:      Right Ear: Tympanic membrane is erythematous.       Left Ear: Tympanic membrane, ear canal and external ear normal.      Mouth/Throat:      Pharynx: Posterior oropharyngeal erythema present. Cardiovascular:      Rate and Rhythm: Normal rate and regular rhythm. Heart sounds: Normal heart sounds. Pulmonary:      Effort: Pulmonary effort is normal.      Breath sounds: Normal breath sounds. Musculoskeletal:         General: Normal range of motion. Skin:     General: Skin is warm and dry. Neurological:      Mental Status: She is alert and oriented to person, place, and time. Psychiatric:         Mood and Affect: Mood normal.         Behavior: Behavior normal.         Thought Content: Thought content normal.         Judgment: Judgment normal.               Portions of the above record have been created with voice recognition software. Occasional wrong word or "sound alike" substitution may have occurred due to the inherent limitations of voice recognition software. Read the chart carefully and recognize, using context, where substitution may have occurred.

## 2023-07-06 NOTE — PATIENT INSTRUCTIONS
Pharyngitis   AMBULATORY CARE:   Pharyngitis , or sore throat, is inflammation of the tissues and structures in your pharynx (throat). Pharyngitis is most often caused by bacteria or a virus. Other causes include smoking, allergies, or acid reflux. Signs and symptoms  depend on the cause of your pharyngitis. You may have any of the following:  Sore throat or pain when you swallow    Fever, chills, and body aches    Hoarse or raspy voice    Cough, runny or stuffy nose, itchy or watery eyes    Headache    Upset stomach and loss of appetite    Whitish-yellow patches on the back of the throat    Tender, swollen lumps on the sides of the neck    Call your local emergency number (911 in the Critical access hospital) if:   You have trouble breathing or swallowing because your throat is swollen. Seek care immediately if:   You are drooling because it hurts too much to swallow. Your fever is higher than 102? F (39?C) or lasts longer than 3 days. You are confused. You taste blood. Call your doctor if:   Your throat pain gets worse. You have a painful lump in your throat that does not go away after 5 days. Your symptoms do not improve after 5 days. You have questions or concerns about your condition or care. Treatment:  Viral pharyngitis will go away on its own without treatment. Your sore throat should start to feel better in 3 to 5 days. You may need any of the following:  Antibiotics  treat a bacterial infection. NSAIDs  help decrease swelling and pain or fever. This medicine is available with or without a doctor's order. NSAIDs can cause stomach bleeding or kidney problems in certain people. If you take blood thinner medicine, always ask your healthcare provider if NSAIDs are safe for you. Always read the medicine label and follow directions. Acetaminophen  decreases pain and fever. It is available without a doctor's order. Ask how much to take and how often to take it. Follow directions.  Read the labels of all other medicines you are using to see if they also contain acetaminophen, or ask your doctor or pharmacist. Acetaminophen can cause liver damage if not taken correctly. Manage your symptoms:   Gargle salt water. Mix ¼ teaspoon salt in an 8 ounce glass of warm water and gargle. Do not swallow. Salt water may help decrease swelling in your throat. Drink liquids as directed. You may need to drink more liquids than usual. Liquids may help soothe your throat and prevent dehydration. Ask how much liquid to drink each day and which liquids are best for you. Use a cool mist humidifier. This will add moisture to the air and help decrease your cough. Soothe your throat. Cough drops, ice, soft foods, or popsicles may help decrease throat pain. Prevent the spread of pharyngitis:  Cover your mouth and nose when you cough or sneeze. Do not share food or drinks. Wash your hands often. Use soap and water. If soap and water are unavailable, use an alcohol-based hand . Follow up with your doctor as directed:  Write down your questions so you remember to ask them during your visits. © Copyright [employer] 2022 Information is for End User's use only and may not be sold, redistributed or otherwise used for commercial purposes. The above information is an  only. It is not intended as medical advice for individual conditions or treatments. Talk to your doctor, nurse or pharmacist before following any medical regimen to see if it is safe and effective for you.

## 2023-07-06 NOTE — ASSESSMENT & PLAN NOTE
Strep A positive. Prescription for Augmentin sent to the pharmacy. Get a new toothbrush after being on antibiotic for 24 hours. Rest, increaseoral fluid intake. Take OTC medications. Follow-up for worsening or persistent symptoms. Parent verbalizes understanding regarding plan of care and all questions answered.

## 2023-07-20 ENCOUNTER — OFFICE VISIT (OUTPATIENT)
Dept: FAMILY MEDICINE CLINIC | Facility: CLINIC | Age: 63
End: 2023-07-20
Payer: COMMERCIAL

## 2023-07-20 VITALS
SYSTOLIC BLOOD PRESSURE: 110 MMHG | BODY MASS INDEX: 23.8 KG/M2 | WEIGHT: 170 LBS | OXYGEN SATURATION: 98 % | TEMPERATURE: 98.6 F | HEIGHT: 71 IN | HEART RATE: 77 BPM | DIASTOLIC BLOOD PRESSURE: 68 MMHG

## 2023-07-20 DIAGNOSIS — M25.551 CHRONIC HIP PAIN, RIGHT: ICD-10-CM

## 2023-07-20 DIAGNOSIS — H60.332 ACUTE SWIMMER'S EAR OF LEFT SIDE: Primary | ICD-10-CM

## 2023-07-20 DIAGNOSIS — G89.29 CHRONIC HIP PAIN, RIGHT: ICD-10-CM

## 2023-07-20 PROBLEM — J02.0 STREP PHARYNGITIS: Status: RESOLVED | Noted: 2023-07-06 | Resolved: 2023-07-20

## 2023-07-20 PROCEDURE — 99214 OFFICE O/P EST MOD 30 MIN: CPT | Performed by: NURSE PRACTITIONER

## 2023-07-20 RX ORDER — OFLOXACIN 3 MG/ML
5 SOLUTION AURICULAR (OTIC) DAILY
Qty: 1.75 ML | Refills: 0 | Status: SHIPPED | OUTPATIENT
Start: 2023-07-20 | End: 2023-07-27

## 2023-07-20 NOTE — PROGRESS NOTES
Assessment/Plan:    Chronic hip pain, right  To continue glucosamine and condroitin. To continue care with orthopedist.  To begin physical therapy. Acute swimmer's ear of left side  To begin ofloxacin drops to the left ear. Counseled on keeping ear dry. Follow-up if no improvement in 1 week or sooner if symptoms worsen. Diagnoses and all orders for this visit:    Acute swimmer's ear of left side  -     ofloxacin (FLOXIN) 0.3 % otic solution; Administer 5 drops into the left ear daily for 7 days    Chronic hip pain, right  -     Ambulatory referral to Physical Therapy; Future          Subjective:      Patient ID: Apple Alfonso is a 58 y.o. female. Dusty Dominguez presents reporting left ear pain for the past 3 days. She started Zyrtec and Flonase which provided some improvement in her symptoms. Denies fever, otorrhea, or chills.       The following portions of the patient's history were reviewed and updated as appropriate:   She   Patient Active Problem List    Diagnosis Date Noted   • Acute swimmer's ear of left side 07/20/2023   • Chronic hip pain, right 03/02/2023   • GERD (gastroesophageal reflux disease) 06/02/2022   • Polyp at cervical os 02/17/2020   • Hot flashes 08/08/2018   • Migraine with aura and without status migrainosus, not intractable 02/27/2017   • Thyroid nodule 02/23/2016     Current Outpatient Medications   Medication Sig Dispense Refill   • calcium citrate (CALCITRATE) 950 (200 Ca) MG tablet Take 1 tablet by mouth daily     • CALCIUM PO Take by mouth daily      • cholecalciferol (VITAMIN D3) 400 units tablet Take 400 Units by mouth daily     • eletriptan (RELPAX) 40 MG tablet Take 1 tablet (40 mg total) by mouth once as needed for migraine for up to 1 dose may repeat in 2 hours if necessary 16 tablet 3   • fluticasone (FLONASE) 50 mcg/act nasal spray 1 spray into each nostril daily 16 g 1   • Glucosamine-Chondroitin (GLUCOSAMINE CHONDR COMPLEX PO) Take by mouth daily      • meloxicam (Mobic) 7.5 mg tablet Take 1 tablet (7.5 mg total) by mouth daily 30 tablet 2   • Multiple Vitamins-Minerals (CENTRUM SILVER) tablet Take by mouth     • ofloxacin (FLOXIN) 0.3 % otic solution Administer 5 drops into the left ear daily for 7 days 1.75 mL 0   • omeprazole (PriLOSEC) 20 mg delayed release capsule TAKE 1 CAPSULE BY MOUTH EVERY DAY 30 capsule 1   • PARoxetine (PAXIL) 10 mg tablet Take 1 tablet (10 mg total) by mouth daily (Patient taking differently: Take 5 mg by mouth daily) 90 tablet 0   • Premarin vaginal cream      • timolol (TIMOPTIC) 0.5 % ophthalmic solution      • TURMERIC-MECHE PO Take by mouth       No current facility-administered medications for this visit. She is allergic to pollen extract. .    Review of Systems   Constitutional: Negative. HENT: Positive for ear pain. Respiratory: Negative. Cardiovascular: Negative. Gastrointestinal: Negative. Genitourinary: Negative. Musculoskeletal:        Right hip pain   Neurological: Negative. Psychiatric/Behavioral: Negative. /68 (BP Location: Left arm, Patient Position: Sitting, Cuff Size: Standard)   Pulse 77   Temp 98.6 °F (37 °C) (Tympanic)   Ht 5' 10.5" (1.791 m)   Wt 77.1 kg (170 lb)   LMP  (LMP Unknown)   SpO2 98%   BMI 24.05 kg/m²     Objective:     Physical Exam  Vitals and nursing note reviewed. Constitutional:       General: She is not in acute distress. Appearance: Normal appearance. She is well-developed and normal weight. She is not ill-appearing, toxic-appearing or diaphoretic. HENT:      Head: Normocephalic and atraumatic. Right Ear: Tympanic membrane, ear canal and external ear normal.      Left Ear: Tympanic membrane and external ear normal. No mastoid tenderness. Ears:      Comments: Presence of mild erythema and swelling in left ear canal     Nose: Nose normal.      Mouth/Throat:      Mouth: Mucous membranes are moist.      Pharynx: Oropharynx is clear. Eyes:      Conjunctiva/sclera: Conjunctivae normal.   Cardiovascular:      Rate and Rhythm: Normal rate and regular rhythm. Heart sounds: Normal heart sounds. No murmur heard. Pulmonary:      Effort: Pulmonary effort is normal. No respiratory distress. Breath sounds: Normal breath sounds. No wheezing or rales. Chest:      Chest wall: No tenderness. Musculoskeletal:      Cervical back: Neck supple. Lymphadenopathy:      Cervical: No cervical adenopathy. Skin:     Capillary Refill: Capillary refill takes less than 2 seconds. Neurological:      General: No focal deficit present. Mental Status: She is alert and oriented to person, place, and time. Psychiatric:         Mood and Affect: Mood normal.         Behavior: Behavior normal.         Thought Content:  Thought content normal.         Judgment: Judgment normal.

## 2023-07-20 NOTE — ASSESSMENT & PLAN NOTE
To begin ofloxacin drops to the left ear. Counseled on keeping ear dry. Follow-up if no improvement in 1 week or sooner if symptoms worsen.

## 2023-07-20 NOTE — ASSESSMENT & PLAN NOTE
To continue glucosamine and condroitin. To continue care with orthopedist.  To begin physical therapy.

## 2023-08-07 ENCOUNTER — EVALUATION (OUTPATIENT)
Dept: PHYSICAL THERAPY | Facility: CLINIC | Age: 63
End: 2023-08-07
Payer: COMMERCIAL

## 2023-08-07 VITALS — SYSTOLIC BLOOD PRESSURE: 98 MMHG | DIASTOLIC BLOOD PRESSURE: 73 MMHG

## 2023-08-07 DIAGNOSIS — G89.29 CHRONIC HIP PAIN, RIGHT: ICD-10-CM

## 2023-08-07 DIAGNOSIS — M16.11 OSTEOARTHRITIS OF RIGHT HIP, UNSPECIFIED OSTEOARTHRITIS TYPE: Primary | ICD-10-CM

## 2023-08-07 DIAGNOSIS — M25.551 CHRONIC HIP PAIN, RIGHT: ICD-10-CM

## 2023-08-07 PROCEDURE — 97161 PT EVAL LOW COMPLEX 20 MIN: CPT

## 2023-08-07 PROCEDURE — 97110 THERAPEUTIC EXERCISES: CPT

## 2023-08-07 PROCEDURE — 97140 MANUAL THERAPY 1/> REGIONS: CPT

## 2023-08-07 NOTE — PROGRESS NOTES
PT Evaluation     Today's date: 2023  Patient name: Dee Alvarado  : 1960  MRN: [id number]  Referring provider: ELY Ponce  Dx:   Encounter Diagnosis     ICD-10-CM    1. Osteoarthritis of right hip, unspecified osteoarthritis type  M16.11       2. Chronic hip pain, right  M25.551 Ambulatory referral to Physical Therapy    G89.29           Start Time: 936  Stop Time:   Total time in clinic (min): 48 minutes    Assessment  Assessment details: Problem List:  1) R hip IR ROM  2) R hip ER ROM    Dee Alvarado is a pleasant 58 y.o. female who presents with acute exacerbation of chronic R hip pain after hiking about 2 weeks ago. Signs and symptoms may be consistent with R hip osteoarthritis. She has limited R hip ROM especially with IR and ER, R hip weakness, and activity intolerance resulting in the pain she is experiencing, wanting to avoid surgery and fear of not being able to keep active. No further referral appears necessary at this time based upon examination results. I expect she will improve with manual therapy and hip strengthening exercises. Positive prognostic indicators include positive attitude toward recovery and good understanding of diagnosis and treatment plan options. Negative prognostic indicators include chronicity of symptoms. R hip IR and ER mobilizations resulted in improved ROM at end of session. Gave patient red theraband for SL hip ABD exercise for HEP. Patient would benefit from skilled PT services in order to address these deficits.       Comparable signs:  1) stair negotiation  2) R hip ROM  Impairments: abnormal gait, abnormal or restricted ROM, activity intolerance, impaired physical strength, lacks appropriate home exercise program, pain with function and weight-bearing intolerance  Understanding of Dx/Px/POC: good   Prognosis: good    Goals  STGs  Patient will be independent with HEP in 3 weeks  Patient will decrease pain by 2 points in 3 weeks  LTGs  Patient will be able to negotiate stairs while carrying objects without limitation in 6 weeks  Patient will improve R hip IR ROM by 10* in 6 weeks  Patient will improve R hip ER ROM by 15* in 6 weeks    Plan  Patient would benefit from: skilled physical therapy  Planned therapy interventions: home exercise program, therapeutic exercise, therapeutic activities, joint mobilization, manual therapy, motor coordination training, neuromuscular re-education, patient education, strengthening, abdominal trunk stabilization, flexibility and balance  Frequency: 2x week  Duration in visits: 12  Duration in weeks: 6  Plan of Care beginning date: 2023  Plan of Care expiration date: 2023  Treatment plan discussed with: patient        Subjective Evaluation    History of Present Illness  Mechanism of injury: Patient reports to OP PT with chief complaints of chronic R hip pain. She has been to PT in the past for her hip and returns when it "flares up". She states that it does bother her with changes in weather and when she does too much. She went hiking 2 weeks ago and felt like she pulled a muscle. Since then the pain has been coming down steadily. She has trouble pushing off of the R leg compared to the left leg and doesn't feel that it is as strong as the L leg. Worse pain in the morning and loosens up throughout the day.   Carrying things up and down stairs, standing from a chair, and limited flexibility limit the patient during ADLs  Patient Goals  Patient goals for therapy: decreased pain and increased motion    Pain  Current pain ratin  At best pain ratin  At worst pain ratin  Location: R hip  Relieving factors: heat and medications  Aggravating factors: stair climbing and standing  Progression: worsening    Treatments  Previous treatment: physical therapy        Objective     Active Range of Motion   Left Hip   External rotation (90/90): 46 degrees   Internal rotation (90/90): 42 degrees Right Hip   External rotation (90/90): 20 degrees   Internal rotation (90/90): 25 degrees     Additional Active Range of Motion Details  IR post mobilization= 26*  ER post mobilization= 29*    Passive Range of Motion   Left Hip   Flexion: 115 degrees     Right Hip   Flexion: 95 degrees     Strength/Myotome Testing     Left Hip   Planes of Motion   Flexion: 4+  Abduction: 4+  External rotation: 4+  Internal rotation: 4+    Right Hip   Planes of Motion   Flexion: 4 (pain)  Abduction: 4 (pain)  External rotation: 4  Internal rotation: 4    Tests     Left Hip   Negative MITCH, FADIR and scour. Right Hip   Positive MITCH and FADIR. Negative SI compression. Ambulation     Ambulation: Stairs     Additional Stairs Ambulation Details  R trunk lean, L hip drop             Precautions: n/a    POC expires Auth Status Unit limit Start date  Expiration date PT/OT + Visit limit?    9/18/23 pend BOMN pend pend BOMN                                     Visit/Unit Tracking  AUTH Status:  Date 8/7              pend Used 1               Remaining                    Foto 8/7            Manuals 8/7            R hip IR mob GS   Gr 4            R hip ER mob GS  Gr 4            R hip ant mob                          Neuro Re-Ed             SLS                                                                                           Ther Ex             SL hip ABD HEP            TB side stepping             Clamshell             Seated TB IR             Leg press             Bridge                          Bike             Ther Activity                                       Gait Training                                       Modalities

## 2023-08-08 ENCOUNTER — OFFICE VISIT (OUTPATIENT)
Dept: PHYSICAL THERAPY | Facility: CLINIC | Age: 63
End: 2023-08-08
Payer: COMMERCIAL

## 2023-08-08 DIAGNOSIS — M25.551 CHRONIC HIP PAIN, RIGHT: ICD-10-CM

## 2023-08-08 DIAGNOSIS — G89.29 CHRONIC HIP PAIN, RIGHT: ICD-10-CM

## 2023-08-08 DIAGNOSIS — M16.11 OSTEOARTHRITIS OF RIGHT HIP, UNSPECIFIED OSTEOARTHRITIS TYPE: Primary | ICD-10-CM

## 2023-08-08 PROCEDURE — 97110 THERAPEUTIC EXERCISES: CPT

## 2023-08-08 NOTE — PROGRESS NOTES
Daily Note     Today's date: 2023  Patient name: Landon Guadalupe  : 1960  MRN: [id number]  Referring provider: ELY Mendoza  Dx:   Encounter Diagnosis     ICD-10-CM    1. Osteoarthritis of right hip, unspecified osteoarthritis type  M16.11       2. Chronic hip pain, right  M25.551     G89.29           Start Time: 0945  Stop Time: 1030  Total time in clinic (min): 45 minutes    Subjective: Patient reports that she is having the most pain when pushing off when going up the stairs. Objective: See treatment diary below      Assessment: Patient demonstrates improving R glute and hip ABD strength this session. Patient completed today's session with decrease in pain with step negotiation following bridge. 95% improvement in stiffness and pain at end of session. Gave patient blue theraband bridges for HEP. Future sessions should continue to progress strengthening exercises as able. Tolerated treatment well. Patient would benefit from continued PT        Plan: Continue per plan of care. Precautions: n/a    POC expires Auth Status Unit limit Start date  Expiration date PT/OT + Visit limit?    23 N/a BOMN n/a n/a BOMN                                       Foto             Manuals            R hip IR mob GS   Gr 4            R hip ER mob GS  Gr 4            R hip ant mob                          Neuro Re-Ed             SLS                                                                                           Ther Ex             SL hip ABD HEP            TB side stepping             Clamshell             Seated TB IR             Leg press             Bridge  2x10  btb  3" up, 5" hold, 3" down           Assessment  GS           Bike             Ther Activity                                       Gait Training                                       Modalities

## 2023-08-15 ENCOUNTER — OFFICE VISIT (OUTPATIENT)
Dept: PHYSICAL THERAPY | Facility: CLINIC | Age: 63
End: 2023-08-15
Payer: COMMERCIAL

## 2023-08-15 DIAGNOSIS — M16.11 OSTEOARTHRITIS OF RIGHT HIP, UNSPECIFIED OSTEOARTHRITIS TYPE: Primary | ICD-10-CM

## 2023-08-15 DIAGNOSIS — G89.29 CHRONIC HIP PAIN, RIGHT: ICD-10-CM

## 2023-08-15 DIAGNOSIS — M25.551 CHRONIC HIP PAIN, RIGHT: ICD-10-CM

## 2023-08-15 PROCEDURE — 97110 THERAPEUTIC EXERCISES: CPT

## 2023-08-15 NOTE — PROGRESS NOTES
Daily Note     Today's date: 8/15/2023  Patient name: Hema Carcamo  : 1960  MRN: [id number]  Referring provider: ELY March  Dx:   Encounter Diagnosis     ICD-10-CM    1. Osteoarthritis of right hip, unspecified osteoarthritis type  M16.11       2. Chronic hip pain, right  M25.551     G89.29           Start Time: 1430  Stop Time: 1502  Total time in clinic (min): 32 minutes    Subjective: Patient reports that her hip has been much better since last session with the bridging exercise. The pain in her R hip has been minimal and hasn't had trouble negotiating stairs. Objective: See treatment diary below      Assessment: Patient demonstrates improving R hip extension and ER strength this session. Patient completed today's session without pain. Progressed bridges to back elevated on plyo box and patient completes with 2 RIR and no pain in R hip. Future sessions should continue to progress strengthening exercises as able. Tolerated treatment well. Patient would benefit from continued PT    Plan: Continue per plan of care. Precautions: n/a    POC expires Auth Status Unit limit Start date  Expiration date PT/OT + Visit limit?    23 N/a BOMN n/a n/a BOMN                                       Foto             Manuals 8/7 8/8 8/15          R hip IR mob GS   Gr 4            R hip ER mob GS  Gr 4            R hip ant mob                          Neuro Re-Ed             SLS                                                                                           Ther Ex             SL hip ABD HEP            TB side stepping             Clamshell   2x10  btb          Seated TB IR             Leg press   1x10  75#  2x10  95#          Bridge  2x10  btb  3" up, 5" hold, 3" down 2x10  btb  20" box          Assessment  GS           Bike             Ther Activity                                       Gait Training                                       Modalities

## 2023-08-18 ENCOUNTER — OFFICE VISIT (OUTPATIENT)
Dept: PHYSICAL THERAPY | Facility: CLINIC | Age: 63
End: 2023-08-18
Payer: COMMERCIAL

## 2023-08-18 DIAGNOSIS — G89.29 CHRONIC HIP PAIN, RIGHT: ICD-10-CM

## 2023-08-18 DIAGNOSIS — M16.11 OSTEOARTHRITIS OF RIGHT HIP, UNSPECIFIED OSTEOARTHRITIS TYPE: Primary | ICD-10-CM

## 2023-08-18 DIAGNOSIS — M25.551 CHRONIC HIP PAIN, RIGHT: ICD-10-CM

## 2023-08-18 PROCEDURE — 97110 THERAPEUTIC EXERCISES: CPT

## 2023-08-18 NOTE — PROGRESS NOTES
Daily Note     Today's date: 2023  Patient name: Azalea Mott  : 1960  MRN: [id number]  Referring provider: ELY Holman  Dx:   Encounter Diagnosis     ICD-10-CM    1. Osteoarthritis of right hip, unspecified osteoarthritis type  M16.11       2. Chronic hip pain, right  M25.551     G89.29           Start Time: 1203  Stop Time: 1229  Total time in clinic (min): 26 minutes    Subjective: Patient reports that her back has been in some pain after trying the elevated bridges. She does not wish to continue doing that exercise. Objective: See treatment diary below      Assessment: Patient demonstrates improving R hip strength this session. Patient completed today's session without pain. Patient no longer feels that she has functional deficits and is independent with HEP. Check in session scheduled for 2 weeks from now with discharge if she is still not having difficulty. Future sessions should continue to progress R hip strengthening exercises as able. Tolerated treatment well. Patient would benefit from continued PT      Plan: Continue per plan of care. Precautions: n/a    POC expires Auth Status Unit limit Start date  Expiration date PT/OT + Visit limit?    23 N/a BOMN n/a n/a BOMN                                       Foto             Manuals 8/7 8/8 8/15 8/18         R hip IR mob GS   Gr 4            R hip ER mob GS  Gr 4            R hip ant mob                          Neuro Re-Ed             SLS                                                                                           Ther Ex             SL hip ABD HEP            TB side stepping             Clamshell   2x10  btb 3x10  btb         Seated TB IR             Leg press   1x10  75#  2x10  95# 1x10  95#  2x10  115#         Bridge  2x10  btb  3" up, 5" hold, 3" down 2x10  btb  20" box 3x10  3" up,  10" hold,  3" down         Assessment  GS           Bike             Ther Activity Gait Training                                       Modalities

## 2023-08-29 ENCOUNTER — APPOINTMENT (OUTPATIENT)
Dept: PHYSICAL THERAPY | Facility: CLINIC | Age: 63
End: 2023-08-29
Payer: COMMERCIAL

## 2023-08-31 ENCOUNTER — APPOINTMENT (OUTPATIENT)
Dept: PHYSICAL THERAPY | Facility: CLINIC | Age: 63
End: 2023-08-31
Payer: COMMERCIAL

## 2023-09-01 ENCOUNTER — APPOINTMENT (OUTPATIENT)
Dept: PHYSICAL THERAPY | Facility: CLINIC | Age: 63
End: 2023-09-01
Payer: COMMERCIAL

## 2023-09-05 ENCOUNTER — OFFICE VISIT (OUTPATIENT)
Dept: PHYSICAL THERAPY | Facility: CLINIC | Age: 63
End: 2023-09-05
Payer: COMMERCIAL

## 2023-09-05 DIAGNOSIS — M16.11 OSTEOARTHRITIS OF RIGHT HIP, UNSPECIFIED OSTEOARTHRITIS TYPE: Primary | ICD-10-CM

## 2023-09-05 DIAGNOSIS — G89.29 CHRONIC HIP PAIN, RIGHT: ICD-10-CM

## 2023-09-05 DIAGNOSIS — M25.551 CHRONIC HIP PAIN, RIGHT: ICD-10-CM

## 2023-09-05 PROCEDURE — 97110 THERAPEUTIC EXERCISES: CPT

## 2023-09-05 NOTE — PROGRESS NOTES
PT Discharge    Today's date: 2023  Patient name: Randi Bellamy  : 1960  MRN: [id number]  Referring provider: ELY Lord  Dx:   Encounter Diagnosis     ICD-10-CM    1. Osteoarthritis of right hip, unspecified osteoarthritis type  M16.11       2. Chronic hip pain, right  M25.551     G89.29           Start Time: 1117  Stop Time: 1150  Total time in clinic (min): 33 minutes    Assessment  Assessment details: Ranid Bellamy is a pleasant 58 y.o. female who presents with acute exacerbation of chronic R hip pain after hiking about 2 weeks ago. With PT, she has made great progress in improving R hip ABD strength resulting in pain free return to activity. She recently was on vacation and was able to play with her grandchildren without pain or limitation. Patient feels she has returned to [facility] and can manage on her own now. Patient should be discharged to [facility] for maintenance at this time. Encouraged patient to contact me with any questions or concerns in the future. Goals  STGs  Patient will be independent with HEP in 3 weeks -met  Patient will decrease pain by 2 points in 3 weeks -met  LTGs  Patient will be able to negotiate stairs while carrying objects without limitation in 6 weeks -met  Patient will improve R hip IR ROM by 10* in 6 weeks -not met  Patient will improve R hip ER ROM by 15* in 6 weeks -not met    Plan  Plan details: D/c to HEP  Treatment plan discussed with: patient        Subjective Evaluation    History of Present Illness  Mechanism of injury: Patient reports to OP PT with chief complaints of chronic R hip pain. She has been to PT in the past for her hip and returns when it "flares up". She states that it does bother her with changes in weather and when she does too much. She went hiking 2 weeks ago and felt like she pulled a muscle. Since then the pain has been coming down steadily.   She has trouble pushing off of the R leg compared to the left leg and doesn't feel that it is as strong as the L leg. Worse pain in the morning and loosens up throughout the day. Carrying things up and down stairs, standing from a chair, and limited flexibility limit the patient during ADLs    - patient reports that she feels about 98% better since starting PT  Patient Goals  Patient goals for therapy: decreased pain and increased motion    Pain  Current pain ratin  At best pain ratin  At worst pain ratin  Location: R hip  Relieving factors: heat and medications  Aggravating factors: stair climbing and standing  Progression: worsening    Treatments  Previous treatment: physical therapy        Objective     Active Range of Motion   Left Hip   External rotation (90/90): 46 degrees   Internal rotation (90/90): 42 degrees     Right Hip   External rotation (90/90): 20 degrees   Internal rotation (90/90): 26 degrees     Additional Active Range of Motion Details  IR post mobilization= 26*  ER post mobilization= 29*    Passive Range of Motion   Left Hip   Flexion: 115 degrees     Right Hip   Flexion: 95 degrees     Strength/Myotome Testing     Left Hip   Planes of Motion   Flexion: 5  Abduction: 5  External rotation: 5  Internal rotation: 5    Right Hip   Planes of Motion   Flexion: 5 (pain)  Abduction: 5 (pain)  External rotation: 5  Internal rotation: 5    Tests     Left Hip   Negative MITCH, FADIR and scour. Right Hip   Positive MITCH and FADIR. Negative SI compression. Ambulation     Ambulation: Stairs     Additional Stairs Ambulation Details  R trunk lean, L hip drop             Precautions: n/a      POC expires Auth Status Unit limit Start date  Expiration date PT/OT + Visit limit?    23 N/a BOMN n/a n/a BOMN                                       Foto         Manuals 8/7 8/8 8/15 8/18 9/5        R hip IR mob GS   Gr 4            R hip ER mob GS  Gr 4            R hip ant mob                          Neuro Re-Ed             SLS Ther Ex             SL hip ABD HEP            TB side stepping             Clamshell   2x10  btb 3x10  btb 3x10  btb        Seated TB IR             Leg press   1x10  75#  2x10  95# 1x10  95#  2x10  115# 1x10  105#  1x10  115#          Bridge  2x10  btb  3" up, 5" hold, 3" down 2x10  btb  20" box 3x10  3" up,  10" hold,  3" down 3x10  3" up  10" hold,  3" down  black        Assessment  GS           Bike             Ther Activity                                       Gait Training                                       Modalities

## 2023-09-08 ENCOUNTER — APPOINTMENT (OUTPATIENT)
Dept: LAB | Facility: HOSPITAL | Age: 63
End: 2023-09-08

## 2023-09-08 ENCOUNTER — APPOINTMENT (OUTPATIENT)
Dept: PHYSICAL THERAPY | Facility: CLINIC | Age: 63
End: 2023-09-08
Payer: COMMERCIAL

## 2023-09-08 DIAGNOSIS — Z00.8 ENCOUNTER FOR OTHER GENERAL EXAMINATION: ICD-10-CM

## 2023-09-08 LAB
CHOLEST SERPL-MCNC: 206 MG/DL
EST. AVERAGE GLUCOSE BLD GHB EST-MCNC: 94 MG/DL
HBA1C MFR BLD: 4.9 %
HDLC SERPL-MCNC: 68 MG/DL
LDLC SERPL CALC-MCNC: 124 MG/DL (ref 0–100)
NONHDLC SERPL-MCNC: 138 MG/DL
TRIGL SERPL-MCNC: 68 MG/DL

## 2023-09-08 PROCEDURE — 83036 HEMOGLOBIN GLYCOSYLATED A1C: CPT

## 2023-09-08 PROCEDURE — 80061 LIPID PANEL: CPT

## 2023-09-08 PROCEDURE — 36415 COLL VENOUS BLD VENIPUNCTURE: CPT

## 2023-09-11 ENCOUNTER — APPOINTMENT (OUTPATIENT)
Dept: PHYSICAL THERAPY | Facility: CLINIC | Age: 63
End: 2023-09-11
Payer: COMMERCIAL

## 2023-09-15 ENCOUNTER — APPOINTMENT (OUTPATIENT)
Dept: PHYSICAL THERAPY | Facility: CLINIC | Age: 63
End: 2023-09-15
Payer: COMMERCIAL

## 2023-09-18 ENCOUNTER — APPOINTMENT (OUTPATIENT)
Dept: PHYSICAL THERAPY | Facility: CLINIC | Age: 63
End: 2023-09-18
Payer: COMMERCIAL

## 2023-09-18 ENCOUNTER — 6 MONTH FOLLOW UP (OUTPATIENT)
Dept: URBAN - METROPOLITAN AREA CLINIC 6 | Facility: CLINIC | Age: 63
End: 2023-09-18

## 2023-09-18 DIAGNOSIS — H43.813: ICD-10-CM

## 2023-09-18 DIAGNOSIS — H02.882: ICD-10-CM

## 2023-09-18 DIAGNOSIS — H35.413: ICD-10-CM

## 2023-09-18 DIAGNOSIS — H02.885: ICD-10-CM

## 2023-09-18 DIAGNOSIS — H40.1131: ICD-10-CM

## 2023-09-18 PROBLEM — H60.332 ACUTE SWIMMER'S EAR OF LEFT SIDE: Status: RESOLVED | Noted: 2023-07-20 | Resolved: 2023-09-18

## 2023-09-18 PROCEDURE — 92014 COMPRE OPH EXAM EST PT 1/>: CPT

## 2023-09-18 PROCEDURE — 92083 EXTENDED VISUAL FIELD XM: CPT

## 2023-09-18 PROCEDURE — 92133 CPTRZD OPH DX IMG PST SGM ON: CPT

## 2023-09-18 ASSESSMENT — TONOMETRY
OD_IOP_MMHG: 24
OS_IOP_MMHG: 24

## 2023-09-18 ASSESSMENT — VISUAL ACUITY
OS_SC: 20/50-2
OD_SC: 20/50-1
OS_PH: 20/25-2

## 2023-09-21 ENCOUNTER — APPOINTMENT (OUTPATIENT)
Dept: PHYSICAL THERAPY | Facility: CLINIC | Age: 63
End: 2023-09-21
Payer: COMMERCIAL

## 2023-10-03 ENCOUNTER — HOSPITAL ENCOUNTER (OUTPATIENT)
Dept: MAMMOGRAPHY | Facility: CLINIC | Age: 63
Discharge: HOME | End: 2023-10-03
Payer: COMMERCIAL

## 2023-10-03 VITALS — WEIGHT: 170 LBS | HEIGHT: 70 IN | BODY MASS INDEX: 24.34 KG/M2

## 2023-10-03 DIAGNOSIS — Z12.31 ENCOUNTER FOR SCREENING MAMMOGRAM FOR MALIGNANT NEOPLASM OF BREAST: ICD-10-CM

## 2023-10-03 PROCEDURE — 77067 SCR MAMMO BI INCL CAD: CPT

## 2023-10-03 PROCEDURE — 77063 BREAST TOMOSYNTHESIS BI: CPT

## 2023-10-09 ENCOUNTER — IMMUNIZATIONS (OUTPATIENT)
Dept: FAMILY MEDICINE CLINIC | Facility: CLINIC | Age: 63
End: 2023-10-09
Payer: COMMERCIAL

## 2023-10-09 ENCOUNTER — HOSPITAL ENCOUNTER (OUTPATIENT)
Dept: MAMMOGRAPHY | Facility: CLINIC | Age: 63
Discharge: HOME | End: 2023-10-09
Payer: COMMERCIAL

## 2023-10-09 ENCOUNTER — HOSPITAL ENCOUNTER (OUTPATIENT)
Dept: ULTRASOUND IMAGING | Facility: CLINIC | Age: 63
Discharge: HOME | End: 2023-10-09
Payer: COMMERCIAL

## 2023-10-09 VITALS — HEIGHT: 70 IN | BODY MASS INDEX: 24.34 KG/M2 | WEIGHT: 170 LBS

## 2023-10-09 DIAGNOSIS — R92.8 ABNORMAL MAMMOGRAM: ICD-10-CM

## 2023-10-09 DIAGNOSIS — Z23 NEED FOR INFLUENZA VACCINATION: Primary | ICD-10-CM

## 2023-10-09 PROCEDURE — 90471 IMMUNIZATION ADMIN: CPT

## 2023-10-09 PROCEDURE — 77065 DX MAMMO INCL CAD UNI: CPT

## 2023-10-09 PROCEDURE — G0279 TOMOSYNTHESIS, MAMMO: HCPCS

## 2023-10-09 PROCEDURE — 90686 IIV4 VACC NO PRSV 0.5 ML IM: CPT

## 2023-10-09 PROCEDURE — 76642 ULTRASOUND BREAST LIMITED: CPT

## 2023-10-30 ENCOUNTER — FOLLOW UP (OUTPATIENT)
Dept: URBAN - METROPOLITAN AREA CLINIC 6 | Facility: CLINIC | Age: 63
End: 2023-10-30

## 2023-10-30 DIAGNOSIS — H40.1131: ICD-10-CM

## 2023-10-30 DIAGNOSIS — H02.885: ICD-10-CM

## 2023-10-30 DIAGNOSIS — H02.882: ICD-10-CM

## 2023-10-30 PROCEDURE — 92012 INTRM OPH EXAM EST PATIENT: CPT

## 2023-10-30 ASSESSMENT — TONOMETRY
OS_IOP_MMHG: 15
OD_IOP_MMHG: 10

## 2023-10-30 ASSESSMENT — VISUAL ACUITY
OS_SC: 20/40
OD_SC: 20/40

## 2023-11-20 ENCOUNTER — ANNUAL EXAM (OUTPATIENT)
Dept: OBGYN CLINIC | Facility: CLINIC | Age: 63
End: 2023-11-20
Payer: COMMERCIAL

## 2023-11-20 VITALS
WEIGHT: 167.4 LBS | DIASTOLIC BLOOD PRESSURE: 62 MMHG | HEIGHT: 72 IN | SYSTOLIC BLOOD PRESSURE: 102 MMHG | BODY MASS INDEX: 22.67 KG/M2

## 2023-11-20 DIAGNOSIS — Z01.419 ENCOUNTER FOR ANNUAL ROUTINE GYNECOLOGICAL EXAMINATION: Primary | ICD-10-CM

## 2023-11-20 DIAGNOSIS — Z01.419 ENCOUNTER FOR GYNECOLOGICAL EXAMINATION WITHOUT ABNORMAL FINDING: ICD-10-CM

## 2023-11-20 DIAGNOSIS — Z12.31 ENCOUNTER FOR SCREENING MAMMOGRAM FOR MALIGNANT NEOPLASM OF BREAST: ICD-10-CM

## 2023-11-20 DIAGNOSIS — R92.30 DENSE BREAST TISSUE: ICD-10-CM

## 2023-11-20 DIAGNOSIS — N95.2 ATROPHIC VULVOVAGINITIS: ICD-10-CM

## 2023-11-20 PROBLEM — N84.1 POLYP AT CERVICAL OS: Status: RESOLVED | Noted: 2020-02-17 | Resolved: 2023-11-20

## 2023-11-20 PROCEDURE — S0612 ANNUAL GYNECOLOGICAL EXAMINA: HCPCS | Performed by: OBSTETRICS & GYNECOLOGY

## 2023-11-20 RX ORDER — ESTRADIOL 2 MG/1
1 RING VAGINAL
Qty: 1 EACH | Refills: 4 | Status: SHIPPED | OUTPATIENT
Start: 2023-11-20

## 2023-11-20 RX ORDER — LATANOPROST 50 UG/ML
SOLUTION/ DROPS OPHTHALMIC
COMMUNITY
Start: 2023-09-19

## 2023-11-20 NOTE — PROGRESS NOTES
Assessment/Plan:    Encounter for gynecological examination without abnormal finding  Pap/HPV current  Mammogram ordered  Colonoscopy current    Encourage healthy diet, exercise, Calcium 1200mg per day and at least 800 iu Vitamin D daily. Diagnoses and all orders for this visit:    Encounter for annual routine gynecological examination    Encounter for screening mammogram for malignant neoplasm of breast  -     Mammo screening bilateral w 3d & cad; Future    Dense breast tissue  -     US breast screening bilateral complete (ABUS); Future    Encounter for gynecological examination without abnormal finding    Other orders  -     latanoprost (XALATAN) 0.005 % ophthalmic solution          Subjective:      Patient ID: Connie Grey is a 61 y.o. female. Patient presents for a routine annual visit  Menarche- 14Y/O  Last Pap Smear- 10/26/20 neg/neg    Mammogram-10/3/23 showed L asymmetry.   -Diag L mammo and US 10/9/23 normal findings. Return to routine screening. Colonoscopy-8/15/22 recall 5 years. Dr. Franz Query  [id number]  Non smoker  Social drinker  Currently sexually active  Mother with breast cancer  MGM with endometrial cancer    Reports vaginal dryness. Would like to discuss different lubricant options. Reviewed options. Has tried OTC lubricatns, coconut oil, vagifem, estrace, premarin. Will try esring. Gynecologic Exam  She reports no genital itching, genital lesions, genital odor, genital rash, pelvic pain, vaginal bleeding or vaginal discharge. The patient is experiencing no pain. Pertinent negatives include no chills, constipation, diarrhea, fever, nausea, sore throat or vomiting.      The following portions of the patient's history were reviewed and updated as appropriate: She  has a past medical history of Acute cystitis, Anxiety, Breast lump (.), Dermatitis, Disease of thyroid gland, Diverticulitis (11/2020), Dysuria, ETD (Eustachian tube dysfunction), bilateral, Fibroid (2012), Glaucoma, Hematuria, Migraine (1985), Miscarriage (1990), Polyp at cervical os, and Varicella (1966). She   Patient Active Problem List    Diagnosis Date Noted    Chronic hip pain, right 03/02/2023    GERD (gastroesophageal reflux disease) 06/02/2022    Encounter for gynecological examination without abnormal finding 10/03/2018    Hot flashes 08/08/2018    Migraine with aura and without status migrainosus, not intractable 02/27/2017    Thyroid nodule 02/23/2016     She  has a past surgical history that includes Tooth extraction; Tubal ligation; Breast biopsy (Right, 2009); Colonoscopy; and Cataract extraction (Bilateral). Her family history includes Breast cancer (age of onset: 52) in her mother; Cancer in her father, mother, and son; Endometrial cancer in her maternal grandmother; Heart disease in her maternal grandmother; Hypertension in her father; Migraines in her father; No Known Problems in her daughter, paternal aunt, paternal grandmother, and sister; Rectal cancer in her father; Throat cancer in her brother. She  reports that she has never smoked. She has never used smokeless tobacco. She reports current alcohol use of about 3.0 standard drinks of alcohol per week. She reports that she does not use drugs.   Current Outpatient Medications   Medication Sig Dispense Refill    calcium citrate (CALCITRATE) 950 (200 Ca) MG tablet Take 1 tablet by mouth daily      eletriptan (RELPAX) 40 MG tablet Take 1 tablet (40 mg total) by mouth once as needed for migraine for up to 1 dose may repeat in 2 hours if necessary 16 tablet 3    Glucosamine-Chondroitin (GLUCOSAMINE CHONDR COMPLEX PO) Take by mouth daily       latanoprost (XALATAN) 0.005 % ophthalmic solution       meloxicam (Mobic) 7.5 mg tablet Take 1 tablet (7.5 mg total) by mouth daily 30 tablet 2    Multiple Vitamins-Minerals (CENTRUM SILVER) tablet Take by mouth      omeprazole (PriLOSEC) 20 mg delayed release capsule TAKE 1 CAPSULE BY MOUTH EVERY DAY 30 capsule 1 Premarin vaginal cream       timolol (TIMOPTIC) 0.5 % ophthalmic solution       TURMERIC-GINGER PO Take by mouth      CALCIUM PO Take by mouth daily  (Patient not taking: Reported on 11/20/2023)      cholecalciferol (VITAMIN D3) 400 units tablet Take 400 Units by mouth daily (Patient not taking: Reported on 11/20/2023)      fluticasone (FLONASE) 50 mcg/act nasal spray 1 spray into each nostril daily (Patient not taking: Reported on 11/20/2023) 16 g 1    PARoxetine (PAXIL) 10 mg tablet Take 1 tablet (10 mg total) by mouth daily (Patient not taking: Reported on 11/20/2023) 90 tablet 0     No current facility-administered medications for this visit.      Current Outpatient Medications on File Prior to Visit   Medication Sig    calcium citrate (CALCITRATE) 950 (200 Ca) MG tablet Take 1 tablet by mouth daily    eletriptan (RELPAX) 40 MG tablet Take 1 tablet (40 mg total) by mouth once as needed for migraine for up to 1 dose may repeat in 2 hours if necessary    Glucosamine-Chondroitin (GLUCOSAMINE CHONDR COMPLEX PO) Take by mouth daily     latanoprost (XALATAN) 0.005 % ophthalmic solution     meloxicam (Mobic) 7.5 mg tablet Take 1 tablet (7.5 mg total) by mouth daily    Multiple Vitamins-Minerals (CENTRUM SILVER) tablet Take by mouth    omeprazole (PriLOSEC) 20 mg delayed release capsule TAKE 1 CAPSULE BY MOUTH EVERY DAY    Premarin vaginal cream     timolol (TIMOPTIC) 0.5 % ophthalmic solution     TURMERIC-GINGER PO Take by mouth    CALCIUM PO Take by mouth daily  (Patient not taking: Reported on 11/20/2023)    cholecalciferol (VITAMIN D3) 400 units tablet Take 400 Units by mouth daily (Patient not taking: Reported on 11/20/2023)    fluticasone (FLONASE) 50 mcg/act nasal spray 1 spray into each nostril daily (Patient not taking: Reported on 11/20/2023)    PARoxetine (PAXIL) 10 mg tablet Take 1 tablet (10 mg total) by mouth daily (Patient not taking: Reported on 11/20/2023)     No current facility-administered medications on file prior to visit. She is allergic to pollen extract. .    Review of Systems   Constitutional:  Negative for activity change, appetite change, chills, fatigue and fever. HENT:  Negative for rhinorrhea, sneezing and sore throat. Eyes:  Negative for visual disturbance. Respiratory:  Negative for cough, shortness of breath and wheezing. Cardiovascular:  Negative for chest pain, palpitations and leg swelling. Gastrointestinal:  Negative for abdominal distention, constipation, diarrhea, nausea and vomiting. Genitourinary:  Negative for difficulty urinating, pelvic pain and vaginal discharge. Neurological:  Negative for syncope and light-headedness. Objective:      /62 (BP Location: Left arm, Patient Position: Sitting, Cuff Size: Standard)   Ht 6' (1.829 m)   Wt 75.9 kg (167 lb 6.4 oz)   LMP  (LMP Unknown)   BMI 22.70 kg/m²          Physical Exam  Constitutional:       General: She is not in acute distress. Appearance: She is well-developed. She is not diaphoretic. Chest:   Breasts:     Breasts are symmetrical.      Right: No inverted nipple, mass, nipple discharge, skin change or tenderness. Left: No inverted nipple, mass, nipple discharge, skin change or tenderness. Abdominal:      General: Bowel sounds are normal. There is no distension. Palpations: Abdomen is soft. There is no mass. Tenderness: There is no abdominal tenderness. There is no guarding or rebound. Genitourinary:     Labia:         Right: No rash, tenderness, lesion or injury. Left: No rash, tenderness, lesion or injury. Vagina: No vaginal discharge, erythema, tenderness or bleeding. Cervix: No cervical motion tenderness, discharge or friability. Uterus: Not deviated, not enlarged, not fixed and not tender. Adnexa:         Right: No mass, tenderness or fullness. Left: No mass, tenderness or fullness.         Comments: Urethral meatus- no lesions, non tender  Urethra non tender  Bladder non tender  Thin, atrophic vaginal mucosa  Skin:     General: Skin is warm and dry. Coloration: Skin is not pale. Findings: No erythema or rash.

## 2023-11-20 NOTE — PATIENT INSTRUCTIONS
Wellness Visit for Adults   WHAT YOU NEED TO KNOW:   What is a wellness visit? A wellness visit is when you see your healthcare provider to get screened for health problems. Your healthcare provider will also give you advice on how to stay healthy. Write down your questions so you remember to ask them. Ask your healthcare provider how often you should have a wellness visit. What happens at a wellness visit? Your healthcare provider will ask about your health, and your family history of health problems. This includes high blood pressure, heart disease, and cancer. He or she will ask if you have symptoms that concern you, if you smoke, and about your mood. You may also be asked about your intake of medicines, supplements, food, and alcohol. Any of the following may be done: Your weight  will be checked. Your height may also be checked so your body mass index (BMI) can be calculated. Your BMI shows if you are at a healthy weight. Your blood pressure  and heart rate will be checked. Your temperature may also be checked. Blood and urine tests  may be done. Blood tests may be done to check your cholesterol levels. Abnormal cholesterol levels increase your risk for heart disease and stroke. You may also need a blood or urine test to check for diabetes if you are at increased risk. Urine tests may be done to look for signs of an infection or kidney disease. A physical exam  includes checking your heartbeat and lungs with a stethoscope. Your healthcare provider may also check your skin to look for sun damage. Screening tests  may be recommended. A screening test is done to check for diseases that may not cause symptoms. The screening tests you may need depend on your age, gender, family history, and lifestyle habits. For example, colorectal screening may be recommended if you are 48years old or older. What screening tests do I need if I am a woman? A Pap smear  is used to screen for cervical cancer.  Pap smears are usually done every 3 to 5 years depending on your age. You may need them more often if you have had abnormal Pap smear test results in the past. Ask your healthcare provider how often you should have a Pap smear. A mammogram  is an x-ray of your breasts to screen for breast cancer. Experts recommend mammograms every 2 years starting at age 48 years. You may need a mammogram at age 52 years or younger if you have an increased risk for breast cancer. Talk to your healthcare provider about when you should start having mammograms and how often you need them. What vaccines might I need? Get an influenza vaccine  every year. The influenza vaccine protects you from the flu. Several types of viruses cause the flu. The viruses change over time, so new vaccines are made each year. Get a tetanus-diphtheria (Td) booster vaccine  every 10 years. This vaccine protects you against tetanus and diphtheria. Tetanus is a severe infection that may cause painful muscle spasms and lockjaw. Diphtheria is a severe bacterial infection that causes a thick covering in the back of your mouth and throat. Get a human papillomavirus (HPV) vaccine  if you are female and aged 23 to 32 or male 23 to 24 and never received it. This vaccine protects you from HPV infection. HPV is the most common infection spread by sexual contact. HPV may also cause vaginal, penile, and anal cancers. Get a pneumococcal vaccine  if you are aged 72 years or older. The pneumococcal vaccine is an injection given to protect you from pneumococcal disease. Pneumococcal disease is an infection caused by pneumococcal bacteria. The infection may cause pneumonia, meningitis, or an ear infection. Get a shingles vaccine  if you are 60 or older, even if you have had shingles before. The shingles vaccine is an injection to protect you from the varicella-zoster virus. This is the same virus that causes chickenpox.  Shingles is a painful rash that develops in people who had chickenpox or have been exposed to the virus. How can I eat healthy? My Plate is a model for planning healthy meals. It shows the types and amounts of foods that should go on your plate. Fruits and vegetables make up about half of your plate, and grains and protein make up the other half. A serving of dairy is included on the side of your plate. The amount of calories and serving sizes you need depends on your age, gender, weight, and height. Examples of healthy foods are listed below:  Eat a variety of vegetables  such as dark green, red, and orange vegetables. You can also include canned vegetables low in sodium (salt) and frozen vegetables without added butter or sauces. Eat a variety of fresh fruits , canned fruit in 100% juice, frozen fruit, and dried fruit. Include whole grains. At least half of the grains you eat should be whole grains. Examples include whole-wheat bread, wheat pasta, brown rice, and whole-grain cereals such as oatmeal.    Eat a variety of protein foods such as seafood (fish and shellfish), lean meat, and poultry without skin (turkey and chicken). Examples of lean meats include pork leg, shoulder, or tenderloin, and beef round, sirloin, tenderloin, and extra lean ground beef. Other protein foods include eggs and egg substitutes, beans, peas, soy products, nuts, and seeds. Choose low-fat dairy products such as skim or 1% milk or low-fat yogurt, cheese, and cottage cheese. Limit unhealthy fats  such as butter, hard margarine, and shortening. How much exercise do I need? Exercise at least 30 minutes per day on most days of the week. Some examples of exercise include walking, biking, dancing, and swimming. You can also fit in more physical activity by taking the stairs instead of the elevator or parking farther away from stores. Include muscle strengthening activities 2 days each week. Regular exercise provides many health benefits.  It helps you manage your weight, and decreases your risk for type 2 diabetes, heart disease, stroke, and high blood pressure. Exercise can also help improve your mood. Ask your healthcare provider about the best exercise plan for you. What are some general health and safety guidelines I should follow? Do not smoke. Nicotine and other chemicals in cigarettes and cigars can cause lung damage. Ask your healthcare provider for information if you currently smoke and need help to quit. E-cigarettes or smokeless tobacco still contain nicotine. Talk to your healthcare provider before you use these products. Limit alcohol. A drink of alcohol is 12 ounces of beer, 5 ounces of wine, or 1½ ounces of liquor. Lose weight, if needed. Being overweight increases your risk of certain health conditions. These include heart disease, high blood pressure, type 2 diabetes, and certain types of cancer. Protect your skin. Do not sunbathe or use tanning beds. Use sunscreen with a SPF 15 or higher. Apply sunscreen at least 15 minutes before you go outside. Reapply sunscreen every 2 hours. Wear protective clothing, hats, and sunglasses when you are outside. Drive safely. Always wear your seatbelt. Make sure everyone in your car wears a seatbelt. A seatbelt can save your life if you are in an accident. Do not use your cell phone when you are driving. This could distract you and cause an accident. Pull over if you need to make a call or send a text message. Practice safe sex. Use latex condoms if are sexually active and have more than one partner. Your healthcare provider may recommend screening tests for sexually transmitted infections (STIs). Wear helmets, lifejackets, and protective gear. Always wear a helmet when you ride a bike or motorcycle, go skiing, or play sports that could cause a head injury. Wear protective equipment when you play sports. Wear a lifejacket when you are on a boat or doing water sports.     CARE AGREEMENT: You have the right to help plan your care. Learn about your health condition and how it may be treated. Discuss treatment options with your healthcare providers to decide what care you want to receive. You always have the right to refuse treatment. The above information is an  only. It is not intended as medical advice for individual conditions or treatments. Talk to your doctor, nurse or pharmacist before following any medical regimen to see if it is safe and effective for you. © Copyright Elisha Goltz 2023 Information is for End User's use only and may not be sold, redistributed or otherwise used for commercial purposes.

## 2023-12-07 ENCOUNTER — OFFICE VISIT (OUTPATIENT)
Dept: FAMILY MEDICINE CLINIC | Facility: CLINIC | Age: 63
End: 2023-12-07
Payer: COMMERCIAL

## 2023-12-07 VITALS
TEMPERATURE: 98.4 F | WEIGHT: 170 LBS | DIASTOLIC BLOOD PRESSURE: 80 MMHG | OXYGEN SATURATION: 97 % | BODY MASS INDEX: 23.03 KG/M2 | HEART RATE: 100 BPM | SYSTOLIC BLOOD PRESSURE: 100 MMHG | HEIGHT: 72 IN

## 2023-12-07 DIAGNOSIS — J01.00 ACUTE NON-RECURRENT MAXILLARY SINUSITIS: Primary | ICD-10-CM

## 2023-12-07 LAB — S PYO AG THROAT QL: NEGATIVE

## 2023-12-07 PROCEDURE — 99214 OFFICE O/P EST MOD 30 MIN: CPT

## 2023-12-07 PROCEDURE — 87880 STREP A ASSAY W/OPTIC: CPT

## 2023-12-07 RX ORDER — PREDNISONE 20 MG/1
20 TABLET ORAL DAILY
Qty: 5 TABLET | Refills: 0 | Status: SHIPPED | OUTPATIENT
Start: 2023-12-07 | End: 2023-12-12

## 2023-12-07 RX ORDER — DOXYCYCLINE HYCLATE 100 MG/1
100 CAPSULE ORAL EVERY 12 HOURS SCHEDULED
Qty: 20 CAPSULE | Refills: 0 | Status: SHIPPED | OUTPATIENT
Start: 2023-12-07 | End: 2023-12-17

## 2023-12-07 NOTE — PATIENT INSTRUCTIONS
Problem List Items Addressed This Visit    None  Visit Diagnoses       Acute non-recurrent maxillary sinusitis    -  Primary    Antibiotics twice daily for 10 days, recommend using Flonase steroid burst.  Vitamin C D and zinc.  Stay well-hydrated use steam/humidification.     Relevant Medications    doxycycline hyclate (VIBRAMYCIN) 100 mg capsule    predniSONE 20 mg tablet

## 2023-12-07 NOTE — PROGRESS NOTES
Assessment/Plan:         Problem List Items Addressed This Visit    None  Visit Diagnoses     Acute non-recurrent maxillary sinusitis    -  Primary    Antibiotics twice daily for 10 days, recommend using Flonase steroid burst.  Vitamin C D and zinc.  Stay well-hydrated use steam/humidification. Relevant Medications    doxycycline hyclate (VIBRAMYCIN) 100 mg capsule    predniSONE 20 mg tablet              Subjective:      Patient ID: Hudson Cowart is a 61 y.o. female. Richi Runner started to feel unwell 11/28/23. Sore Throat   Associated symptoms include congestion and coughing. Pertinent negatives include no diarrhea, ear pain, headaches, shortness of breath or vomiting. Fatigue  Associated symptoms include congestion, coughing, fatigue, myalgias and a sore throat. Pertinent negatives include no chills, diaphoresis, fever, headaches, nausea or vomiting. The following portions of the patient's history were reviewed and updated as appropriate:   Past Medical History:  She has a past medical history of Acute cystitis, Anxiety, Breast lump (.), Dermatitis, Disease of thyroid gland, Diverticulitis (11/2020), Dysuria, ETD (Eustachian tube dysfunction), bilateral, Fibroid (2012), Glaucoma, Hematuria, Migraine (1985), Miscarriage (1990), Polyp at cervical os, and Varicella (1966). ,  _______________________________________________________________________  Medical Problems:  does not have any pertinent problems on file.,  _______________________________________________________________________  Past Surgical History:   has a past surgical history that includes Tooth extraction; Tubal ligation; Breast biopsy (Right, 2009); Colonoscopy; and Cataract extraction (Bilateral). ,  _______________________________________________________________________  Family History:  family history includes Breast cancer (age of onset: 52) in her mother; Cancer in her father, mother, and son;  Endometrial cancer in her maternal grandmother; Heart disease in her maternal grandmother; Hypertension in her father; Migraines in her father; No Known Problems in her daughter, paternal aunt, paternal grandmother, and sister; Rectal cancer in her father; Throat cancer in her brother.,  _______________________________________________________________________  Social History:   reports that she has never smoked. She has never used smokeless tobacco. She reports current alcohol use of about 3.0 standard drinks of alcohol per week. She reports that she does not use drugs. ,  _______________________________________________________________________  Allergies:  is allergic to pollen extract. .  _______________________________________________________________________  Current Outpatient Medications   Medication Sig Dispense Refill   • calcium citrate (CALCITRATE) 950 (200 Ca) MG tablet Take 1 tablet by mouth daily     • doxycycline hyclate (VIBRAMYCIN) 100 mg capsule Take 1 capsule (100 mg total) by mouth every 12 (twelve) hours for 10 days 20 capsule 0   • eletriptan (RELPAX) 40 MG tablet Take 1 tablet (40 mg total) by mouth once as needed for migraine for up to 1 dose may repeat in 2 hours if necessary 16 tablet 3   • Estradiol (Estring) 7.5 MCG/24HR RING Insert 1 Ring into the vagina every 3 (three) months 1 each 4   • Glucosamine-Chondroitin (GLUCOSAMINE CHONDR COMPLEX PO) Take by mouth daily      • latanoprost (XALATAN) 0.005 % ophthalmic solution      • meloxicam (Mobic) 7.5 mg tablet Take 1 tablet (7.5 mg total) by mouth daily 30 tablet 2   • Multiple Vitamins-Minerals (CENTRUM SILVER) tablet Take by mouth     • omeprazole (PriLOSEC) 20 mg delayed release capsule TAKE 1 CAPSULE BY MOUTH EVERY DAY 30 capsule 1   • predniSONE 20 mg tablet Take 1 tablet (20 mg total) by mouth daily for 5 days 5 tablet 0   • timolol (TIMOPTIC) 0.5 % ophthalmic solution      • TURMERIC-GINGER PO Take by mouth     • fluticasone (FLONASE) 50 mcg/act nasal spray 1 spray into each nostril daily (Patient not taking: Reported on 11/20/2023) 16 g 1     No current facility-administered medications for this visit.     _______________________________________________________________________  Review of Systems   Constitutional:  Positive for fatigue. Negative for chills, diaphoresis and fever. HENT:  Positive for congestion, postnasal drip, rhinorrhea and sore throat. Negative for ear pain, sinus pressure and sinus pain. Respiratory:  Positive for cough. Negative for chest tightness, shortness of breath and wheezing. Gastrointestinal:  Negative for diarrhea, nausea and vomiting. Musculoskeletal:  Positive for myalgias. Neurological:  Negative for headaches. Objective:  Vitals:    12/07/23 1115   BP: 100/80   Pulse: 100   Temp: 98.4 °F (36.9 °C)   SpO2: 97%   Weight: 77.1 kg (170 lb)   Height: 6' (1.829 m)     Body mass index is 23.06 kg/m². Physical Exam  Vitals and nursing note reviewed. Constitutional:       Appearance: Normal appearance. She is ill-appearing. HENT:      Right Ear: There is no impacted cerumen. Tympanic membrane is erythematous. Left Ear: There is no impacted cerumen. Tympanic membrane is erythematous. Nose: Congestion present. Mouth/Throat:      Mouth: Mucous membranes are moist.      Pharynx: Posterior oropharyngeal erythema present. Eyes:      Extraocular Movements: Extraocular movements intact. Cardiovascular:      Rate and Rhythm: Normal rate. Heart sounds: Normal heart sounds. No murmur heard. Pulmonary:      Effort: Pulmonary effort is normal.      Breath sounds: Normal breath sounds. No wheezing. Abdominal:      Palpations: Abdomen is soft. Tenderness: There is no abdominal tenderness. Musculoskeletal:         General: Normal range of motion. Cervical back: Normal range of motion. Skin:     General: Skin is warm and dry. Neurological:      General: No focal deficit present.       Mental Status: She is alert.    Psychiatric:         Mood and Affect: Mood normal.         Behavior: Behavior normal.

## 2024-01-02 ENCOUNTER — TELEPHONE (OUTPATIENT)
Dept: OBGYN CLINIC | Facility: CLINIC | Age: 64
End: 2024-01-02

## 2024-01-02 DIAGNOSIS — N95.2 ATROPHIC VULVOVAGINITIS: Primary | ICD-10-CM

## 2024-01-02 NOTE — TELEPHONE ENCOUNTER
Patient doesn't like the estring has taken it out due to irritation would like to go back to the premarin cream

## 2024-01-03 ENCOUNTER — TELEPHONE (OUTPATIENT)
Dept: OBGYN CLINIC | Facility: CLINIC | Age: 64
End: 2024-01-03

## 2024-01-03 DIAGNOSIS — N89.8 VAGINAL IRRITATION: Primary | ICD-10-CM

## 2024-01-03 NOTE — TELEPHONE ENCOUNTER
Regarding: Estring  Contact: [phone]  ----- Message from Maye Don sent at 1/3/2024 11:41 AM EST -----    Pt removed estring but is still having itching and irritated. Please call pt to discuss.            ----- Message from Amy Clement to Joselin Alaniz MD sent at 1/2/2024  3:45 PM -----   Thank you      ----- Message -----       From:Joselin Alaniz       Sent:1/2/2024  3:31 PM EST         To:Amy Clement    Subject:Estring    I sent the script to the pharmacy for you.   Let me know if you need anything else.       ----- Message -----       From:Amy Clement       Sent:1/2/2024  1:25 PM EST         To:Joselin Alaniz    Subject:Estring    Hi Dr Jaison Ricks. I am having some side effects--spotting and vaginal irritation/ itching.  I removed the estring and it was bloody-not bright, kind of rust colored. I would like to go back to using Premarin, but will need a prescription. I' ll call the office as I see you are out of office. Hope you are enjoying your well- deserved time off. Happy New Year!

## 2024-01-03 NOTE — TELEPHONE ENCOUNTER
Pt had an e string in for 3 weeks. She had itching and irritation so took it out. It was coated with dark blood and mucus.  She has itching now and wants to return to estrace cream. Should she use anything for yeast? She has the rx for estrace cream.  Thanks

## 2024-01-03 NOTE — TELEPHONE ENCOUNTER
Pt allergic to monistat but can use Terazol.   [facility]

## 2024-01-08 ENCOUNTER — TELEPHONE (OUTPATIENT)
Dept: OBGYN CLINIC | Facility: CLINIC | Age: 64
End: 2024-01-08

## 2024-01-09 DIAGNOSIS — N95.0 POST-MENOPAUSAL BLEEDING: Primary | ICD-10-CM

## 2024-01-10 NOTE — TELEPHONE ENCOUNTER
Pt will go for pelvic u/s now. She wants to wait for ktm for an appt. Has a fairly personal relationship (per pt) with her.

## 2024-01-11 ENCOUNTER — HOSPITAL ENCOUNTER (OUTPATIENT)
Dept: ULTRASOUND IMAGING | Facility: HOSPITAL | Age: 64
End: 2024-01-11
Attending: STUDENT IN AN ORGANIZED HEALTH CARE EDUCATION/TRAINING PROGRAM
Payer: COMMERCIAL

## 2024-01-11 DIAGNOSIS — N95.0 POST-MENOPAUSAL BLEEDING: ICD-10-CM

## 2024-01-11 PROCEDURE — 76856 US EXAM PELVIC COMPLETE: CPT

## 2024-01-11 PROCEDURE — 76830 TRANSVAGINAL US NON-OB: CPT

## 2024-01-15 ENCOUNTER — OFFICE VISIT (OUTPATIENT)
Dept: FAMILY MEDICINE CLINIC | Facility: CLINIC | Age: 64
End: 2024-01-15
Payer: COMMERCIAL

## 2024-01-15 VITALS
DIASTOLIC BLOOD PRESSURE: 72 MMHG | HEIGHT: 72 IN | BODY MASS INDEX: 22.75 KG/M2 | SYSTOLIC BLOOD PRESSURE: 108 MMHG | WEIGHT: 168 LBS | HEART RATE: 82 BPM | OXYGEN SATURATION: 98 % | TEMPERATURE: 97.6 F

## 2024-01-15 DIAGNOSIS — E78.5 BORDERLINE HYPERLIPIDEMIA: ICD-10-CM

## 2024-01-15 DIAGNOSIS — G43.109 MIGRAINE WITH AURA AND WITHOUT STATUS MIGRAINOSUS, NOT INTRACTABLE: ICD-10-CM

## 2024-01-15 DIAGNOSIS — E04.1 THYROID NODULE: ICD-10-CM

## 2024-01-15 DIAGNOSIS — Z00.00 ANNUAL PHYSICAL EXAM: Primary | ICD-10-CM

## 2024-01-15 DIAGNOSIS — F41.9 ANXIETY: ICD-10-CM

## 2024-01-15 DIAGNOSIS — R23.2 HOT FLASHES: ICD-10-CM

## 2024-01-15 PROCEDURE — 99396 PREV VISIT EST AGE 40-64: CPT | Performed by: NURSE PRACTITIONER

## 2024-01-15 PROCEDURE — 99214 OFFICE O/P EST MOD 30 MIN: CPT | Performed by: NURSE PRACTITIONER

## 2024-01-15 RX ORDER — ELETRIPTAN HYDROBROMIDE 40 MG/1
40 TABLET, FILM COATED ORAL ONCE AS NEEDED
Qty: 16 TABLET | Refills: 3 | Status: SHIPPED | OUTPATIENT
Start: 2024-01-15

## 2024-01-15 RX ORDER — PAROXETINE 10 MG/1
10 TABLET, FILM COATED ORAL DAILY
Qty: 30 TABLET | Refills: 1 | Status: SHIPPED | OUTPATIENT
Start: 2024-01-15

## 2024-01-15 NOTE — PROGRESS NOTES
ADULT ANNUAL PHYSICAL  [facility] [address] [facility]    NAME: Amy Clement  AGE: 63 y.o. SEX: female  : 1960     DATE: 1/15/2024     Assessment and Plan:     Problem List Items Addressed This Visit          Endocrine    Thyroid nodule     Patient's endocrinologist is retiring.  Provided referral to become reestablished with another specialist.           Relevant Orders    Ambulatory Referral to Endocrinology       Cardiovascular and Mediastinum    Migraine with aura and without status migrainosus, not intractable     Stable.  Provided refill of Relpax.         Relevant Medications    PARoxetine (PAXIL) 10 mg tablet    eletriptan (RELPAX) 40 MG tablet       Other    Anxiety     Has recently noted an increase anxiety related to travel.  Would like to resume Paxil 10 mg.  Refill provided.         Relevant Medications    PARoxetine (PAXIL) 10 mg tablet    Annual physical exam - Primary     Other Visit Diagnoses       Borderline hyperlipidemia        Relevant Orders    CBC and differential    Comprehensive metabolic panel    Hemoglobin A1C    Lipid Panel with Direct LDL reflex    TSH, 3rd generation with Free T4 reflex              Immunizations and preventive care screenings were discussed with patient today. Appropriate education was printed on patient's after visit summary.    Counseling:  Alcohol/drug use: discussed moderation in alcohol intake, the recommendations for healthy alcohol use, and avoidance of illicit drug use.  Dental Health: discussed importance of regular tooth brushing, flossing, and dental visits.  Injury prevention: discussed safety/seat belts, safety helmets, smoke detectors, carbon dioxide detectors, and smoking near bedding or upholstery.  Sexual health: discussed sexually transmitted diseases, partner selection, use of condoms, avoidance of unintended pregnancy, and contraceptive alternatives.  Exercise: the  importance of regular exercise/physical activity was discussed. Recommend exercise 3-5 times per week for at least 30 minutes.          Return in about 6 months (around 7/15/2024), or if symptoms worsen or fail to improve, for Recheck.     Chief Complaint:     Chief Complaint   Patient presents with    Annual Exam      History of Present Illness:     Adult Annual Physical   Patient here for a comprehensive physical exam. The patient reports problems - increase in anxiety while traveling .    Diet and Physical Activity  Diet/Nutrition: well balanced diet.   Exercise: walking and 3-4 times a week on average.      Depression Screening  PHQ-2/9 Depression Screening    Little interest or pleasure in doing things: 0 - not at all  Feeling down, depressed, or hopeless: 0 - not at all  PHQ-2 Score: 0  PHQ-2 Interpretation: Negative depression screen       General Health  Sleep: gets 7-8 hours of sleep on average.   Hearing: normal - bilateral.  Vision: no vision problems and goes for regular eye exams.   Dental: regular dental visits.       /GYN Health  Follows with gynecology? yes   Patient is: postmenopausal  Last menstrual period:   Contraceptive method:    Advanced Care Planning  Do you have an advanced directive? no  Do you have a durable medical power of ? no     Review of Systems:     Review of Systems   Constitutional: Negative.    HENT: Negative.     Eyes: Negative.    Respiratory: Negative.     Cardiovascular: Negative.    Gastrointestinal: Negative.    Endocrine: Negative.    Genitourinary: Negative.    Musculoskeletal: Negative.    Skin: Negative.    Allergic/Immunologic: Negative.    Neurological: Negative.    Hematological: Negative.    Psychiatric/Behavioral: Negative.        Past Medical History:     Past Medical History:   Diagnosis Date    Acute cystitis     Anxiety     medication for hot flasshes    Breast lump .    last assessed 01/16/2017    Dermatitis     last assessed 02/27/2017    Disease of  thyroid gland     last assessed 02/23/2016    Diverticulitis 11/2020    Dysuria     last assessed 04/28/2017    ETD (Eustachian tube dysfunction), bilateral     last assessed 02/23/2016    Fibroid 2012    Glaucoma     Left    Hematuria     Migraine 1985    Miscarriage 1990    Polyp at cervical os     Varicella 1966      Past Surgical History:     Past Surgical History:   Procedure Laterality Date    BREAST BIOPSY Right 2009    2 areas benign    CATARACT EXTRACTION Bilateral     COLONOSCOPY      TOOTH EXTRACTION      TUBAL LIGATION        Social History:     Social History     Socioeconomic History    Marital status: /Civil Union     Spouse name: None    Number of children: None    Years of education: None    Highest education level: None   Occupational History     Comment: Retired   Tobacco Use    Smoking status: Never    Smokeless tobacco: Never   Vaping Use    Vaping status: Never Used   Substance and Sexual Activity    Alcohol use: Yes     Alcohol/week: 3.0 standard drinks of alcohol     Types: 3 Glasses of wine per week     Comment: occasionally    Drug use: No    Sexual activity: Yes     Partners: Male     Birth control/protection: Post-menopausal   Other Topics Concern    None   Social History Narrative    Activities:  Dance    Always uses seat belt    Exercise:  Walking    Lives with      Social Determinants of Health     Financial Resource Strain: Not on file   Food Insecurity: Not on file   Transportation Needs: Not on file   Physical Activity: Not on file   Stress: Not on file   Social Connections: Not on file   Intimate Partner Violence: Not on file   Housing Stability: Not on file      Family History:     Family History   Problem Relation Age of Onset    Breast cancer Mother 47    Cancer Mother         Breast cancer    Cancer Father         Colon cancer    Hypertension Father         Late life dx    Rectal cancer Father     Migraines Father     No Known Problems Sister     No Known Problems  Daughter     Heart disease Maternal Grandmother     Endometrial cancer Maternal Grandmother         unknown age    No Known Problems Paternal Grandmother     Throat cancer Brother     No Known Problems Paternal Aunt     Cancer Son         Kidney cancer    Ovarian cancer Neg Hx     Uterine cancer Neg Hx     Cervical cancer Neg Hx       Current Medications:     Current Outpatient Medications   Medication Sig Dispense Refill    calcium citrate (CALCITRATE) 950 (200 Ca) MG tablet Take 1 tablet by mouth daily      eletriptan (RELPAX) 40 MG tablet Take 1 tablet (40 mg total) by mouth once as needed for migraine for up to 64 doses may repeat in 2 hours if necessary 16 tablet 3    estradiol (ESTRACE) 0.1 mg/g vaginal cream Insert 0.5 g into the vagina 2 (two) times a week 42.5 g 3    Glucosamine-Chondroitin (GLUCOSAMINE CHONDR COMPLEX PO) Take by mouth daily       latanoprost (XALATAN) 0.005 % ophthalmic solution       meloxicam (Mobic) 7.5 mg tablet Take 1 tablet (7.5 mg total) by mouth daily 30 tablet 2    Multiple Vitamins-Minerals (CENTRUM SILVER) tablet Take by mouth      omeprazole (PriLOSEC) 20 mg delayed release capsule TAKE 1 CAPSULE BY MOUTH EVERY DAY 30 capsule 1    PARoxetine (PAXIL) 10 mg tablet Take 1 tablet (10 mg total) by mouth daily 30 tablet 1    timolol (TIMOPTIC) 0.5 % ophthalmic solution       TURMERIC-GINGER PO Take by mouth      fluticasone (FLONASE) 50 mcg/act nasal spray 1 spray into each nostril daily (Patient not taking: Reported on 11/20/2023) 16 g 1     No current facility-administered medications for this visit.      Allergies:     Allergies   Allergen Reactions    Pollen Extract Itching, Swelling and Sneezing      Physical Exam:     /72 (BP Location: Left arm, Patient Position: Sitting)   Pulse 82   Temp 97.6 °F (36.4 °C)   Ht 6' (1.829 m)   Wt 76.2 kg (168 lb)   LMP  (LMP Unknown)   SpO2 98%   BMI 22.78 kg/m²     Physical Exam  Vitals and nursing note reviewed.   Constitutional:        General: She is not in acute distress.     Appearance: Normal appearance. She is well-developed. She is not ill-appearing, toxic-appearing or diaphoretic.   HENT:      Head: Normocephalic and atraumatic.      Right Ear: Tympanic membrane and external ear normal.      Left Ear: Tympanic membrane and external ear normal.      Nose: Nose normal.      Mouth/Throat:      Mouth: Mucous membranes are moist.      Pharynx: Oropharynx is clear. No oropharyngeal exudate.   Eyes:      Conjunctiva/sclera: Conjunctivae normal.      Pupils: Pupils are equal, round, and reactive to light.   Neck:      Thyroid: No thyromegaly.   Cardiovascular:      Rate and Rhythm: Normal rate and regular rhythm.      Pulses: Normal pulses.      Heart sounds: Normal heart sounds. No murmur heard.  Pulmonary:      Effort: Pulmonary effort is normal. No respiratory distress.      Breath sounds: Normal breath sounds. No stridor. No wheezing or rales.   Chest:      Chest wall: No tenderness.   Abdominal:      General: Bowel sounds are normal. There is no distension.      Palpations: Abdomen is soft. There is no mass.      Tenderness: There is no abdominal tenderness. There is no guarding or rebound.      Hernia: No hernia is present.   Musculoskeletal:         General: Normal range of motion.      Cervical back: Normal range of motion and neck supple.   Lymphadenopathy:      Cervical: No cervical adenopathy.   Skin:     General: Skin is warm and dry.      Capillary Refill: Capillary refill takes less than 2 seconds.   Neurological:      General: No focal deficit present.      Mental Status: She is alert and oriented to person, place, and time.      Cranial Nerves: No cranial nerve deficit.      Gait: Gait normal.   Psychiatric:         Mood and Affect: Mood normal.         Behavior: Behavior normal.         Thought Content: Thought content normal.         Judgment: Judgment normal.          ELY Ordoñez  [facility] [address]

## 2024-01-15 NOTE — ASSESSMENT & PLAN NOTE
Patient's endocrinologist is retiring.  Provided referral to become reestablished with another specialist.

## 2024-01-15 NOTE — ASSESSMENT & PLAN NOTE
Has recently noted an increase anxiety related to travel.  Would like to resume Paxil 10 mg.  Refill provided.

## 2024-01-19 PROBLEM — Z01.419 ENCOUNTER FOR GYNECOLOGICAL EXAMINATION WITHOUT ABNORMAL FINDING: Status: RESOLVED | Noted: 2018-10-03 | Resolved: 2024-01-19

## 2024-01-23 ENCOUNTER — OFFICE VISIT (OUTPATIENT)
Dept: OBGYN CLINIC | Facility: CLINIC | Age: 64
End: 2024-01-23
Payer: COMMERCIAL

## 2024-01-23 VITALS — SYSTOLIC BLOOD PRESSURE: 98 MMHG | BODY MASS INDEX: 23.03 KG/M2 | DIASTOLIC BLOOD PRESSURE: 62 MMHG | WEIGHT: 169.8 LBS

## 2024-01-23 DIAGNOSIS — N95.0 POSTMENOPAUSAL BLEEDING: Primary | ICD-10-CM

## 2024-01-23 PROCEDURE — 99214 OFFICE O/P EST MOD 30 MIN: CPT | Performed by: OBSTETRICS & GYNECOLOGY

## 2024-01-23 NOTE — PROGRESS NOTES
Assessment/Plan:    Postmenopausal bleeding  Options D&C vs EB  Lining on imaging 14mm, h/o cervical polyp removal in the office.  At this time I recommend D&C Hysteroscopy with possible polypectomy    We reviewed the risks of the surgery including but not limited to infection, bleeding, injury to nearby organs (bowel. bladder, ureter, blood vessel, nerve) and possible long term consequences of such injury, as well as possibility of  need for blood transfusion and other resuscitative measures.        Diagnoses and all orders for this visit:    Postmenopausal bleeding          Subjective:      Patient ID: Amy Clement is a 63 y.o. female.    Patient here to discuss options regarding her pmb and endometrial thickening. Pelvic US completed 1/11/24. Needs EMB today.     62yo G5P 4 0 1 4 with postmenopausal bleeding.  Began use of esring for treatment of vaginal atrophy and had episode of bleeding. Discontinued ring and then had increase in discharge and irritation for which she was given antifungal. These symptoms resolved and restarted vaginal estrogen cream.  Another episode of bleeding occurred.     Imaging was ordered which revealed thickened endometrium 14mm, fibroids noted which may have submucosal component.   Here to discuss next steps in evaluation. Sampling is needed. EB vs D&C.  H/o cervical polyp removal 4 years ago    Plan for D&C Hysteroscopy    Gynecologic Exam  She complains of vaginal bleeding. This is a new problem. The current episode started 1 to 4 weeks ago. The problem occurs rarely. The problem has been resolved since onset. The patient is experiencing no pain. Pertinent negatives include no chills, constipation, diarrhea, fever, frequency, nausea, sore throat, urgency or vomiting. The vaginal bleeding is spotting. Patient has not been passing clots. Patient has not been passing tissue. Nothing aggravates the symptoms. Past treatments include nothing. The treatment provided no relief. She is  sexually active.     The following portions of the patient's history were reviewed and updated as appropriate: She  has a past medical history of Acute cystitis, Anxiety, Breast lump (.), Dermatitis, Disease of thyroid gland, Diverticulitis (11/2020), Dysuria, ETD (Eustachian tube dysfunction), bilateral, Fibroid (2012), Glaucoma, Hematuria, Migraine (1985), Miscarriage (1990), Polyp at cervical os, and Varicella (1966).  She   Patient Active Problem List    Diagnosis Date Noted    Postmenopausal bleeding 01/23/2024    Chronic hip pain, right 03/02/2023    GERD (gastroesophageal reflux disease) 06/02/2022    Annual physical exam 10/15/2020    Anxiety 08/08/2018    Migraine with aura and without status migrainosus, not intractable 02/27/2017    Thyroid nodule 02/23/2016     She  has a past surgical history that includes Tooth extraction; Tubal ligation; Breast biopsy (Right, 2009); Colonoscopy; and Cataract extraction (Bilateral).  Her family history includes Breast cancer (age of onset: 47) in her mother; Cancer in her father, mother, and son; Endometrial cancer in her maternal grandmother; Heart disease in her maternal grandmother; Hypertension in her father; Migraines in her father; No Known Problems in her daughter, paternal aunt, paternal grandmother, and sister; Rectal cancer in her father; Throat cancer in her brother.  She  reports that she has never smoked. She has never used smokeless tobacco. She reports current alcohol use of about 3.0 standard drinks of alcohol per week. She reports that she does not use drugs.  Current Outpatient Medications   Medication Sig Dispense Refill    calcium citrate (CALCITRATE) 950 (200 Ca) MG tablet Take 1 tablet by mouth daily      eletriptan (RELPAX) 40 MG tablet Take 1 tablet (40 mg total) by mouth once as needed for migraine for up to 64 doses may repeat in 2 hours if necessary 16 tablet 3    estradiol (ESTRACE) 0.1 mg/g vaginal cream Insert 0.5 g into the vagina 2 (two)  times a week 42.5 g 3    Glucosamine-Chondroitin (GLUCOSAMINE CHONDR COMPLEX PO) Take by mouth daily       latanoprost (XALATAN) 0.005 % ophthalmic solution       meloxicam (Mobic) 7.5 mg tablet Take 1 tablet (7.5 mg total) by mouth daily 30 tablet 2    Multiple Vitamins-Minerals (CENTRUM SILVER) tablet Take by mouth      omeprazole (PriLOSEC) 20 mg delayed release capsule TAKE 1 CAPSULE BY MOUTH EVERY DAY 30 capsule 1    PARoxetine (PAXIL) 10 mg tablet Take 1 tablet (10 mg total) by mouth daily 30 tablet 1    timolol (TIMOPTIC) 0.5 % ophthalmic solution       TURMERIC-GINGER PO Take by mouth      fluticasone (FLONASE) 50 mcg/act nasal spray 1 spray into each nostril daily (Patient not taking: Reported on 11/20/2023) 16 g 1     No current facility-administered medications for this visit.     Current Outpatient Medications on File Prior to Visit   Medication Sig    calcium citrate (CALCITRATE) 950 (200 Ca) MG tablet Take 1 tablet by mouth daily    eletriptan (RELPAX) 40 MG tablet Take 1 tablet (40 mg total) by mouth once as needed for migraine for up to 64 doses may repeat in 2 hours if necessary    estradiol (ESTRACE) 0.1 mg/g vaginal cream Insert 0.5 g into the vagina 2 (two) times a week    Glucosamine-Chondroitin (GLUCOSAMINE CHONDR COMPLEX PO) Take by mouth daily     latanoprost (XALATAN) 0.005 % ophthalmic solution     meloxicam (Mobic) 7.5 mg tablet Take 1 tablet (7.5 mg total) by mouth daily    Multiple Vitamins-Minerals (CENTRUM SILVER) tablet Take by mouth    omeprazole (PriLOSEC) 20 mg delayed release capsule TAKE 1 CAPSULE BY MOUTH EVERY DAY    PARoxetine (PAXIL) 10 mg tablet Take 1 tablet (10 mg total) by mouth daily    timolol (TIMOPTIC) 0.5 % ophthalmic solution     TURMERIC-GINGER PO Take by mouth    fluticasone (FLONASE) 50 mcg/act nasal spray 1 spray into each nostril daily (Patient not taking: Reported on 11/20/2023)     No current facility-administered medications on file prior to visit.     She  is allergic to monistat [miconazole] and pollen extract..    Review of Systems   Constitutional:  Negative for activity change, appetite change, chills, fatigue and fever.   HENT:  Negative for rhinorrhea, sneezing and sore throat.    Eyes:  Negative for visual disturbance.   Respiratory:  Negative for cough, shortness of breath and wheezing.    Cardiovascular:  Negative for chest pain, palpitations and leg swelling.   Gastrointestinal:  Negative for abdominal distention, constipation, diarrhea, nausea and vomiting.   Genitourinary:  Negative for difficulty urinating, frequency and urgency.   Neurological:  Negative for syncope and light-headedness.         Objective:      BP 98/62 (BP Location: Left arm, Patient Position: Sitting, Cuff Size: Standard)   Wt 77 kg (169 lb 12.8 oz)   LMP  (LMP Unknown)   BMI 23.03 kg/m²          Physical Exam  Constitutional:       General: She is not in acute distress.     Appearance: She is well-developed. She is not diaphoretic.   Neck:      Vascular: No JVD.   Cardiovascular:      Rate and Rhythm: Normal rate and regular rhythm.      Heart sounds: Normal heart sounds. No murmur heard.     No friction rub. No gallop.   Pulmonary:      Effort: Pulmonary effort is normal. No respiratory distress.      Breath sounds: Normal breath sounds.   Abdominal:      General: Bowel sounds are normal. There is no distension.      Palpations: Abdomen is soft.      Tenderness: There is no abdominal tenderness. There is no guarding or rebound.   Genitourinary:     Labia:         Right: No rash, tenderness or lesion.         Left: No rash, tenderness or lesion.       Vagina: Normal. No vaginal discharge, erythema, tenderness or bleeding.      Cervix: No cervical motion tenderness, discharge, friability or erythema.      Uterus: Not deviated, not enlarged, not fixed and not tender.       Adnexa:         Right: No mass, tenderness or fullness.          Left: No mass, tenderness or fullness.      Musculoskeletal:      Cervical back: Neck supple.      Right lower leg: No edema.      Left lower leg: No edema.   Lymphadenopathy:      Cervical: No cervical adenopathy.   Neurological:      Mental Status: She is alert and oriented to person, place, and time.      Deep Tendon Reflexes: Reflexes are normal and symmetric.         I have spent a total time of 25 minutes on 01/23/24 in caring for this patient including Diagnostic results, Prognosis, Risks and benefits of tx options, Impressions, Counseling / Coordination of care, Documenting in the medical record, and Reviewing / ordering tests, medicine, procedures  .

## 2024-01-25 ENCOUNTER — PREP FOR PROCEDURE (OUTPATIENT)
Dept: OBGYN CLINIC | Facility: CLINIC | Age: 64
End: 2024-01-25

## 2024-01-25 ENCOUNTER — TELEPHONE (OUTPATIENT)
Dept: OBGYN CLINIC | Facility: CLINIC | Age: 64
End: 2024-01-25

## 2024-01-25 DIAGNOSIS — N95.0 PMB (POSTMENOPAUSAL BLEEDING): Primary | ICD-10-CM

## 2024-01-25 NOTE — TELEPHONE ENCOUNTER
Talked to patient she is scheduled for her surgical procedure with Dr. Alaniz in the [facility]note 3] OR on 2/23/2024.

## 2024-02-07 DIAGNOSIS — K21.9 GASTROESOPHAGEAL REFLUX DISEASE WITHOUT ESOPHAGITIS: ICD-10-CM

## 2024-02-07 DIAGNOSIS — M16.11 PRIMARY OSTEOARTHRITIS OF ONE HIP, RIGHT: ICD-10-CM

## 2024-02-07 RX ORDER — OMEPRAZOLE 20 MG/1
CAPSULE, DELAYED RELEASE ORAL
Qty: 30 CAPSULE | Refills: 1 | Status: SHIPPED | OUTPATIENT
Start: 2024-02-07

## 2024-02-07 RX ORDER — MELOXICAM 7.5 MG/1
7.5 TABLET ORAL DAILY
Qty: 30 TABLET | Refills: 2 | Status: SHIPPED | OUTPATIENT
Start: 2024-02-07

## 2024-02-14 ENCOUNTER — OFFICE VISIT (OUTPATIENT)
Dept: LAB | Facility: HOSPITAL | Age: 64
End: 2024-02-14
Payer: COMMERCIAL

## 2024-02-14 ENCOUNTER — APPOINTMENT (OUTPATIENT)
Dept: LAB | Facility: HOSPITAL | Age: 64
End: 2024-02-14
Payer: COMMERCIAL

## 2024-02-14 DIAGNOSIS — Z01.818 PRE-OP TESTING: ICD-10-CM

## 2024-02-14 DIAGNOSIS — E78.5 BORDERLINE HYPERLIPIDEMIA: ICD-10-CM

## 2024-02-14 LAB
ALBUMIN SERPL BCP-MCNC: 4.7 G/DL (ref 3.5–5)
ALP SERPL-CCNC: 53 U/L (ref 34–104)
ALT SERPL W P-5'-P-CCNC: 16 U/L (ref 7–52)
ANION GAP SERPL CALCULATED.3IONS-SCNC: 4 MMOL/L
AST SERPL W P-5'-P-CCNC: 17 U/L (ref 13–39)
BASOPHILS # BLD AUTO: 0.01 THOUSANDS/ÂΜL (ref 0–0.1)
BASOPHILS NFR BLD AUTO: 0 % (ref 0–1)
BILIRUB SERPL-MCNC: 0.62 MG/DL (ref 0.2–1)
BUN SERPL-MCNC: 16 MG/DL (ref 5–25)
CALCIUM SERPL-MCNC: 10.1 MG/DL (ref 8.4–10.2)
CHLORIDE SERPL-SCNC: 104 MMOL/L (ref 96–108)
CHOLEST SERPL-MCNC: 196 MG/DL
CO2 SERPL-SCNC: 30 MMOL/L (ref 21–32)
CREAT SERPL-MCNC: 0.67 MG/DL (ref 0.6–1.3)
EOSINOPHIL # BLD AUTO: 0.11 THOUSAND/ÂΜL (ref 0–0.61)
EOSINOPHIL NFR BLD AUTO: 2 % (ref 0–6)
ERYTHROCYTE [DISTWIDTH] IN BLOOD BY AUTOMATED COUNT: 12.2 % (ref 11.6–15.1)
EST. AVERAGE GLUCOSE BLD GHB EST-MCNC: 97 MG/DL
GFR SERPL CREATININE-BSD FRML MDRD: 93 ML/MIN/1.73SQ M
GLUCOSE P FAST SERPL-MCNC: 86 MG/DL (ref 65–99)
HBA1C MFR BLD: 5 %
HCT VFR BLD AUTO: 45.2 % (ref 34.8–46.1)
HDLC SERPL-MCNC: 82 MG/DL
HGB BLD-MCNC: 15.1 G/DL (ref 11.5–15.4)
IMM GRANULOCYTES # BLD AUTO: 0.01 THOUSAND/UL (ref 0–0.2)
IMM GRANULOCYTES NFR BLD AUTO: 0 % (ref 0–2)
LDLC SERPL CALC-MCNC: 101 MG/DL (ref 0–100)
LYMPHOCYTES # BLD AUTO: 1.36 THOUSANDS/ÂΜL (ref 0.6–4.47)
LYMPHOCYTES NFR BLD AUTO: 29 % (ref 14–44)
MCH RBC QN AUTO: 31.3 PG (ref 26.8–34.3)
MCHC RBC AUTO-ENTMCNC: 33.4 G/DL (ref 31.4–37.4)
MCV RBC AUTO: 94 FL (ref 82–98)
MONOCYTES # BLD AUTO: 0.36 THOUSAND/ÂΜL (ref 0.17–1.22)
MONOCYTES NFR BLD AUTO: 8 % (ref 4–12)
NEUTROPHILS # BLD AUTO: 2.85 THOUSANDS/ÂΜL (ref 1.85–7.62)
NEUTS SEG NFR BLD AUTO: 61 % (ref 43–75)
NRBC BLD AUTO-RTO: 0 /100 WBCS
PLATELET # BLD AUTO: 245 THOUSANDS/UL (ref 149–390)
PMV BLD AUTO: 10.2 FL (ref 8.9–12.7)
POTASSIUM SERPL-SCNC: 4.7 MMOL/L (ref 3.5–5.3)
PROT SERPL-MCNC: 7.2 G/DL (ref 6.4–8.4)
RBC # BLD AUTO: 4.83 MILLION/UL (ref 3.81–5.12)
SODIUM SERPL-SCNC: 138 MMOL/L (ref 135–147)
TRIGL SERPL-MCNC: 67 MG/DL
TSH SERPL DL<=0.05 MIU/L-ACNC: 0.48 UIU/ML (ref 0.45–4.5)
WBC # BLD AUTO: 4.7 THOUSAND/UL (ref 4.31–10.16)

## 2024-02-14 PROCEDURE — 85025 COMPLETE CBC W/AUTO DIFF WBC: CPT

## 2024-02-14 PROCEDURE — 80053 COMPREHEN METABOLIC PANEL: CPT

## 2024-02-14 PROCEDURE — 83036 HEMOGLOBIN GLYCOSYLATED A1C: CPT

## 2024-02-14 PROCEDURE — 80061 LIPID PANEL: CPT

## 2024-02-14 PROCEDURE — 84443 ASSAY THYROID STIM HORMONE: CPT

## 2024-02-14 PROCEDURE — 36415 COLL VENOUS BLD VENIPUNCTURE: CPT

## 2024-02-14 PROCEDURE — 93005 ELECTROCARDIOGRAM TRACING: CPT

## 2024-02-16 ENCOUNTER — PROBLEM (OUTPATIENT)
Dept: URBAN - METROPOLITAN AREA CLINIC 6 | Facility: CLINIC | Age: 64
End: 2024-02-16

## 2024-02-16 DIAGNOSIS — H00.034: ICD-10-CM

## 2024-02-16 PROCEDURE — 92012 INTRM OPH EXAM EST PATIENT: CPT

## 2024-02-16 ASSESSMENT — VISUAL ACUITY
OD_SC: 20/40
OS_SC: 20/50-1

## 2024-02-16 ASSESSMENT — TONOMETRY
OS_IOP_MMHG: 11
OD_IOP_MMHG: 9

## 2024-02-17 LAB
ATRIAL RATE: 70 BPM
P AXIS: 57 DEGREES
PR INTERVAL: 168 MS
QRS AXIS: 59 DEGREES
QRSD INTERVAL: 80 MS
QT INTERVAL: 426 MS
QTC INTERVAL: 460 MS
T WAVE AXIS: 58 DEGREES
VENTRICULAR RATE: 70 BPM

## 2024-02-19 RX ORDER — UBIDECARENONE 50 MG
CAPSULE ORAL
COMMUNITY

## 2024-02-19 RX ORDER — BACILLUS COAGULANS/INULIN 1B-250 MG
CAPSULE ORAL
COMMUNITY

## 2024-02-19 NOTE — PRE-PROCEDURE INSTRUCTIONS
Pre-Surgery Instructions:   Medication Instructions    Bacillus Coagulans-Inulin (Probiotic) 1-250 BILLION-MG CAPS Stop taking 7 days prior to surgery.    calcium citrate (CALCITRATE) 950 (200 Ca) MG tablet Stop taking 7 days prior to surgery.    eletriptan (RELPAX) 40 MG tablet Uses PRN- OK to take day of surgery    estradiol (ESTRACE) 0.1 mg/g vaginal cream Take night before surgery    fluticasone (FLONASE) 50 mcg/act nasal spray Uses PRN- OK to take day of surgery    Glucosamine-Chondroitin (GLUCOSAMINE CHONDR COMPLEX PO) Stop taking 7 days prior to surgery.    latanoprost (XALATAN) 0.005 % ophthalmic solution Take day of surgery.    meloxicam (MOBIC) 7.5 mg tablet Stop taking 7 days prior to surgery.    Multiple Vitamins-Minerals (CENTRUM SILVER) tablet Stop taking 7 days prior to surgery.    omeprazole (PriLOSEC) 20 mg delayed release capsule Uses PRN- OK to take day of surgery    PARoxetine (PAXIL) 10 mg tablet Uses PRN- OK to take day of surgery    Red Yeast Rice 600 MG TABS Stop taking 7 days prior to surgery.    timolol (TIMOPTIC) 0.5 % ophthalmic solution Take night before surgery    TURMERIC-GINGER PO Stop taking 7 days prior to surgery.    Medication instructions for day surgery reviewed. Please use only a sip of water to take your instructed medications. Avoid all over the counter vitamins, supplements and NSAIDS for one week prior to surgery per anesthesia guidelines. Tylenol is ok to take as needed.     You will receive a call one business day prior to surgery with an arrival time and hospital directions. If your surgery is scheduled on a Monday, the hospital will be calling you on the Friday prior to your surgery. If you have not heard from anyone by 8pm, please call the hospital supervisor through the hospital  at [phone]. ([facility] [phone] or [place] [phone]).    Do not eat or drink anything after midnight the night before your surgery, including candy, mints, lifesavers, or  chewing gum. Do not drink alcohol 24hrs before your surgery. Try not to smoke at least 24hrs before your surgery.       Follow the pre surgery showering instructions as listed in the “My Surgical Experience Booklet” or otherwise provided by your surgeon's office. Do not use a blade to shave the surgical area 1 week before surgery. It is okay to use a clean electric clippers up to 24 hours before surgery. Do not apply any lotions, creams, including makeup, cologne, deodorant, or perfumes after showering on the day of your surgery. Do not use dry shampoo, hair spray, hair gel, or any type of hair products.     No contact lenses, eye make-up, or artificial eyelashes. Remove nail polish, including gel polish, and any artificial, gel, or acrylic nails if possible. Remove all jewelry including rings and body piercing jewelry.     Wear causal clothing that is easy to take on and off. Consider your type of surgery.    Keep any valuables, jewelry, piercings at home. Please bring any specially ordered equipment (sling, braces) if indicated.    Arrange for a responsible person to drive you to and from the hospital on the day of your surgery. Please confirm the visitor policy for the day of your procedure when you receive your phone call with an arrival time.     Call the surgeon's office with any new illnesses, exposures, or additional questions prior to surgery.    Please reference your “My Surgical Experience Booklet” for additional information to prepare for your upcoming surgery.

## 2024-02-23 ENCOUNTER — HOSPITAL ENCOUNTER (OUTPATIENT)
Facility: HOSPITAL | Age: 64
Setting detail: OUTPATIENT SURGERY
Discharge: HOME | End: 2024-02-23
Attending: OBSTETRICS & GYNECOLOGY | Admitting: OBSTETRICS & GYNECOLOGY
Payer: COMMERCIAL

## 2024-02-23 ENCOUNTER — ANESTHESIA EVENT (OUTPATIENT)
Dept: PERIOP | Facility: HOSPITAL | Age: 64
End: 2024-02-23
Payer: COMMERCIAL

## 2024-02-23 ENCOUNTER — ANESTHESIA (OUTPATIENT)
Dept: PERIOP | Facility: HOSPITAL | Age: 64
End: 2024-02-23
Payer: COMMERCIAL

## 2024-02-23 VITALS
BODY MASS INDEX: 24.05 KG/M2 | RESPIRATION RATE: 16 BRPM | TEMPERATURE: 98.1 F | SYSTOLIC BLOOD PRESSURE: 125 MMHG | WEIGHT: 167.99 LBS | DIASTOLIC BLOOD PRESSURE: 60 MMHG | HEART RATE: 74 BPM | OXYGEN SATURATION: 97 % | HEIGHT: 70 IN

## 2024-02-23 DIAGNOSIS — Z98.890 S/P DILATION AND CURETTAGE: Primary | ICD-10-CM

## 2024-02-23 DIAGNOSIS — N95.0 PMB (POSTMENOPAUSAL BLEEDING): ICD-10-CM

## 2024-02-23 PROCEDURE — NC001 PR NO CHARGE: Performed by: OBSTETRICS & GYNECOLOGY

## 2024-02-23 PROCEDURE — 58561 HYSTEROSCOPY REMOVE MYOMA: CPT | Performed by: OBSTETRICS & GYNECOLOGY

## 2024-02-23 PROCEDURE — 88305 TISSUE EXAM BY PATHOLOGIST: CPT | Performed by: STUDENT IN AN ORGANIZED HEALTH CARE EDUCATION/TRAINING PROGRAM

## 2024-02-23 RX ORDER — PROPOFOL 10 MG/ML
INJECTION, EMULSION INTRAVENOUS CONTINUOUS PRN
Status: DISCONTINUED | OUTPATIENT
Start: 2024-02-23 | End: 2024-02-23

## 2024-02-23 RX ORDER — IBUPROFEN 600 MG/1
600 TABLET ORAL EVERY 6 HOURS PRN
Start: 2024-02-23

## 2024-02-23 RX ORDER — MIDAZOLAM HYDROCHLORIDE 2 MG/2ML
INJECTION, SOLUTION INTRAMUSCULAR; INTRAVENOUS AS NEEDED
Status: DISCONTINUED | OUTPATIENT
Start: 2024-02-23 | End: 2024-02-23

## 2024-02-23 RX ORDER — ACETAMINOPHEN 325 MG/1
975 TABLET ORAL EVERY 6 HOURS PRN
Status: DISCONTINUED | OUTPATIENT
Start: 2024-02-23 | End: 2024-02-23 | Stop reason: HOSPADM

## 2024-02-23 RX ORDER — FENTANYL CITRATE/PF 50 MCG/ML
25 SYRINGE (ML) INJECTION
Status: DISCONTINUED | OUTPATIENT
Start: 2024-02-23 | End: 2024-02-23 | Stop reason: HOSPADM

## 2024-02-23 RX ORDER — LIDOCAINE HYDROCHLORIDE AND EPINEPHRINE 10; 10 MG/ML; UG/ML
INJECTION, SOLUTION INFILTRATION; PERINEURAL AS NEEDED
Status: DISCONTINUED | OUTPATIENT
Start: 2024-02-23 | End: 2024-02-23 | Stop reason: HOSPADM

## 2024-02-23 RX ORDER — ONDANSETRON 2 MG/ML
INJECTION INTRAMUSCULAR; INTRAVENOUS AS NEEDED
Status: DISCONTINUED | OUTPATIENT
Start: 2024-02-23 | End: 2024-02-23

## 2024-02-23 RX ORDER — PROPOFOL 10 MG/ML
INJECTION, EMULSION INTRAVENOUS AS NEEDED
Status: DISCONTINUED | OUTPATIENT
Start: 2024-02-23 | End: 2024-02-23

## 2024-02-23 RX ORDER — METOCLOPRAMIDE HYDROCHLORIDE 5 MG/ML
10 INJECTION INTRAMUSCULAR; INTRAVENOUS ONCE AS NEEDED
Status: DISCONTINUED | OUTPATIENT
Start: 2024-02-23 | End: 2024-02-23 | Stop reason: HOSPADM

## 2024-02-23 RX ORDER — ONDANSETRON 2 MG/ML
4 INJECTION INTRAMUSCULAR; INTRAVENOUS ONCE AS NEEDED
Status: DISCONTINUED | OUTPATIENT
Start: 2024-02-23 | End: 2024-02-23 | Stop reason: HOSPADM

## 2024-02-23 RX ORDER — FENTANYL CITRATE 50 UG/ML
INJECTION, SOLUTION INTRAMUSCULAR; INTRAVENOUS AS NEEDED
Status: DISCONTINUED | OUTPATIENT
Start: 2024-02-23 | End: 2024-02-23

## 2024-02-23 RX ORDER — MAGNESIUM HYDROXIDE 1200 MG/15ML
LIQUID ORAL AS NEEDED
Status: DISCONTINUED | OUTPATIENT
Start: 2024-02-23 | End: 2024-02-23 | Stop reason: HOSPADM

## 2024-02-23 RX ORDER — KETOROLAC TROMETHAMINE 30 MG/ML
INJECTION, SOLUTION INTRAMUSCULAR; INTRAVENOUS AS NEEDED
Status: DISCONTINUED | OUTPATIENT
Start: 2024-02-23 | End: 2024-02-23

## 2024-02-23 RX ORDER — ONDANSETRON 2 MG/ML
4 INJECTION INTRAMUSCULAR; INTRAVENOUS EVERY 6 HOURS PRN
Status: DISCONTINUED | OUTPATIENT
Start: 2024-02-23 | End: 2024-02-23 | Stop reason: HOSPADM

## 2024-02-23 RX ORDER — ACETAMINOPHEN 500 MG
500 TABLET ORAL EVERY 6 HOURS PRN
Start: 2024-02-23

## 2024-02-23 RX ORDER — EPHEDRINE SULFATE 50 MG/ML
INJECTION INTRAVENOUS AS NEEDED
Status: DISCONTINUED | OUTPATIENT
Start: 2024-02-23 | End: 2024-02-23

## 2024-02-23 RX ORDER — LIDOCAINE HYDROCHLORIDE 10 MG/ML
INJECTION, SOLUTION EPIDURAL; INFILTRATION; INTRACAUDAL; PERINEURAL AS NEEDED
Status: DISCONTINUED | OUTPATIENT
Start: 2024-02-23 | End: 2024-02-23

## 2024-02-23 RX ORDER — OXYCODONE HYDROCHLORIDE 5 MG/1
5 TABLET ORAL EVERY 4 HOURS PRN
Status: DISCONTINUED | OUTPATIENT
Start: 2024-02-23 | End: 2024-02-23 | Stop reason: HOSPADM

## 2024-02-23 RX ORDER — SODIUM CHLORIDE, SODIUM LACTATE, POTASSIUM CHLORIDE, CALCIUM CHLORIDE 600; 310; 30; 20 MG/100ML; MG/100ML; MG/100ML; MG/100ML
INJECTION, SOLUTION INTRAVENOUS CONTINUOUS PRN
Status: DISCONTINUED | OUTPATIENT
Start: 2024-02-23 | End: 2024-02-23

## 2024-02-23 RX ADMIN — ACETAMINOPHEN 975 MG: 325 TABLET, FILM COATED ORAL at 13:05

## 2024-02-23 RX ADMIN — KETOROLAC TROMETHAMINE 15 MG: 30 INJECTION, SOLUTION INTRAMUSCULAR; INTRAVENOUS at 11:45

## 2024-02-23 RX ADMIN — EPHEDRINE SULFATE 5 MG: 50 INJECTION, SOLUTION INTRAVENOUS at 11:44

## 2024-02-23 RX ADMIN — PROPOFOL 80 MG: 10 INJECTION, EMULSION INTRAVENOUS at 11:17

## 2024-02-23 RX ADMIN — ONDANSETRON 4 MG: 2 INJECTION INTRAMUSCULAR; INTRAVENOUS at 11:19

## 2024-02-23 RX ADMIN — FENTANYL CITRATE 25 MCG: 50 INJECTION, SOLUTION INTRAMUSCULAR; INTRAVENOUS at 11:23

## 2024-02-23 RX ADMIN — PROPOFOL 100 MCG/KG/MIN: 10 INJECTION, EMULSION INTRAVENOUS at 11:18

## 2024-02-23 RX ADMIN — FENTANYL CITRATE 25 MCG: 50 INJECTION, SOLUTION INTRAMUSCULAR; INTRAVENOUS at 11:17

## 2024-02-23 RX ADMIN — EPHEDRINE SULFATE 5 MG: 50 INJECTION, SOLUTION INTRAVENOUS at 11:36

## 2024-02-23 RX ADMIN — SODIUM CHLORIDE, SODIUM LACTATE, POTASSIUM CHLORIDE, AND CALCIUM CHLORIDE: .6; .31; .03; .02 INJECTION, SOLUTION INTRAVENOUS at 11:14

## 2024-02-23 RX ADMIN — FENTANYL CITRATE 25 MCG: 50 INJECTION, SOLUTION INTRAMUSCULAR; INTRAVENOUS at 11:27

## 2024-02-23 RX ADMIN — OXYCODONE HYDROCHLORIDE 5 MG: 5 TABLET ORAL at 13:05

## 2024-02-23 RX ADMIN — MIDAZOLAM HYDROCHLORIDE 2 MG: 1 INJECTION, SOLUTION INTRAMUSCULAR; INTRAVENOUS at 11:13

## 2024-02-23 RX ADMIN — LIDOCAINE HYDROCHLORIDE 50 MG: 10 INJECTION, SOLUTION EPIDURAL; INFILTRATION; INTRACAUDAL; PERINEURAL at 11:17

## 2024-02-23 RX ADMIN — EPHEDRINE SULFATE 5 MG: 50 INJECTION, SOLUTION INTRAVENOUS at 11:40

## 2024-02-23 NOTE — OP NOTE
OPERATIVE REPORT  PATIENT NAME: Amy Clement    :  1960  MRN: [id number]  Pt Location: [facility] OR ROOM 02    SURGERY DATE: 2024    Surgeons and Role:     * Joselin Alaniz MD - Primary     * Brittany Zepeda MD - Assisting    Preop Diagnosis:  PMB (postmenopausal bleeding) [N95.0]    Post-Op Diagnosis Codes:     * Endocervical polyp     * Endometrial polyps     * Submucosal fibroids    Procedure(s):  DILATATION AND CURETTAGE (D&C) WITH HYSTEROSCOPY  POLYPECTOMY    Specimen(s):  ID Type Source Tests Collected by Time Destination   1 : cervical polyp Tissue Polyp, Cervical/Endometrial TISSUE EXAM Joselin Alaniz MD 2024 1132    2 : uterine polyp and fibroids Tissue Uterus TISSUE EXAM Joselin Alaniz MD 2024 1136    3 : endometrial curettings Tissue Endometrium TISSUE EXAM Joselin Alaniz MD 2024 1145        Estimated Blood Loss:   Minimal    Drains:  None    Anesthesia Type:   IV Sedation with Anesthesia    Operative Indications:  PMB (postmenopausal bleeding) [N95.0]    Operative Findings:  Atrophic external female genitalia. Normal sized, retroverted uterus. No palpable adnexal fullness. Endocervical polyp noted upon visualization of the cervix. 2 posterior uterine corpus endometrial poylps noted on hysteroscopy as well as multiple submucosal fibroids in the right cornua and anterior uterine corpus. Fluid deficit 950mL    Complications:   None apparent    Procedure and Technique:  The patient was correctly identified in preop holding. She was taken to the OR. SCDs were applied to the lower extremities. IV sedation was administered. She was positioned on the OR table in the dorsal lithotomy position with legs supported by Barrington stirrups. All pressure points were padded and warm blankets placed to maintain control of core body temperature. A bimanual exam was performed and the uterus was noted to be retroverted, normal in size and consistency with no palpable adnexal  masses or fullness. The vagina and perineum were prepped with chlorhexidine and she was draped in the usual sterile fashion. Surgical timeout was performed with all in agreement.    The cervix was visualized. The anterior lip of the cervix was then grasped with a single tooth tenaculum. An endocervical polyp was noted and removed with polyp forceps. Paracervical block was then performed with Lidocaine 1% with epinephrine. The uterus sounded to 9cm. The cervix was serially dilated to 23F for introduction of the Symphion hysteroscope.     Hysteroscope was introduced under direct visualization using normal saline solution as the distention media. Hysteroscope was advanced to the uterine fundus and the entire uterine cavity was inspected in a systematic manner. There was noted to be 2 endometrial polyps and the cavity was distorted by several submucosal fibroids. Bilateral tubal ostia were visualized. The Symphion resection tool was then introduced through the hysteroscope. The endometrial polyps and fibroids were resected. Hysteroscope was withdrawn. Sharp curette was then used to perform endocervical curettage.  Endometrial curettage was then performed. All tissue obtained was sent for pathological analysis. The single toothed tenaculum was removed from the anterior lip of the cervix. Good hemostasis was confirmed at the tenaculum puncture sites. All instruments were removed from the vagina. Fluid deficit at the end of the procedure was 950mL.    At the conclusion of the procedure, all needle, sponge, and instrument counts were noted to be correct x2.     Dr. Alaniz was present and participated in all key portions of the case.      Patient Disposition:  PACU with plan for same day discharge to home.        SIGNATURE: Brittany Zepeda MD  DATE: February 23, 2024  TIME: 11:54 AM

## 2024-02-23 NOTE — H&P
Assessment/Plan:    Postmenopausal bleeding  Options D&C vs EB  Lining on imaging 14mm, h/o cervical polyp removal in the office.  At this time I recommend D&C Hysteroscopy with possible polypectomy    We reviewed the risks of the surgery including but not limited to infection, bleeding, injury to nearby organs (bowel. bladder, ureter, blood vessel, nerve) and possible long term consequences of such injury, as well as possibility of  need for blood transfusion and other resuscitative measures.      Diagnoses and all orders for this visit:    Postmenopausal bleeding          Subjective:      Patient ID: Amy Clement is a 63 y.o. female.    Patient here to discuss options regarding her pmb and endometrial thickening. Pelvic US completed 1/11/24. Needs EMB today.     64yo G5P 4 0 1 4 with postmenopausal bleeding.  Began use of esring for treatment of vaginal atrophy and had episode of bleeding. Discontinued ring and then had increase in discharge and irritation for which she was given antifungal. These symptoms resolved and restarted vaginal estrogen cream.  Another episode of bleeding occurred.     Imaging was ordered which revealed thickened endometrium 14mm, fibroids noted which may have submucosal component.   Here to discuss next steps in evaluation. Sampling is needed. EB vs D&C.  H/o cervical polyp removal 4 years ago    Plan for D&C Hysteroscopy    Gynecologic Exam  She complains of vaginal bleeding. This is a new problem. The current episode started 1 to 4 weeks ago. The problem occurs rarely. The problem has been resolved since onset. The patient is experiencing no pain. Pertinent negatives include no chills, constipation, diarrhea, fever, frequency, nausea, sore throat, urgency or vomiting. The vaginal bleeding is spotting. Patient has not been passing clots. Patient has not been passing tissue. Nothing aggravates the symptoms. Past treatments include nothing. The treatment provided no relief. She is  sexually active.     The following portions of the patient's history were reviewed and updated as appropriate: She  has a past medical history of Acute cystitis, Anxiety, Breast lump (.), Dermatitis, Disease of thyroid gland, Diverticulitis (11/2020), Dysuria, ETD (Eustachian tube dysfunction), bilateral, Fibroid (2012), Glaucoma, Hematuria, Migraine (1985), Miscarriage (1990), Polyp at cervical os, and Varicella (1966).  She   Patient Active Problem List    Diagnosis Date Noted    Postmenopausal bleeding 01/23/2024    Chronic hip pain, right 03/02/2023    GERD (gastroesophageal reflux disease) 06/02/2022    Annual physical exam 10/15/2020    Anxiety 08/08/2018    Migraine with aura and without status migrainosus, not intractable 02/27/2017    Thyroid nodule 02/23/2016     She  has a past surgical history that includes Tooth extraction; Tubal ligation; Breast biopsy (Right, 2009); Colonoscopy; and Cataract extraction (Bilateral).  Her family history includes Breast cancer (age of onset: 47) in her mother; Cancer in her father, mother, and son; Endometrial cancer in her maternal grandmother; Heart disease in her maternal grandmother; Hypertension in her father; Migraines in her father; No Known Problems in her daughter, paternal aunt, paternal grandmother, and sister; Rectal cancer in her father; Throat cancer in her brother.  She  reports that she has never smoked. She has never used smokeless tobacco. She reports current alcohol use of about 3.0 standard drinks of alcohol per week. She reports that she does not use drugs.  No current facility-administered medications for this encounter.     No current facility-administered medications on file prior to encounter.     Current Outpatient Medications on File Prior to Encounter   Medication Sig    Bacillus Coagulans-Inulin (Probiotic) 1-250 BILLION-MG CAPS Take by mouth    calcium citrate (CALCITRATE) 950 (200 Ca) MG tablet Take 1 tablet by mouth daily    eletriptan  "(RELPAX) 40 MG tablet Take 1 tablet (40 mg total) by mouth once as needed for migraine for up to 64 doses may repeat in 2 hours if necessary    estradiol (ESTRACE) 0.1 mg/g vaginal cream Insert 0.5 g into the vagina 2 (two) times a week    fluticasone (FLONASE) 50 mcg/act nasal spray 1 spray into each nostril daily (Patient taking differently: 1 spray into each nostril if needed)    Glucosamine-Chondroitin (GLUCOSAMINE CHONDR COMPLEX PO) Take by mouth daily     latanoprost (XALATAN) 0.005 % ophthalmic solution Administer 1 drop to both eyes daily at bedtime    Multiple Vitamins-Minerals (CENTRUM SILVER) tablet Take by mouth    PARoxetine (PAXIL) 10 mg tablet Take 1 tablet (10 mg total) by mouth daily    Red Yeast Rice 600 MG TABS Take by mouth    timolol (TIMOPTIC) 0.5 % ophthalmic solution Administer 1 drop to both eyes daily    TURMERIC-GINGER PO Take by mouth     She is allergic to monistat [miconazole] and pollen extract..    Review of Systems   Constitutional:  Negative for activity change, appetite change, chills, fatigue and fever.   HENT:  Negative for rhinorrhea, sneezing and sore throat.    Eyes:  Negative for visual disturbance.   Respiratory:  Negative for cough, shortness of breath and wheezing.    Cardiovascular:  Negative for chest pain, palpitations and leg swelling.   Gastrointestinal:  Negative for abdominal distention, constipation, diarrhea, nausea and vomiting.   Genitourinary:  Negative for difficulty urinating, frequency and urgency.   Neurological:  Negative for syncope and light-headedness.         Objective:      /67   Pulse 77   Temp 97.7 °F (36.5 °C) (Temporal)   Resp 18   Ht 5' 10\" (1.778 m)   Wt 76.2 kg (167 lb 15.9 oz)   LMP  (LMP Unknown)   SpO2 97%   BMI 24.10 kg/m²          Physical Exam  Constitutional:       General: She is not in acute distress.     Appearance: She is well-developed. She is not diaphoretic.   Neck:      Vascular: No JVD.   Cardiovascular:      Rate " and Rhythm: Normal rate and regular rhythm.      Heart sounds: Normal heart sounds. No murmur heard.     No friction rub. No gallop.   Pulmonary:      Effort: Pulmonary effort is normal. No respiratory distress.      Breath sounds: Normal breath sounds.   Abdominal:      General: Bowel sounds are normal. There is no distension.      Palpations: Abdomen is soft.      Tenderness: There is no abdominal tenderness. There is no guarding or rebound.   Genitourinary:     Labia:         Right: No rash, tenderness or lesion.         Left: No rash, tenderness or lesion.       Vagina: Normal. No vaginal discharge, erythema, tenderness or bleeding.      Cervix: No cervical motion tenderness, discharge, friability or erythema.      Uterus: Not deviated, not enlarged, not fixed and not tender.       Adnexa:         Right: No mass, tenderness or fullness.          Left: No mass, tenderness or fullness.     Musculoskeletal:      Cervical back: Neck supple.      Right lower leg: No edema.      Left lower leg: No edema.   Lymphadenopathy:      Cervical: No cervical adenopathy.   Neurological:      Mental Status: She is alert and oriented to person, place, and time.      Deep Tendon Reflexes: Reflexes are normal and symmetric.         I have spent a total time of 25 minutes on 02/23/24 in caring for this patient including Diagnostic results, Prognosis, Risks and benefits of tx options, Impressions, Counseling / Coordination of care, Documenting in the medical record, and Reviewing / ordering tests, medicine, procedures  .

## 2024-02-23 NOTE — ANESTHESIA PREPROCEDURE EVALUATION
"Procedure:  DILATATION AND CURETTAGE (D&C) WITH HYSTEROSCOPY (Uterus)  POLYPECTOMY (Uterus)    Relevant Problems   CARDIO   (+) Migraine with aura and without status migrainosus, not intractable      GI/HEPATIC   (+) GERD (gastroesophageal reflux disease)      NEURO/PSYCH   (+) Anxiety   (+) Migraine with aura and without status migrainosus, not intractable        Physical Exam    Airway    Mallampati score: III  TM Distance: >3 FB  Neck ROM: full     Dental   No notable dental hx     Cardiovascular      Pulmonary      Other Findings  post-pubertal.      Anesthesia Plan  ASA Score- 2     Anesthesia Type- IV sedation with anesthesia with ASA Monitors.         Additional Monitors:     Airway Plan:            Plan Factors-Exercise tolerance (METS): >4 METS.    Chart reviewed. EKG reviewed.  Existing labs reviewed. Patient summary reviewed.    Patient is not a current smoker.  Patient did not smoke on day of surgery.            Induction- intravenous.    Postoperative Plan-     Informed Consent- Anesthetic plan and risks discussed with patient.  I personally reviewed this patient with the CRNA. Discussed and agreed on the Anesthesia Plan with the CRNA..              VITALS  /67   Pulse 77   Temp 97.7 °F (36.5 °C) (Temporal)   Resp 18   Ht 5' 10\" (1.778 m)   Wt 76.2 kg (167 lb 15.9 oz)   LMP  (LMP Unknown)   SpO2 97%   BMI 24.10 kg/m²   BP Readings from Last 3 Encounters:   02/23/24 114/67   01/23/24 98/62   01/15/24 108/72     LABS  Results from Last 12 Months   Lab Units 02/14/24  0748 09/08/23  0829   SODIUM mmol/L 138  --    POTASSIUM mmol/L 4.7  --    CHLORIDE mmol/L 104  --    CO2 mmol/L 30  --    ANION GAP mmol/L 4  --    BUN mg/dL 16  --    CREATININE mg/dL 0.67  --    CALCIUM mg/dL 10.1  --    AST U/L 17  --    ALT U/L 16  --    ALK PHOS U/L 53  --    TOTAL BILIRUBIN mg/dL 0.62  --    ALBUMIN g/dL 4.7  --    HEMOGLOBIN A1C % 5.0 4.9     Results from Last 12 Months   Lab Units 02/14/24  0748   WBC " "Thousand/uL 4.70   HEMOGLOBIN g/dL 15.1   HEMATOCRIT % 45.2   PLATELETS Thousands/uL 245     No results for input(s): \"APTT\", \"INR\", \"PTT\" in the last 8784 hours.    ECG  Encounter Date: 02/14/24   ECG 12 lead   Result Value    Ventricular Rate 70    Atrial Rate 70    AR Interval 168    QRSD Interval 80    QT Interval 426    QTC Interval 460    P Axis 57    QRS Axis 59    T Wave Axis 58    Narrative    Normal sinus rhythm  Normal ECG  No previous ECGs available  Confirmed by Bao Munoz ([id number]) on 2/17/2024 11:07:45 PM     No results found for this or any previous visit.    ECHOCARDIOGRAPHY AND OTHER TESTING/IMAGING  n/a    ANESTHESIA RISK-BENEFIT DISCUSSION  BENEFITS INCLUDE (NBK [id number], PMID 81862011):   (1) A specialized anesthesia team reduces mortality and morbidity for major surgeries.  (2) The team provides analgesia/sedation/amnesia/akinesia as safely as possible.  (3) The team strives to reduce discomfort as much as reasonably possible.    RISKS ASSESSMENT (AND PLANS TO MITIGATE RISKS) INCLUDE:    Neurologic system: IntraOp awareness (Risk is ~1:1,000 - 1:14,000; PMID 80006260), Stroke (Risk ~<0.1-2% for most cases; PMID 20333536), nerve injury, vision loss, and POCD.     Airway and Pulmonary system: Dental or mouth injury, throat pain, critical hypoxia, pneumothorax, prolonged intubation, post-op respiratory compromise.  Airway/Intubation risks and prior data: [facility]  Major ARISCAT risk factors for pulmonary complications include: none, yielding a score 0-1= Low risk, 1.6%.  Cardiovascular system: Hypotension/Vasoplegia, arrhythmias, blood clot, devan-op cardiac injury/MACE, bleeding, infection, or injury to vascular structures.  Signs of active, severe cardiac instability: none  Jd's RCRI score items: none, yielding an RCRI Score of 0= 0.4% risk of MACE  Are devan-op or intra-op beta blockers indicated? (PMID [id number]): no  FEN/GI system: Aspiration risk (~0.5% " [facility] [id number]) and PONV (10-80% per Apfel score).  ASA NPO guideline compliance?: Yes  Medication risk assessment: Allergic reactions, bleeding due to anticoagulant use, overdoses, drug-drug interactions, injury to a fetus or  in pregnant or breastfeeding patients, sedation while driving/operating heavy machinery.  Recent relevant medications: See MAR or Med Review  Personal or family history of anesthesia complications: no  Pregnancy Status: N/A  Estimate mortality risks associated with anesthesia based on ASA-PS (PMID 00520116): ASA-PS II: risk 1:20,000

## 2024-02-23 NOTE — ANESTHESIA POSTPROCEDURE EVALUATION
Post-Op Assessment Note    CV Status:  Stable  Pain Score: 0    Pain management: adequate       Mental Status:  Awake and sleepy   Hydration Status:  Euvolemic   PONV Controlled:  Controlled   Airway Patency:  Patent     Post Op Vitals Reviewed: Yes    No anethesia notable event occurred.    Staff: CRNA               BP   104/78   Temp   97.4   Pulse  75   Resp   14   SpO2   100

## 2024-02-23 NOTE — INTERVAL H&P NOTE
H&P reviewed. After examining the patient I find no changes in the patients condition since the H&P had been written.    Vitals:    02/23/24 1032   BP: 114/67   Pulse: 77   Resp: 18   Temp: 97.7 °F (36.5 °C)   SpO2: 97%

## 2024-02-27 PROCEDURE — 88305 TISSUE EXAM BY PATHOLOGIST: CPT | Performed by: STUDENT IN AN ORGANIZED HEALTH CARE EDUCATION/TRAINING PROGRAM

## 2024-02-28 ENCOUNTER — TELEPHONE (OUTPATIENT)
Age: 64
End: 2024-02-28

## 2024-02-28 NOTE — TELEPHONE ENCOUNTER
Pt called. Pt wanted to schedule post-op for D&C 02/23. Pt states she is also aware of her results.

## 2024-04-02 ENCOUNTER — HOSPITAL ENCOUNTER (OUTPATIENT)
Dept: RADIOLOGY | Facility: HOSPITAL | Age: 64
Discharge: HOME | End: 2024-04-02
Payer: COMMERCIAL

## 2024-04-02 ENCOUNTER — OFFICE VISIT (OUTPATIENT)
Dept: FAMILY MEDICINE CLINIC | Facility: CLINIC | Age: 64
End: 2024-04-02
Payer: COMMERCIAL

## 2024-04-02 VITALS
HEART RATE: 88 BPM | OXYGEN SATURATION: 97 % | BODY MASS INDEX: 23.91 KG/M2 | DIASTOLIC BLOOD PRESSURE: 70 MMHG | WEIGHT: 167 LBS | SYSTOLIC BLOOD PRESSURE: 110 MMHG | HEIGHT: 70 IN

## 2024-04-02 DIAGNOSIS — S99.912A INJURY OF LEFT ANKLE AND FOOT, INITIAL ENCOUNTER: Primary | ICD-10-CM

## 2024-04-02 DIAGNOSIS — S99.922A INJURY OF LEFT ANKLE AND FOOT, INITIAL ENCOUNTER: ICD-10-CM

## 2024-04-02 DIAGNOSIS — S99.922A INJURY OF LEFT ANKLE AND FOOT, INITIAL ENCOUNTER: Primary | ICD-10-CM

## 2024-04-02 DIAGNOSIS — S99.912A INJURY OF LEFT ANKLE AND FOOT, INITIAL ENCOUNTER: ICD-10-CM

## 2024-04-02 PROCEDURE — 73610 X-RAY EXAM OF ANKLE: CPT

## 2024-04-02 PROCEDURE — 73630 X-RAY EXAM OF FOOT: CPT

## 2024-04-02 PROCEDURE — 99213 OFFICE O/P EST LOW 20 MIN: CPT | Performed by: NURSE PRACTITIONER

## 2024-04-02 RX ORDER — AMOXICILLIN AND CLAVULANATE POTASSIUM 875; 125 MG/1; MG/1
1 TABLET, FILM COATED ORAL 2 TIMES DAILY
COMMUNITY
Start: 2024-02-16 | End: 2024-04-02 | Stop reason: ALTCHOICE

## 2024-04-02 NOTE — PROGRESS NOTES
Name: Amy Clement      : 1960      MRN: [id number]  Encounter Provider: ELY Nguyen  Encounter Date: 2024   Encounter department: [facility] [address]    Assessment & Plan     1. Injury of left ankle and foot, initial encounter  Comments:  Advised ice to the first 24 to 48 hours then heat, elevation, compression, rest.  Discussed use of naproxen twice daily for 5-7 days. To return if sx worsen  Orders:  -     XR ankle 3+ vw left; Future; Expected date: 2024  -     XR foot 3+ vw left; Future; Expected date: 2024           Subjective      Patient presents to the office for evaluation of injury to left ankle and foot.  Injury occurred 3 days ago while visiting family member.  Patient had mechanical fall on slick surface outside.  Denies striking head or any other injuries to body.  Denies LOC.  Cannot recall if she twisted her ankle or not, but states upon slipping, experienced immediate pain to left ankle.  She notes increased swelling to ankle, ecchymosis to foot.  Has been using ice, elevation, splint, Alleve with mild relief.  As per patient, swelling has decreased and can bear weight on left foot and ankle.  Notes worsening pain when standing for prolonged periods of time.      Review of Systems   Constitutional:  Negative for fever.   Eyes:  Negative for photophobia and visual disturbance.   Respiratory:  Negative for cough and shortness of breath.    Cardiovascular:  Negative for chest pain, palpitations and leg swelling.   Gastrointestinal:  Negative for abdominal pain, nausea and vomiting.   Musculoskeletal:  Positive for arthralgias (left ankle/foot) and joint swelling. Negative for back pain and neck pain.   Skin:  Negative for wound.   Neurological:  Negative for dizziness, light-headedness and headaches.   Psychiatric/Behavioral:  Negative for confusion.        Current Outpatient Medications on File Prior to Visit   Medication  "Sig    acetaminophen (TYLENOL) 500 mg tablet Take 1 tablet (500 mg total) by mouth every 6 (six) hours as needed for mild pain    Bacillus Coagulans-Inulin (Probiotic) 1-250 BILLION-MG CAPS Take by mouth    calcium citrate (CALCITRATE) 950 (200 Ca) MG tablet Take 1 tablet by mouth daily    eletriptan (RELPAX) 40 MG tablet Take 1 tablet (40 mg total) by mouth once as needed for migraine for up to 64 doses may repeat in 2 hours if necessary    estradiol (ESTRACE) 0.1 mg/g vaginal cream Insert 0.5 g into the vagina 2 (two) times a week    fluticasone (FLONASE) 50 mcg/act nasal spray 1 spray into each nostril daily (Patient taking differently: 1 spray into each nostril if needed)    Glucosamine-Chondroitin (GLUCOSAMINE CHONDR COMPLEX PO) Take by mouth daily     latanoprost (XALATAN) 0.005 % ophthalmic solution Administer 1 drop to both eyes daily at bedtime    meloxicam (MOBIC) 7.5 mg tablet TAKE 1 TABLET BY MOUTH EVERY DAY (Patient taking differently: Take 7.5 mg by mouth if needed)    Multiple Vitamins-Minerals (CENTRUM SILVER) tablet Take by mouth    omeprazole (PriLOSEC) 20 mg delayed release capsule TAKE 1 CAPSULE BY MOUTH EVERY DAY (Patient taking differently: 20 mg if needed)    PARoxetine (PAXIL) 10 mg tablet Take 1 tablet (10 mg total) by mouth daily    Red Yeast Rice 600 MG TABS Take by mouth    timolol (TIMOPTIC) 0.5 % ophthalmic solution Administer 1 drop to both eyes daily    TURMERIC-GINGER PO Take by mouth    [DISCONTINUED] amoxicillin-clavulanate (AUGMENTIN) 875-125 mg per tablet Take 1 tablet by mouth 2 (two) times a day (Patient not taking: Reported on 4/2/2024)    [DISCONTINUED] ibuprofen (MOTRIN) 600 mg tablet Take 1 tablet (600 mg total) by mouth every 6 (six) hours as needed for mild pain (Patient not taking: Reported on 4/2/2024)       Objective     /70   Pulse 88   Ht 5' 10\" (1.778 m)   Wt 75.8 kg (167 lb)   LMP  (LMP Unknown)   SpO2 97%   BMI 23.96 kg/m²     Physical Exam  Vitals " reviewed.   Constitutional:       General: She is not in acute distress.     Appearance: Normal appearance. She is not ill-appearing.   HENT:      Head: Normocephalic and atraumatic.      Right Ear: External ear normal.      Left Ear: External ear normal.      Nose: Nose normal.      Mouth/Throat:      Mouth: Mucous membranes are moist.      Pharynx: Oropharynx is clear.   Eyes:      Conjunctiva/sclera: Conjunctivae normal.      Pupils: Pupils are equal, round, and reactive to light.   Cardiovascular:      Rate and Rhythm: Normal rate and regular rhythm.      Pulses: Normal pulses.      Heart sounds: Normal heart sounds. No murmur heard.  Pulmonary:      Effort: Pulmonary effort is normal.      Breath sounds: Normal breath sounds.   Musculoskeletal:      Cervical back: Normal range of motion and neck supple.      Right ankle: Normal.      Left ankle: Swelling and ecchymosis present. No deformity. Tenderness present over the lateral malleolus. Normal pulse.      Left Achilles Tendon: Tenderness present.      Right foot: Normal.      Left foot: Normal capillary refill. Tenderness present. No swelling. Normal pulse.   Skin:     General: Skin is warm and dry.   Neurological:      Mental Status: She is alert and oriented to person, place, and time.   Psychiatric:         Mood and Affect: Mood normal.         Behavior: Behavior normal.       ELY Nguyen

## 2024-04-24 DIAGNOSIS — K21.9 GASTROESOPHAGEAL REFLUX DISEASE WITHOUT ESOPHAGITIS: ICD-10-CM

## 2024-04-24 RX ORDER — OMEPRAZOLE 20 MG/1
CAPSULE, DELAYED RELEASE ORAL
Qty: 30 CAPSULE | Refills: 1 | Status: SHIPPED | OUTPATIENT
Start: 2024-04-24

## 2024-04-29 ENCOUNTER — 6 MONTH FOLLOW UP (OUTPATIENT)
Dept: URBAN - METROPOLITAN AREA CLINIC 6 | Facility: CLINIC | Age: 64
End: 2024-04-29

## 2024-04-29 DIAGNOSIS — H40.1131: ICD-10-CM

## 2024-04-29 DIAGNOSIS — H02.882: ICD-10-CM

## 2024-04-29 DIAGNOSIS — Z96.1: ICD-10-CM

## 2024-04-29 DIAGNOSIS — H02.885: ICD-10-CM

## 2024-04-29 PROCEDURE — 92133 CPTRZD OPH DX IMG PST SGM ON: CPT

## 2024-04-29 PROCEDURE — 92012 INTRM OPH EXAM EST PATIENT: CPT

## 2024-04-29 ASSESSMENT — VISUAL ACUITY
OD_SC: 20/30-2
OS_SC: 20/40+1

## 2024-04-29 ASSESSMENT — TONOMETRY
OD_IOP_MMHG: 10
OS_IOP_MMHG: 10

## 2024-05-31 DIAGNOSIS — R23.2 HOT FLASHES: ICD-10-CM

## 2024-06-01 RX ORDER — PAROXETINE 10 MG/1
10 TABLET, FILM COATED ORAL DAILY
Qty: 30 TABLET | Refills: 5 | Status: SHIPPED | OUTPATIENT
Start: 2024-06-01

## 2024-06-07 ENCOUNTER — OFFICE VISIT (OUTPATIENT)
Dept: URGENT CARE | Facility: CLINIC | Age: 64
End: 2024-06-07
Payer: COMMERCIAL

## 2024-06-07 VITALS
DIASTOLIC BLOOD PRESSURE: 70 MMHG | HEART RATE: 82 BPM | HEIGHT: 70 IN | WEIGHT: 166.8 LBS | OXYGEN SATURATION: 98 % | BODY MASS INDEX: 23.88 KG/M2 | TEMPERATURE: 98.2 F | SYSTOLIC BLOOD PRESSURE: 122 MMHG | RESPIRATION RATE: 20 BRPM

## 2024-06-07 DIAGNOSIS — J06.9 BACTERIAL UPPER RESPIRATORY INFECTION: Primary | ICD-10-CM

## 2024-06-07 DIAGNOSIS — B96.89 BACTERIAL UPPER RESPIRATORY INFECTION: Primary | ICD-10-CM

## 2024-06-07 DIAGNOSIS — J02.9 SORE THROAT: ICD-10-CM

## 2024-06-07 LAB — S PYO AG THROAT QL: NEGATIVE

## 2024-06-07 PROCEDURE — 99213 OFFICE O/P EST LOW 20 MIN: CPT | Performed by: NURSE PRACTITIONER

## 2024-06-07 PROCEDURE — 87880 STREP A ASSAY W/OPTIC: CPT | Performed by: NURSE PRACTITIONER

## 2024-06-07 RX ORDER — AMOXICILLIN 500 MG/1
500 CAPSULE ORAL 2 TIMES DAILY
Qty: 14 CAPSULE | Refills: 0 | Status: SHIPPED | OUTPATIENT
Start: 2024-06-07 | End: 2024-06-14

## 2024-06-07 NOTE — PROGRESS NOTES
[facility]        NAME: Amy Clement is a 63 y.o. female  : 1960    MRN: [id number]  DATE: 2024  TIME: 9:46 AM    Assessment and Plan   Sore throat [J02.9]  1. Sore throat  POCT rapid ANTIGEN strepA      2. Bacterial upper respiratory infection  amoxicillin (AMOXIL) 500 mg capsule            Patient Instructions     Rapid strep is negative  Take antibiotics as prescribed   Follow up with PCP in 3-5 days.  Proceed to  ER if symptoms worsen.    If tests have been performed at [facility], our office will contact you with results if changes need to be made to the care plan discussed with you at the visit.  You can review your full results on [facility].    Chief Complaint     Chief Complaint   Patient presents with    Sore Throat     Sore throat and low grade fever started about a week. Slight dry cough and rash on chest that seems to be clearing. R ear feels full.          History of Present Illness       HPI  Reports sore throat and low grade fever x 1 week. Also dry cough and nose congestion. Had a rash on her chest x 1 day in the beginning which resolved on its own.     Review of Systems   Review of Systems   Constitutional:  Negative for fever.   HENT:  Positive for congestion, ear pain, rhinorrhea, sinus pressure and sore throat.    Respiratory:  Positive for cough. Negative for chest tightness, shortness of breath and wheezing.    Cardiovascular:  Negative for chest pain.   Gastrointestinal:  Negative for abdominal pain, diarrhea and vomiting.   Neurological:  Negative for headaches.         Current Medications       Current Outpatient Medications:     acetaminophen (TYLENOL) 500 mg tablet, Take 1 tablet (500 mg total) by mouth every 6 (six) hours as needed for mild pain, Disp: , Rfl:     amoxicillin (AMOXIL) 500 mg capsule, Take 1 capsule (500 mg total) by mouth 2 (two) times a day for 7 days, Disp: 14 capsule, Rfl: 0    Bacillus Coagulans-Inulin (Probiotic) 1-250 BILLION-MG  CAPS, Take by mouth, Disp: , Rfl:     calcium citrate (CALCITRATE) 950 (200 Ca) MG tablet, Take 1 tablet by mouth daily, Disp: , Rfl:     eletriptan (RELPAX) 40 MG tablet, Take 1 tablet (40 mg total) by mouth once as needed for migraine for up to 64 doses may repeat in 2 hours if necessary, Disp: 16 tablet, Rfl: 3    estradiol (ESTRACE) 0.1 mg/g vaginal cream, Insert 0.5 g into the vagina 2 (two) times a week, Disp: 42.5 g, Rfl: 3    fluticasone (FLONASE) 50 mcg/act nasal spray, 1 spray into each nostril daily (Patient taking differently: 1 spray into each nostril if needed), Disp: 16 g, Rfl: 1    Glucosamine-Chondroitin (GLUCOSAMINE CHONDR COMPLEX PO), Take by mouth daily , Disp: , Rfl:     latanoprost (XALATAN) 0.005 % ophthalmic solution, Administer 1 drop to both eyes daily at bedtime, Disp: , Rfl:     Multiple Vitamins-Minerals (CENTRUM SILVER) tablet, Take by mouth, Disp: , Rfl:     omeprazole (PriLOSEC) 20 mg delayed release capsule, TAKE 1 CAPSULE BY MOUTH EVERY DAY, Disp: 30 capsule, Rfl: 1    PARoxetine (PAXIL) 10 mg tablet, Take 1 tablet (10 mg total) by mouth daily, Disp: 30 tablet, Rfl: 5    Red Yeast Rice 600 MG TABS, Take by mouth, Disp: , Rfl:     timolol (TIMOPTIC) 0.5 % ophthalmic solution, Administer 1 drop to both eyes daily, Disp: , Rfl:     TURMERIC-GINGER PO, Take by mouth, Disp: , Rfl:     meloxicam (MOBIC) 7.5 mg tablet, TAKE 1 TABLET BY MOUTH EVERY DAY (Patient not taking: Reported on 6/7/2024), Disp: 30 tablet, Rfl: 2    Current Allergies     Allergies as of 06/07/2024 - Reviewed 06/07/2024   Allergen Reaction Noted    Monistat [miconazole] Itching 01/23/2024    Pollen extract Itching, Swelling, and Sneezing 08/08/2018            The following portions of the patient's history were reviewed and updated as appropriate: allergies, current medications, past family history, past medical history, past social history, past surgical history and problem list.     Past Medical History:   Diagnosis  "Date    Acute cystitis     Anxiety     medication for hot flasshes    Breast lump .    last assessed 01/16/2017    Dermatitis     last assessed 02/27/2017    Disease of thyroid gland     last assessed 02/23/2016    Diverticulitis 11/2020    Diverticulitis of colon November 25 July 2016 1st dx    Dysuria     last assessed 04/28/2017    ETD (Eustachian tube dysfunction), bilateral     last assessed 02/23/2016    Fibroid 2012    GERD (gastroesophageal reflux disease) Occasionally    Glaucoma     Left    Hematuria     Migraine 1985    Miscarriage 1990    Polyp at cervical os     Varicella 1966       Past Surgical History:   Procedure Laterality Date    BREAST BIOPSY Right 2009    2 areas benign    CATARACT EXTRACTION Bilateral     COLONOSCOPY      MA HYSTEROSCOPY BX ENDOMETRIUM&/POLYPC W/WO D&C N/A 2/23/2024    Procedure: DILATATION AND CURETTAGE (D&C) WITH HYSTEROSCOPY;  Surgeon: Joselin Alaniz MD;  Location: [facility];  Service: Gynecology    MA HYSTEROSCOPY BX ENDOMETRIUM&/POLYPC W/WO D&C N/A 2/23/2024    Procedure: POLYPECTOMY;  Surgeon: Joselin Alaniz MD;  Location: [facility];  Service: Gynecology    TOOTH EXTRACTION      TUBAL LIGATION         Family History   Problem Relation Age of Onset    Breast cancer Mother 47    Cancer Mother         Breast cancer    Cancer Father         Colon cancer    Hypertension Father         Late life dx    Rectal cancer Father     Migraines Father     No Known Problems Sister     No Known Problems Daughter     Heart disease Maternal Grandmother     Endometrial cancer Maternal Grandmother         unknown age    No Known Problems Paternal Grandmother     Throat cancer Brother     No Known Problems Paternal Aunt     Cancer Son         Kidney cancer    Ovarian cancer Neg Hx     Uterine cancer Neg Hx     Cervical cancer Neg Hx          Medications have been verified.        Objective   /70   Pulse 82   Temp 98.2 °F (36.8 °C)   Resp 20   Ht 5' 10\" (1.778 m)   " Wt 75.7 kg (166 lb 12.8 oz)   LMP  (LMP Unknown)   SpO2 98%   BMI 23.93 kg/m²   No LMP recorded (lmp unknown). Patient is postmenopausal.       Physical Exam     Physical Exam  Constitutional:       Appearance: She is not ill-appearing.   HENT:      Right Ear: Tympanic membrane normal.      Left Ear: Tympanic membrane normal.      Nose: Rhinorrhea present.      Mouth/Throat:      Pharynx: Posterior oropharyngeal erythema present.      Tonsils: No tonsillar exudate. 1+ on the right. 1+ on the left.      Comments: Post nasal drip  Cardiovascular:      Rate and Rhythm: Regular rhythm.   Pulmonary:      Effort: Pulmonary effort is normal.      Breath sounds: No wheezing.      Comments: Mild congestion in the chest

## 2024-08-09 ENCOUNTER — TELEPHONE (OUTPATIENT)
Age: 64
End: 2024-08-09

## 2024-10-04 ENCOUNTER — HOSPITAL ENCOUNTER (OUTPATIENT)
Dept: ULTRASOUND IMAGING | Facility: CLINIC | Age: 64
End: 2024-10-04
Payer: COMMERCIAL

## 2024-10-04 ENCOUNTER — HOSPITAL ENCOUNTER (OUTPATIENT)
Dept: MAMMOGRAPHY | Facility: CLINIC | Age: 64
End: 2024-10-04
Payer: COMMERCIAL

## 2024-10-04 VITALS — DIASTOLIC BLOOD PRESSURE: 73 MMHG | HEART RATE: 81 BPM | SYSTOLIC BLOOD PRESSURE: 113 MMHG

## 2024-10-04 VITALS — HEIGHT: 70 IN | WEIGHT: 166.89 LBS | BODY MASS INDEX: 23.89 KG/M2

## 2024-10-04 DIAGNOSIS — R92.8 ABNORMAL MAMMOGRAM: ICD-10-CM

## 2024-10-04 DIAGNOSIS — R93.89 ABNORMAL ULTRASOUND: ICD-10-CM

## 2024-10-04 DIAGNOSIS — R92.30 DENSE BREAST TISSUE: ICD-10-CM

## 2024-10-04 DIAGNOSIS — Z12.31 ENCOUNTER FOR SCREENING MAMMOGRAM FOR MALIGNANT NEOPLASM OF BREAST: ICD-10-CM

## 2024-10-04 DIAGNOSIS — R92.8 ABNORMAL FINDING ON BREAST IMAGING: ICD-10-CM

## 2024-10-04 PROCEDURE — 76942 ECHO GUIDE FOR BIOPSY: CPT

## 2024-10-04 PROCEDURE — 88360 TUMOR IMMUNOHISTOCHEM/MANUAL: CPT | Performed by: STUDENT IN AN ORGANIZED HEALTH CARE EDUCATION/TRAINING PROGRAM

## 2024-10-04 PROCEDURE — A4648 IMPLANTABLE TISSUE MARKER: HCPCS

## 2024-10-04 PROCEDURE — 77067 SCR MAMMO BI INCL CAD: CPT

## 2024-10-04 PROCEDURE — 88305 TISSUE EXAM BY PATHOLOGIST: CPT | Performed by: STUDENT IN AN ORGANIZED HEALTH CARE EDUCATION/TRAINING PROGRAM

## 2024-10-04 PROCEDURE — 38505 NEEDLE BIOPSY LYMPH NODES: CPT

## 2024-10-04 PROCEDURE — 88341 IMHCHEM/IMCYTCHM EA ADD ANTB: CPT | Performed by: STUDENT IN AN ORGANIZED HEALTH CARE EDUCATION/TRAINING PROGRAM

## 2024-10-04 PROCEDURE — 88342 IMHCHEM/IMCYTCHM 1ST ANTB: CPT | Performed by: STUDENT IN AN ORGANIZED HEALTH CARE EDUCATION/TRAINING PROGRAM

## 2024-10-04 PROCEDURE — 77063 BREAST TOMOSYNTHESIS BI: CPT

## 2024-10-04 PROCEDURE — 19083 BX BREAST 1ST LESION US IMAG: CPT

## 2024-10-04 PROCEDURE — 76641 ULTRASOUND BREAST COMPLETE: CPT

## 2024-10-04 PROCEDURE — 76642 ULTRASOUND BREAST LIMITED: CPT

## 2024-10-04 RX ORDER — LIDOCAINE HYDROCHLORIDE 10 MG/ML
5 INJECTION, SOLUTION EPIDURAL; INFILTRATION; INTRACAUDAL; PERINEURAL ONCE
Status: COMPLETED | OUTPATIENT
Start: 2024-10-04 | End: 2024-10-04

## 2024-10-04 RX ADMIN — LIDOCAINE HYDROCHLORIDE 5 ML: 10 INJECTION, SOLUTION EPIDURAL; INFILTRATION; INTRACAUDAL; PERINEURAL at 13:38

## 2024-10-04 RX ADMIN — LIDOCAINE HYDROCHLORIDE 5 ML: 10 INJECTION, SOLUTION EPIDURAL; INFILTRATION; INTRACAUDAL; PERINEURAL at 14:27

## 2024-10-04 NOTE — PROGRESS NOTES
Patient arrived via:    __x___ambulatory    _____wheelchair    _____stretcher      Domestic violence screen    ___x___negative______positive    Breast Implants:    _______yes ___x_____no

## 2024-10-04 NOTE — PROGRESS NOTES
Procedure type:    __x___ultrasound guided _____stereotactic    Breast:    _____Left ___x__Right    Location: axilla    Needle: 12 gauge elie    # of passes: 3     Clip: ROSA ISELA      Performed by: Dr. Khai Miles     Pressure held for 5 minutes by: Donna Johnston     Steranabel Strips:    __x___yes _____no    Band aid:    ___x__yes_____no    Tape and guaze:    _____yes ___x__no    Tolerated procedure:    ___x__yes _____no

## 2024-10-04 NOTE — PROGRESS NOTES
Procedure type:    __x___ultrasound guided _____stereotactic    Breast:    _____Left ___x__Right    Location: 4 o'clock 2 cmfn     Needle: 12 gauge elie     # of passes: 3    Clip: ROSA ISELA      Performed by: Dr. Khai Miles     Pressure held for 5 minutes by: Donna Johnston     Steranabel Strips:    ___x__yes _____no    Band aid:    __x___yes_____no    Tape and guaze:    _____yes ___x__no    Tolerated procedure:    ___x__yes _____no

## 2024-10-04 NOTE — PROGRESS NOTES
Met with patient and Dr. Khai Miles regarding recommendation for;    __X___ RIGHT ______LEFT      __X___Ultrasound guided  ______Stereotactic breast biopsy.      __X___Verbalized understanding.    Reviewed clip placement with patient, pt states understanding: Yes: __X__ No: ____  Comments:    Blood thinners:  No: ___X__ Yes: ______ What:          Biopsy teaching sheet given:  Yes: ___X___ No: ________    Pt given contact information and adv to call with any questions/needs    Patient advised to arrive at 1300 for a 1400 appointment

## 2024-10-07 NOTE — PROGRESS NOTES
Post procedure call completed    Bleeding: _____yes __X___no (pt denies)    Pain: _____yes ___X___no (pt denies, used ice, took OTC x1)    Redness/Swelling: ______yes ___X___no (pt with slight bruising)    Band aid removed: ___X__yes _____no    Steri-Strips intact: ___X___yes _____no (discussed with patient to remove steri strips on Wednesday if they have not come off on their own)    Pt with no questions at this time, adv will call when results available, adv to call with any questions or concerns, has name/# for contact

## 2024-10-08 ENCOUNTER — DOCUMENTATION (OUTPATIENT)
Dept: HEMATOLOGY ONCOLOGY | Facility: CLINIC | Age: 64
End: 2024-10-08

## 2024-10-08 ENCOUNTER — TELEPHONE (OUTPATIENT)
Dept: MAMMOGRAPHY | Facility: CLINIC | Age: 64
End: 2024-10-08

## 2024-10-08 DIAGNOSIS — C50.919 INVASIVE LOBULAR CARCINOMA OF BREAST IN FEMALE (HCC): Primary | ICD-10-CM

## 2024-10-08 PROCEDURE — 88342 IMHCHEM/IMCYTCHM 1ST ANTB: CPT | Performed by: STUDENT IN AN ORGANIZED HEALTH CARE EDUCATION/TRAINING PROGRAM

## 2024-10-08 PROCEDURE — 88305 TISSUE EXAM BY PATHOLOGIST: CPT | Performed by: STUDENT IN AN ORGANIZED HEALTH CARE EDUCATION/TRAINING PROGRAM

## 2024-10-08 PROCEDURE — 88341 IMHCHEM/IMCYTCHM EA ADD ANTB: CPT | Performed by: STUDENT IN AN ORGANIZED HEALTH CARE EDUCATION/TRAINING PROGRAM

## 2024-10-08 PROCEDURE — 88360 TUMOR IMMUNOHISTOCHEM/MANUAL: CPT | Performed by: STUDENT IN AN ORGANIZED HEALTH CARE EDUCATION/TRAINING PROGRAM

## 2024-10-08 NOTE — PROGRESS NOTES
Breast Cancer Nurse Navigator    Chart reviewed for prepping for initial outreach by nurse navigator.    Nurse Navigator and Patient Navigator added to care team    Diagnosis: R Invasive mammary carcinoma with right LN mets receptors pending    Recent Imaging:    10/04/2024 US breast screening bilateral complete(ABUS)/ Mammo screening bilateral w 3d & cad  10/04/2024 US breast right limited (diagnostic)  10/04/2024 US guided breast biopsy right complete/US guided breast lymph node biopsy right/ Mammo post biopsy right    Recent Pathology: 10/04/2024 Tissue Exam  Breast, Right,  4:00 O'clock,  2 cmfn - Invasive mammary carcinoma with predominately lobular features   Apocrine and histiocytoid morphologic features. Gr 2 Receptors pending  Lymph node, Axilla, biopsy - Metastatic carcinoma compatible with mammary origin    Does patient have a ROSA ISELA ? Yes x2    Genetic testing: tbd    Family history of cancer:  History of breast cancer in mother and colon cancer in father, brother throat cancer, endometrial caner in her maternal grandmother    Previous Breast Cancer Diagnosis:  No previous biopsy right breast in 2009 benign    Any further needs:     Other:    Information forwarded to [facility]: 10/08/2024    Please schedule patient with Archana by 7 days at [place] (preferred location)    Nurse Navigator will call patient for initial outreach.      Will have Patient Navigator outreach patient after surgical oncology consultation.  Any clinical questions to be directed to office nurse.

## 2024-10-08 NOTE — TELEPHONE ENCOUNTER
Call placed to patient after pt given biopsy results from radiologist, Dr. Khai Miles, questions answered, support given, adv next step is to set patient up with surgeon, options discussed and pt would like to have appt made with Dr. Koki Magaña, adv patient that  would call her back by tomorrow with appt, pt states understanding, pt with no questions at this time, has name/# for any further needs    Pt also adv that she would be getting call from Nurse Navigator Sara for additional support and continued care, discussed genetic testing and adv that Sara would be providing more information and making referral, pt interested in completing

## 2024-10-08 NOTE — PROGRESS NOTES
All records needed are in patients chart. No records retrieval needed at this time.     Referral received/ Chart reviewed for work up completed     Imaging completed: [facility]   [] PET/CT   [] MRI   [] CT   [x] US   [x] Mammo   [] Bone scan   [] N/A    Pathology completed:    Date: 10/04/2024   Location: [facility]   []N/A    Additional records needed:   [] Genomic report   [] Genetic testing results   [] Office Note   [] Procedure/ Operative note   [] Lab results   [] N/A      [] Radiation Oncology records retrieval needed (PN to route to rad/onc clerical pool once scheduled)  Date:  Location:

## 2024-10-09 ENCOUNTER — TELEPHONE (OUTPATIENT)
Age: 64
End: 2024-10-09

## 2024-10-09 ENCOUNTER — PATIENT OUTREACH (OUTPATIENT)
Dept: HEMATOLOGY ONCOLOGY | Facility: CLINIC | Age: 64
End: 2024-10-09

## 2024-10-09 DIAGNOSIS — C50.911 MALIGNANT NEOPLASM OF RIGHT FEMALE BREAST, UNSPECIFIED ESTROGEN RECEPTOR STATUS, UNSPECIFIED SITE OF BREAST (HCC): Primary | ICD-10-CM

## 2024-10-09 NOTE — TELEPHONE ENCOUNTER
Pt called. Pt is looking to schedule with breast surgeon Sara Magaña within the Network. Pt wanted Dr Loo opinion on physician or a different one. Also if provider can help with pt getting in sooner to see surgeon. Pt stated she might need 12/03 visit r/s due to possibly procedure but will hold off to closer date if needed. Please advise.

## 2024-10-09 NOTE — PROGRESS NOTES
Breast Oncology Nurse Navigator    Called patient for initial outreach from nurse navigator.  Introduced myself and my role as well as members of the navigation team.  Oncology Nurse Navigator will follow patient until they are seen by surgical oncology, after which the patient navigator initiate outreach and follow the patient to survivorship.      Referral received from [facility]  Breast cancer diagnosis was made after an abnormal mammogram resulted tamiko right breast  and right LN axilla biopsy   Patient states an understanding/awareness of diagnosis    Any other issues/diagnoses?  no.      Confirmed upcoming appointment with Dr. Magaña, including date, time and location.  Patient will be accompanied by her daughter  Provided brief explanation of what to expect at this visit including a breast exam, review of pathology, recommendation for consults with both a medical and radiation oncologist as s standard of care. Also discussed surgery options and if she would like to meet with plastics as well. She inquired about how long the entire process takes  - from time of diagnosis to end of treatment. I explained it is different for all patients as the treatment plans are individualized.   Patient inquired about getting the Flu, COVID and RDV vaccine prior to her consult with Dr Magaña. Message sent to Dr Magaña inquiring if she should postpone the vaccines as she may request additional testing and or imaging. She also inquired about getting a sooner appointment. A message was sent to the [facility] pool requesting a sooner appointment. Patient is aware and stated she is open to another location is she, Dr Magaña, has an availability  Patient has transportation to appointments. She does not smoke. She is retired.      Support system includes her daughter and son and their families, and her friends.  She also has young grandchildren she spends time with.   Referral placed to oncology social worker: no    Cancer family history  includes her mother with breast cancer. Discussed genetic testing and patient agreed to have it completed.   Referral to oncology genetics: yes,a STAT referral was placed    Discussed the Cancer Support Community and some of the programs and services offered  Discussed Breast Cancer Support group that meets monthly at [facility].  Information sent via [facility].    Patient has my contact information and knows she can reach out with questions.  Office hours provided.  Patient provided with the phone number for [phone].  General assessment completed.  Patient does have [facility] set up  [facility] message sent with our team's contact information.

## 2024-10-09 NOTE — TELEPHONE ENCOUNTER
Pt called to schedule referral. She needed to be seen within 7 days with dr waddell and first available with dr Waddell is 10/21/24 @ 7105.

## 2024-10-10 ENCOUNTER — TELEPHONE (OUTPATIENT)
Dept: SURGICAL ONCOLOGY | Facility: CLINIC | Age: 64
End: 2024-10-10

## 2024-10-10 ENCOUNTER — TELEPHONE (OUTPATIENT)
Dept: GENETICS | Facility: CLINIC | Age: 64
End: 2024-10-10

## 2024-10-10 NOTE — TELEPHONE ENCOUNTER
I introduced myself to Amy and let her know that her nurse navigator reached out to the cancer risk and genetics program on her behalf.    I reviewed the following with Amy:    While the majority of cancer occurs by chance, approximately 5-10% of breast cancer has an underlying genetic cause. Genetic testing is available which can determine if there is an underlying genetic cause to your cancer.  Understanding if there is an underlying genetic cause can:  Provide your surgeon with additional information to help with surgical decisions, treatment decisions and eligibility for clinical trials.    It can determine if you have an increased risk for any additional cancers.  Help family members understand their cancer risk.       We work closely with the [facility] breast surgeons and are reaching out to see if you have interest in genetic testing. The reason we are reaching out at this time is that this result may help your surgeon determine the appropriate type of surgery (i.e. lumpectomy vs mastectomy). This test is not a requirement but can take 5-10 days to complete so we would like to start the process as soon as possible so the results are ready for your appointment with your surgeon.       If you are interested in genetic testing, I can collect your family history and initiate genetic testing for you.        Patient elected to pursue testing     Diagnosis Details:  Invasive Lobular Carcinoma, DCIS  Right  Hormone receptors pending  Metastatic    Personal History:  Do you have a personal history of any other cancer? No  If yes type/age of diagnosis:     Family history:   Do you have Ashkenazi Mormonism ancestry? No  If yes, maternal, paternal, or both?    Do you have any children? Yes  How many sons? 3  How many daughters? 1  Do any of your children have a history of cancer? Yes  If yes type/age of diagnosis:   Son - Kidney Cancer age 38    Do you have any brothers or sisters? Yes  How many brothers? 1  How  many sisters? 1  Are they from the same parents? Yes  If no how maternal/paternal half-siblings:  Do any of your brothers or sisters have a history of cancer? Yes  If yes who and the type/age of diagnosis:   Brother - Throat Cancer age 52; Bladder Cancer age 70           Do you have nieces or nephews? Yes  Do any of them have a history of cancer? No  If yes type/age of diagnosis:    Does your mother have a history of cancer? Yes  If yes, cancer type and age of diagnosis: Breast Cancer age 47  Is your mother still living? No  Age/Age of death: 91    Thinking about your mother's family (aunts, uncles, cousins, grandparents) is there anyone with a history of cancer? No  If yes, list relationship, cancer type and age of diagnosis:    Does your father have a history of cancer? Yes  If yes, cancer type and age of diagnosis: Rectal Cancer age 85  Is your father still living? No  Age/age of death: 100    Thinking about your father's family (aunts, uncles, cousins, grandparents) is there anyone with a history of cancer? Yes  If yes, list relationship, cancer type and age of diagnosis:   Paternal Aunt - Throat Cancer age 60s ()     Types of Results  Positive Result - May explain personal diagnosis/family history. Can give surgeon information on treatment plan, inform future screening/management or tell a person about other possible risks. Positive results can initiate testing for other family members who may be at risk (children, siblings, etc)  Negative Result - Does not give an explanation. Surgical/treatment plan will be based on clinical presentation and will be part of discussion with surgeon. Negative result cannot be passed down to children, but they are still at elevated risk.  Uncertain Result - Common, but treated like a negative result clinically. 90% are downgraded over time.     [facility]'s billing policy   Most insurance plans cover the cost of genetic testing. The out-of-pocket cost varies due to  the differences in deductibles, co-payments and co-insurance defined by individual plans but 90% of people pay $100 or less for a genetic test     A blood kit will be mailed to you overnight. Please take the blood kit along with packet of paperwork to any [facility]'s lab to have your blood drawn.    We have genetic counselors available, if you have any additional questions or would like to speak with them we can schedule you a 15 minute appointment.      Plan:  A blood kit will be mailed out on 10/10/2024 along with information on genetic testing and the lab's billing policy.    Genetic Testing Preformed: [employer] BRCAplus STAT Panel (13 genes): JUAN J, BARD1, BRCA1, BRCA2, CDH1, CHEK2, NF1, PALB2, PTEN, RAD51C, RAD51D, STK11, TP53 with reflex to [employer]: Cancer+RNAinsight (59 genes): APC, JUAN J, AXIN2, BAP1, BARD1, BMPR1A, BRCA1, BRCA2, BRIP1, CDH1, CDK4, CDKN1B, CDKN2A, CHEK2, CTNNA1, DICER1, EGLN1, EPCAM, FH, FLCN, GREM1, HOXB13, KIF1B, KIT, MAX, MEN1, MET, MITF, MLH1, MSH2, MSH3, MSH6, MUTYH, NF1, NTHL1, PALB2, PDGFRA PMS2, POLD1, POLE, POT1, PTEN, RAD51C, RAD51D, RB1, RET, SDHA, SDHAF2, SDHB, SDHC, SDHD, SMAD4, SMARCA4, STK11, WDRB773, TP53, TSC1, TSC2, VHL    Result Call Information:  I confirmed the patient's mobile number on file as the best number to call with results  I confirmed with the patient that we can leave a voicemail on the provided numbers    Initial results will take approximately 5-12 days to return     Additional results may take up to 2-3 weeks to complete once test is started.    Patient does not have any further questions, declined meeting with genetic counselor    When your results are ready, someone from the genetics team will call you, review the results, and contact your breast surgeon. You will be contacted with any type of result- positive, negative, or uncertain.

## 2024-10-10 NOTE — TELEPHONE ENCOUNTER
Called patient and left a voicemail. Informed her that 10/21 was the first available appointment with Dr. Magaña, however, if she'd like to be seen sooner, we do have availability on 10/16 and 10/17 with Dr. Cardarelli in [place]. [facility] number was left if patient would like to take a sooner appointment.

## 2024-10-10 NOTE — TELEPHONE ENCOUNTER
Patient returned my call on my teams number. Prefers to see Dr. Magaña and will keep the appointment on 10/21. I told her if she changed her mind, she can call the office back and schedule sooner with Dr. Cardarelli.

## 2024-10-14 ENCOUNTER — DOCUMENTATION (OUTPATIENT)
Dept: GENETICS | Facility: CLINIC | Age: 64
End: 2024-10-14

## 2024-10-15 ENCOUNTER — TELEPHONE (OUTPATIENT)
Dept: HEMATOLOGY ONCOLOGY | Facility: CLINIC | Age: 64
End: 2024-10-15

## 2024-10-15 ENCOUNTER — DOCUMENTATION (OUTPATIENT)
Dept: HEMATOLOGY ONCOLOGY | Facility: CLINIC | Age: 64
End: 2024-10-15

## 2024-10-15 ENCOUNTER — APPOINTMENT (OUTPATIENT)
Dept: LAB | Facility: HOSPITAL | Age: 64
End: 2024-10-15
Payer: COMMERCIAL

## 2024-10-15 DIAGNOSIS — D05.01 LOBULAR CARCINOMA IN SITU OF RIGHT BREAST: ICD-10-CM

## 2024-10-15 DIAGNOSIS — C50.911 MALIGNANT NEOPLASM OF RIGHT FEMALE BREAST, UNSPECIFIED ESTROGEN RECEPTOR STATUS, UNSPECIFIED SITE OF BREAST (HCC): Primary | ICD-10-CM

## 2024-10-15 DIAGNOSIS — Z80.0 FAMILY HISTORY OF MALIGNANT NEOPLASM OF GASTROINTESTINAL TRACT: ICD-10-CM

## 2024-10-15 DIAGNOSIS — Z80.51 FAMILY HISTORY OF MALIGNANT NEOPLASM OF KIDNEY: ICD-10-CM

## 2024-10-15 DIAGNOSIS — Z80.3 FAMILY HISTORY OF MALIGNANT NEOPLASM OF BREAST: ICD-10-CM

## 2024-10-15 PROCEDURE — 36415 COLL VENOUS BLD VENIPUNCTURE: CPT

## 2024-10-15 NOTE — PROGRESS NOTES
Per Dr Magaña patient needs to see med onc. Referral placed and sent to .  Patient is scheduled with Dr Gonzalez for 10/18 at 4pm

## 2024-10-15 NOTE — TELEPHONE ENCOUNTER
I contacted patient to schedule Medical Oncology consult . Patient was offered and agreed to the below Medical oncology consult appointment. We discussed what to expect at this appointment. At this time patient had no further questions or  concerns at this time. I encouraged her to call should any arise.        Type of appointment-Medical Oncology consult  Provider-Dr. Gonzalez  Napr-25-  Time-4:00pm  Location-[place]

## 2024-10-16 ENCOUNTER — ESTABLISHED COMPREHENSIVE EXAM (OUTPATIENT)
Dept: URBAN - METROPOLITAN AREA CLINIC 6 | Facility: CLINIC | Age: 64
End: 2024-10-16

## 2024-10-16 DIAGNOSIS — H40.1131: ICD-10-CM

## 2024-10-16 PROBLEM — C77.3 BREAST CANCER METASTASIZED TO AXILLARY LYMPH NODE, RIGHT (HCC): Status: ACTIVE | Noted: 2024-10-16

## 2024-10-16 PROBLEM — C50.911 BREAST CANCER METASTASIZED TO AXILLARY LYMPH NODE, RIGHT (HCC): Status: ACTIVE | Noted: 2024-10-16

## 2024-10-16 PROBLEM — Z00.00 ANNUAL PHYSICAL EXAM: Status: RESOLVED | Noted: 2020-10-15 | Resolved: 2024-10-16

## 2024-10-16 PROBLEM — C50.311 MALIGNANT NEOPLASM OF LOWER-INNER QUADRANT OF RIGHT BREAST OF FEMALE, ESTROGEN RECEPTOR NEGATIVE (HCC): Status: ACTIVE | Noted: 2024-10-16

## 2024-10-16 PROBLEM — Z17.1 MALIGNANT NEOPLASM OF LOWER-INNER QUADRANT OF RIGHT BREAST OF FEMALE, ESTROGEN RECEPTOR NEGATIVE (HCC): Status: ACTIVE | Noted: 2024-10-16

## 2024-10-16 PROCEDURE — 92083 EXTENDED VISUAL FIELD XM: CPT

## 2024-10-16 PROCEDURE — 92250 FUNDUS PHOTOGRAPHY W/I&R: CPT

## 2024-10-16 PROCEDURE — 92012 INTRM OPH EXAM EST PATIENT: CPT

## 2024-10-16 ASSESSMENT — VISUAL ACUITY
OD_PH: 20/40-2
OS_SC: 20/60+1
OD_SC: 20/60-1
OS_PH: 20/40

## 2024-10-16 ASSESSMENT — TONOMETRY
OS_IOP_MMHG: 18
OD_IOP_MMHG: 19

## 2024-10-18 ENCOUNTER — OFFICE VISIT (OUTPATIENT)
Dept: HEMATOLOGY ONCOLOGY | Facility: CLINIC | Age: 64
End: 2024-10-18
Payer: COMMERCIAL

## 2024-10-18 VITALS
TEMPERATURE: 98.4 F | WEIGHT: 164 LBS | HEART RATE: 85 BPM | OXYGEN SATURATION: 98 % | BODY MASS INDEX: 23.48 KG/M2 | DIASTOLIC BLOOD PRESSURE: 78 MMHG | HEIGHT: 70 IN | RESPIRATION RATE: 18 BRPM | SYSTOLIC BLOOD PRESSURE: 100 MMHG

## 2024-10-18 DIAGNOSIS — Z51.81 THERAPEUTIC DRUG MONITORING: ICD-10-CM

## 2024-10-18 DIAGNOSIS — C50.911 MALIGNANT NEOPLASM OF RIGHT FEMALE BREAST, UNSPECIFIED ESTROGEN RECEPTOR STATUS, UNSPECIFIED SITE OF BREAST (HCC): ICD-10-CM

## 2024-10-18 DIAGNOSIS — Z17.1 MALIGNANT NEOPLASM OF LOWER-INNER QUADRANT OF RIGHT BREAST OF FEMALE, ESTROGEN RECEPTOR NEGATIVE (HCC): Primary | ICD-10-CM

## 2024-10-18 DIAGNOSIS — C50.311 MALIGNANT NEOPLASM OF LOWER-INNER QUADRANT OF RIGHT BREAST OF FEMALE, ESTROGEN RECEPTOR NEGATIVE (HCC): Primary | ICD-10-CM

## 2024-10-18 PROCEDURE — 99245 OFF/OP CONSLTJ NEW/EST HI 55: CPT | Performed by: INTERNAL MEDICINE

## 2024-10-18 RX ORDER — ONDANSETRON 8 MG/1
8 TABLET, FILM COATED ORAL EVERY 8 HOURS PRN
Qty: 30 TABLET | Refills: 2 | Status: SHIPPED | OUTPATIENT
Start: 2024-10-18

## 2024-10-18 RX ORDER — DEXAMETHASONE 4 MG/1
8 TABLET ORAL 2 TIMES DAILY WITH MEALS
Qty: 12 TABLET | Refills: 6 | Status: SHIPPED | OUTPATIENT
Start: 2024-11-03 | End: 2024-11-24

## 2024-10-18 RX ORDER — LIDOCAINE/PRILOCAINE 2.5 %-2.5%
CREAM (GRAM) TOPICAL AS NEEDED
Qty: 30 G | Refills: 2 | Status: SHIPPED | OUTPATIENT
Start: 2024-10-18

## 2024-10-18 RX ORDER — PROCHLORPERAZINE MALEATE 10 MG
10 TABLET ORAL EVERY 6 HOURS PRN
Qty: 30 TABLET | Refills: 2 | Status: SHIPPED | OUTPATIENT
Start: 2024-10-18

## 2024-10-21 ENCOUNTER — DOCUMENTATION (OUTPATIENT)
Dept: OTHER | Facility: HOSPITAL | Age: 64
End: 2024-10-21

## 2024-10-21 ENCOUNTER — OFFICE VISIT (OUTPATIENT)
Dept: SURGICAL ONCOLOGY | Facility: CLINIC | Age: 64
End: 2024-10-21
Payer: COMMERCIAL

## 2024-10-21 VITALS
BODY MASS INDEX: 23.69 KG/M2 | HEIGHT: 70 IN | DIASTOLIC BLOOD PRESSURE: 62 MMHG | TEMPERATURE: 98.3 F | HEART RATE: 69 BPM | RESPIRATION RATE: 16 BRPM | WEIGHT: 165.5 LBS | OXYGEN SATURATION: 96 % | SYSTOLIC BLOOD PRESSURE: 108 MMHG

## 2024-10-21 DIAGNOSIS — C77.3 BREAST CANCER METASTASIZED TO AXILLARY LYMPH NODE, RIGHT (HCC): ICD-10-CM

## 2024-10-21 DIAGNOSIS — Z80.3 FAMILY HISTORY OF BREAST CANCER: ICD-10-CM

## 2024-10-21 DIAGNOSIS — Z17.1 MALIGNANT NEOPLASM OF LOWER-INNER QUADRANT OF RIGHT BREAST OF FEMALE, ESTROGEN RECEPTOR NEGATIVE (HCC): Primary | ICD-10-CM

## 2024-10-21 DIAGNOSIS — C50.311 MALIGNANT NEOPLASM OF LOWER-INNER QUADRANT OF RIGHT BREAST OF FEMALE, ESTROGEN RECEPTOR NEGATIVE (HCC): Primary | ICD-10-CM

## 2024-10-21 DIAGNOSIS — C50.911 BREAST CANCER METASTASIZED TO AXILLARY LYMPH NODE, RIGHT (HCC): ICD-10-CM

## 2024-10-21 DIAGNOSIS — C50.919 INVASIVE LOBULAR CARCINOMA OF BREAST IN FEMALE (HCC): ICD-10-CM

## 2024-10-21 PROCEDURE — 99245 OFF/OP CONSLTJ NEW/EST HI 55: CPT | Performed by: SURGERY

## 2024-10-21 NOTE — PROGRESS NOTES
[facility]   Informed Consent      I, Lorena Brown, Clinical Research Coordinator, met with Amy Clement and her son on 10/21/2024 at 11:20 am to conduct the informed consent process for enrollment into [employer] 2021-05 clinical research study. Lorena Brown, Amy Clement, her son, and Clinical Research Nurse Samira Del Valle was present during the consent discussion.  The current IRB approved informed consent form was reviewed in its entirety. Amy Clement was given sufficient time to review the study information and to ask questions, and all questions were answered to the satisfaction of Amy Clement. The patient wanted to obtain her study labs during collection of her pre-treatment outpatient labs, and so the informed consent process will occur and be signed at that time. Pt aware that her eligibility is dependent on her labs and informed consent being obtained prior to the start of any treatment. A copy of the unsigned informed consent form was provided to Amy Clement for her review, with plans to coordinate with the Clinical Research Coordinator to arrange signing of the informed consent and collection of the blood sample.

## 2024-10-21 NOTE — PROGRESS NOTES
Breast Consultation-Surgical Oncology     [address]  [facility] [place]  [address]  [facility] [address]    Name:  Amy Clement  YOB: 1960  MRN:  [id number]    Assessment & Plan   Diagnoses and all orders for this visit:    Malignant neoplasm of lower-inner quadrant of right breast of female, estrogen receptor negative (HCC)    Breast cancer metastasized to axillary lymph node, right (HCC)    Family history of breast cancer    Extremely dense tissue of both breasts on mammography    Invasive lobular carcinoma of breast in female (HCC)  -     Ambulatory referral to Surgical Oncology        HPI: Amy Clement is a 64 y.o. year old female who presents with newly diagnosed carcinoma of the right breast.  She states that she was asymptomatic referable to the breast.  She had a screening mammogram and ultrasound at the same time.  She was called back for diagnostic imaging and biopsy of the breast and axilla.  She did submit a sample for genetic testing which is currently pending.  She reports family history of breast cancer in her mother.    Surgical treatment to date consisted of not applicable.    Oncology History:    Oncology History   Malignant neoplasm of lower-inner quadrant of right breast of female, estrogen receptor negative (HCC)   10/4/2024 Biopsy    A. Right breast ultrasound-guided biopsy  4 o'clock, 2 cm from nipple (ROSA ISELA)  Invasive mammary carcinoma with predominately lobular features   Apocrine and histiocytoid morphologic features  Grade 2  ER <1, SC 10, HER2 3+  Lymphovascular invasion present    B. Right axillary lymph node biopsy (ROSA ISELA)  Metastatic carcinoma, compatible with mammary origin  ER <1, SC 70, HER2 3+    Concordant. Right malignancy appears unifocal; biopsy-proven carcinoma measured 1.7 cm on US. Biopsy-proven metastatic disease to right axillary lymph node. Left breast clear.     10/15/2024 Genetic Testing    Genetic  testing with RNA analysis ordered  [facility]     2024 -  Chemotherapy    alteplase (CATHFLO), 2 mg, Intracatheter, Every 1 Minute as needed, 0 of 18 cycles  pegfilgrastim (NEULASTA ONPRO), 6 mg, Subcutaneous, Once, 0 of 6 cycles  fosaprepitant (EMEND) IVPB, 150 mg, Intravenous, Once, 0 of 6 cycles  pertuzumab (PERJETA) IVPB, 840 mg, Intravenous, Once, 0 of 18 cycles  CARBOplatin (PARAPLATIN) IVPB (GOG AUC DOSING), , Intravenous, Once, 0 of 6 cycles  DOCEtaxel (TAXOTERE) chemo infusion, 75 mg/m2 = 144 mg, Intravenous, Once, 0 of 6 cycles  trastuzumab (HERCEPTIN) chemo infusion, 8 mg/kg = 595 mg, Intravenous, Once, 0 of 18 cycles         Pertinent reproductive history:  Age at menarche:    OB History          5    Para   4    Term   4            AB   1    Living   4         SAB   1    IAB        Ectopic        Multiple        Live Births   4           Obstetric Comments   Menarche: 12 y/o  Age at first pregnancy: 21                 Problem List:   Patient Active Problem List   Diagnosis    Migraine with aura and without status migrainosus, not intractable    Thyroid nodule    Anxiety    GERD (gastroesophageal reflux disease)    Chronic hip pain, right    Postmenopausal bleeding    S/P dilation and curettage    Malignant neoplasm of lower-inner quadrant of right breast of female, estrogen receptor negative (HCC)    Breast cancer metastasized to axillary lymph node, right (HCC)     Past Medical History:   Diagnosis Date    Acute cystitis     Anxiety     medication for hot flasshes    Breast lump .    last assessed 2017    Dermatitis     last assessed 2017    Disease of thyroid gland     last assessed 2016    Diverticulitis 2020    Diverticulitis of colon  1st dx    Dysuria     last assessed 2017    ETD (Eustachian tube dysfunction), bilateral     last assessed 2016    Fibroid 2012    GERD (gastroesophageal reflux disease) Occasionally    Glaucoma      Left    Hematuria     Migraine 1985    Miscarriage 1990    Polyp at cervical os     Varicella 1966     Past Surgical History:   Procedure Laterality Date    BREAST BIOPSY Right 2009    2 areas benign    CATARACT EXTRACTION Bilateral     COLONOSCOPY      TX HYSTEROSCOPY BX ENDOMETRIUM&/POLYPC W/WO D&C N/A 02/23/2024    Procedure: DILATATION AND CURETTAGE (D&C) WITH HYSTEROSCOPY;  Surgeon: Joselin Alaniz MD;  Location: [facility];  Service: Gynecology    TX HYSTEROSCOPY BX ENDOMETRIUM&/POLYPC W/WO D&C N/A 02/23/2024    Procedure: POLYPECTOMY;  Surgeon: Joselin Alaniz MD;  Location: [facility];  Service: Gynecology    TUBAL LIGATION      US GUIDED BREAST BIOPSY RIGHT COMPLETE Right 10/04/2024    US GUIDED BREAST LYMPH NODE BIOPSY RIGHT Right 10/04/2024     Family History   Problem Relation Age of Onset    Breast cancer Mother 47    Hypertension Father         Late life dx    Rectal cancer Father     Migraines Father 85    No Known Problems Sister     Throat cancer Brother     Cancer Brother         bladder    Heart disease Maternal Grandmother     No Known Problems Paternal Grandmother     No Known Problems Daughter     Kidney cancer Son 38    No Known Problems Paternal Aunt      Social History     Socioeconomic History    Marital status: /Civil Union     Spouse name: Not on file    Number of children: Not on file    Years of education: Not on file    Highest education level: Not on file   Occupational History     Comment: Retired   Tobacco Use    Smoking status: Never    Smokeless tobacco: Never   Vaping Use    Vaping status: Never Used   Substance and Sexual Activity    Alcohol use: Yes     Alcohol/week: 3.0 standard drinks of alcohol     Types: 3 Glasses of wine per week     Comment: occasionally    Drug use: No    Sexual activity: Yes     Partners: Male     Birth control/protection: Post-menopausal   Other Topics Concern    Not on file   Social History Narrative    Activities:  Dance     Always uses seat belt    Exercise:  Walking    Lives with      Social Determinants of Health     Financial Resource Strain: Not on file   Food Insecurity: Not on file   Transportation Needs: Not on file   Physical Activity: Not on file   Stress: Not on file   Social Connections: Not on file   Intimate Partner Violence: Not on file   Housing Stability: Not on file     Current Outpatient Medications   Medication Sig Dispense Refill    Bacillus Coagulans-Inulin (Probiotic) 1-250 BILLION-MG CAPS Take by mouth      calcium citrate (CALCITRATE) 950 (200 Ca) MG tablet Take 1 tablet by mouth daily      [START ON 11/3/2024] dexamethasone (DECADRON) 4 mg tablet Take 2 tablets (8 mg total) by mouth 2 (two) times a day with meals for 21 days Take 2 tabs by mouth twice daily the day before, the day of, and the day after chemo. Do not start before November 3, 2024. 12 tablet 6    eletriptan (RELPAX) 40 MG tablet Take 1 tablet (40 mg total) by mouth once as needed for migraine for up to 64 doses may repeat in 2 hours if necessary 16 tablet 3    fluticasone (FLONASE) 50 mcg/act nasal spray 1 spray into each nostril daily (Patient taking differently: 1 spray into each nostril if needed) 16 g 1    Glucosamine-Chondroitin (GLUCOSAMINE CHONDR COMPLEX PO) Take by mouth daily       latanoprost (XALATAN) 0.005 % ophthalmic solution Administer 1 drop to both eyes daily at bedtime      Multiple Vitamins-Minerals (CENTRUM SILVER) tablet Take by mouth      ondansetron (ZOFRAN) 8 mg tablet Take 1 tablet (8 mg total) by mouth every 8 (eight) hours as needed for nausea or vomiting 30 tablet 2    PARoxetine (PAXIL) 10 mg tablet Take 1 tablet (10 mg total) by mouth daily 30 tablet 5    prochlorperazine (COMPAZINE) 10 mg tablet Take 1 tablet (10 mg total) by mouth every 6 (six) hours as needed for nausea or vomiting 30 tablet 2    Red Yeast Rice 600 MG TABS Take by mouth      timolol (TIMOPTIC) 0.5 % ophthalmic solution Administer 1 drop to  both eyes daily      TURMERIC-GINGER PO Take by mouth      acetaminophen (TYLENOL) 500 mg tablet Take 1 tablet (500 mg total) by mouth every 6 (six) hours as needed for mild pain      estradiol (ESTRACE) 0.1 mg/g vaginal cream Insert 0.5 g into the vagina 2 (two) times a week (Patient not taking: Reported on 10/18/2024) 42.5 g 3    lidocaine-prilocaine (EMLA) cream Apply topically as needed for mild pain (Patient not taking: Reported on 10/21/2024) 30 g 2    meloxicam (MOBIC) 7.5 mg tablet TAKE 1 TABLET BY MOUTH EVERY DAY (Patient not taking: Reported on 6/7/2024) 30 tablet 2    omeprazole (PriLOSEC) 20 mg delayed release capsule TAKE 1 CAPSULE BY MOUTH EVERY DAY 30 capsule 1     No current facility-administered medications for this visit.     Allergies   Allergen Reactions    Monistat [Miconazole] Itching    Pollen Extract Itching, Swelling and Sneezing         The following portions of the patient's history were reviewed and updated as appropriate: allergies, current medications, past family history, past medical history, past social history, past surgical history, and problem list.    Review of Systems:  Review of Systems   Constitutional: Negative.  Negative for appetite change, fever and unexpected weight change.   HENT: Negative.  Negative for trouble swallowing.    Eyes:  Positive for itching.   Respiratory: Negative.  Negative for cough and shortness of breath.    Cardiovascular: Negative.  Negative for chest pain.   Gastrointestinal: Negative.  Negative for abdominal pain, nausea and vomiting.   Endocrine: Negative.    Genitourinary: Negative.  Negative for dysuria.   Musculoskeletal:  Positive for joint swelling. Negative for arthralgias and myalgias.   Skin:  Positive for rash.   Allergic/Immunologic: Positive for environmental allergies.   Neurological: Negative.  Negative for headaches.   Hematological: Negative.  Negative for adenopathy. Does not bruise/bleed easily.   Psychiatric/Behavioral:  The  patient is nervous/anxious.        Physical Exam:  Physical Exam  Constitutional:       General: She is not in acute distress.     Appearance: Normal appearance. She is well-developed.   HENT:      Head: Normocephalic and atraumatic.   Cardiovascular:      Heart sounds: Normal heart sounds.   Pulmonary:      Breath sounds: Normal breath sounds.   Chest:   Breasts:     Right: Mass and skin change (resolving ecchymosis) present. No swelling, bleeding, inverted nipple, nipple discharge or tenderness.      Left: No swelling, bleeding, inverted nipple, mass, nipple discharge, skin change or tenderness.       Abdominal:      Palpations: Abdomen is soft.   Musculoskeletal:      Right lower leg: No edema.      Left lower leg: No edema.   Lymphadenopathy:      Upper Body:      Right upper body: Axillary adenopathy (mobile) present. No supraclavicular or pectoral adenopathy.      Left upper body: No supraclavicular, axillary or pectoral adenopathy.   Neurological:      Mental Status: She is alert and oriented to person, place, and time.   Psychiatric:         Mood and Affect: Mood normal.         Laboratory:  10/4/24 core biopsy right breast 4:00 2cm FN and axillary node    Pathology revealed: invasive lobular carcinoma    Histologic grade: moderately differentiated     Tumor node status: Positive    Hormone receptor status:  breast ER negative, PA 10%, HER2 3+; axilla ER negative, PA 70%, HER2 3+    Other studies: Genetic testing is pending    Imaging:  10/4/2024 bilateral automated breast ultrasound tissue bilateral, oval mass inner right breast roughly 15 mm, left side was benign    10/4/2024 bilateral 3D screening mammogram again shows dense breast tissue the mass was seen on mammogram, left side was benign    10/4/24 ultrasound of the right breast and axilla shows a 17 mm oval lesion with indistinct margins at 4:00 2 cm from the nipple, suspicious lymph node with cortical thickening measuring 11 mm for which biopsies  were recommended as noted above    10/4/2024 postbiopsy mammogram is concordant Sirisha reflector in the breast and axilla      Discussion/Summary: 64-year-old female who presents with newly diagnosed carcinoma of the right breast.  She was asymptomatic referable to the breast.  She had both a screening 3d ultrasound and mammogram revealing a lesion in the right breast and axilla.  I am able to palpate a mass in the breast and axilla on exam today.  She already met with medical oncology who discussed neoadjuvant therapy with her.  Staging scans are scheduled.  The patient had genetic testing which is currently pending.  She does report family history of breast cancer in her mother diagnosed at a young age.  If her scans show no evidence of systemic disease, she will return to me for surgical management.  We discussed both lumpectomy versus mastectomy.  If she has any actionable gene mutations, she would likely proceed with a double mastectomy.  Outside of that setting she is a good candidate for breast conservation.  We discussed handling of the axilla post neoadjuvant therapy.  If she becomes clinically node-negative on exam and ultrasound, I would offer her a targeted dissection i.e. removal of clipped node along with sentinel node and if negative we could forego the complete axillary dissection.  She understands that she would need radiation therapy.  All of her questions were answered.  We will call her with the results of the genetic testing.  She will continue care with medical oncology and see me at a future date for a preoperative encounter.

## 2024-10-21 NOTE — H&P (VIEW-ONLY)
Breast Consultation-Surgical Oncology     [address]  [facility] [place]  [address]  [facility] [address]    Name:  Amy Clement  YOB: 1960  MRN:  [id number]    Assessment & Plan   Diagnoses and all orders for this visit:    Malignant neoplasm of lower-inner quadrant of right breast of female, estrogen receptor negative (HCC)    Breast cancer metastasized to axillary lymph node, right (HCC)    Family history of breast cancer    Extremely dense tissue of both breasts on mammography    Invasive lobular carcinoma of breast in female (HCC)  -     Ambulatory referral to Surgical Oncology        HPI: Amy Clement is a 64 y.o. year old female who presents with newly diagnosed carcinoma of the right breast.  She states that she was asymptomatic referable to the breast.  She had a screening mammogram and ultrasound at the same time.  She was called back for diagnostic imaging and biopsy of the breast and axilla.  She did submit a sample for genetic testing which is currently pending.  She reports family history of breast cancer in her mother.    Surgical treatment to date consisted of not applicable.    Oncology History:    Oncology History   Malignant neoplasm of lower-inner quadrant of right breast of female, estrogen receptor negative (HCC)   10/4/2024 Biopsy    A. Right breast ultrasound-guided biopsy  4 o'clock, 2 cm from nipple (ROSA ISELA)  Invasive mammary carcinoma with predominately lobular features   Apocrine and histiocytoid morphologic features  Grade 2  ER <1, MO 10, HER2 3+  Lymphovascular invasion present    B. Right axillary lymph node biopsy (ROSA ISELA)  Metastatic carcinoma, compatible with mammary origin  ER <1, MO 70, HER2 3+    Concordant. Right malignancy appears unifocal; biopsy-proven carcinoma measured 1.7 cm on US. Biopsy-proven metastatic disease to right axillary lymph node. Left breast clear.     10/15/2024 Genetic Testing    Genetic  testing with RNA analysis ordered  [facility]     2024 -  Chemotherapy    alteplase (CATHFLO), 2 mg, Intracatheter, Every 1 Minute as needed, 0 of 18 cycles  pegfilgrastim (NEULASTA ONPRO), 6 mg, Subcutaneous, Once, 0 of 6 cycles  fosaprepitant (EMEND) IVPB, 150 mg, Intravenous, Once, 0 of 6 cycles  pertuzumab (PERJETA) IVPB, 840 mg, Intravenous, Once, 0 of 18 cycles  CARBOplatin (PARAPLATIN) IVPB (GOG AUC DOSING), , Intravenous, Once, 0 of 6 cycles  DOCEtaxel (TAXOTERE) chemo infusion, 75 mg/m2 = 144 mg, Intravenous, Once, 0 of 6 cycles  trastuzumab (HERCEPTIN) chemo infusion, 8 mg/kg = 595 mg, Intravenous, Once, 0 of 18 cycles         Pertinent reproductive history:  Age at menarche:    OB History          5    Para   4    Term   4            AB   1    Living   4         SAB   1    IAB        Ectopic        Multiple        Live Births   4           Obstetric Comments   Menarche: 14 y/o  Age at first pregnancy: 21                 Problem List:   Patient Active Problem List   Diagnosis    Migraine with aura and without status migrainosus, not intractable    Thyroid nodule    Anxiety    GERD (gastroesophageal reflux disease)    Chronic hip pain, right    Postmenopausal bleeding    S/P dilation and curettage    Malignant neoplasm of lower-inner quadrant of right breast of female, estrogen receptor negative (HCC)    Breast cancer metastasized to axillary lymph node, right (HCC)     Past Medical History:   Diagnosis Date    Acute cystitis     Anxiety     medication for hot flasshes    Breast lump .    last assessed 2017    Dermatitis     last assessed 2017    Disease of thyroid gland     last assessed 2016    Diverticulitis 2020    Diverticulitis of colon  1st dx    Dysuria     last assessed 2017    ETD (Eustachian tube dysfunction), bilateral     last assessed 2016    Fibroid 2012    GERD (gastroesophageal reflux disease) Occasionally    Glaucoma      Left    Hematuria     Migraine 1985    Miscarriage 1990    Polyp at cervical os     Varicella 1966     Past Surgical History:   Procedure Laterality Date    BREAST BIOPSY Right 2009    2 areas benign    CATARACT EXTRACTION Bilateral     COLONOSCOPY      [place] HYSTEROSCOPY BX ENDOMETRIUM&/POLYPC W/WO D&C N/A 02/23/2024    Procedure: DILATATION AND CURETTAGE (D&C) WITH HYSTEROSCOPY;  Surgeon: Joselin Alaniz MD;  Location: [facility];  Service: Gynecology    N[place] HYSTEROSCOPY BX ENDOMETRIUM&/POLYPC W/WO D&C N/A 02/23/2024    Procedure: POLYPECTOMY;  Surgeon: Joselin Alaniz MD;  Location: [facility];  Service: Gynecology    TUBAL LIGATION      US GUIDED BREAST BIOPSY RIGHT COMPLETE Right 10/04/2024    US GUIDED BREAST LYMPH NODE BIOPSY RIGHT Right 10/04/2024     Family History   Problem Relation Age of Onset    Breast cancer Mother 47    Hypertension Father         Late life dx    Rectal cancer Father     Migraines Father 85    No Known Problems Sister     Throat cancer Brother     Cancer Brother         bladder    Heart disease Maternal Grandmother     No Known Problems Paternal Grandmother     No Known Problems Daughter     Kidney cancer Son 38    No Known Problems Paternal Aunt      Social History     Socioeconomic History    Marital status: /Civil Union     Spouse name: Not on file    Number of children: Not on file    Years of education: Not on file    Highest education level: Not on file   Occupational History     Comment: Retired   Tobacco Use    Smoking status: Never    Smokeless tobacco: Never   Vaping Use    Vaping status: Never Used   Substance and Sexual Activity    Alcohol use: Yes     Alcohol/week: 3.0 standard drinks of alcohol     Types: 3 Glasses of wine per week     Comment: occasionally    Drug use: No    Sexual activity: Yes     Partners: Male     Birth control/protection: Post-menopausal   Other Topics Concern    Not on file   Social History Narrative    Activities:  Dance     Always uses seat belt    Exercise:  Walking    Lives with      Social Determinants of Health     Financial Resource Strain: Not on file   Food Insecurity: Not on file   Transportation Needs: Not on file   Physical Activity: Not on file   Stress: Not on file   Social Connections: Not on file   Intimate Partner Violence: Not on file   Housing Stability: Not on file     Current Outpatient Medications   Medication Sig Dispense Refill    Bacillus Coagulans-Inulin (Probiotic) 1-250 BILLION-MG CAPS Take by mouth      calcium citrate (CALCITRATE) 950 (200 Ca) MG tablet Take 1 tablet by mouth daily      [START ON 11/3/2024] dexamethasone (DECADRON) 4 mg tablet Take 2 tablets (8 mg total) by mouth 2 (two) times a day with meals for 21 days Take 2 tabs by mouth twice daily the day before, the day of, and the day after chemo. Do not start before November 3, 2024. 12 tablet 6    eletriptan (RELPAX) 40 MG tablet Take 1 tablet (40 mg total) by mouth once as needed for migraine for up to 64 doses may repeat in 2 hours if necessary 16 tablet 3    fluticasone (FLONASE) 50 mcg/act nasal spray 1 spray into each nostril daily (Patient taking differently: 1 spray into each nostril if needed) 16 g 1    Glucosamine-Chondroitin (GLUCOSAMINE CHONDR COMPLEX PO) Take by mouth daily       latanoprost (XALATAN) 0.005 % ophthalmic solution Administer 1 drop to both eyes daily at bedtime      Multiple Vitamins-Minerals (CENTRUM SILVER) tablet Take by mouth      ondansetron (ZOFRAN) 8 mg tablet Take 1 tablet (8 mg total) by mouth every 8 (eight) hours as needed for nausea or vomiting 30 tablet 2    PARoxetine (PAXIL) 10 mg tablet Take 1 tablet (10 mg total) by mouth daily 30 tablet 5    prochlorperazine (COMPAZINE) 10 mg tablet Take 1 tablet (10 mg total) by mouth every 6 (six) hours as needed for nausea or vomiting 30 tablet 2    Red Yeast Rice 600 MG TABS Take by mouth      timolol (TIMOPTIC) 0.5 % ophthalmic solution Administer 1 drop to  both eyes daily      TURMERIC-GINGER PO Take by mouth      acetaminophen (TYLENOL) 500 mg tablet Take 1 tablet (500 mg total) by mouth every 6 (six) hours as needed for mild pain      estradiol (ESTRACE) 0.1 mg/g vaginal cream Insert 0.5 g into the vagina 2 (two) times a week (Patient not taking: Reported on 10/18/2024) 42.5 g 3    lidocaine-prilocaine (EMLA) cream Apply topically as needed for mild pain (Patient not taking: Reported on 10/21/2024) 30 g 2    meloxicam (MOBIC) 7.5 mg tablet TAKE 1 TABLET BY MOUTH EVERY DAY (Patient not taking: Reported on 6/7/2024) 30 tablet 2    omeprazole (PriLOSEC) 20 mg delayed release capsule TAKE 1 CAPSULE BY MOUTH EVERY DAY 30 capsule 1     No current facility-administered medications for this visit.     Allergies   Allergen Reactions    Monistat [Miconazole] Itching    Pollen Extract Itching, Swelling and Sneezing         The following portions of the patient's history were reviewed and updated as appropriate: allergies, current medications, past family history, past medical history, past social history, past surgical history, and problem list.    Review of Systems:  Review of Systems   Constitutional: Negative.  Negative for appetite change, fever and unexpected weight change.   HENT: Negative.  Negative for trouble swallowing.    Eyes:  Positive for itching.   Respiratory: Negative.  Negative for cough and shortness of breath.    Cardiovascular: Negative.  Negative for chest pain.   Gastrointestinal: Negative.  Negative for abdominal pain, nausea and vomiting.   Endocrine: Negative.    Genitourinary: Negative.  Negative for dysuria.   Musculoskeletal:  Positive for joint swelling. Negative for arthralgias and myalgias.   Skin:  Positive for rash.   Allergic/Immunologic: Positive for environmental allergies.   Neurological: Negative.  Negative for headaches.   Hematological: Negative.  Negative for adenopathy. Does not bruise/bleed easily.   Psychiatric/Behavioral:  The  patient is nervous/anxious.        Physical Exam:  Physical Exam  Constitutional:       General: She is not in acute distress.     Appearance: Normal appearance. She is well-developed.   HENT:      Head: Normocephalic and atraumatic.   Cardiovascular:      Heart sounds: Normal heart sounds.   Pulmonary:      Breath sounds: Normal breath sounds.   Chest:   Breasts:     Right: Mass and skin change (resolving ecchymosis) present. No swelling, bleeding, inverted nipple, nipple discharge or tenderness.      Left: No swelling, bleeding, inverted nipple, mass, nipple discharge, skin change or tenderness.       Abdominal:      Palpations: Abdomen is soft.   Musculoskeletal:      Right lower leg: No edema.      Left lower leg: No edema.   Lymphadenopathy:      Upper Body:      Right upper body: Axillary adenopathy (mobile) present. No supraclavicular or pectoral adenopathy.      Left upper body: No supraclavicular, axillary or pectoral adenopathy.   Neurological:      Mental Status: She is alert and oriented to person, place, and time.   Psychiatric:         Mood and Affect: Mood normal.         Laboratory:  10/4/24 core biopsy right breast 4:00 2cm FN and axillary node    Pathology revealed: invasive lobular carcinoma    Histologic grade: moderately differentiated     Tumor node status: Positive    Hormone receptor status:  breast ER negative, OR 10%, HER2 3+; axilla ER negative, OR 70%, HER2 3+    Other studies: Genetic testing is pending    Imaging:  10/4/2024 bilateral automated breast ultrasound tissue bilateral, oval mass inner right breast roughly 15 mm, left side was benign    10/4/2024 bilateral 3D screening mammogram again shows dense breast tissue the mass was seen on mammogram, left side was benign    10/4/24 ultrasound of the right breast and axilla shows a 17 mm oval lesion with indistinct margins at 4:00 2 cm from the nipple, suspicious lymph node with cortical thickening measuring 11 mm for which biopsies  were recommended as noted above    10/4/2024 postbiopsy mammogram is concordant Sirisha reflector in the breast and axilla      Discussion/Summary: 64-year-old female who presents with newly diagnosed carcinoma of the right breast.  She was asymptomatic referable to the breast.  She had both a screening 3d ultrasound and mammogram revealing a lesion in the right breast and axilla.  I am able to palpate a mass in the breast and axilla on exam today.  She already met with medical oncology who discussed neoadjuvant therapy with her.  Staging scans are scheduled.  The patient had genetic testing which is currently pending.  She does report family history of breast cancer in her mother diagnosed at a young age.  If her scans show no evidence of systemic disease, she will return to me for surgical management.  We discussed both lumpectomy versus mastectomy.  If she has any actionable gene mutations, she would likely proceed with a double mastectomy.  Outside of that setting she is a good candidate for breast conservation.  We discussed handling of the axilla post neoadjuvant therapy.  If she becomes clinically node-negative on exam and ultrasound, I would offer her a targeted dissection i.e. removal of clipped node along with sentinel node and if negative we could forego the complete axillary dissection.  She understands that she would need radiation therapy.  All of her questions were answered.  We will call her with the results of the genetic testing.  She will continue care with medical oncology and see me at a future date for a preoperative encounter.

## 2024-10-22 ENCOUNTER — TELEPHONE (OUTPATIENT)
Dept: HEMATOLOGY ONCOLOGY | Facility: CLINIC | Age: 64
End: 2024-10-22

## 2024-10-22 NOTE — TELEPHONE ENCOUNTER
Please schedule Port placement and treatment after. Patient is going to do paxman and would like to be treated at [facility]. Thanks!

## 2024-10-22 NOTE — PROGRESS NOTES
Oncology Outpatient Consult Note  Amy Clement 64 y.o. female MRN: @ Encounter: [id number]        Date:  10/22/2024        CC:  breast cancer      HPI:  Amy Clement is seen for initial consultation 10/22/2024 regarding her newly diagnosed ER negative, MI low positive, HER2 3+ right-sided breast cancer.  This was discovered on mammogram and she has biopsy-proven disease in the lymph nodes as well.  Her other medical problems include history of migraine, GERD, diverticulitis, hypothyroidism, D&C with polypectomy in February 2024.  She is in her normal state of health otherwise today.  She denies headache, chest pain shortness of breath, back pain, bowel problems.  She has had genetic testing and it is pending.  She is seeing Dr. Magaña on Monday.  She denies any history of neuropathy.  No dental problems.  She is a retired cytotech, occasional alcohol, no tobacco.  She has adult children 1 of which has just finished his neurology residency.  Her daughter-in-law is also a neurologist in our system.  Her genetic testing is pending.        ROS: As stated in history of present illness otherwise her 14 point review of systems today was negative.    ECOG PS:0    Cancer Staging:  Cancer Staging   Malignant neoplasm of lower-inner quadrant of right breast of female, estrogen receptor negative (HCC)  Staging form: Breast, AJCC 8th Edition  - Clinical stage from 10/4/2024: Stage IIA (cT1c, cN1(f), cM0, G2, ER-, MI+, HER2+) - Signed by Koki Magaña MD on 10/21/2024  Stage prefix: Initial diagnosis  Method of lymph node assessment: Core biopsy  Histologic grading system: 3 grade system      Molecular Testing:     Oncology History Overview Note   10/2024 - right breast and LNBx positive for invasive mammary carcinoma with predominantly lobular features, apocrine and histiocytoid features in less amount, grade 2, ER 1% MI 10% Her2 3+, [id number]    LN biopsy receptors - ER 1% MI 70% Her2 3+     Malignant neoplasm of  lower-inner quadrant of right breast of female, estrogen receptor negative (HCC)   10/4/2024 Biopsy    A. Right breast ultrasound-guided biopsy  4 o'clock, 2 cm from nipple (ROSA ISELA)  Invasive mammary carcinoma with predominately lobular features   Apocrine and histiocytoid morphologic features  Grade 2  ER <1, TX 10, HER2 3+  Lymphovascular invasion present    B. Right axillary lymph node biopsy (ROSA ISELA)  Metastatic carcinoma, compatible with mammary origin  ER <1, TX 70, HER2 3+    Concordant. Right malignancy appears unifocal; biopsy-proven carcinoma measured 1.7 cm on US. Biopsy-proven metastatic disease to right axillary lymph node. Left breast clear.     10/4/2024 -  Cancer Staged    Staging form: Breast, AJCC 8th Edition  - Clinical stage from 10/4/2024: Stage IIA (cT1c, cN1(f), cM0, G2, ER-, TX+, HER2+) - Signed by Koki Magaña MD on 10/21/2024  Stage prefix: Initial diagnosis  Method of lymph node assessment: Core biopsy  Histologic grading system: 3 grade system       10/15/2024 Genetic Testing    Genetic testing with RNA analysis ordered  [facility]     11/4/2024 -  Chemotherapy    alteplase (CATHFLO), 2 mg, Intracatheter, Every 1 Minute as needed, 0 of 18 cycles  pegfilgrastim (NEULASTA ONPRO), 6 mg, Subcutaneous, Once, 0 of 6 cycles  fosaprepitant (EMEND) IVPB, 150 mg, Intravenous, Once, 0 of 6 cycles  pertuzumab (PERJETA) IVPB, 840 mg, Intravenous, Once, 0 of 18 cycles  CARBOplatin (PARAPLATIN) IVPB (GOG AUC DOSING), , Intravenous, Once, 0 of 6 cycles  DOCEtaxel (TAXOTERE) chemo infusion, 75 mg/m2 = 144 mg, Intravenous, Once, 0 of 6 cycles  trastuzumab (HERCEPTIN) chemo infusion, 8 mg/kg = 595 mg, Intravenous, Once, 0 of 18 cycles     Breast cancer metastasized to axillary lymph node, right (HCC)   10/16/2024 Initial Diagnosis    Breast cancer metastasized to axillary lymph node, right (HCC)             Test Results:    Imaging: US guided breast biopsy right complete, US guided breast lymph node biopsy right,  Mammo post biopsy right    Addendum Date: 10/8/2024 Addendum:   ADDENDUM: PATHOLOGY RESULTS: Final Diagnosis A. Breast, Right,  4:00 O'clock,  2 cm from nipple, biopsy: - Invasive mammary carcinoma with predominately lobular features, see comment.              Apocrine and histiocytoid morphologic features.  * Radha grade 2 of 3 (total score: 6 of 9)   -- tubule formation < 10%, score 3   -- nuclear grade 2 of 3, score 2   -- mitoses < 3/mm2, (</= 7 mitoses/10HPF), score 1.  * Confirmed by tumor cell immunophenotype:   -- positive: nuclear stain for GATA3 and  weak membranous p120.   -- negative: Ecad,  smmhc, ck5/6.   * Invasive carcinoma involves 3 of 3 submitted core biopsies, max. dimension = 12 millimeters.  * Estrogen, progesterone & HER2 receptor studies pending, to be described in a separate receptor report.  * Ductal carcinoma in situ (DCIS): Present; minor component (< 25% of total tumor).   -- solid architectural pattern, nuclear grade 2 of 3, without necrosis.  * Lymph-vascular invasion: Present.  * Microcalcifications: Absent.   B. Lymph node, Axilla, biopsy: -   Metastatic carcinoma compatible with mammary origin.  In review of the patient’s recent imaging, the right malignancy appears unifocal.  The biopsy-proven carcinoma measured 1.7 cm on ultrasound. There is biopsy-proven metastatic disease to a right axillary lymph node. Recent imaging of the contralateral left breast dated 10/4/2024 was reviewed and shows no suspicious findings. The pathology result and recommendation were discussed with the patient on 10/8/2024 at 1:50 PM by Dr. Khai Miles via telephone. RECOMMENDATION:      - Surgical Consultation for the right breast. Signed by:  Khai Miles MD END ADDENDUM    Result Date: 10/8/2024  Narrative: DIAGNOSIS: Abnormal ultrasound INDICATION: Amy Clement is a 64 y.o. female presenting for ultrasound-guided core needle biopsies. Prior to the procedure, previous imaging was  reviewed.  The procedure was explained to the patient in detail.  All questions were answered.  Written and verbal informed consent was obtained. FINDINGS: RIGHT 1) MASS [B] US guided breast biopsy right complete: Images of the right breast mass in the inner central region at 4 o'clock in the anterior portion, 2 cm from the nipple were obtained. The patient was placed supine on the biopsy table. A core needle biopsy of a 15 mm x 13 mm x 17 mm mass was performed under ultrasound guidance using a lateral approach. The skin was prepped in the usual fashion. Anesthesia was administered using lidocaine 1%. A 12 G core needle biopsy device was advanced in 3 passes. Using the device, 3 samples were collected. Preoperative ultrasound-guided Sirisha  radiofrequency reflector localization was performed.  A Sirisha  radiofrequency reflector was inserted into the target area.  Operation of the reflector was confirmed using the  check device.  Post-placement ultrasound and digital mammographic imaging demonstrate the radiofrequency reflector was at the site. The patient tolerated the procedure well. There were no immediate complications. 2) LYMPH NODE [C] US guided breast biopsy right complete: Images of the right axilla lymph node were obtained. The patient was placed supine on the biopsy table. A core needle biopsy was performed under ultrasound guidance using a lateral approach. The skin was prepped in the usual fashion. Anesthesia was administered using lidocaine 1%. A 12 G core needle biopsy device was advanced in 3 passes. Using the device, 3 samples were collected. Preoperative ultrasound-guided Sirisha  radiofrequency reflector localization was performed.  A Sirisha  radiofrequency reflector was inserted into the target area.  Operation of the reflector was confirmed using the  check device.  Post-placement ultrasound and digital mammographic imaging demonstrate the radiofrequency reflector was at the  site. The patient tolerated the procedure well. There were no immediate complications.      Impression:  Technically successful ultrasound-guided core needle biopsy of a hypoechoic mass in the 4 o'clock position right breast, 2 cm from the nipple with preoperative ultrasound-guided Sirisha  radiofrequency reflector localization. Technically successful ultrasound-guided core needle biopsy of an abnormal appearing lymph node in the right axilla with preoperative ultrasound-guided Sirisha  radiofrequency reflector localization. ASSESSMENT/BI-RADS CATEGORY: Right: Waiting for Pathology Overall: No FDA approved assessment is documented. RECOMMENDATION:      - Waiting for pathology for the right breast. Workstation ID: [id number] Signed by:  Khai Miles MD     US breast right limited (diagnostic)    Result Date: 10/4/2024  Narrative: DIAGNOSIS: Abnormal ultrasound TECHNIQUE: Ultrasound of the right breast(s) was performed. COMPARISONS: Multiple prior exams dated between 6/4/2008 and 10/4/2024. RELEVANT HISTORY: Family Breast Cancer History: History of breast cancer in Mother. Family Medical History: Family medical history includes breast cancer in mother and colon cancer in father. Personal History: Hormone history includes estrogen replacement therapy and birth control. Surgical history includes breast biopsy. No known relevant medical history. RISK ASSESSMENT: 5 Year Tyrer-Cuzick: 6.58% 10 Year Tyrer-Cuzick: 11.84% Lifetime Tyrer-Cuzick: 24.28% INDICATION: Amy Clement is a 64 y.o. female presenting for screening mammogram and ultrasound. FINDINGS: RIGHT 1) MASS [B]: There is a 15 mm x 13 mm x 17 mm oval, hypoechoic mass with indistinct margins seen in the inner central region of the right breast at 4 o'clock in the anterior depth, 2 cm from the nipple. The mass correlates with the prior mammogram finding and ultrasound finding. 2) LYMPH NODE [C]: There is a suspicious lymph node with cortical  thickening seen in the right axilla. Cortical thickness measures 11 mm on the right.      Impression:  Suspicious mass in the right breast and abnormal appearing lymph node in the right axilla.  Right ultrasound-guided core needle biopsies were subsequently performed and reported separately. This patient has been identified as being at elevated risk for development of breast cancer based on the Tyrer-Cuzick model. She may benefit from supplemental screening. ASSESSMENT/BI-RADS CATEGORY: Right: 4 - Suspicious Overall: 4 - Suspicious RECOMMENDATION:      - Ultrasound-guided breast biopsy for the right breast. Workstation ID: [id number] Signed by:  Khai Miles MD     US breast screening bilateral complete (ABUS), Mammo screening bilateral w 3d & cad    Result Date: 10/4/2024  Narrative: DIAGNOSIS: Encounter for screening mammogram for malignant neoplasm of breast TECHNIQUE: Digital screening mammography was performed. Computer Aided Detection (CAD) analyzed all applicable images. COMPARISONS: Prior breast imaging dated: 10/09/2023, 10/09/2023, 10/03/2023, 07/28/2022, 12/28/2021, 12/28/2021, 02/08/2021, 02/03/2020, 01/16/2019, 01/15/2018, 01/18/2017, 01/13/2017, 12/10/2015, 12/04/2014, and 11/21/2013 RELEVANT HISTORY: Family Breast Cancer History: History of breast cancer in Mother. Family Medical History: Family medical history includes breast cancer in mother and colon cancer in father. Personal History: Hormone history includes estrogen replacement therapy and birth control. Surgical history includes breast biopsy. No known relevant medical history. The patient is scheduled in a reminder system for screening mammography. 8-10% of cancers will be missed on mammography. Management of a palpable abnormality must be based on clinical grounds.  Patients will be notified of their results via letter from our facility. Accredited by [employer] and FDA. RISK ASSESSMENT: 5 Year Tyrer-Cuzick: 6.58%  10 Year Tyrer-Cuzick: 11.84% Lifetime Tyrer-Cuzick: 24.28% TISSUE DENSITY: The breasts are extremely dense, which lowers the sensitivity of mammography. TISSUE COMPOSITION: Heterogeneous background echotexture INDICATION: Amy Clement is a 64 y.o. female presenting for screening mammography. FINDINGS: RIGHT 1) MASS [B] Mammo screening bilateral w 3d & cad: There is an oval mass with indistinct margins seen in the inner central region of the right breast in the anterior depth. US breast screening bilateral complete (ABUS): There is a 15 mm oval, hypoechoic mass with indistinct margins seen in the right breast at 4 o'clock, 3.4 cm from the nipple. Left Mammo screening bilateral w 3d & cad There are no suspicious masses, grouped microcalcifications or areas of unexplained architectural distortion. The skin and nipple areolar complex are unremarkable. US breast screening bilateral complete (ABUS) There are no suspicious masses or areas of architectural distortion. Biopsy marker clips are noted in the right breast.  Benign-appearing calcifications are noted in each breast.     Impression: Additional imaging required. A breast health care nurse from our facility will be contacting the patient regarding the need for additional imaging. This patient has been identified as being at elevated risk for development of breast cancer based on the Tyrer-Cuzick model. She may benefit from supplemental screening. ASSESSMENT/BI-RADS CATEGORY: Left: 2 - Benign Right: 0 - Incomplete: Needs Additional Imaging Evaluation Overall: 0 - Incomplete: Needs Additional Imaging Evaluation RECOMMENDATION:      - Ultrasound at the current time for the right breast.      - Routine screening mammogram in 1 year for the left breast. Workstation ID: [id number] Signed by:  Khai Miles MD       Labs:   Lab Results   Component Value Date    WBC 4.70 02/14/2024    HGB 15.1 02/14/2024    HCT 45.2 02/14/2024    MCV 94 02/14/2024      "02/14/2024     Lab Results   Component Value Date    K 4.7 02/14/2024     02/14/2024    CO2 30 02/14/2024    BUN 16 02/14/2024    CREATININE 0.67 02/14/2024    GLUF 86 02/14/2024    CALCIUM 10.1 02/14/2024    AST 17 02/14/2024    ALT 16 02/14/2024    ALKPHOS 53 02/14/2024    EGFR 93 02/14/2024         No results found for: \"SPEP\", \"UPEP\"    No results found for: \"PSA\"    No results found for: \"CEA\"    No results found for: \"AFP\"    No results found for: \"IRON\", \"TIBC\", \"FERRITIN\"    No results found for: \"GPIVZDQX55\"            Active Problems:   Patient Active Problem List   Diagnosis    Migraine with aura and without status migrainosus, not intractable    Thyroid nodule    Anxiety    GERD (gastroesophageal reflux disease)    Chronic hip pain, right    Postmenopausal bleeding    S/P dilation and curettage    Malignant neoplasm of lower-inner quadrant of right breast of female, estrogen receptor negative (HCC)    Breast cancer metastasized to axillary lymph node, right (HCC)    Family history of breast cancer       Past Medical History:   Past Medical History:   Diagnosis Date    Acute cystitis     Anxiety     medication for hot flasshes    Breast lump .    last assessed 01/16/2017    Dermatitis     last assessed 02/27/2017    Disease of thyroid gland     last assessed 02/23/2016    Diverticulitis 11/2020    Diverticulitis of colon November 25 July 2016 1st dx    Dysuria     last assessed 04/28/2017    ETD (Eustachian tube dysfunction), bilateral     last assessed 02/23/2016    Fibroid 2012    GERD (gastroesophageal reflux disease) Occasionally    Glaucoma     Left    Hematuria     Migraine 1985    Miscarriage 1990    Polyp at cervical os     Varicella 1966       Surgical History:   Past Surgical History:   Procedure Laterality Date    BREAST BIOPSY Right 2009    2 areas benign    CATARACT EXTRACTION Bilateral     COLONOSCOPY      FL HYSTEROSCOPY BX ENDOMETRIUM&/POLYPC W/WO D&C N/A 02/23/2024    Procedure: " DILATATION AND CURETTAGE (D&C) WITH HYSTEROSCOPY;  Surgeon: Joselin Alaniz MD;  Location: [facility];  Service: Gynecology    IL HYSTEROSCOPY BX ENDOMETRIUM&/POLYPC W/WO D&C N/A 02/23/2024    Procedure: POLYPECTOMY;  Surgeon: Joselin Alaniz MD;  Location: [facility];  Service: Gynecology    TUBAL LIGATION      US GUIDED BREAST BIOPSY RIGHT COMPLETE Right 10/04/2024    US GUIDED BREAST LYMPH NODE BIOPSY RIGHT Right 10/04/2024       Family History:    Family History   Problem Relation Age of Onset    Breast cancer Mother 47    Hypertension Father         Late life dx    Rectal cancer Father     Migraines Father 85    No Known Problems Sister     Throat cancer Brother     Cancer Brother         bladder    Heart disease Maternal Grandmother     No Known Problems Paternal Grandmother     No Known Problems Daughter     Kidney cancer Son 38    No Known Problems Paternal Aunt        Cancer-related family history includes Breast cancer (age of onset: 47) in her mother; Cancer in her brother; Kidney cancer (age of onset: 38) in her son; Rectal cancer in her father.    Social History:   Social History     Socioeconomic History    Marital status: /Civil Union     Spouse name: Not on file    Number of children: Not on file    Years of education: Not on file    Highest education level: Not on file   Occupational History     Comment: Retired   Tobacco Use    Smoking status: Never    Smokeless tobacco: Never   Vaping Use    Vaping status: Never Used   Substance and Sexual Activity    Alcohol use: Yes     Alcohol/week: 3.0 standard drinks of alcohol     Types: 3 Glasses of wine per week     Comment: occasionally    Drug use: No    Sexual activity: Yes     Partners: Male     Birth control/protection: Post-menopausal   Other Topics Concern    Not on file   Social History Narrative    Activities:  Dance    Always uses seat belt    Exercise:  Walking    Lives with      Social Determinants of Health      Financial Resource Strain: Not on file   Food Insecurity: Not on file   Transportation Needs: Not on file   Physical Activity: Not on file   Stress: Not on file   Social Connections: Not on file   Intimate Partner Violence: Not on file   Housing Stability: Not on file       Current Medications:   Current Outpatient Medications   Medication Sig Dispense Refill    calcium citrate (CALCITRATE) 950 (200 Ca) MG tablet Take 1 tablet by mouth daily      eletriptan (RELPAX) 40 MG tablet Take 1 tablet (40 mg total) by mouth once as needed for migraine for up to 64 doses may repeat in 2 hours if necessary 16 tablet 3    Glucosamine-Chondroitin (GLUCOSAMINE CHONDR COMPLEX PO) Take by mouth daily       latanoprost (XALATAN) 0.005 % ophthalmic solution Administer 1 drop to both eyes daily at bedtime      lidocaine-prilocaine (EMLA) cream Apply topically as needed for mild pain (Patient not taking: Reported on 10/21/2024) 30 g 2    Multiple Vitamins-Minerals (CENTRUM SILVER) tablet Take by mouth      ondansetron (ZOFRAN) 8 mg tablet Take 1 tablet (8 mg total) by mouth every 8 (eight) hours as needed for nausea or vomiting 30 tablet 2    PARoxetine (PAXIL) 10 mg tablet Take 1 tablet (10 mg total) by mouth daily 30 tablet 5    prochlorperazine (COMPAZINE) 10 mg tablet Take 1 tablet (10 mg total) by mouth every 6 (six) hours as needed for nausea or vomiting 30 tablet 2    timolol (TIMOPTIC) 0.5 % ophthalmic solution Administer 1 drop to both eyes daily      acetaminophen (TYLENOL) 500 mg tablet Take 1 tablet (500 mg total) by mouth every 6 (six) hours as needed for mild pain      Bacillus Coagulans-Inulin (Probiotic) 1-250 BILLION-MG CAPS Take by mouth      [START ON 11/3/2024] dexamethasone (DECADRON) 4 mg tablet Take 2 tablets (8 mg total) by mouth 2 (two) times a day with meals for 21 days Take 2 tabs by mouth twice daily the day before, the day of, and the day after chemo. Do not start before November 3, 2024. 12 tablet 6     estradiol (ESTRACE) 0.1 mg/g vaginal cream Insert 0.5 g into the vagina 2 (two) times a week (Patient not taking: Reported on 10/18/2024) 42.5 g 3    fluticasone (FLONASE) 50 mcg/act nasal spray 1 spray into each nostril daily (Patient taking differently: 1 spray into each nostril if needed) 16 g 1    meloxicam (MOBIC) 7.5 mg tablet TAKE 1 TABLET BY MOUTH EVERY DAY (Patient not taking: Reported on 6/7/2024) 30 tablet 2    omeprazole (PriLOSEC) 20 mg delayed release capsule TAKE 1 CAPSULE BY MOUTH EVERY DAY 30 capsule 1    Red Yeast Rice 600 MG TABS Take by mouth      TURMERIC-MECHE PO Take by mouth       No current facility-administered medications for this visit.       Allergies:   Allergies   Allergen Reactions    Monistat [Miconazole] Itching    Pollen Extract Itching, Swelling and Sneezing         Physical Exam:    Body surface area is 1.92 meters squared.    Wt Readings from Last 3 Encounters:   10/21/24 75.1 kg (165 lb 8 oz)   10/18/24 74.4 kg (164 lb)   10/04/24 75.7 kg (166 lb 14.2 oz)        Temp Readings from Last 3 Encounters:   10/21/24 98.3 °F (36.8 °C) (Temporal)   10/18/24 98.4 °F (36.9 °C) (Temporal)   06/07/24 98.2 °F (36.8 °C)        BP Readings from Last 3 Encounters:   10/21/24 108/62   10/18/24 100/78   10/04/24 113/73         Pulse Readings from Last 3 Encounters:   10/21/24 69   10/18/24 85   10/04/24 81     @LASTSAO2(3)@    Physical Exam     Constitutional   General appearance: No acute distress, well appearing and well nourished.    Eyes   Conjunctiva and lids: No swelling, erythema or discharge.    Pupils and irises: Equal, round and reactive to light.    Ears, Nose, Mouth, and Throat   External inspection of ears and nose: Normal.    Nasal mucosa, septum, and turbinates: Normal without edema or erythema.    Oropharynx: Normal with no erythema, edema, exudate or lesions.    Pulmonary   Respiratory effort: No increased work of breathing or signs of respiratory distress.    Auscultation of  lungs: Clear to auscultation.    Cardiovascular   Palpation of heart: Normal PMI, no thrills.    Auscultation of heart: Normal rate and rhythm, normal S1 and S2, without murmurs.    Examination of extremities for edema and/or varicosities: Normal.    Carotid pulses: Normal.    Abdomen   Abdomen: Non-tender, no masses.    Liver and spleen: No hepatomegaly or splenomegaly.    Lymphatic   Palpation of lymph nodes in neck: No lymphadenopathy.    Musculoskeletal   Gait and station: Normal.    Digits and nails: Normal without clubbing or cyanosis.    Inspection/palpation of joints, bones, and muscles: Normal.    Skin   Skin and subcutaneous tissue: Normal without rashes or lesions.    Neurologic   Cranial nerves: Cranial nerves 2-12 intact.    Sensation: No sensory loss.    Psychiatric   Orientation to person, place, and time: Normal.    Mood and affect: Normal.          Assessment/ Plan: 64-year-old female with an ER negative IA positive HER2 3+ node positive breast cancer.  We discussed the natural history and pathophysiology of the disease.  We discussed that standard of care is neoadjuvant chemotherapy with Taxotere, carboplatin, Herceptin, Perjeta.  Risks and benefits of this treatment were discussed and consent was signed.  We also discussed how we adapt therapy once we have her pathologic response from surgery to either completing a year of Herceptin Perjeta versus switching to Kadcyla if there is residual disease.  She is interested in cold Technology and we have consented for that today.  She is concerned this may give her migraines and she usually uses Phenergan for migraines.  I added that to be given at the start of the cold cap use.  She will need an echo and a port prior to starting treatment.  I am also going to get baseline staging studies of a CT and bone scan.  All the patient's questions were answered and I will plan on seeing her back prior to cycle 2.  She is ER negative but does have IA positivity  more so in the lymph node.  We can consider adjuvant endocrine therapy as long as this is well-tolerated later.  That is something we can discuss after surgery as well.        I spent 60 minutes in chart review, face to face counseling, coordination of care, and documentation.

## 2024-10-23 ENCOUNTER — PATIENT OUTREACH (OUTPATIENT)
Dept: HEMATOLOGY ONCOLOGY | Facility: CLINIC | Age: 64
End: 2024-10-23

## 2024-10-23 ENCOUNTER — TELEPHONE (OUTPATIENT)
Age: 64
End: 2024-10-23

## 2024-10-23 DIAGNOSIS — Z17.1 MALIGNANT NEOPLASM OF LOWER-INNER QUADRANT OF RIGHT BREAST OF FEMALE, ESTROGEN RECEPTOR NEGATIVE (HCC): Primary | ICD-10-CM

## 2024-10-23 DIAGNOSIS — C50.311 MALIGNANT NEOPLASM OF LOWER-INNER QUADRANT OF RIGHT BREAST OF FEMALE, ESTROGEN RECEPTOR NEGATIVE (HCC): Primary | ICD-10-CM

## 2024-10-23 NOTE — TELEPHONE ENCOUNTER
Pt called to report that she got her covid and flu shot on 10/19 and was interested in getting the RSV as recommended but was told by the pharmacist that it is recommended to get the RSV shot about 10-14 days after the covid and flu shot and if pt got the RSV shot , she would have to stay off dexamethasone for 2 weeks post RSV shot which dexamethasone is in the treatment plan that startes on 11/8. Pt wanted to know if she should push back the treatment or get the RSV shot on a particular day that she does not have to take dexamethasone for 2 weeks after . Please advise

## 2024-10-23 NOTE — PROGRESS NOTES
I reached out to Amy to complete the Distress Thermometer, review for any barriers to care and to offer any supportive services that may be needed. I left  with the reason for my call including my direct phone number [phone].

## 2024-10-23 NOTE — TELEPHONE ENCOUNTER
Patient's son Donte calling in to speak with Dr Gonzalez regarding how his mother's appointment went on Friday since he could not attend it with her. Please call him at [phone].

## 2024-10-24 ENCOUNTER — PATIENT OUTREACH (OUTPATIENT)
Dept: HEMATOLOGY ONCOLOGY | Facility: CLINIC | Age: 64
End: 2024-10-24

## 2024-10-24 DIAGNOSIS — C50.311 MALIGNANT NEOPLASM OF LOWER-INNER QUADRANT OF RIGHT BREAST OF FEMALE, ESTROGEN RECEPTOR NEGATIVE (HCC): Primary | ICD-10-CM

## 2024-10-24 DIAGNOSIS — Z17.1 MALIGNANT NEOPLASM OF LOWER-INNER QUADRANT OF RIGHT BREAST OF FEMALE, ESTROGEN RECEPTOR NEGATIVE (HCC): Primary | ICD-10-CM

## 2024-10-24 NOTE — PROGRESS NOTES
I reached out and spoke with Amy now that consults have been completed with the oncology teams to review for any barriers to care and offer supportive services as needed.     Distress Thermometer completed at this time. Patient scored 9/10. Referral to SW placed.     I reviewed and updated the members assigned to the care team in [facility].     She knows the members of the care team as well as how and when to contact them with any needs.     She verbalizes managing the schedules well.     She is currently able to drive and denies any transportation needs.    She denies any uncontrolled symptoms. Discussed role of Palliative Care in symptom and side effect management. Declined referral at this time.     Patient open to the idea of palliative in future- patient also would like to get her MMJ card.    Patients states that she is eating and drinking as per usual with no unintentional weight loss.       Patient does not smoke. Patient does not smoke cigarettes however does smoke marijuana once in a while.    Patient has friends and family however they live further away.    Community support information provided via mail including pamphlets..  Patient feels she     has adequate insurance coverage and denies any financial concerns at this time.     Patient mentioned she has a lot of worry and anxiety about the unknown regarding her cancer diagnosis. Patient is feeling fear,anger and some depression regarding her diagnosis. When I mentioned to patient that I would be placing a referral to SW based off of how she answered some of my questions she was VERY supportive of that idea. Patient has support from friends and family however most of them live further away. Patient's daughter is pregnant and she has some concern about being able to be there for both her daughter and her grandchild come April 2025.     Patient mentioned wanting cold-cap which she already received information about. Patient specifically mentioned wanting  wig resources which I will be mailing to patient.     Based on individual needs I will follow up in about 2 weeks.   I have provided my direct contact information and welcome them to contact me if their needs as discussed above change. Patient was  very appreciative for the call.

## 2024-10-25 ENCOUNTER — ANESTHESIA EVENT (OUTPATIENT)
Dept: PERIOP | Facility: AMBULARY SURGERY CENTER | Age: 64
End: 2024-10-25
Payer: COMMERCIAL

## 2024-10-25 NOTE — PRE-PROCEDURE INSTRUCTIONS
Pre-Surgery Instructions:   Medication Instructions    Bacillus Coagulans-Inulin (Probiotic) 1-250 BILLION-MG CAPS Hold day of surgery.    calcium citrate (CALCITRATE) 950 (200 Ca) MG tablet Stop taking 7 days prior to surgery.    eletriptan (RELPAX) 40 MG tablet Uses PRN- OK to take day of surgery    Glucosamine-Chondroitin (GLUCOSAMINE CHONDR COMPLEX PO) Stop taking 7 days prior to surgery.    latanoprost (XALATAN) 0.005 % ophthalmic solution Take day of surgery.    Multiple Vitamins-Minerals (CENTRUM SILVER) tablet Stop taking 7 days prior to surgery.    PARoxetine (PAXIL) 10 mg tablet Take day of surgery.    Red Yeast Rice 600 MG TABS Stop taking 7 days prior to surgery.    timolol (TIMOPTIC) 0.5 % ophthalmic solution Take day of surgery.    TURMERIC-GINGER PO Stop taking 7 days prior to surgery.      Medication instructions for day surgery reviewed. Please use only a sip of water to take your instructed medications. Avoid all over the counter vitamins, supplements and NSAIDS for one week prior to surgery per anesthesia guidelines. Tylenol is ok to take as needed.     You will receive a call one business day prior to surgery with an arrival time and hospital directions. If your surgery is scheduled on a Monday, the hospital will be calling you on the Friday prior to your surgery. If you have not heard from anyone by 8pm, please call the hospital supervisor through the hospital  at [phone]. ([place] [phone] or [place] [phone]).    Do not eat or drink anything after midnight the night before your surgery, including candy, mints, lifesavers, or chewing gum. Do not drink alcohol 24hrs before your surgery. Try not to smoke at least 24hrs before your surgery.       Follow the pre surgery showering instructions as listed in the “My Surgical Experience Booklet” or otherwise provided by your surgeon's office. Do not use a blade to shave the surgical area 1 week before surgery. It is okay to use a  clean electric clippers up to 24 hours before surgery. Do not apply any lotions, creams, including makeup, cologne, deodorant, or perfumes after showering on the day of your surgery. Do not use dry shampoo, hair spray, hair gel, or any type of hair products.     No contact lenses, eye make-up, or artificial eyelashes. Remove nail polish, including gel polish, and any artificial, gel, or acrylic nails if possible. Remove all jewelry including rings and body piercing jewelry.     Wear causal clothing that is easy to take on and off. Consider your type of surgery.    Keep any valuables, jewelry, piercings at home. Please bring any specially ordered equipment (sling, braces) if indicated.    Arrange for a responsible person to drive you to and from the hospital on the day of your surgery. Please confirm the visitor policy for the day of your procedure when you receive your phone call with an arrival time.     Call the surgeon's office with any new illnesses, exposures, or additional questions prior to surgery.    Please reference your “My Surgical Experience Booklet” for additional information to prepare for your upcoming surgery.

## 2024-10-26 LAB
GENE DIS ANL INTERP-IMP: NORMAL
GENES NOT REPORTED: NORMAL
INTERPRETATION: NORMAL

## 2024-10-28 ENCOUNTER — HOSPITAL ENCOUNTER (OUTPATIENT)
Dept: CT IMAGING | Facility: CLINIC | Age: 64
Discharge: HOME | End: 2024-10-28
Payer: COMMERCIAL

## 2024-10-28 ENCOUNTER — HOSPITAL ENCOUNTER (OUTPATIENT)
Dept: NON INVASIVE DIAGNOSTICS | Facility: CLINIC | Age: 64
Discharge: HOME | End: 2024-10-28
Payer: COMMERCIAL

## 2024-10-28 ENCOUNTER — TELEPHONE (OUTPATIENT)
Dept: GENETICS | Facility: CLINIC | Age: 64
End: 2024-10-28

## 2024-10-28 ENCOUNTER — TELEPHONE (OUTPATIENT)
Age: 64
End: 2024-10-28

## 2024-10-28 VITALS
BODY MASS INDEX: 23.69 KG/M2 | DIASTOLIC BLOOD PRESSURE: 62 MMHG | WEIGHT: 165.5 LBS | SYSTOLIC BLOOD PRESSURE: 108 MMHG | HEART RATE: 76 BPM | HEIGHT: 70 IN

## 2024-10-28 DIAGNOSIS — Z51.81 THERAPEUTIC DRUG MONITORING: ICD-10-CM

## 2024-10-28 DIAGNOSIS — C50.911 MALIGNANT NEOPLASM OF RIGHT FEMALE BREAST, UNSPECIFIED ESTROGEN RECEPTOR STATUS, UNSPECIFIED SITE OF BREAST (HCC): ICD-10-CM

## 2024-10-28 LAB
AORTIC ROOT: 3.3 CM
APICAL FOUR CHAMBER EJECTION FRACTION: 52 %
ASCENDING AORTA: 3.3 CM
BSA FOR ECHO PROCEDURE: 1.93 M2
E WAVE DECELERATION TIME: 228 MS
E/A RATIO: 0.78
FRACTIONAL SHORTENING: 37 (ref 28–44)
INTERVENTRICULAR SEPTUM IN DIASTOLE (PARASTERNAL SHORT AXIS VIEW): 0.9 CM
INTERVENTRICULAR SEPTUM: 0.9 CM (ref 0.6–1.1)
LAAS-AP2: 12.6 CM2
LAAS-AP4: 12.3 CM2
LEFT ATRIUM AREA SYSTOLE SINGLE PLANE A4C: 12.2 CM2
LEFT ATRIUM SIZE: 3.4 CM
LEFT ATRIUM VOLUME (MOD BIPLANE): 30 ML
LEFT ATRIUM VOLUME INDEX (MOD BIPLANE): 15.5 ML/M2
LEFT INTERNAL DIMENSION IN SYSTOLE: 3.4 CM (ref 2.1–4)
LEFT VENTRICULAR INTERNAL DIMENSION IN DIASTOLE: 5.4 CM (ref 3.5–6)
LEFT VENTRICULAR POSTERIOR WALL IN END DIASTOLE: 0.6 CM
LEFT VENTRICULAR STROKE VOLUME: 95 ML
LVSV (TEICH): 95 ML
MV E'TISSUE VEL-SEP: 11 CM/S
MV PEAK A VEL: 0.82 M/S
MV PEAK E VEL: 64 CM/S
MV STENOSIS PRESSURE HALF TIME: 66 MS
MV VALVE AREA P 1/2 METHOD: 3.33
RIGHT ATRIUM AREA SYSTOLE A4C: 10.3 CM2
RIGHT VENTRICLE ID DIMENSION: 3 CM
SL CV LEFT ATRIUM LENGTH A2C: 4.8 CM
SL CV LV EF: 60
SL CV PED ECHO LEFT VENTRICLE DIASTOLIC VOLUME (MOD BIPLANE) 2D: 142 ML
SL CV PED ECHO LEFT VENTRICLE SYSTOLIC VOLUME (MOD BIPLANE) 2D: 47 ML
TR MAX PG: 26 MMHG
TR PEAK VELOCITY: 2.5 M/S
TRICUSPID ANNULAR PLANE SYSTOLIC EXCURSION: 1.9 CM
TRICUSPID VALVE PEAK REGURGITATION VELOCITY: 2.53 M/S

## 2024-10-28 PROCEDURE — 93306 TTE W/DOPPLER COMPLETE: CPT | Performed by: INTERNAL MEDICINE

## 2024-10-28 PROCEDURE — 74177 CT ABD & PELVIS W/CONTRAST: CPT

## 2024-10-28 PROCEDURE — 93306 TTE W/DOPPLER COMPLETE: CPT

## 2024-10-28 PROCEDURE — 71260 CT THORAX DX C+: CPT

## 2024-10-28 RX ADMIN — IOHEXOL 50 ML: 350 INJECTION, SOLUTION INTRAVENOUS at 14:04

## 2024-10-28 NOTE — TELEPHONE ENCOUNTER
Spoke with patient and advised her that she can get the pneumonia vaccine. Patient verbalized understanding.

## 2024-10-28 NOTE — TELEPHONE ENCOUNTER
STAT Genetic Test Result    Summary:    I spoke with Amy to review the result of her STAT genetic test.    [facility] (13 genes): JUAN J, BARD1, BRCA1, BRCA2, CDH1, CHEK2, NF1, PALB2, PTEN, RAD51C, RAD51D, STK11, TP53     Result: Negative - No Clinically Significant Variants Detected      Assessment:     Negative   A negative result significantly reduces the likelihood that Amy has a hereditary cancer syndrome related to the high-risk breast cancer genes listed above.      This result does not have surgical implications and surgical options should be discussed with her healthcare provider.        Additional Testing:  [facility]: Cancer+RNAinsight (59 genes): APC, JUAN J, AXIN2, BAP1, BARD1, BMPR1A, BRCA1, BRCA2, BRIP1, CDH1, CDK4, CDKN1B, CDKN2A, CHEK2, CTNNA1, DICER1, EGLN1, EPCAM, FH, FLCN, GREM1, HOXB13, KIF1B, KIT, MAX, MEN1, MET, MITF, MLH1, MSH2, MSH3, MSH6, MUTYH, NF1, NTHL1, PALB2, PDGFRA PMS2, POLD1, POLE, POT1, PTEN, RAD51C, RAD51D, RB1, RET, SDHA, SDHAF2, SDHB, SDHC, SDHD, SMAD4, SMARCA4, STK11, MIWE772, TP53, TSC1, TSC2, VHL test is pending.  We will contact Amy once results are available.  Additional recommendations for surveillance/medical management will be made upon final genetic test result.         Amy's breast surgeon Dr. Magaña was made aware of this test result.

## 2024-10-29 ENCOUNTER — TELEPHONE (OUTPATIENT)
Age: 64
End: 2024-10-29

## 2024-10-29 ENCOUNTER — TELEPHONE (OUTPATIENT)
Dept: HEMATOLOGY ONCOLOGY | Facility: CLINIC | Age: 64
End: 2024-10-29

## 2024-10-29 DIAGNOSIS — C50.311 MALIGNANT NEOPLASM OF LOWER-INNER QUADRANT OF RIGHT BREAST OF FEMALE, ESTROGEN RECEPTOR NEGATIVE (HCC): Primary | ICD-10-CM

## 2024-10-29 DIAGNOSIS — R93.89 ABNORMAL FINDING ON CT SCAN: ICD-10-CM

## 2024-10-29 DIAGNOSIS — Z17.1 MALIGNANT NEOPLASM OF LOWER-INNER QUADRANT OF RIGHT BREAST OF FEMALE, ESTROGEN RECEPTOR NEGATIVE (HCC): Primary | ICD-10-CM

## 2024-10-29 DIAGNOSIS — R93.5 ABNORMAL CT SCAN, PELVIS: ICD-10-CM

## 2024-10-29 NOTE — TELEPHONE ENCOUNTER
Spoke with patient about the spot that she is concerned of in the CT with the right humerus. I explained to the patient that this spot is too tiny to biopsy and would not be seen on a PET CT either. I explained the only imaging that may pick it up is an MRI. Patient is agreeable. I advised her that someone would reach out to schedule. Patient verbalized understanding and is in agreement with the plan.

## 2024-10-29 NOTE — TELEPHONE ENCOUNTER
"Spoke with patient and informed her that her echo was normal and that her CT scan did not show any metastasis. Patient was concerned with the comment on the CT scan \"Tiny sclerotic focus in the proximal humerus on the right is less dense than typically seen with a bone island. A tiny metastatic focus cannot be entirely excluded.\" I informed her that I will review with Dr. Gonzalez and either her or myself will call her back shortly. Patient verbalized understanding and is in agreement with the plan.  "

## 2024-10-29 NOTE — TELEPHONE ENCOUNTER
Joceline from [facility]'s Radiology Reading Room called with significant findings on patient's CT C/A/P from 10/28/24 for Dr. Gonzalez. Requests the provider reviews the results in Epic.    IMPRESSION:     Medial right breast mass and presumed involved single enlarged right axillary lymph node.     Tiny sclerotic focus in the proximal humerus on the right is less dense than typically seen with a bone island. A tiny metastatic focus cannot be entirely excluded.     Otherwise no metastatic disease to the chest, abdomen, or pelvis.     Large left thyroid lobe goiter extending into the mediastinum and deviating the trachea to the right.     Fluid attenuating focus in the right adnexa may represent an ovarian cyst or could represent focal dilatation of the fallopian tube.     Numerous uterine fibroids     Sigmoid diverticulosis without diverticulitis.     The study was marked in EPIC for significant notification..     Workstation performed: [id number]

## 2024-10-29 NOTE — TELEPHONE ENCOUNTER
Received a phone call from patient.  Patient stated that she spoke with Dr. Gonzalez about MMJ and proceeded to obtain.  Patient received ID # [id number].  Patient inquiring what the next steps are.  Patient begins treatment on 11/8 and would like to have prior to starting.  Please call patient with updates.

## 2024-10-30 ENCOUNTER — PATIENT MESSAGE (OUTPATIENT)
Dept: HEMATOLOGY ONCOLOGY | Facility: CLINIC | Age: 64
End: 2024-10-30

## 2024-10-30 ENCOUNTER — TELEPHONE (OUTPATIENT)
Dept: SURGICAL ONCOLOGY | Facility: CLINIC | Age: 64
End: 2024-10-30

## 2024-10-30 ENCOUNTER — PATIENT OUTREACH (OUTPATIENT)
Dept: CASE MANAGEMENT | Facility: HOSPITAL | Age: 64
End: 2024-10-30

## 2024-10-30 NOTE — TELEPHONE ENCOUNTER
Patient made aware that she will need to pay the $50 online and that card will be sent out within 7-10 business days

## 2024-10-30 NOTE — PROGRESS NOTES
Biopsychosocial and Barriers Assessment    Type of Cancer: breast  Treatment plan: chemo to start, surgery and radiation TBD  Noted barriers to care: none noted  Cultural/[place]ian concerns: none noted  Hair Loss/ Wig resources needed: pt is using [facility] system    DT completed: 10/24  DT score: 9  Issues noted: sleep, fatigue, memory, anxiety, depression, fear, anger, relationship with her children.    Marital status/Lives with: pt is newly  and lives alone  Pt's support system: 4 adult children, none live locally  Mental Health history: none noted  Substance Abuse: occasionally uses marijuana     Employment/income source: retired  Concerns with bills (treatment vs household): none noted  Noted issues with home: none noted    Narrative note:      MSW s/w pt by phone this morning to introduce myself and assess for any needs, pt was appreciative of the call and shared her overwhelm with this unexpected diagnosis and then all of the subsequent testing, appointments, scans etc.  She tells me that she had no symptoms at all of her cancer and was shocked by her diagnosis.  She has good support from her 4 adult children but none of them live locally.  She does say that they plan to come stay with her during and after treatment to see how she does.  She also shared that she has 5 grandchildren and one on the way.  Her  passed away unexpectedly just a few months ago so she has been adjusting to his loss and now living alone.  She tells me that she was attending a grief share group and found some parts of it helpful, and some parts of it too depressing.  She says that she has a strong network of friends here locally and is grateful for their support as well.    Pt will start her treatments next week and we spoke about what to expect, what the infusion center is like, what to bring with her, etc.  Her son plans to be here with her for her first treatment.  She is doing the cold cap system as well.  I did offer  her a tour of the infusion center before getting started but she did not feel that was necessary.  We talked about options for support including support groups both in person and virtually, counseling, or support from me.  She is agreeable to ongoing support and we agreed that I would call her to check in after her first treatment.  She plans to save my number in her phone as well to reach out as needed in the interim.  She was appreciative of the call and the time spent talking, and denies any other needs at this time.  I will continue to assist or support her as needed.

## 2024-10-30 NOTE — TELEPHONE ENCOUNTER
Oncology Finance Advocacy Intake and Intervention  Oncology Finance Counselor/Advocate placed call to patient. This writer informed patient that this writer is here to assist patient with billing questions, financial assistance, payment/payment plans, quotes, copayment assistance, insurance optimization, and insurance navigation.    This writer conducted a thorough benefit review of copayment, deductible, and out of pocket cost. This information is documented below and has been reviewed with patient.     Copayment:$15.00  Deductible:$350.00 / Met  Out of Pocket Cost:$9,450.00 Remaining $8,495.01  Insurance optimization (Limited benefit vs self-pay):  Patient assistance status:Patient Outreach  Free Drug Applications:N/A  Oral Chemo Application:N/A  BIN#:  PCN#:  GRP#:  Copay:$  Interventions:    Received E mail from the patient regarding concerns over the costs associated with upcoming testing and treatment.   We went over her remaining out of pocket for 2024 and in regards to the costs I provided Amy with the number for the  team [phone] that would be able to assist her with that information. I would state to the patient that I would look for funding for her diagnosis and for the drugs used in the treatment plan (Perjeta & Herceptin). Regretfully there is no funding available for the diagnosis at this time but because she has commercial insurance I can submit an application for co pay assistance with [employer] for the Perjeta & Herceptin.   If there is an application that I have to send to the patient to complete I will e mail it to [email or web address]. The patient gave me the authorization to go forward in submitting an application for the co pay assistance and will contact her  to obtain the financial information if needed on the application.    Information above was review thoroughly with patient and patient was advise of possible assistance programs/interventions. If  any question arise patient can contact this writer at below information. This information was given to patient at time of contact.      Brayan Bean  Phone:[phone]  Email: [email or web address]

## 2024-10-30 NOTE — TELEPHONE ENCOUNTER
E mail received from patient regarding a benefit evaluation to determine the costs associated with with her testing and treatment.      Kirt Schmidt.   I am currently an oncology patient at [facility]. I have been having testing this week, will have Port placement and chemotherapy starting next week. I was given your contact information in my cancer care packet.  I understand there is an insurance benefit evaluation to determine costs associated with testing and treatment.  When is a good time to call you to discuss this and other details?    Looking forward to hearing from you.    Amy Clement      I placed a call to the patient that went to .  I left my contact information and a brief message regarding those concerns.    I can submit applications for co pay assistance for her Perjeta and Herceptin with [employer] if given authorization and financial information.

## 2024-11-01 ENCOUNTER — HOSPITAL ENCOUNTER (OUTPATIENT)
Dept: NUCLEAR MEDICINE | Facility: HOSPITAL | Age: 64
End: 2024-11-01
Attending: INTERNAL MEDICINE
Payer: COMMERCIAL

## 2024-11-01 DIAGNOSIS — C50.911 MALIGNANT NEOPLASM OF RIGHT FEMALE BREAST, UNSPECIFIED ESTROGEN RECEPTOR STATUS, UNSPECIFIED SITE OF BREAST (HCC): ICD-10-CM

## 2024-11-01 PROCEDURE — A9503 TC99M MEDRONATE: HCPCS

## 2024-11-01 PROCEDURE — 78306 BONE IMAGING WHOLE BODY: CPT

## 2024-11-05 ENCOUNTER — ANESTHESIA (OUTPATIENT)
Dept: PERIOP | Facility: AMBULARY SURGERY CENTER | Age: 64
End: 2024-11-05
Payer: COMMERCIAL

## 2024-11-05 ENCOUNTER — HOSPITAL ENCOUNTER (OUTPATIENT)
Facility: AMBULARY SURGERY CENTER | Age: 64
Setting detail: OUTPATIENT SURGERY
Discharge: HOME | End: 2024-11-05
Attending: RADIOLOGY | Admitting: RADIOLOGY
Payer: COMMERCIAL

## 2024-11-05 ENCOUNTER — APPOINTMENT (OUTPATIENT)
Dept: RADIOLOGY | Facility: AMBULARY SURGERY CENTER | Age: 64
End: 2024-11-05
Payer: COMMERCIAL

## 2024-11-05 VITALS
DIASTOLIC BLOOD PRESSURE: 69 MMHG | SYSTOLIC BLOOD PRESSURE: 111 MMHG | OXYGEN SATURATION: 99 % | TEMPERATURE: 98.4 F | HEART RATE: 60 BPM | RESPIRATION RATE: 18 BRPM

## 2024-11-05 PROCEDURE — 36561 INSERT TUNNELED CV CATH: CPT | Performed by: RADIOLOGY

## 2024-11-05 PROCEDURE — 76937 US GUIDE VASCULAR ACCESS: CPT | Performed by: RADIOLOGY

## 2024-11-05 PROCEDURE — C1894 INTRO/SHEATH, NON-LASER: HCPCS | Performed by: RADIOLOGY

## 2024-11-05 PROCEDURE — 77001 FLUOROGUIDE FOR VEIN DEVICE: CPT

## 2024-11-05 PROCEDURE — 77001 FLUOROGUIDE FOR VEIN DEVICE: CPT | Performed by: RADIOLOGY

## 2024-11-05 PROCEDURE — C1788 PORT, INDWELLING, IMP: HCPCS | Performed by: RADIOLOGY

## 2024-11-05 DEVICE — PORT DIGINTY 8FR MICRO-STICK KIT HEMODIAL MID SIZE: Type: IMPLANTABLE DEVICE | Site: CHEST | Status: FUNCTIONAL

## 2024-11-05 RX ORDER — ONDANSETRON 2 MG/ML
4 INJECTION INTRAMUSCULAR; INTRAVENOUS ONCE AS NEEDED
Status: DISCONTINUED | OUTPATIENT
Start: 2024-11-05 | End: 2024-11-05 | Stop reason: HOSPADM

## 2024-11-05 RX ORDER — CEFAZOLIN SODIUM 1 G/50ML
1000 SOLUTION INTRAVENOUS ONCE
Status: COMPLETED | OUTPATIENT
Start: 2024-11-05 | End: 2024-11-05

## 2024-11-05 RX ORDER — SODIUM CHLORIDE 9 MG/ML
INJECTION, SOLUTION INTRAVENOUS AS NEEDED
Status: DISCONTINUED | OUTPATIENT
Start: 2024-11-05 | End: 2024-11-05 | Stop reason: HOSPADM

## 2024-11-05 RX ORDER — FENTANYL CITRATE 50 UG/ML
INJECTION, SOLUTION INTRAMUSCULAR; INTRAVENOUS AS NEEDED
Status: DISCONTINUED | OUTPATIENT
Start: 2024-11-05 | End: 2024-11-05

## 2024-11-05 RX ORDER — PROPOFOL 10 MG/ML
INJECTION, EMULSION INTRAVENOUS AS NEEDED
Status: DISCONTINUED | OUTPATIENT
Start: 2024-11-05 | End: 2024-11-05

## 2024-11-05 RX ORDER — PROPOFOL 10 MG/ML
INJECTION, EMULSION INTRAVENOUS CONTINUOUS PRN
Status: DISCONTINUED | OUTPATIENT
Start: 2024-11-05 | End: 2024-11-05

## 2024-11-05 RX ORDER — MIDAZOLAM HYDROCHLORIDE 2 MG/2ML
INJECTION, SOLUTION INTRAMUSCULAR; INTRAVENOUS AS NEEDED
Status: DISCONTINUED | OUTPATIENT
Start: 2024-11-05 | End: 2024-11-05

## 2024-11-05 RX ORDER — DEXAMETHASONE SODIUM PHOSPHATE 10 MG/ML
INJECTION, SOLUTION INTRAMUSCULAR; INTRAVENOUS AS NEEDED
Status: DISCONTINUED | OUTPATIENT
Start: 2024-11-05 | End: 2024-11-05

## 2024-11-05 RX ORDER — SODIUM CHLORIDE, SODIUM LACTATE, POTASSIUM CHLORIDE, CALCIUM CHLORIDE 600; 310; 30; 20 MG/100ML; MG/100ML; MG/100ML; MG/100ML
INJECTION, SOLUTION INTRAVENOUS CONTINUOUS PRN
Status: DISCONTINUED | OUTPATIENT
Start: 2024-11-05 | End: 2024-11-05

## 2024-11-05 RX ORDER — ONDANSETRON 2 MG/ML
INJECTION INTRAMUSCULAR; INTRAVENOUS AS NEEDED
Status: DISCONTINUED | OUTPATIENT
Start: 2024-11-05 | End: 2024-11-05

## 2024-11-05 RX ORDER — LIDOCAINE HYDROCHLORIDE AND EPINEPHRINE 10; 10 MG/ML; UG/ML
INJECTION, SOLUTION INFILTRATION; PERINEURAL AS NEEDED
Status: DISCONTINUED | OUTPATIENT
Start: 2024-11-05 | End: 2024-11-05 | Stop reason: HOSPADM

## 2024-11-05 RX ORDER — FENTANYL CITRATE/PF 50 MCG/ML
25 SYRINGE (ML) INJECTION
Status: DISCONTINUED | OUTPATIENT
Start: 2024-11-05 | End: 2024-11-05 | Stop reason: HOSPADM

## 2024-11-05 RX ADMIN — PROPOFOL 30 MG: 10 INJECTION, EMULSION INTRAVENOUS at 08:28

## 2024-11-05 RX ADMIN — CEFAZOLIN SODIUM 1000 MG: 1 SOLUTION INTRAVENOUS at 08:26

## 2024-11-05 RX ADMIN — SODIUM CHLORIDE, SODIUM LACTATE, POTASSIUM CHLORIDE, AND CALCIUM CHLORIDE: .6; .31; .03; .02 INJECTION, SOLUTION INTRAVENOUS at 07:00

## 2024-11-05 RX ADMIN — PROPOFOL 50 MCG/KG/MIN: 10 INJECTION, EMULSION INTRAVENOUS at 08:28

## 2024-11-05 RX ADMIN — MIDAZOLAM 2 MG: 1 INJECTION INTRAMUSCULAR; INTRAVENOUS at 08:23

## 2024-11-05 RX ADMIN — FENTANYL CITRATE 25 MCG: 50 INJECTION INTRAMUSCULAR; INTRAVENOUS at 09:00

## 2024-11-05 RX ADMIN — FENTANYL CITRATE 50 MCG: 50 INJECTION INTRAMUSCULAR; INTRAVENOUS at 08:28

## 2024-11-05 RX ADMIN — FENTANYL CITRATE 25 MCG: 50 INJECTION INTRAMUSCULAR; INTRAVENOUS at 08:53

## 2024-11-05 RX ADMIN — DEXAMETHASONE SODIUM PHOSPHATE 10 MG: 10 INJECTION, SOLUTION INTRAMUSCULAR; INTRAVENOUS at 08:35

## 2024-11-05 RX ADMIN — ONDANSETRON 4 MG: 2 INJECTION INTRAMUSCULAR; INTRAVENOUS at 08:35

## 2024-11-05 NOTE — ANESTHESIA POSTPROCEDURE EVALUATION
Post-Op Assessment Note    CV Status:  Stable    Pain management: adequate       Mental Status:  Alert and awake   PONV Controlled:  Controlled   Airway Patency:  Patent     Post Op Vitals Reviewed: Yes    No anethesia notable event occurred.    Staff: Anesthesiologist           Last Filed PACU Vitals:  Vitals Value Taken Time   Temp 98.4 °F (36.9 °C) 11/05/24 0904   Pulse 60 11/05/24 0920   /61 11/05/24 0919   Resp 8 11/05/24 0920   SpO2 97 % 11/05/24 0920   Vitals shown include unfiled device data.    Modified Kathleen:  Activity: 2 (11/5/2024 10:00 AM)  Respiration: 2 (11/5/2024 10:00 AM)  Circulation: 2 (11/5/2024 10:00 AM)  Consciousness: 2 (11/5/2024 10:00 AM)  Oxygen Saturation: 2 (11/5/2024 10:00 AM)  Modified Kathleen Score: 10 (11/5/2024 10:00 AM)

## 2024-11-05 NOTE — ANESTHESIA POSTPROCEDURE EVALUATION
Post-Op Assessment Note    CV Status:  Stable  Pain Score: 0    Pain management: adequate       Mental Status:  Alert and awake   Hydration Status:  Euvolemic   PONV Controlled:  Controlled   Airway Patency:  Patent     Post Op Vitals Reviewed: Yes    No anethesia notable event occurred.    Staff: CRNA           Last Filed PACU Vitals:  Vitals Value Taken Time   Temp 98.4 °F (36.9 °C) 11/05/24 0904   Pulse 64 11/05/24 0905   BP 97/53 11/05/24 0904   Resp 17    SpO2 98 % 11/05/24 0905   Vitals shown include unfiled device data.    Modified Kathleen:  No data recorded

## 2024-11-05 NOTE — INTERVAL H&P NOTE
Patient arrived to [facility] for port placement    The procedure and risks were discussed with the patient.  All questions were answered. Informed consent was obtained.    H & P reviewed after examining the patient and I find no changes in the patient condition since the H & P has been written.    /66   Pulse 73   Temp 97.8 °F (36.6 °C) (Temporal)   Resp 18   LMP  (LMP Unknown)   SpO2 98%     Patient re-evaluated. Accept as history and physical.    Chandler Ball, DO/November 5, 2024/8:03 AM

## 2024-11-05 NOTE — OP NOTE
Chest port placement 11/5/2024     History:      Breast carcinoma     Contrast: none     Procedure:     The patient was identified verbally and by wristband. Timeout was performed. Informed consent was obtained.      All elements of maximal sterile barrier technique were followed (cap, mask, sterile gown, sterile gloves, large sterile sheet, hand hygiene and 2% chlorhexidine for cutaneous antisepsis).     Following obtaining informed consent, the patient was prepped and draped in the usual sterile fashion. Using ultrasound guidance, access was gained to the patient's right nternal jugular vein using a micropuncture system. The micropuncture wire was removed and a .035 wire was advanced to the level of the inferior vena cava using fluoroscopic guidance.     Using 1% lidocaine, the region of the right anterior chest was was anesthetized. A small incision was made using a 15 blade scalpel. The port pocket was created using blunt dissection.     The catheter tubing was tunneled from the incision to the venotomy. The micropuncture dilator was exchanged for a peel-away sheath, using fluoroscopic guidance. The catheter was place through the peel-away sheath. The catheter was measured and cut to size. The catheter was attached to the port. The port was flushed with saline to ensure patency without evidence of leakage. The port was placed in the port pocket. The port was sutured in the pocket using 3-0 Vicryl.     The incisions were approximated with 3-0 Vicryl and tissue adhesive. The patient tolerated the procedure well without apparent immediate complications. The patient left the IR department in unchanged condition.     Dr. Ball performed and directly supervised the entire procedure.     Findings:      Using ultrasound guidance, the right internal jugular vein was cannulated using Seldinger technique. The right internal jugular vein was evaluated as a potential access site. The right internal jugular vein was patent,  and free of thrombus. Static images of the vessel was obtained. Visualization of real time needle entry into the vessel was obtained.     Fluoroscopic spot image demonstrates a newly placed single lumen chest port via the right internal jugular vein with the most central aspect at the SVC/RA junction. The catheter tubing is smooth in contour.        IMPRESSION:     Successful placement of a single lumen chest port via the right internal jugular vein.

## 2024-11-05 NOTE — ANESTHESIA PREPROCEDURE EVALUATION
Procedure:  INSERTION VENOUS PORT ( PORT-A-CATH) IR (Chest)    Relevant Problems   CARDIO   (+) Migraine with aura and without status migrainosus, not intractable      GI/HEPATIC   (+) GERD (gastroesophageal reflux disease)      GYN   (+) Malignant neoplasm of lower-inner quadrant of right breast of female, estrogen receptor negative (HCC)      NEURO/PSYCH   (+) Anxiety   (+) Migraine with aura and without status migrainosus, not intractable      Other   (+) Breast cancer metastasized to axillary lymph node, right (HCC)        Physical Exam    Airway    Mallampati score: III  TM Distance: >3 FB  Neck ROM: full     Dental       Cardiovascular      Pulmonary      Other Findings  post-pubertal.      Anesthesia Plan  ASA Score- 2     Anesthesia Type- IV sedation with anesthesia with ASA Monitors.         Additional Monitors:     Airway Plan:            Plan Factors-Exercise tolerance (METS): >4 METS.    Chart reviewed.   Existing labs reviewed. Patient summary reviewed.                  Induction- intravenous.    Postoperative Plan-         Informed Consent- Anesthetic plan and risks discussed with patient.  I personally reviewed this patient with the CRNA. Discussed and agreed on the Anesthesia Plan with the CRNA..

## 2024-11-06 ENCOUNTER — TELEPHONE (OUTPATIENT)
Dept: INFUSION CENTER | Facility: CLINIC | Age: 64
End: 2024-11-06

## 2024-11-06 ENCOUNTER — APPOINTMENT (OUTPATIENT)
Dept: LAB | Facility: HOSPITAL | Age: 64
End: 2024-11-06
Attending: INTERNAL MEDICINE
Payer: COMMERCIAL

## 2024-11-06 ENCOUNTER — PATIENT MESSAGE (OUTPATIENT)
Dept: HEMATOLOGY ONCOLOGY | Facility: CLINIC | Age: 64
End: 2024-11-06

## 2024-11-06 ENCOUNTER — NURSE TRIAGE (OUTPATIENT)
Age: 64
End: 2024-11-06

## 2024-11-06 DIAGNOSIS — Z17.1 MALIGNANT NEOPLASM OF LOWER-INNER QUADRANT OF RIGHT BREAST OF FEMALE, ESTROGEN RECEPTOR NEGATIVE (HCC): ICD-10-CM

## 2024-11-06 DIAGNOSIS — C50.311 MALIGNANT NEOPLASM OF LOWER-INNER QUADRANT OF RIGHT BREAST OF FEMALE, ESTROGEN RECEPTOR NEGATIVE (HCC): ICD-10-CM

## 2024-11-06 LAB
ALBUMIN SERPL BCG-MCNC: 4.7 G/DL (ref 3.5–5)
ALP SERPL-CCNC: 55 U/L (ref 34–104)
ALT SERPL W P-5'-P-CCNC: 19 U/L (ref 7–52)
ANION GAP SERPL CALCULATED.3IONS-SCNC: 5 MMOL/L (ref 4–13)
AST SERPL W P-5'-P-CCNC: 18 U/L (ref 13–39)
BASOPHILS # BLD AUTO: 0.01 THOUSANDS/ÂΜL (ref 0–0.1)
BASOPHILS NFR BLD AUTO: 0 % (ref 0–1)
BILIRUB SERPL-MCNC: 0.51 MG/DL (ref 0.2–1)
BUN SERPL-MCNC: 12 MG/DL (ref 5–25)
CALCIUM SERPL-MCNC: 9.9 MG/DL (ref 8.4–10.2)
CHLORIDE SERPL-SCNC: 105 MMOL/L (ref 96–108)
CO2 SERPL-SCNC: 29 MMOL/L (ref 21–32)
CREAT SERPL-MCNC: 0.54 MG/DL (ref 0.6–1.3)
EOSINOPHIL # BLD AUTO: 0.01 THOUSAND/ÂΜL (ref 0–0.61)
EOSINOPHIL NFR BLD AUTO: 0 % (ref 0–6)
ERYTHROCYTE [DISTWIDTH] IN BLOOD BY AUTOMATED COUNT: 12.2 % (ref 11.6–15.1)
GFR SERPL CREATININE-BSD FRML MDRD: 100 ML/MIN/1.73SQ M
GLUCOSE P FAST SERPL-MCNC: 96 MG/DL (ref 65–99)
HCT VFR BLD AUTO: 42 % (ref 34.8–46.1)
HGB BLD-MCNC: 13.6 G/DL (ref 11.5–15.4)
IMM GRANULOCYTES # BLD AUTO: 0.03 THOUSAND/UL (ref 0–0.2)
IMM GRANULOCYTES NFR BLD AUTO: 0 % (ref 0–2)
LYMPHOCYTES # BLD AUTO: 1.06 THOUSANDS/ÂΜL (ref 0.6–4.47)
LYMPHOCYTES NFR BLD AUTO: 12 % (ref 14–44)
MCH RBC QN AUTO: 31.2 PG (ref 26.8–34.3)
MCHC RBC AUTO-ENTMCNC: 32.4 G/DL (ref 31.4–37.4)
MCV RBC AUTO: 96 FL (ref 82–98)
MONOCYTES # BLD AUTO: 0.7 THOUSAND/ÂΜL (ref 0.17–1.22)
MONOCYTES NFR BLD AUTO: 8 % (ref 4–12)
NEUTROPHILS # BLD AUTO: 6.72 THOUSANDS/ÂΜL (ref 1.85–7.62)
NEUTS SEG NFR BLD AUTO: 80 % (ref 43–75)
NRBC BLD AUTO-RTO: 0 /100 WBCS
PLATELET # BLD AUTO: 265 THOUSANDS/UL (ref 149–390)
PMV BLD AUTO: 9.6 FL (ref 8.9–12.7)
POTASSIUM SERPL-SCNC: 4.1 MMOL/L (ref 3.5–5.3)
PROT SERPL-MCNC: 7.1 G/DL (ref 6.4–8.4)
RBC # BLD AUTO: 4.36 MILLION/UL (ref 3.81–5.12)
SODIUM SERPL-SCNC: 139 MMOL/L (ref 135–147)
WBC # BLD AUTO: 8.53 THOUSAND/UL (ref 4.31–10.16)

## 2024-11-06 PROCEDURE — 36415 COLL VENOUS BLD VENIPUNCTURE: CPT

## 2024-11-06 PROCEDURE — 85025 COMPLETE CBC W/AUTO DIFF WBC: CPT

## 2024-11-06 PROCEDURE — 80053 COMPREHEN METABOLIC PANEL: CPT

## 2024-11-06 NOTE — TELEPHONE ENCOUNTER
"Pt is calling about having trouble sleeping.    In addition, pt is asking you to review some of her lab values that she received.     She is also clarifying that she needs to take the claritin the day of chemo.    Reason for Disposition   Difficulty falling to sleep or staying asleep    Answer Assessment - Initial Assessment Questions  1. DESCRIPTION: \"Tell me about your sleeping problem.\"       Has had sleeping problems for some time. (Also taking paxil)  2. ONSET: \"How long have you been having trouble sleeping?\" (e.g., days, weeks, months)      Recently worried and having stress> (weeks)   3. RECURRENT: \"Have you had sleeping problems before?\"  If Yes, ask: \"What happened that time?\" \"What helped your sleeping problem go away in the past?\"       Only with menopause  4. STRESS: \"Is there anything in your life that is making you feel stressed or tense?\"      Has stress about her diagnosis.   5. PAIN: \"Do you have any pain that is keeping you awake?\" (e.g., back pain, headache, abdomen pain)      no  6. CAFFEINE ABUSE: \"Do you drink caffeinated beverages, and how much each day?\" (e.g., coffee, tea, candace)      Only one cup in am  7. ALCOHOL USE OR SUBSTANCE USE (DRUG USE): \"Do you drink alcohol or use any illegal drugs?\"      Not lately, may have one glass of wine with dinner.  8. OTHER SYMPTOMS: \"Do you have any other symptoms?\"  (e.g., difficulty breathing)      no    Protocols used: Insomnia-Adult-[place]    "

## 2024-11-06 NOTE — TELEPHONE ENCOUNTER
NEW PATIENT PHONE CALL  M for patient regarding upcoming appt.  Left infusion phone number to call back with any questions. [phone]

## 2024-11-06 NOTE — TELEPHONE ENCOUNTER
Patient called back and I went over policy and procedures and answered all questions for patient.  She is doing RADHA.

## 2024-11-07 ENCOUNTER — HOSPITAL ENCOUNTER (OUTPATIENT)
Dept: RADIOLOGY | Facility: IMAGING CENTER | Age: 64
End: 2024-11-07
Payer: COMMERCIAL

## 2024-11-07 DIAGNOSIS — Z17.1 MALIGNANT NEOPLASM OF LOWER-INNER QUADRANT OF RIGHT BREAST OF FEMALE, ESTROGEN RECEPTOR NEGATIVE (HCC): ICD-10-CM

## 2024-11-07 DIAGNOSIS — C50.311 MALIGNANT NEOPLASM OF LOWER-INNER QUADRANT OF RIGHT BREAST OF FEMALE, ESTROGEN RECEPTOR NEGATIVE (HCC): ICD-10-CM

## 2024-11-07 DIAGNOSIS — R93.89 ABNORMAL FINDING ON CT SCAN: ICD-10-CM

## 2024-11-07 PROCEDURE — 73220 MRI UPPR EXTREMITY W/O&W/DYE: CPT

## 2024-11-07 PROCEDURE — A9585 GADOBUTROL INJECTION: HCPCS | Performed by: NURSE PRACTITIONER

## 2024-11-07 RX ORDER — GADOBUTROL 604.72 MG/ML
7.5 INJECTION INTRAVENOUS
Status: COMPLETED | OUTPATIENT
Start: 2024-11-07 | End: 2024-11-07

## 2024-11-07 RX ADMIN — GADOBUTROL 7.5 ML: 604.72 INJECTION INTRAVENOUS at 10:49

## 2024-11-08 ENCOUNTER — PATIENT OUTREACH (OUTPATIENT)
Dept: HEMATOLOGY ONCOLOGY | Facility: CLINIC | Age: 64
End: 2024-11-08

## 2024-11-08 ENCOUNTER — HOSPITAL ENCOUNTER (OUTPATIENT)
Dept: INFUSION CENTER | Facility: CLINIC | Age: 64
End: 2024-11-08
Payer: COMMERCIAL

## 2024-11-08 VITALS
RESPIRATION RATE: 18 BRPM | SYSTOLIC BLOOD PRESSURE: 132 MMHG | DIASTOLIC BLOOD PRESSURE: 75 MMHG | WEIGHT: 165.6 LBS | HEART RATE: 84 BPM | HEIGHT: 70 IN | TEMPERATURE: 97.8 F | BODY MASS INDEX: 23.71 KG/M2

## 2024-11-08 DIAGNOSIS — Z17.1 MALIGNANT NEOPLASM OF LOWER-INNER QUADRANT OF RIGHT BREAST OF FEMALE, ESTROGEN RECEPTOR NEGATIVE (HCC): Primary | ICD-10-CM

## 2024-11-08 DIAGNOSIS — C50.311 MALIGNANT NEOPLASM OF LOWER-INNER QUADRANT OF RIGHT BREAST OF FEMALE, ESTROGEN RECEPTOR NEGATIVE (HCC): Primary | ICD-10-CM

## 2024-11-08 LAB
GENE DIS ANL INTERP-IMP: NORMAL
INTERPRETATION: NORMAL

## 2024-11-08 RX ORDER — SODIUM CHLORIDE 9 MG/ML
20 INJECTION, SOLUTION INTRAVENOUS ONCE
Status: COMPLETED | OUTPATIENT
Start: 2024-11-08 | End: 2024-11-08

## 2024-11-08 RX ORDER — SODIUM CHLORIDE 9 MG/ML
20 INJECTION, SOLUTION INTRAVENOUS ONCE
Status: CANCELLED | OUTPATIENT
Start: 2024-11-08

## 2024-11-08 RX ADMIN — DOCETAXEL 144 MG: 20 INJECTION, SOLUTION, CONCENTRATE INTRAVENOUS at 13:41

## 2024-11-08 RX ADMIN — DEXAMETHASONE SODIUM PHOSPHATE: 10 INJECTION, SOLUTION INTRAMUSCULAR; INTRAVENOUS at 08:47

## 2024-11-08 RX ADMIN — FOSAPREPITANT 150 MG: 150 INJECTION, POWDER, LYOPHILIZED, FOR SOLUTION INTRAVENOUS at 09:18

## 2024-11-08 RX ADMIN — PROMETHAZINE HYDROCHLORIDE 12.5 MG: 25 INJECTION INTRAMUSCULAR; INTRAVENOUS at 11:14

## 2024-11-08 RX ADMIN — CARBOPLATIN 722.4 MG: 10 INJECTION, SOLUTION INTRAVENOUS at 14:54

## 2024-11-08 RX ADMIN — PERTUZUMAB 840 MG: 30 INJECTION, SOLUTION, CONCENTRATE INTRAVENOUS at 10:10

## 2024-11-08 RX ADMIN — SODIUM CHLORIDE 20 ML/HR: 0.9 INJECTION, SOLUTION INTRAVENOUS at 08:47

## 2024-11-08 RX ADMIN — TRASTUZUMAB 595 MG: 150 INJECTION, POWDER, LYOPHILIZED, FOR SOLUTION INTRAVENOUS at 11:51

## 2024-11-08 RX ADMIN — PEGFILGRASTIM 6 MG: KIT SUBCUTANEOUS at 17:29

## 2024-11-08 NOTE — PROGRESS NOTES
Pt here for chemotherapy, day 1 cycle 1,  offering no complaints.  Right port accessed with positive blood return noted throughout treatment.  Tolerated infusion without incident.  Port flushed and de-accessed. Pt used PAXMAN today.  We put cold cap on during herceptin for 30 minutes and she did a 90 minute cool down. Neulasta onPro placed on right arm  aware to take it off 11/9 @ 930 pm.  AVS declined.  Appt calendar given.  Aware of next appt for labs 11/27 @ 1230 pm.  Walked out in stable condition     Side note  Patient to get phenergen right before the herceptin goes up.  Dr wants it given before cold cap goes on as patient is prone to migraines.

## 2024-11-08 NOTE — PROGRESS NOTES
I reached out and spoke with Amy to follow up and to review for any changes in barriers to care and offer supportive services as needed.    Patient was at the infusion center so the conversation was short however patient did mention that due to the steroids she has been having trouble sleeping.    Patient was wondering if  would suggest a sleep aide. Patient also mentioned that if  thought a sleep aide impacted her or her treatment in a negative way that she would kind of deal with it the best she could. Mentioned to patient that I would send a message to 's team as patient did mentioned she sent a [employer] message yesterday and has not heard back yet. Patient did mention her perferred [facility] would be the one on [address].    Bases on individual needs I will follow up with them in 3-4 weeks. I have provided my direct contact information and welcome them to contact me if their needs as discussed above change. They were appreciative for the call.

## 2024-11-11 ENCOUNTER — TELEPHONE (OUTPATIENT)
Dept: GENETICS | Facility: CLINIC | Age: 64
End: 2024-11-11

## 2024-11-11 NOTE — TELEPHONE ENCOUNTER
Genetic Test Result Note    SUMMARY    Today I spoke with Amy over the phone to review the results of her genetic test for hereditary cancer. She underwent genetic testing through our program on 10/10/2024 due to her recent diagnosis of breast cancer.    A separate report of her STAT genetic test results were disclosed to her on 10/28/2024. This result includes new genes and does not change her initial genetic test result.     Initial Test: [employer] BRCAplus STAT Panel (13 genes): JUAN J, BARD1, BRCA1, BRCA2, CDH1, CHEK2, NF1, PALB2, PTEN, RAD51C, RAD51D, STK11, TP53    Result: Negative - No Clinically Significant Variants Detected     Final Result    Reflexed Test: [employer] CustomNext: Cancer+RNAinsight (59 genes): APC, JUAN J, AXIN2, BAP1, BARD1, BMPR1A, BRCA1, BRCA2, BRIP1, CDH1, CDK4, CDKN1B, CDKN2A, CHEK2, CTNNA1, DICER1, EGLN1, EPCAM, FH, FLCN, GREM1, HOXB13, KIF1B, KIT, MAX, MEN1, MET, MITF, MLH1, MSH2, MSH3, MSH6, MUTYH, NF1, NTHL1, PALB2, PDGFRA PMS2, POLD1, POLE, POT1, PTEN, RAD51C, RAD51D, RB1, RET, SDHA, SDHAF2, SDHB, SDHC, SDHD, SMAD4, SMARCA4, STK11, ASYH845, TP53, TSC1, TSC2, VHL     Result: Variant of Unknown Significance Detected    Variant 1  AXIN2  p.R335K (c.1004G>A; heterozygous; uncertain significance      Variant 2  MSH6 p.V2200P (c.3921T>G); heterozygous; uncertain significance      Variant of uncertain significance (VUS)  A variant of uncertain significance (VUS) means that a change was identified in a specific gene but it cannot be determined whether the variant is associated with an increased risk of cancer or is a harmless genetic change.     It is possible that the variant was seen in only a handful of individuals, or there may be conflicting or incomplete information in the medical literature about the variant and its association with hereditary cancer.     The significance of the AXIN2 and MSH6 variants are currently not known and therefore this test result cannot be used to help determine  Amy surgical plan, medical management or cancer risks.      Risks for Family Members:  Amy was made aware that all of her first-degree relatives are at increased risk to develop breast cancer based on her recent diagnosis and family history of breast cancer.. We recommend that her first-degree relatives make their healthcare providers aware of the family history and discuss their options for screening and risk-reduction.    Testing for other Family Members:    At this time, the AXIN2 and MSH6 VUS are not clinically actionable and is not recommended to be tested in other family members.    The laboratory will continue to accumulate information on this variant and will reclassify it as either a positive or negative genetic test result when they are confident that they have adequate information. As updated information is obtained, we will notify Amy with the updated information.      It is important to note that the majority of variants of uncertain significance are reclassified as likely benign or benign as additional information about the variant becomes available.     If Amy has any affected family members with a cancer diagnosis, especially at a young age, they may still consider genetic testing. Relatives who wish to pursue genetic testing can reach out to the Cancer Risk and Genetics Program at [phone] to schedule an appointment or visit [email or web address] to identify a local genetic counselor.      Plan:     VUS Result: This result does not have surgical implications and surgical options should be discussed with her healthcare provider. Amy's breast surgeon, Dr. Magaña will be made aware of her results

## 2024-11-12 ENCOUNTER — PATIENT OUTREACH (OUTPATIENT)
Dept: CASE MANAGEMENT | Facility: HOSPITAL | Age: 64
End: 2024-11-12

## 2024-11-12 ENCOUNTER — TELEPHONE (OUTPATIENT)
Dept: HEMATOLOGY ONCOLOGY | Facility: CLINIC | Age: 64
End: 2024-11-12

## 2024-11-12 NOTE — PROGRESS NOTES
MSW checked in with pt by phone this morning, she had her first infusion tx last week and tells me that it overall went well, although it was a very long day.  She is very complimentary of the care given to her in the infusion center and all of the staff.  She says that she has had a few days of feeling off, somewhat nauseous mostly in the morning, but otherwise no major issues.  We agreed that it's comparable to nausea when you're pregnant and eating smaller, bland meals may help until she's through the first few days after treatment.  She did have a medication during treatment to help prevent any migraines and she says that she has not had any issues so it must have worked.  She also says that the cold cap was relatively easy to manage and didn't give her any issues, she fell asleep with it on which surprised her.  Otherwise she is doing well and has had her sons there with her for a few days to be supportive.  She was appreciative of the call to check in today and denies any other needs or concerns at this time.  She is agreeable to reaching out as needed.  I will remain available to assist or support her as needed moving forward.

## 2024-11-12 NOTE — TELEPHONE ENCOUNTER
Attempted to call patient to go over MRI results. Unable to complete call. [employer] message sent to patient

## 2024-11-17 ENCOUNTER — TELEPHONE (OUTPATIENT)
Dept: OTHER | Facility: OTHER | Age: 64
End: 2024-11-17

## 2024-11-17 DIAGNOSIS — T80.212A PORT OR RESERVOIR INFECTION, INITIAL ENCOUNTER: Primary | ICD-10-CM

## 2024-11-17 RX ORDER — CEPHALEXIN 500 MG/1
500 CAPSULE ORAL EVERY 6 HOURS SCHEDULED
Qty: 28 CAPSULE | Refills: 0 | Status: SHIPPED | OUTPATIENT
Start: 2024-11-17 | End: 2024-11-24

## 2024-11-17 NOTE — TELEPHONE ENCOUNTER
Patient called in to report redness around port area that is a little bit itchy; denies fever. Reports it appears to be a red welt under the incision. [employer] message sent to on-call provider for follow up.

## 2024-11-17 NOTE — PROGRESS NOTES
Telephone encounter    Patient called about PORT    Has been accessed for [facility] 8th Nov    Over last 24 hours, increased itchiness/reddness below PORT-Port itself looks ok    No fever, no other signs/symptoms PORT infection; tried hydrocortisone/aquaphor with no change    Concern with PORT infection will start Keflex x 7 days    Told to call office in am if worse; or call on call if worsens/pain, oozing, fevers    Please follow up patient of Dr Lisa Santoyo MD PhD

## 2024-11-18 ENCOUNTER — TELEPHONE (OUTPATIENT)
Age: 64
End: 2024-11-18

## 2024-11-18 DIAGNOSIS — T80.212A PORT OR RESERVOIR INFECTION, INITIAL ENCOUNTER: Primary | ICD-10-CM

## 2024-11-18 NOTE — TELEPHONE ENCOUNTER
Spoke with patient to follow-up on her port. Patient states she started the antibiotics and she is feeling fine, denies fever, pain, oozing or swelling of the site. Patient states that it is very itchy. I advised her to put aquaphor and hydrocortisone on it to help and informed her that she can also take Benadryl as needed. Patient will send me photos of the port today. I informed her that I will call her back with further recommendations. Patient verbalized understanding.

## 2024-11-18 NOTE — TELEPHONE ENCOUNTER
Patient's port site is very red and itchy. Port site check ordered and scheduled for 11/19 at 1130. Patient made aware.

## 2024-11-19 ENCOUNTER — OFFICE VISIT (OUTPATIENT)
Dept: HEMATOLOGY ONCOLOGY | Facility: CLINIC | Age: 64
End: 2024-11-19
Payer: COMMERCIAL

## 2024-11-19 ENCOUNTER — HOSPITAL ENCOUNTER (OUTPATIENT)
Dept: NON INVASIVE DIAGNOSTICS | Facility: HOSPITAL | Age: 64
Discharge: HOME | End: 2024-11-19
Attending: INTERNAL MEDICINE

## 2024-11-19 VITALS
DIASTOLIC BLOOD PRESSURE: 80 MMHG | WEIGHT: 164 LBS | HEIGHT: 70 IN | SYSTOLIC BLOOD PRESSURE: 130 MMHG | TEMPERATURE: 98 F | BODY MASS INDEX: 23.48 KG/M2 | OXYGEN SATURATION: 95 % | RESPIRATION RATE: 16 BRPM | HEART RATE: 100 BPM

## 2024-11-19 DIAGNOSIS — Z95.828 PORT-A-CATH IN PLACE: Primary | ICD-10-CM

## 2024-11-19 DIAGNOSIS — T80.212A PORT OR RESERVOIR INFECTION, INITIAL ENCOUNTER: ICD-10-CM

## 2024-11-19 DIAGNOSIS — C50.911 MALIGNANT NEOPLASM OF RIGHT FEMALE BREAST, UNSPECIFIED ESTROGEN RECEPTOR STATUS, UNSPECIFIED SITE OF BREAST (HCC): Primary | ICD-10-CM

## 2024-11-19 PROCEDURE — 99214 OFFICE O/P EST MOD 30 MIN: CPT | Performed by: INTERNAL MEDICINE

## 2024-11-19 RX ORDER — SULFAMETHOXAZOLE AND TRIMETHOPRIM 800; 160 MG/1; MG/1
1 TABLET ORAL EVERY 12 HOURS SCHEDULED
Qty: 10 TABLET | Refills: 0 | Status: SHIPPED | OUTPATIENT
Start: 2024-11-19 | End: 2024-11-24

## 2024-11-19 RX ORDER — METHYLPREDNISOLONE 4 MG/1
TABLET ORAL
Qty: 21 TABLET | Refills: 0 | Status: SHIPPED | OUTPATIENT
Start: 2024-11-19

## 2024-11-19 NOTE — PROGRESS NOTES
Interventional radiology quick note    64-year-old female status post right chest port placement 11/5/2024.  Patient reports on 11/17/2024 she developed redness and itchiness and contacted her oncologist.  She was started on Keflex x 7 days.  Patient reports placing topical hydrocortisone and Aquaphor onto incision line.  She reports no difficulties with accessing and use during last chemotherapy session 11/8/2024.    She presents to interventional radiology with moderate redness, erythema to right chest port as well as neck puncture site.  She denies fever and chills.  She reports overall feeling well.    Removed glue from incision line.  No purulent drainage or discharge noted.  Wound mostly approximated.  Wound cleansed and Steri-Strips applied.  Redness edged with body marker.        Continue with current p.o. Keflex order  Add p.o. Bactrim once daily x 5 days  Medrol pack ordered  Discussed with patient use of probiotics and need for hydration  Will have patient follow-up in the clinic on Thursday 11/21  Discussed with patient symptoms for which she should report to the emergency department or notify her provider-fever, chills, increased pain discomfort, redness or swelling.  Patient verbalized understanding  Discussed case with ELY Monsalve

## 2024-11-21 ENCOUNTER — TELEPHONE (OUTPATIENT)
Age: 64
End: 2024-11-21

## 2024-11-21 ENCOUNTER — OFFICE VISIT (OUTPATIENT)
Age: 64
End: 2024-11-21
Payer: COMMERCIAL

## 2024-11-21 ENCOUNTER — HOSPITAL ENCOUNTER (OUTPATIENT)
Dept: NON INVASIVE DIAGNOSTICS | Facility: HOSPITAL | Age: 64
Discharge: HOME | End: 2024-11-21

## 2024-11-21 VITALS
DIASTOLIC BLOOD PRESSURE: 82 MMHG | SYSTOLIC BLOOD PRESSURE: 124 MMHG | BODY MASS INDEX: 23.19 KG/M2 | OXYGEN SATURATION: 98 % | RESPIRATION RATE: 16 BRPM | HEIGHT: 70 IN | WEIGHT: 162 LBS | HEART RATE: 82 BPM

## 2024-11-21 DIAGNOSIS — Z00.00 WELL ADULT EXAM: Primary | ICD-10-CM

## 2024-11-21 DIAGNOSIS — Z95.828 PORT-A-CATH IN PLACE: ICD-10-CM

## 2024-11-21 DIAGNOSIS — G43.109 MIGRAINE WITH AURA AND WITHOUT STATUS MIGRAINOSUS, NOT INTRACTABLE: ICD-10-CM

## 2024-11-21 DIAGNOSIS — R23.2 HOT FLASHES: ICD-10-CM

## 2024-11-21 DIAGNOSIS — K21.9 GASTROESOPHAGEAL REFLUX DISEASE WITHOUT ESOPHAGITIS: ICD-10-CM

## 2024-11-21 DIAGNOSIS — Z17.1 MALIGNANT NEOPLASM OF LOWER-INNER QUADRANT OF RIGHT BREAST OF FEMALE, ESTROGEN RECEPTOR NEGATIVE (HCC): ICD-10-CM

## 2024-11-21 DIAGNOSIS — C50.311 MALIGNANT NEOPLASM OF LOWER-INNER QUADRANT OF RIGHT BREAST OF FEMALE, ESTROGEN RECEPTOR NEGATIVE (HCC): ICD-10-CM

## 2024-11-21 DIAGNOSIS — Z78.0 MENOPAUSE: ICD-10-CM

## 2024-11-21 DIAGNOSIS — Z95.828 PORT-A-CATH IN PLACE: Primary | ICD-10-CM

## 2024-11-21 PROCEDURE — 99386 PREV VISIT NEW AGE 40-64: CPT | Performed by: INTERNAL MEDICINE

## 2024-11-21 RX ORDER — RIMEGEPANT SULFATE 75 MG/75MG
75 TABLET, ORALLY DISINTEGRATING ORAL ONCE
Qty: 10 TABLET | Refills: 3 | Status: SHIPPED | OUTPATIENT
Start: 2024-11-21 | End: 2024-11-21

## 2024-11-21 RX ORDER — PAROXETINE 10 MG/1
10 TABLET, FILM COATED ORAL DAILY
Qty: 90 TABLET | Refills: 3 | Status: SHIPPED | OUTPATIENT
Start: 2024-11-21

## 2024-11-21 NOTE — TELEPHONE ENCOUNTER
PA for (Nurtec) 75 mg SUBMITTED to [facility] rx    via    []CMM-KEY:   [x]Surescripts-Case ID # [id number]  []Availity-Auth ID # NDC #   []Faxed to plan   []Other website   []Phone call Case ID #     []PA sent as URGENT    All office notes, labs and other pertaining documents and studies sent. Clinical questions answered. Awaiting determination from insurance company.     Turnaround time for your insurance to make a decision on your Prior Authorization can take 7-21 business days.

## 2024-11-21 NOTE — PATIENT INSTRUCTIONS
New patient visit to establish primary care, chart reviewed    Rash on right chest wall in area of new port-question infection versus inflammation, being treated with both Keflex and Bactrim as well as dexamethasone with gradual improvements.  At this time continue with empiric treatment and monitor response.  Consider labs including CBC and blood cultures if fever or worsening symptoms.  Following with interventional radiology.    Breast cancer under care of medical and surgical oncology tolerating chemotherapy well    GERD stable on omeprazole    Migraines-prescription for Nurtec    Anxiety-refill Paxil    Health maintenance-check bone density, otherwise up-to-date

## 2024-11-21 NOTE — PROGRESS NOTES
Assessment/Plan:    There are no diagnoses linked to this encounter.          There are no Patient Instructions on file for this visit.    Subjective:      Patient ID: Amy Clement is a 64 y.o. female    [facility] [employer] [facility]  Retired [employer] after 3rd child  4 kids Natalie Teran, youngest doing [place] fellowship at [employer].  6 grandkids   Lost Guido in June  Enjoys travel, lunch w/ friends, grandkids  Exercising w/ walks 5-6 d per week, yoga, swimming  Breast CA dx'd 10/24, neoadjubant chemo started 11/6, Herceptin/taxotere  Started w/ Rash 11/17 over port.  No f/c.  Started keflex then bactrim and medrol, now less itchy  Anxiety-started paxil at menopause 5 mg.  Started back w/ Guido's issues   Migraines-tried triptans but they don't work as well as Nurtec, wants rx.  Were worse w/ menses, now more r/t wine or other triggers.   GERD-minimal symptoms, but back on omeprazole d/t chemo          Current Outpatient Medications:     acetaminophen (TYLENOL) 500 mg tablet, Take 1 tablet (500 mg total) by mouth every 6 (six) hours as needed for mild pain, Disp: , Rfl:     Bacillus Coagulans-Inulin (Probiotic) 1-250 BILLION-MG CAPS, Take by mouth, Disp: , Rfl:     calcium citrate (CALCITRATE) 950 (200 Ca) MG tablet, Take 1 tablet by mouth daily, Disp: , Rfl:     cephalexin (KEFLEX) 500 mg capsule, Take 1 capsule (500 mg total) by mouth every 6 (six) hours for 7 days, Disp: 28 capsule, Rfl: 0    dexamethasone (DECADRON) 4 mg tablet, Take 2 tablets (8 mg total) by mouth 2 (two) times a day with meals for 21 days Take 2 tabs by mouth twice daily the day before, the day of, and the day after chemo. Do not start before November 3, 2024., Disp: 12 tablet, Rfl: 6    eletriptan (RELPAX) 40 MG tablet, Take 1 tablet (40 mg total) by mouth once as needed for migraine for up to 64 doses may repeat in 2 hours if necessary, Disp: 16 tablet, Rfl: 3    estradiol (ESTRACE) 0.1 mg/g vaginal cream, Insert 0.5 g into the vagina 2 (two) times  a week, Disp: 42.5 g, Rfl: 3    fluticasone (FLONASE) 50 mcg/act nasal spray, 1 spray into each nostril daily, Disp: 16 g, Rfl: 1    Glucosamine-Chondroitin (GLUCOSAMINE CHONDR COMPLEX PO), Take by mouth daily , Disp: , Rfl:     latanoprost (XALATAN) 0.005 % ophthalmic solution, Administer 1 drop to both eyes daily at bedtime, Disp: , Rfl:     lidocaine-prilocaine (EMLA) cream, Apply topically as needed for mild pain, Disp: 30 g, Rfl: 2    methylPREDNISolone 4 MG tablet therapy pack, Use as directed on package, Disp: 21 tablet, Rfl: 0    Multiple Vitamins-Minerals (CENTRUM SILVER) tablet, Take by mouth, Disp: , Rfl:     omeprazole (PriLOSEC) 20 mg delayed release capsule, TAKE 1 CAPSULE BY MOUTH EVERY DAY, Disp: 30 capsule, Rfl: 1    ondansetron (ZOFRAN) 8 mg tablet, Take 1 tablet (8 mg total) by mouth every 8 (eight) hours as needed for nausea or vomiting, Disp: 30 tablet, Rfl: 2    PARoxetine (PAXIL) 10 mg tablet, Take 1 tablet (10 mg total) by mouth daily, Disp: 30 tablet, Rfl: 5    prochlorperazine (COMPAZINE) 10 mg tablet, Take 1 tablet (10 mg total) by mouth every 6 (six) hours as needed for nausea or vomiting, Disp: 30 tablet, Rfl: 2    Red Yeast Rice 600 MG TABS, Take by mouth, Disp: , Rfl:     sulfamethoxazole-trimethoprim (BACTRIM DS) 800-160 mg per tablet, Take 1 tablet by mouth every 12 (twelve) hours for 5 days, Disp: 10 tablet, Rfl: 0    timolol (TIMOPTIC) 0.5 % ophthalmic solution, Administer 1 drop to both eyes daily, Disp: , Rfl:     TURMERIC-GINGER PO, Take by mouth, Disp: , Rfl:     meloxicam (MOBIC) 7.5 mg tablet, TAKE 1 TABLET BY MOUTH EVERY DAY (Patient not taking: Reported on 6/7/2024), Disp: 30 tablet, Rfl: 2    Recent Results (from the past 6 weeks)   BRCAplus    Collection Time: 10/15/24 12:00 AM   Result Value Ref Range    GENETIC ANALYSIS OVERALL INTERPRETATION       NEGATIVE: No Clinically Significant Variants Detected    INTERPRETATION See Notes     GENES NOT REPORTED        JUAN J,BARD1,CDH1,CHEK2,PALB2,PTEN,RAD51C,RAD51D,STK11,TP53,NF1,BRCA1,BRCA2   CustomNext: Cancer +[facility]    Collection Time: 10/15/24 12:00 AM   Result Value Ref Range    GENETIC ANALYSIS OVERALL INTERPRETATION Variants of Unknown Significance Detected     INTERPRETATION See Notes     GENES NOT REPORTED       APC,JUAN J,BAP1,BARD1,BMPR1A,BRCA1,BRCA2,BRIP1,CDH1,CDK4,CDKN1B,CDKN2A,CHEK2,CTNNA1,DICER1,EGLN1,EPCAM,FH,FLCN,GREM1,HOXB13,KIF1B,KIT,MAX,MEN1,MET,MITF,MLH1,MSH2,MSH3,MUTYH,NF1,NTHL1,PALB2,PDGFRA,PMS2,POLD1,POLE,POT1,PTEN,RAD51C,RAD51D,RB1,RET,SDHA,SDHAF2,S  DHB,SDHC,SDHD,SMAD4,SMARCA4,STK11,LDTE877,TP53,TSC1,TSC2,VHL     MISCELLANEOUS LAB TEST    Collection Time: 10/15/24  3:29 PM   Result Value Ref Range    Miscellaneous Lab Test Result     Echo complete w/ contrast if indicated    Collection Time: 10/28/24  9:05 AM   Result Value Ref Range    BSA 1.93 m2    A4C EF 52 %    LVIDd 5.40 cm    LVIDS 3.40 cm    IVSd 0.90 cm    LVPWd 0.60 cm    FS 37 28 - 44    MV E' Tissue Velocity Septal 11 cm/s    LA Volume Index (BP) 15.5 mL/m2    E/A ratio 0.78     E wave deceleration time 228 ms    MV Peak E Myles 64 cm/s    MV Peak A Myles 0.82 m/s    RVID d 3.0 cm    Tricuspid annular plane systolic excursion 1.90 cm    LA size 3.4 cm    LA length (A2C) 4.80 cm    NERI A4C 12.2 cm2    LA volume (BP) 30 mL    RAA A4C 10.3 cm2    MV stenosis pressure 1/2 time 66 ms    MV valve area p 1/2 method 3.33     TR Peak Myles 2.5 m/s    Triscuspid Valve Regurgitation Peak Gradient 26.0 mmHg    Ao root 3.30 cm    Asc Ao 3.3 cm    Tricuspid valve peak regurgitation velocity 2.53 m/s    Left ventricular stroke volume (2D) 95.00 mL    IVS 0.9 cm    LEFT VENTRICLE SYSTOLIC VOLUME (MOD BIPLANE) 2D 47 mL    LV DIASTOLIC VOLUME (MOD BIPLANE) 2D 142 mL    Left Atrium Area-systolic Four Chamber 12.3 cm2    Left Atrium Area-systolic Apical Two Chamber 12.6 cm2    LVSV, 2D 95 mL    LV EF 60    CBC and differential    Collection Time: 11/06/24  7:13 AM    Result Value Ref Range    WBC 8.53 4.31 - 10.16 Thousand/uL    RBC 4.36 3.81 - 5.12 Million/uL    Hemoglobin 13.6 11.5 - 15.4 g/dL    Hematocrit 42.0 34.8 - 46.1 %    MCV 96 82 - 98 fL    MCH 31.2 26.8 - 34.3 pg    MCHC 32.4 31.4 - 37.4 g/dL    RDW 12.2 11.6 - 15.1 %    MPV 9.6 8.9 - 12.7 fL    Platelets 265 149 - 390 Thousands/uL    nRBC 0 /100 WBCs    Segmented % 80 (H) 43 - 75 %    Immature Grans % 0 0 - 2 %    Lymphocytes % 12 (L) 14 - 44 %    Monocytes % 8 4 - 12 %    Eosinophils Relative 0 0 - 6 %    Basophils Relative 0 0 - 1 %    Absolute Neutrophils 6.72 1.85 - 7.62 Thousands/µL    Absolute Immature Grans 0.03 0.00 - 0.20 Thousand/uL    Absolute Lymphocytes 1.06 0.60 - 4.47 Thousands/µL    Absolute Monocytes 0.70 0.17 - 1.22 Thousand/µL    Eosinophils Absolute 0.01 0.00 - 0.61 Thousand/µL    Basophils Absolute 0.01 0.00 - 0.10 Thousands/µL   Comprehensive metabolic panel    Collection Time: 11/06/24  7:13 AM   Result Value Ref Range    Sodium 139 135 - 147 mmol/L    Potassium 4.1 3.5 - 5.3 mmol/L    Chloride 105 96 - 108 mmol/L    CO2 29 21 - 32 mmol/L    ANION GAP 5 4 - 13 mmol/L    BUN 12 5 - 25 mg/dL    Creatinine 0.54 (L) 0.60 - 1.30 mg/dL    Glucose, Fasting 96 65 - 99 mg/dL    Calcium 9.9 8.4 - 10.2 mg/dL    AST 18 13 - 39 U/L    ALT 19 7 - 52 U/L    Alkaline Phosphatase 55 34 - 104 U/L    Total Protein 7.1 6.4 - 8.4 g/dL    Albumin 4.7 3.5 - 5.0 g/dL    Total Bilirubin 0.51 0.20 - 1.00 mg/dL    eGFR 100 ml/min/1.73sq m       The following portions of the patient's history were reviewed and updated as appropriate: allergies, current medications, past family history, past medical history, past social history, past surgical history and problem list.     Review of Systems   Constitutional:  Negative for appetite change, chills, diaphoresis, fatigue, fever and unexpected weight change.   HENT:  Negative for congestion, hearing loss and rhinorrhea.    Eyes:  Negative for visual disturbance.    Respiratory:  Negative for cough, chest tightness, shortness of breath and wheezing.    Cardiovascular:  Negative for chest pain, palpitations and leg swelling.   Gastrointestinal:  Negative for abdominal pain and blood in stool.   Endocrine: Negative for cold intolerance, heat intolerance, polydipsia and polyuria.   Genitourinary:  Negative for difficulty urinating, dysuria, frequency and urgency.   Musculoskeletal:  Negative for arthralgias and myalgias.   Skin:  Positive for rash.   Neurological:  Negative for dizziness, weakness, light-headedness and headaches.   Hematological:  Does not bruise/bleed easily.   Psychiatric/Behavioral:  Negative for dysphoric mood and sleep disturbance.          Objective:      Vitals:    11/21/24 1045   BP: 124/82   Pulse: 82   Resp: 16   SpO2: 98%          Physical Exam  Constitutional:       Appearance: She is well-developed.   HENT:      Head: Normocephalic and atraumatic.      Nose: Nose normal.   Eyes:      General: No scleral icterus.     Conjunctiva/sclera: Conjunctivae normal.      Pupils: Pupils are equal, round, and reactive to light.   Neck:      Thyroid: No thyromegaly.      Vascular: No JVD.      Trachea: No tracheal deviation.   Cardiovascular:      Rate and Rhythm: Normal rate and regular rhythm.      Heart sounds: No murmur heard.     No friction rub. No gallop.   Pulmonary:      Effort: Pulmonary effort is normal. No respiratory distress.      Breath sounds: Normal breath sounds. No wheezing or rales.   Musculoskeletal:         General: No deformity.      Cervical back: Normal range of motion and neck supple.   Lymphadenopathy:      Cervical: No cervical adenopathy.   Skin:     General: Skin is warm and dry.      Coloration: Skin is not pale.      Findings: Rash present. No erythema or lesion.      Comments: Well demarcated erythematous rash R upper chest, see photo   Neurological:      Mental Status: She is alert and oriented to person, place, and time.       Cranial Nerves: No cranial nerve deficit.   Psychiatric:         Behavior: Behavior normal.         Thought Content: Thought content normal.         Judgment: Judgment normal.        cervical adenopathy.   Skin:     General: Skin is warm and dry.      Coloration: Skin is not pale.      Findings: Rash present. No erythema or lesion.      Comments: Well demarcated erythematous rash R upper chest, see photo   Neurological:      Mental Status: She is alert and oriented to person, place, and time.      Cranial Nerves: No cranial nerve deficit.   Psychiatric:         Behavior: Behavior normal.         Thought Content: Thought content normal.         Judgment: Judgment normal.

## 2024-11-21 NOTE — QUICK NOTE
Patient seen for port site check today.  Port placed on 11/5/24.  Patient develops redness, swelling and itching directly under port site on 11/17/24.  Patient was seen and evaluated on 11/19/24.  Patient was given Keflex, Medrol dose pack and Bactrim.  Due for next chemo treatment next week.    Glue was removed from the incision site on 11/19/24, no drainage or wound dehiscence was noted and steri-strips were applied to the incision site.    Steri-strips remain in place.  They were trimmed today.    Swelling has resolved but site still remains with redness and bruising noted to bottom portion.  See picture in media.    Patient has no reports of pain, itching, diarrhea or fever.    Plan is to re-assess the site again on Monday since she has only been on the abx for two days.    Patient advised that if she feels that there is no improvement over the weekend, then she is to report to the Emergency Room for possible admission, IV abx and possible port removal.  Patient agrees with plan.  Patient has an appointment today with her Dr. Dasilva. Patient to discuss plan with Dr. Dasilva.

## 2024-11-22 ENCOUNTER — TELEPHONE (OUTPATIENT)
Age: 64
End: 2024-11-22

## 2024-11-22 NOTE — TELEPHONE ENCOUNTER
Call received from patient. She is currently on Bactrim and Keflex as well as steroids for her port rash. Stated that since she started the Bactrim, she has been having stomach pains and last night had one episode of diarrhea. She believes the Bactrim is upsetting her stomach and her taking two antibiotics is as well. She is taking probiotics and staying well hydrated. Questioning what should be done about this. Reports her rash being almost cleared now. It was checked by IR yesterday and they reported rash was clearing.

## 2024-11-22 NOTE — TELEPHONE ENCOUNTER
Spoke with patient and advised her that Dr. Gonzalez would like her to stop the Keflex and continue only on the Bactrim since she is having diarrhea and stomach pains. I advised her to continue with the probiotic and to keep a close eye on the rash and call immediately if she notices it increasing in size or getting worse. Patient verbalized understanding and is in agreement with the plan.

## 2024-11-23 PROBLEM — D70.1 CHEMOTHERAPY INDUCED NEUTROPENIA (HCC): Status: ACTIVE | Noted: 2024-11-23

## 2024-11-23 PROBLEM — T45.1X5A CHEMOTHERAPY INDUCED NEUTROPENIA (HCC): Status: ACTIVE | Noted: 2024-11-23

## 2024-11-23 NOTE — ADDENDUM NOTE
Encounter addended by: Kathrin Gonzalez DO on: 11/23/2024 6:39 PM   Actions taken: Problem List modified, Clinical Note Signed

## 2024-11-23 NOTE — PROGRESS NOTES
HEMATOLOGY / ONCOLOGY CLINIC FOLLOW UP NOTE    Primary Care Provider: Will Dasilva MD  Referring Provider:    MRN: [id number]  : 1960    Reason for Encounter: follow up breast cancer       Oncology History Overview Note   10/2024 - right breast and LNBx positive for invasive mammary carcinoma with predominantly lobular features, apocrine and histiocytoid features in less amount, grade 2, ER 1% NE 10% Her2 3+, [id number]    LN biopsy receptors - ER 1% NE 70% Her2 3+    2024 - start TCHP     Malignant neoplasm of lower-inner quadrant of right breast of female, estrogen receptor negative (HCC)   10/4/2024 Biopsy    A. Right breast ultrasound-guided biopsy  4 o'clock, 2 cm from nipple (ROSA ISELA)  Invasive mammary carcinoma with predominately lobular features   Apocrine and histiocytoid morphologic features  Grade 2  ER <1, NE 10, HER2 3+  Lymphovascular invasion present    B. Right axillary lymph node biopsy (ROSA ISELA)  Metastatic carcinoma, compatible with mammary origin  ER <1, NE 70, HER2 3+    Concordant. Right malignancy appears unifocal; biopsy-proven carcinoma measured 1.7 cm on US. Biopsy-proven metastatic disease to right axillary lymph node. Left breast clear.     10/4/2024 -  Cancer Staged    Staging form: Breast, AJCC 8th Edition  - Clinical stage from 10/4/2024: Stage IIA (cT1c, cN1(f), cM0, G2, ER-, NE+, HER2+) - Signed by Koki Magaña MD on 10/21/2024  Stage prefix: Initial diagnosis  Method of lymph node assessment: Core biopsy  Histologic grading system: 3 grade system       10/15/2024 Genetic Testing    Genetic testing with RNA analysis ordered  [facility]     2024 -  Chemotherapy    alteplase (CATHFLO), 2 mg, Intracatheter, Every 1 Minute as needed, 1 of 18 cycles  pegfilgrastim (NEULASTA ONPRO), 6 mg, Subcutaneous, Once, 1 of 6 cycles  Administration: 6 mg (2024)  fosaprepitant (EMEND) IVPB, 150 mg, Intravenous, Once, 1 of 6 cycles  Administration: 150 mg (2024)  pertuzumab  (PERJETA) IVPB, 840 mg, Intravenous, Once, 1 of 18 cycles  Administration: 840 mg (11/8/2024)  CARBOplatin (PARAPLATIN) IVPB (GOG AUC DOSING), 722.4 mg, Intravenous, Once, 1 of 6 cycles  Administration: 722.4 mg (11/8/2024)  DOCEtaxel (TAXOTERE) chemo infusion, 75 mg/m2 = 144 mg, Intravenous, Once, 1 of 6 cycles  Administration: 144 mg (11/8/2024)  trastuzumab (HERCEPTIN) chemo infusion, 8 mg/kg = 595 mg, Intravenous, Once, 1 of 18 cycles  Administration: 595 mg (11/8/2024)     Breast cancer metastasized to axillary lymph node, right (HCC)   10/16/2024 Initial Diagnosis    Breast cancer metastasized to axillary lymph node, right (HCC)         Interval History: Patient presents for follow up of her ER positive HER2 positive breast cancer.  She had her first cycle of TCHP on November 8.  She overall tolerated it well.  She had some mild diarrhea that was controlled with Imodium.  She called over the weekend with some redness over her port site and was started on Keflex.  This did not improve the erythema on her skin.  She had a port site check with interventional radiology and they added Bactrim to her Keflex.  She denies any fevers.  She denies any neuropathy.         REVIEW OF SYSTEMS:  Please note that a 14-point review of systems was performed to include Constitutional, HEENT, Respiratory, CVS, GI, , Musculoskeletal, Integumentary, Neurologic, Rheumatologic, Endocrinologic, Psychiatric, Lymphatic, and Hematologic/Oncologic systems were reviewed and are negative unless otherwise stated in HPI. Positive and negative findings pertinent to this evaluation are incorporated into the history of present illness.      ECOG PS: 0    PROBLEM LIST:  Patient Active Problem List   Diagnosis    Migraine with aura and without status migrainosus, not intractable    Thyroid nodule    Anxiety    GERD (gastroesophageal reflux disease)    Chronic hip pain, right    Postmenopausal bleeding    S/P dilation and curettage    Malignant  neoplasm of lower-inner quadrant of right breast of female, estrogen receptor negative (HCC)    Breast cancer metastasized to axillary lymph node, right (HCC)    Family history of breast cancer       Assessment / Plan: 64-year-old female with ER positive HER2 positive breast cancer.  She received cycle 1 of neoadjuvant TCHP.  She tolerated it well.  She does appear to have a cellulitis over her port.  She was just started on Bactrim by interventional radiology.  I need this to resolve before I can give her her second cycle of chemotherapy.  If it does not resolve we may need to admit her for IV antibiotics.  I put her in for another appointment next week so I can check this before we proceed with cycle 2 of TCHP.  It seems like from interventional radiology's note they wanted her on both Keflex and Bactrim.  I asked her to take probiotics to try to avoid C. difficile.  She knows to call in the interim with any questions or concerns.       I spent 30 minutes on chart review, face to face counseling time, coordination of care and documentation.    Past Medical History:   has a past medical history of Acute cystitis, Anxiety, Breast lump (.), Dermatitis, Disease of thyroid gland, Diverticulitis (11/2020), Diverticulitis of colon (November 25), Dysuria, ETD (Eustachian tube dysfunction), bilateral, Fibroid (2012), GERD (gastroesophageal reflux disease) (Occasionally), Glaucoma, Hematuria, Migraine (1985), Miscarriage (1990), Polyp at cervical os, and Varicella (1966).    PAST SURGICAL HISTORY:   has a past surgical history that includes Tubal ligation; Breast biopsy (Right, 2009); Colonoscopy; Cataract extraction (Bilateral); pr hysteroscopy bx endometrium&/polypc w/wo d&c (N/A, 02/23/2024); pr hysteroscopy bx endometrium&/polypc w/wo d&c (N/A, 02/23/2024); US guided breast biopsy right complete (Right, 10/04/2024); US guided breast lymph node biopsy right (Right, 10/04/2024); pr insj tunneled ctr vad w/subq port age 5  yr/> (N/A, 11/5/2024); and FL guided central venous access device insertion (11/5/2024).    CURRENT MEDICATIONS  Current Outpatient Medications   Medication Sig Dispense Refill    Bacillus Coagulans-Inulin (Probiotic) 1-250 BILLION-MG CAPS Take by mouth      calcium citrate (CALCITRATE) 950 (200 Ca) MG tablet Take 1 tablet by mouth daily      cephalexin (KEFLEX) 500 mg capsule Take 1 capsule (500 mg total) by mouth every 6 (six) hours for 7 days 28 capsule 0    dexamethasone (DECADRON) 4 mg tablet Take 2 tablets (8 mg total) by mouth 2 (two) times a day with meals for 21 days Take 2 tabs by mouth twice daily the day before, the day of, and the day after chemo. Do not start before November 3, 2024. 12 tablet 6    eletriptan (RELPAX) 40 MG tablet Take 1 tablet (40 mg total) by mouth once as needed for migraine for up to 64 doses may repeat in 2 hours if necessary 16 tablet 3    Glucosamine-Chondroitin (GLUCOSAMINE CHONDR COMPLEX PO) Take by mouth daily       latanoprost (XALATAN) 0.005 % ophthalmic solution Administer 1 drop to both eyes daily at bedtime      lidocaine-prilocaine (EMLA) cream Apply topically as needed for mild pain 30 g 2    methylPREDNISolone 4 MG tablet therapy pack Use as directed on package 21 tablet 0    Multiple Vitamins-Minerals (CENTRUM SILVER) tablet Take by mouth      omeprazole (PriLOSEC) 20 mg delayed release capsule TAKE 1 CAPSULE BY MOUTH EVERY DAY 30 capsule 1    ondansetron (ZOFRAN) 8 mg tablet Take 1 tablet (8 mg total) by mouth every 8 (eight) hours as needed for nausea or vomiting 30 tablet 2    prochlorperazine (COMPAZINE) 10 mg tablet Take 1 tablet (10 mg total) by mouth every 6 (six) hours as needed for nausea or vomiting 30 tablet 2    Red Yeast Rice 600 MG TABS Take by mouth      sulfamethoxazole-trimethoprim (BACTRIM DS) 800-160 mg per tablet Take 1 tablet by mouth every 12 (twelve) hours for 5 days 10 tablet 0    timolol (TIMOPTIC) 0.5 % ophthalmic solution Administer 1 drop  "to both eyes daily      TURMERIC-GINGER PO Take by mouth      acetaminophen (TYLENOL) 500 mg tablet Take 1 tablet (500 mg total) by mouth every 6 (six) hours as needed for mild pain      estradiol (ESTRACE) 0.1 mg/g vaginal cream Insert 0.5 g into the vagina 2 (two) times a week 42.5 g 3    fluticasone (FLONASE) 50 mcg/act nasal spray 1 spray into each nostril daily 16 g 1    meloxicam (MOBIC) 7.5 mg tablet TAKE 1 TABLET BY MOUTH EVERY DAY (Patient not taking: Reported on 6/7/2024) 30 tablet 2    PARoxetine (PAXIL) 10 mg tablet Take 1 tablet (10 mg total) by mouth daily 90 tablet 3     No current facility-administered medications for this visit.     [unfilled]    SOCIAL HISTORY:   reports that she has never smoked. She has never used smokeless tobacco. She reports current alcohol use of about 3.0 standard drinks of alcohol per week. She reports that she does not use drugs.     FAMILY HISTORY:  family history includes Breast cancer (age of onset: 47) in her mother; Cancer in her brother, father, and mother; Heart disease in her maternal grandmother; Hypertension in her father; Kidney cancer (age of onset: 38) in her son; Migraines (age of onset: 85) in her father; No Known Problems in her daughter, paternal aunt, paternal grandmother, and sister; Rectal cancer in her father; Throat cancer in her brother.     ALLERGIES:  is allergic to monistat [miconazole] and pollen extract.      Physical Exam:  Vital Signs:   Visit Vitals  /80 (BP Location: Left arm, Patient Position: Sitting, Cuff Size: Adult)   Pulse 100   Temp 98 °F (36.7 °C) (Temporal)   Resp 16   Ht 5' 10\" (1.778 m)   Wt 74.4 kg (164 lb)   LMP  (LMP Unknown)   SpO2 95%   BMI 23.53 kg/m²   OB Status Postmenopausal   Smoking Status Never   BSA 1.92 m²     Body mass index is 23.53 kg/m².  Body surface area is 1.92 meters squared.    GEN: Alert, awake oriented x3, in no acute distress  HEENT- No pallor, icterus, cyanosis, no oral mucosal lesions,   LAD - no " palpable cervical, clavicle, axillary, inguinal LAD  Heart- normal S1 S2, regular rate and rhythm, No murmur, rubs.   Lungs- clear breathing sound bilateral.   Abdomen- soft, Non tender, bowel sounds present  Extremities- No cyanosis, clubbing, edema  Neuro- No focal neurological deficit  Skin - erythema over the port site, see picture in media    Labs:  Lab Results   Component Value Date    WBC 8.53 11/06/2024    HGB 13.6 11/06/2024    HCT 42.0 11/06/2024    MCV 96 11/06/2024     11/06/2024     Lab Results   Component Value Date    SODIUM 139 11/06/2024    K 4.1 11/06/2024     11/06/2024    CO2 29 11/06/2024    AGAP 5 11/06/2024    BUN 12 11/06/2024    CREATININE 0.54 (L) 11/06/2024    GLUC 116 07/22/2016    GLUF 96 11/06/2024    CALCIUM 9.9 11/06/2024    AST 18 11/06/2024    ALT 19 11/06/2024    ALKPHOS 55 11/06/2024    TP 7.1 11/06/2024    TBILI 0.51 11/06/2024    EGFR 100 11/06/2024

## 2024-11-23 NOTE — PROGRESS NOTES
CLARIFY DX RESPONSE NOTE    A diagnosis of chemo induced neutropenia has been added to the problem list.

## 2024-11-25 ENCOUNTER — HOSPITAL ENCOUNTER (OUTPATIENT)
Dept: NON INVASIVE DIAGNOSTICS | Facility: HOSPITAL | Age: 64
Discharge: HOME | End: 2024-11-25

## 2024-11-25 DIAGNOSIS — Z95.828 PORT-A-CATH IN PLACE: ICD-10-CM

## 2024-11-25 NOTE — QUICK NOTE
"Interventional Radiology Quick Note     64-year-old female status post right chest port placement 11/5/2024. Patient reports on 11/17/2024 she developed redness and itchiness and contacted her oncologist. She was started on Keflex x 7 days. Patient reports placing topical hydrocortisone and Aquaphor onto incision line. She reports no difficulties with accessing and use during last chemotherapy session 11/8/2024.     She was seen in IR clinic for moderate redness, erythema to right chest port site and venotomy site.  She was started on Keflex and Bactrim and Medrol pack.    She reports completion of all medications yesterday, 11/24/2024.  She does report a new \"chemo rash\" that is causing some mild pruritus.  She reports taking Benadryl which reduces pruritus.    Physical examination of right chest port resolving redness with no erythema noted.  However, there is a small area on the distal part of redness that has extended past body marker into the area of ecchymosis.  There is also an increased amount of redness at the venotomy site.  Steri-Strips removed.  Patient denies fever and chills.  Will have her return to IR Wednesday for re-evaluation now that antibiotics have been completed.  Discussed findings with Dr. Perez.  Dr. Perez at bedside.        ELY Amaya  "

## 2024-11-26 ENCOUNTER — OFFICE VISIT (OUTPATIENT)
Dept: HEMATOLOGY ONCOLOGY | Facility: CLINIC | Age: 64
End: 2024-11-26
Payer: COMMERCIAL

## 2024-11-26 VITALS
TEMPERATURE: 98 F | BODY MASS INDEX: 22.9 KG/M2 | RESPIRATION RATE: 16 BRPM | DIASTOLIC BLOOD PRESSURE: 80 MMHG | WEIGHT: 160 LBS | HEART RATE: 89 BPM | SYSTOLIC BLOOD PRESSURE: 122 MMHG | OXYGEN SATURATION: 98 % | HEIGHT: 70 IN

## 2024-11-26 DIAGNOSIS — Z17.1 MALIGNANT NEOPLASM OF LOWER-INNER QUADRANT OF RIGHT BREAST OF FEMALE, ESTROGEN RECEPTOR NEGATIVE (HCC): Primary | ICD-10-CM

## 2024-11-26 DIAGNOSIS — C50.311 MALIGNANT NEOPLASM OF LOWER-INNER QUADRANT OF RIGHT BREAST OF FEMALE, ESTROGEN RECEPTOR NEGATIVE (HCC): Primary | ICD-10-CM

## 2024-11-26 PROCEDURE — 99213 OFFICE O/P EST LOW 20 MIN: CPT | Performed by: INTERNAL MEDICINE

## 2024-11-27 ENCOUNTER — HOSPITAL ENCOUNTER (OUTPATIENT)
Dept: INFUSION CENTER | Facility: CLINIC | Age: 64
Discharge: HOME | End: 2024-11-27
Payer: COMMERCIAL

## 2024-11-27 DIAGNOSIS — C50.311 MALIGNANT NEOPLASM OF LOWER-INNER QUADRANT OF RIGHT BREAST OF FEMALE, ESTROGEN RECEPTOR NEGATIVE (HCC): ICD-10-CM

## 2024-11-27 DIAGNOSIS — Z95.828 PORT-A-CATH IN PLACE: Primary | ICD-10-CM

## 2024-11-27 DIAGNOSIS — Z00.00 WELL ADULT EXAM: ICD-10-CM

## 2024-11-27 DIAGNOSIS — Z17.1 MALIGNANT NEOPLASM OF LOWER-INNER QUADRANT OF RIGHT BREAST OF FEMALE, ESTROGEN RECEPTOR NEGATIVE (HCC): ICD-10-CM

## 2024-11-27 LAB
ALBUMIN SERPL BCG-MCNC: 4 G/DL (ref 3.5–5)
ALP SERPL-CCNC: 58 U/L (ref 34–104)
ALT SERPL W P-5'-P-CCNC: 26 U/L (ref 7–52)
ANION GAP SERPL CALCULATED.3IONS-SCNC: 5 MMOL/L (ref 4–13)
AST SERPL W P-5'-P-CCNC: 16 U/L (ref 13–39)
BASOPHILS # BLD AUTO: 0.03 THOUSANDS/ΜL (ref 0–0.1)
BASOPHILS NFR BLD AUTO: 1 % (ref 0–1)
BILIRUB SERPL-MCNC: 0.52 MG/DL (ref 0.2–1)
BUN SERPL-MCNC: 14 MG/DL (ref 5–25)
CALCIUM SERPL-MCNC: 8.8 MG/DL (ref 8.4–10.2)
CHLORIDE SERPL-SCNC: 102 MMOL/L (ref 96–108)
CHOLEST SERPL-MCNC: 168 MG/DL (ref ?–200)
CO2 SERPL-SCNC: 28 MMOL/L (ref 21–32)
CREAT SERPL-MCNC: 0.54 MG/DL (ref 0.6–1.3)
EOSINOPHIL # BLD AUTO: 0.01 THOUSAND/ΜL (ref 0–0.61)
EOSINOPHIL NFR BLD AUTO: 0 % (ref 0–6)
ERYTHROCYTE [DISTWIDTH] IN BLOOD BY AUTOMATED COUNT: 12.8 % (ref 11.6–15.1)
EST. AVERAGE GLUCOSE BLD GHB EST-MCNC: 103 MG/DL
GFR SERPL CREATININE-BSD FRML MDRD: 100 ML/MIN/1.73SQ M
GLUCOSE SERPL-MCNC: 73 MG/DL (ref 65–140)
HBA1C MFR BLD: 5.2 %
HCT VFR BLD AUTO: 37.7 % (ref 34.8–46.1)
HDLC SERPL-MCNC: 54 MG/DL
HGB BLD-MCNC: 12.1 G/DL (ref 11.5–15.4)
IMM GRANULOCYTES # BLD AUTO: 0.04 THOUSAND/UL (ref 0–0.2)
IMM GRANULOCYTES NFR BLD AUTO: 1 % (ref 0–2)
LDLC SERPL CALC-MCNC: 104 MG/DL (ref 0–100)
LYMPHOCYTES # BLD AUTO: 1.06 THOUSANDS/ΜL (ref 0.6–4.47)
LYMPHOCYTES NFR BLD AUTO: 22 % (ref 14–44)
MCH RBC QN AUTO: 30.9 PG (ref 26.8–34.3)
MCHC RBC AUTO-ENTMCNC: 32.1 G/DL (ref 31.4–37.4)
MCV RBC AUTO: 96 FL (ref 82–98)
MONOCYTES # BLD AUTO: 0.44 THOUSAND/ΜL (ref 0.17–1.22)
MONOCYTES NFR BLD AUTO: 9 % (ref 4–12)
NEUTROPHILS # BLD AUTO: 3.3 THOUSANDS/ΜL (ref 1.85–7.62)
NEUTS SEG NFR BLD AUTO: 67 % (ref 43–75)
NONHDLC SERPL-MCNC: 114 MG/DL
NRBC BLD AUTO-RTO: 0 /100 WBCS
PLATELET # BLD AUTO: 307 THOUSANDS/UL (ref 149–390)
PMV BLD AUTO: 10 FL (ref 8.9–12.7)
POTASSIUM SERPL-SCNC: 3.8 MMOL/L (ref 3.5–5.3)
PROT SERPL-MCNC: 6.3 G/DL (ref 6.4–8.4)
RBC # BLD AUTO: 3.92 MILLION/UL (ref 3.81–5.12)
SODIUM SERPL-SCNC: 135 MMOL/L (ref 135–147)
T4 FREE SERPL-MCNC: 1.03 NG/DL (ref 0.61–1.12)
TRIGL SERPL-MCNC: 52 MG/DL (ref ?–150)
TSH SERPL DL<=0.05 MIU/L-ACNC: 0.12 UIU/ML (ref 0.45–4.5)
WBC # BLD AUTO: 4.88 THOUSAND/UL (ref 4.31–10.16)

## 2024-11-27 PROCEDURE — 80061 LIPID PANEL: CPT

## 2024-11-27 PROCEDURE — 84439 ASSAY OF FREE THYROXINE: CPT

## 2024-11-27 PROCEDURE — 80053 COMPREHEN METABOLIC PANEL: CPT | Performed by: INTERNAL MEDICINE

## 2024-11-27 PROCEDURE — 84443 ASSAY THYROID STIM HORMONE: CPT

## 2024-11-27 PROCEDURE — 83036 HEMOGLOBIN GLYCOSYLATED A1C: CPT

## 2024-11-27 PROCEDURE — 85025 COMPLETE CBC W/AUTO DIFF WBC: CPT | Performed by: INTERNAL MEDICINE

## 2024-11-27 NOTE — PROGRESS NOTES
Amy Clement presents for central venous labs. Port accessed, flushed and with positive blood return. Labs drawn from port with no complications.      Amy Clement is aware of future appt on 11/29 at 8 am. Port flushed and de-accessed. Band-aid placed.    AVS declined.

## 2024-11-29 ENCOUNTER — RESULTS FOLLOW-UP (OUTPATIENT)
Age: 64
End: 2024-11-29

## 2024-11-29 ENCOUNTER — PATIENT OUTREACH (OUTPATIENT)
Dept: HEMATOLOGY ONCOLOGY | Facility: CLINIC | Age: 64
End: 2024-11-29

## 2024-11-29 ENCOUNTER — HOSPITAL ENCOUNTER (OUTPATIENT)
Dept: INFUSION CENTER | Facility: CLINIC | Age: 64
End: 2024-11-29
Payer: COMMERCIAL

## 2024-11-29 VITALS
TEMPERATURE: 97.3 F | BODY MASS INDEX: 23.51 KG/M2 | SYSTOLIC BLOOD PRESSURE: 134 MMHG | WEIGHT: 164.2 LBS | DIASTOLIC BLOOD PRESSURE: 62 MMHG | HEIGHT: 70 IN | RESPIRATION RATE: 17 BRPM | HEART RATE: 101 BPM

## 2024-11-29 DIAGNOSIS — Z17.1 MALIGNANT NEOPLASM OF LOWER-INNER QUADRANT OF RIGHT BREAST OF FEMALE, ESTROGEN RECEPTOR NEGATIVE (HCC): Primary | ICD-10-CM

## 2024-11-29 DIAGNOSIS — C50.911 MALIGNANT NEOPLASM OF RIGHT FEMALE BREAST, UNSPECIFIED ESTROGEN RECEPTOR STATUS, UNSPECIFIED SITE OF BREAST (HCC): ICD-10-CM

## 2024-11-29 DIAGNOSIS — E05.90 SUBCLINICAL HYPERTHYROIDISM: Primary | ICD-10-CM

## 2024-11-29 DIAGNOSIS — C50.311 MALIGNANT NEOPLASM OF LOWER-INNER QUADRANT OF RIGHT BREAST OF FEMALE, ESTROGEN RECEPTOR NEGATIVE (HCC): Primary | ICD-10-CM

## 2024-11-29 PROCEDURE — 96367 TX/PROPH/DG ADDL SEQ IV INF: CPT

## 2024-11-29 PROCEDURE — 96377 APPLICATON ON-BODY INJECTOR: CPT

## 2024-11-29 PROCEDURE — 96417 CHEMO IV INFUS EACH ADDL SEQ: CPT

## 2024-11-29 PROCEDURE — 96375 TX/PRO/DX INJ NEW DRUG ADDON: CPT

## 2024-11-29 PROCEDURE — 96413 CHEMO IV INFUSION 1 HR: CPT

## 2024-11-29 RX ORDER — SODIUM CHLORIDE 9 MG/ML
20 INJECTION, SOLUTION INTRAVENOUS ONCE
Status: CANCELLED | OUTPATIENT
Start: 2024-11-29

## 2024-11-29 RX ORDER — SODIUM CHLORIDE 9 MG/ML
20 INJECTION, SOLUTION INTRAVENOUS ONCE
Status: COMPLETED | OUTPATIENT
Start: 2024-11-29 | End: 2024-11-29

## 2024-11-29 RX ORDER — LIDOCAINE/PRILOCAINE 2.5 %-2.5%
CREAM (GRAM) TOPICAL AS NEEDED
Qty: 30 G | Refills: 2 | Status: SHIPPED | OUTPATIENT
Start: 2024-11-29 | End: 2024-12-06 | Stop reason: SDUPTHER

## 2024-11-29 RX ADMIN — PROMETHAZINE HYDROCHLORIDE 12.5 MG: 25 INJECTION INTRAMUSCULAR; INTRAVENOUS at 10:53

## 2024-11-29 RX ADMIN — SODIUM CHLORIDE 20 ML/HR: 0.9 INJECTION, SOLUTION INTRAVENOUS at 09:12

## 2024-11-29 RX ADMIN — DEXAMETHASONE SODIUM PHOSPHATE: 10 INJECTION, SOLUTION INTRAMUSCULAR; INTRAVENOUS at 09:11

## 2024-11-29 RX ADMIN — TRASTUZUMAB 447 MG: 150 INJECTION, POWDER, LYOPHILIZED, FOR SOLUTION INTRAVENOUS at 11:41

## 2024-11-29 RX ADMIN — FOSAPREPITANT 150 MG: 150 INJECTION, POWDER, LYOPHILIZED, FOR SOLUTION INTRAVENOUS at 09:38

## 2024-11-29 RX ADMIN — CARBOPLATIN 722.4 MG: 10 INJECTION, SOLUTION INTRAVENOUS at 13:36

## 2024-11-29 RX ADMIN — PERTUZUMAB 420 MG: 30 INJECTION, SOLUTION, CONCENTRATE INTRAVENOUS at 10:19

## 2024-11-29 RX ADMIN — PEGFILGRASTIM 6 MG: KIT SUBCUTANEOUS at 14:45

## 2024-11-29 RX ADMIN — DOCETAXEL 144 MG: 20 INJECTION, SOLUTION, CONCENTRATE INTRAVENOUS at 12:18

## 2024-11-29 NOTE — PROGRESS NOTES
Called to follow up with Amy to make sure everything is ok.  Asked patient if she had any questions or concerns- she mentioned not at this time.    Patient did mentioned that she was at her chemo appointment and all is good currently.  Stated to patient expect to hear from me periodically as I will be checking in on her again in a few weeks.

## 2024-11-29 NOTE — PROGRESS NOTES
Pt here for chemotherapy, offering no complaints. PAC accessed with positive blood return noted. Tolerated entire infusion without complication. PAC flushed and de-accessed. Pt used PAXMAN today. Cold cap was applied during herceptin for 30 minutes and had a 90 minute cool down. Neulasta placed on R arm. AVS declined. Next appt 12/18 130pm. Walked out in stable condition.

## 2024-11-29 NOTE — PROGRESS NOTES
Name: Amy Clement      : 1960      MRN: [id number]  Encounter Provider: Kathrin Gonzalez DO  Encounter Date: 2024   Encounter department: [facility] [place]     Cancer Staging   Malignant neoplasm of lower-inner quadrant of right breast of female, estrogen receptor negative (HCC)  Staging form: Breast, AJCC 8th Edition  - Clinical stage from 10/4/2024: Stage IIA (cT1c, cN1(f), cM0, G2, ER-, LA+, HER2+) - Signed by Koki Magaña MD on 10/21/2024  :  Assessment & Plan  Malignant neoplasm of lower-inner quadrant of right breast of female, estrogen receptor negative (HCC)         64-year-old female with HER2 positive breast cancer due for cycle 2 of TCHP on Friday.  From my standpoint she is cleared.  She is going have 1 more port check in IR tomorrow.  I did discuss via [employer] chat with the physician who evaluated her last week.  There was a concern that perhaps the needle was not fully seated in the port and there was some leakage but because of the physical exam finding from last week.  She does not think the port itself is leaking, it was more of an issue of how the needle was accessed in the port.  I will plan on seeing her back prior to cycle 3 of TCHP for ongoing evaluation.  Otherwise she has tolerated it well.    History of Present Illness     Reason for Visit / CC: Follow-up, port check   HPI  Amy Clement is a 64 y.o. female with HER2 positive breast cancer.  Since our last visit she had to stop the Keflex she was taking for her potential cellulitis of her port site because of GI distress.  She has finished the prescribed course of Bactrim.  She will be seeing IR again for port check tomorrow and has her second cycle of TCHP on Friday.  The only other thing she has notices it sometimes it feels hard to swallow.  She was having some issues with GERD and is taking omeprazole which is helping.  She has also been taking Benadryl.  She denies any fevers.   Please see physical exam section for current port site appearance.     Oncology History   Oncology History Overview Note   10/2024 - right breast and LNBx positive for invasive mammary carcinoma with predominantly lobular features, apocrine and histiocytoid features in less amount, grade 2, ER 1% CA 10% Her2 3+, [id number]    LN biopsy receptors - ER 1% CA 70% Her2 3+    11/8/2024 - start TCHP     Malignant neoplasm of lower-inner quadrant of right breast of female, estrogen receptor negative (HCC)   10/4/2024 Biopsy    A. Right breast ultrasound-guided biopsy  4 o'clock, 2 cm from nipple (ROSA ISELA)  Invasive mammary carcinoma with predominately lobular features   Apocrine and histiocytoid morphologic features  Grade 2  ER <1, CA 10, HER2 3+  Lymphovascular invasion present    B. Right axillary lymph node biopsy (ROSA ISELA)  Metastatic carcinoma, compatible with mammary origin  ER <1, CA 70, HER2 3+    Concordant. Right malignancy appears unifocal; biopsy-proven carcinoma measured 1.7 cm on US. Biopsy-proven metastatic disease to right axillary lymph node. Left breast clear.     10/4/2024 -  Cancer Staged    Staging form: Breast, AJCC 8th Edition  - Clinical stage from 10/4/2024: Stage IIA (cT1c, cN1(f), cM0, G2, ER-, CA+, HER2+) - Signed by Koki Magaña MD on 10/21/2024  Stage prefix: Initial diagnosis  Method of lymph node assessment: Core biopsy  Histologic grading system: 3 grade system       10/15/2024 Genetic Testing    Genetic testing with RNA analysis ordered  [facility]     11/8/2024 -  Chemotherapy    alteplase (CATHFLO), 2 mg, Intracatheter, Every 1 Minute as needed, 2 of 18 cycles  pegfilgrastim (NEULASTA ONPRO), 6 mg, Subcutaneous, Once, 2 of 6 cycles  Administration: 6 mg (11/8/2024)  fosaprepitant (EMEND) IVPB, 150 mg, Intravenous, Once, 2 of 6 cycles  Administration: 150 mg (11/8/2024)  pertuzumab (PERJETA) IVPB, 840 mg, Intravenous, Once, 2 of 18 cycles  Administration: 840 mg (11/8/2024)  CARBOplatin  "(PARAPLATIN) IVPB (GOG AUC DOSING), 722.4 mg, Intravenous, Once, 2 of 6 cycles  Administration: 722.4 mg (11/8/2024)  DOCEtaxel (TAXOTERE) chemo infusion, 75 mg/m2 = 144 mg, Intravenous, Once, 2 of 6 cycles  Administration: 144 mg (11/8/2024)  trastuzumab (HERCEPTIN) chemo infusion, 8 mg/kg = 595 mg, Intravenous, Once, 2 of 18 cycles  Administration: 595 mg (11/8/2024)     Breast cancer metastasized to axillary lymph node, right (HCC)   10/16/2024 Initial Diagnosis    Breast cancer metastasized to axillary lymph node, right (HCC)        Review of Systems A complete review of systems is negative other than that noted above in the HPI.        Objective   /80 (BP Location: Left arm, Patient Position: Sitting, Cuff Size: Adult)   Pulse 89   Temp 98 °F (36.7 °C) (Temporal)   Resp 16   Ht 5' 10\" (1.778 m)   Wt 72.6 kg (160 lb)   LMP  (LMP Unknown)   SpO2 98%   BMI 22.96 kg/m²     Blood pressure 122/80, pulse 89, temperature 98 °F (36.7 °C), temperature source Temporal, resp. rate 16, height 5' 10\" (1.778 m), weight 72.6 kg (160 lb), SpO2 98%, not currently breastfeeding.  ECOG  0  Physical Exam     GEN: Alert, awake oriented x3, in no acute distress  HEENT- No pallor, icterus, cyanosis, no oral mucosal lesions,   LAD - no palpable cervical, clavicle, axillary, inguinal LAD  Heart- normal S1 S2, regular rate and rhythm, No murmur, rubs.   Lungs- clear breathing sound bilateral.   Abdomen- soft, Non tender, bowel sounds present  Extremities- No cyanosis, clubbing, edema  Neuro- No focal neurological deficit        Labs:  All pertinent labs and imaging reviewed.    "

## 2024-12-04 ENCOUNTER — OFFICE VISIT (OUTPATIENT)
Dept: OBGYN CLINIC | Facility: CLINIC | Age: 64
End: 2024-12-04
Payer: COMMERCIAL

## 2024-12-04 ENCOUNTER — NURSE TRIAGE (OUTPATIENT)
Age: 64
End: 2024-12-04

## 2024-12-04 VITALS
DIASTOLIC BLOOD PRESSURE: 70 MMHG | SYSTOLIC BLOOD PRESSURE: 120 MMHG | HEIGHT: 70 IN | WEIGHT: 162 LBS | BODY MASS INDEX: 23.19 KG/M2

## 2024-12-04 DIAGNOSIS — N94.89 VAGINAL BURNING: Primary | ICD-10-CM

## 2024-12-04 DIAGNOSIS — N95.2 ATROPHIC VULVOVAGINITIS: ICD-10-CM

## 2024-12-04 PROCEDURE — 87480 CANDIDA DNA DIR PROBE: CPT

## 2024-12-04 PROCEDURE — 87660 TRICHOMONAS VAGIN DIR PROBE: CPT

## 2024-12-04 PROCEDURE — 99213 OFFICE O/P EST LOW 20 MIN: CPT

## 2024-12-04 PROCEDURE — 87510 GARDNER VAG DNA DIR PROBE: CPT

## 2024-12-04 NOTE — PROGRESS NOTES
Name: Amy Clement      : 1960      MRN: [id number]  Encounter Provider: Holly Bolivar PA-C  Encounter Date: 2024   Encounter department: [facility] [place]  :  Assessment & Plan  Vaginal burning    Orders:    VAGINOSIS DNA PROBE    Atrophic vulvovaginitis  Patient unable to take estrace due to breast cancer diagnosis  Patient using coconut oil but is non-palliating  Reviewed with patient non-hormonal options for managing vaginal atrophy including OTC vaginal moisturizers, continuing coconut oil, and discussing various natural and OTC lubricants.  Patient provided with a printout information sheet  Patient encouraged to ask oncology about intrarosa.        History of Present Illness     HPI  Amy Clement is a 64 y.o. female who presents due to vaginal itching/burning x 5 days    Patient is post menopausal. No PMB  Patient previously used estrace cream for atrophic vaginitis but had to stop due to recent breast cancer diagnosis. Patient is currently going through chemotherapy.  Patient has been using coconut oil to relieve symptoms of atrophy but recently she has noticed an increase in vaginal itching and burning despite the use of the oil.  No abnormal vaginal discharge or bleeding.        Review of Systems   Gastrointestinal:  Negative for abdominal pain, anal bleeding, blood in stool, constipation, diarrhea, nausea and vomiting.   Genitourinary:  Negative for difficulty urinating, dysuria, frequency, hematuria, pelvic pain, urgency, vaginal bleeding, vaginal discharge and vaginal pain.   Skin:  Negative for rash.     Current Outpatient Medications on File Prior to Visit   Medication Sig Dispense Refill    acetaminophen (TYLENOL) 500 mg tablet Take 1 tablet (500 mg total) by mouth every 6 (six) hours as needed for mild pain      Bacillus Coagulans-Inulin (Probiotic) 1-250 BILLION-MG CAPS Take by mouth      calcium citrate (CALCITRATE) 950 (200 Ca) MG  tablet Take 1 tablet by mouth daily      eletriptan (RELPAX) 40 MG tablet Take 1 tablet (40 mg total) by mouth once as needed for migraine for up to 64 doses may repeat in 2 hours if necessary 16 tablet 3    estradiol (ESTRACE) 0.1 mg/g vaginal cream Insert 0.5 g into the vagina 2 (two) times a week 42.5 g 3    fluticasone (FLONASE) 50 mcg/act nasal spray 1 spray into each nostril daily 16 g 1    Glucosamine-Chondroitin (GLUCOSAMINE CHONDR COMPLEX PO) Take by mouth daily       latanoprost (XALATAN) 0.005 % ophthalmic solution Administer 1 drop to both eyes daily at bedtime      lidocaine-prilocaine (EMLA) cream Apply topically as needed for mild pain 30 g 2    methylPREDNISolone 4 MG tablet therapy pack Use as directed on package 21 tablet 0    Multiple Vitamins-Minerals (CENTRUM SILVER) tablet Take by mouth      omeprazole (PriLOSEC) 20 mg delayed release capsule TAKE 1 CAPSULE BY MOUTH EVERY DAY 30 capsule 1    ondansetron (ZOFRAN) 8 mg tablet Take 1 tablet (8 mg total) by mouth every 8 (eight) hours as needed for nausea or vomiting 30 tablet 2    PARoxetine (PAXIL) 10 mg tablet Take 1 tablet (10 mg total) by mouth daily 90 tablet 3    prochlorperazine (COMPAZINE) 10 mg tablet Take 1 tablet (10 mg total) by mouth every 6 (six) hours as needed for nausea or vomiting 30 tablet 2    timolol (TIMOPTIC) 0.5 % ophthalmic solution Administer 1 drop to both eyes daily      TURMERIC-GINGER PO Take by mouth      meloxicam (MOBIC) 7.5 mg tablet TAKE 1 TABLET BY MOUTH EVERY DAY (Patient not taking: Reported on 6/7/2024) 30 tablet 2    Red Yeast Rice 600 MG TABS Take by mouth (Patient not taking: Reported on 11/26/2024)       No current facility-administered medications on file prior to visit.      Social History     Tobacco Use    Smoking status: Never    Smokeless tobacco: Never   Vaping Use    Vaping status: Never Used   Substance and Sexual Activity    Alcohol use: Yes     Alcohol/week: 3.0 standard drinks of alcohol      "Types: 3 Glasses of wine per week     Comment: occasionally    Drug use: No    Sexual activity: Yes     Partners: Male     Birth control/protection: Post-menopausal        Objective   /70 (BP Location: Left arm, Patient Position: Sitting, Cuff Size: Large)   Ht 5' 10\" (1.778 m)   Wt 73.5 kg (162 lb)   LMP  (LMP Unknown)   BMI 23.24 kg/m²      Physical Exam  Genitourinary:     Exam position: Lithotomy position.      Labia:         Right: Tenderness and lesion present.         Left: Tenderness and lesion present.       Vagina: No vaginal discharge, tenderness or lesions.      Cervix: No discharge, friability, lesion, erythema or cervical bleeding.          Comments: Areas of erythema at the introitus. Significantly tender to palpation. Vaginal canal pale and dry. Moderate-severe atrophic changes        Holly Bolivar PA-C  "

## 2024-12-04 NOTE — PROGRESS NOTES
Patient is here today for vaginal itching and vaginal burning .  How long have been having this problem : Since  and getting worst .   at age :50  Any PMB : NONE       Non smoker: NO     Currently sexually active: Not currently  Mother--breast cancer  Father--colon cancer

## 2024-12-05 ENCOUNTER — RESULTS FOLLOW-UP (OUTPATIENT)
Age: 64
End: 2024-12-05

## 2024-12-05 LAB
CANDIDA RRNA VAG QL PROBE: NOT DETECTED
G VAGINALIS RRNA GENITAL QL PROBE: NOT DETECTED
T VAGINALIS RRNA GENITAL QL PROBE: NOT DETECTED

## 2024-12-06 DIAGNOSIS — C50.911 MALIGNANT NEOPLASM OF RIGHT FEMALE BREAST, UNSPECIFIED ESTROGEN RECEPTOR STATUS, UNSPECIFIED SITE OF BREAST (HCC): ICD-10-CM

## 2024-12-06 RX ORDER — LIDOCAINE/PRILOCAINE 2.5 %-2.5%
CREAM (GRAM) TOPICAL AS NEEDED
Qty: 30 G | Refills: 2 | Status: SHIPPED | OUTPATIENT
Start: 2024-12-06

## 2024-12-06 NOTE — TELEPHONE ENCOUNTER
Pt called refill line and stated she does not need this script now she got some from [facility]

## 2024-12-06 NOTE — TELEPHONE ENCOUNTER
Misplaced medication and needs a refill      Reason for call:   [x] Refill   [] Prior Auth  [] Other:     Office:   [] PCP/Provider -   [x] Specialty/Provider - Kathrin Gonzalez/[facility]         Medication: Emla Cream    Dose/Frequency: Apply topically as needed    Quantity: 30 g     Pharmacy: [address]    Does the patient have enough for 3 days?   [] Yes   [x] No - Send as HP to POD

## 2024-12-10 ENCOUNTER — PATIENT OUTREACH (OUTPATIENT)
Dept: CASE MANAGEMENT | Facility: HOSPITAL | Age: 64
End: 2024-12-10

## 2024-12-10 NOTE — PROGRESS NOTES
Call out to pt this morning to check in with her, she was happy for the call and says that she's feeling better after a rough few days from her second treatment and decided to start decorating for the holidays.  She tells me that treatments are going OK for the most part, she has had some side effects but finds that they're manageable.  She is using the cold cap and says that her sons have things down to a science when applying it for her.  She has noticed some shedding of her hair but overall is happy with the cold cap results so far.  She is unsure of her plans for the holidays as she has treatment right before, she says that her children are hoping she will spend the holidays with them.  This will be their first holiday without her , he passed away earlier this year.  We spoke about how she has been managing emotionally with all of this happening only 4 months apart, and she notes that she's realizing who her true friends are with how much support she's gotten.  She says that she's maybe never felt so loved as she does now and mentioned how people are bringing her meals and checking in with offers to help all of the time.  She also will travel a few days after [place] down to [place] for a close family friend's wedding, her children plan to go as well.  She says that initially she felt like this would be too much for her right now but her children convinced her to go and she is looking forward to it.  She is also hoping to get away for a week in January or February, as she notes that this is a hard time of year for her in general.    At this point pt denies any needs or concerns but was appreciative of the outreach and the time spent talking.  She has my direct number to reach out as needed moving forward, I will remain available as needed to assist or support her.  No other needs noted at this time.

## 2024-12-11 ENCOUNTER — NURSE TRIAGE (OUTPATIENT)
Age: 64
End: 2024-12-11

## 2024-12-11 NOTE — TELEPHONE ENCOUNTER
"Pt called to report that she developed two sores on mouth, one on bottom lip and the other at the corner of her mouth. Pt reports that she never has this before but her daughter did and take an OTC medicated cream for. Pt reports no open blisters or blisters more like a risen very small discolored bump. Denies fevers or chills, no thrush noted. Protocol suggested pt to go to office today for an appt however, pt reports living far away and having a lot of different appts this week. Pt is going to send a picture via [employer]. Please advise.     Reason for Disposition   Weak immune system (e.g., HIV positive, cancer chemo, splenectomy, organ transplant, chronic steroids)    Answer Assessment - Initial Assessment Questions  1. APPEARANCE: \"What does the cold sore look like?\" (e.g., small bumps or blisters on the outer lip)      Corner lip, small bump. Sore on the bottom of lip no blister but has some discoloration and tingles  2. SIZE: \"How large an area is involved with the cold sores?\" (e.g., inches, cm or compare to coins)      Sore on corner of lip and bottom of lip is very small per pt, smaller than a dime  3. LOCATION: \"Which part of the lip is involved?\"      Sore on corner of lip and bottom of lip  4. ONSET: \"When did the cold sore begin?\"      Last evening  5. RECURRENT BLISTERS: \"Have you had cold sores before?\" If Yes, ask: \"When was the last time?\" \"How many times a year?\"      Denies   6. TREATMENT: \"Are you using any medicines for cold sores?\" (e.g., over-the-counter cream, antiviral pills)      Denies pt just put aquaphore  7. OTHER SYMPTOMS: \"Do you have any other symptoms?\" (e.g., fever, sores inside mouth)      Denies    Protocols used: Cold Sores (Fever Blisters)-Adult-[place]    "

## 2024-12-12 ENCOUNTER — OFFICE VISIT (OUTPATIENT)
Dept: ENDOCRINOLOGY | Facility: CLINIC | Age: 64
End: 2024-12-12
Payer: COMMERCIAL

## 2024-12-12 VITALS
HEART RATE: 92 BPM | DIASTOLIC BLOOD PRESSURE: 62 MMHG | OXYGEN SATURATION: 97 % | SYSTOLIC BLOOD PRESSURE: 120 MMHG | HEIGHT: 70 IN | WEIGHT: 162 LBS | BODY MASS INDEX: 23.19 KG/M2

## 2024-12-12 DIAGNOSIS — E04.1 THYROID NODULE: ICD-10-CM

## 2024-12-12 DIAGNOSIS — E04.9 GOITER: Primary | ICD-10-CM

## 2024-12-12 PROCEDURE — 99215 OFFICE O/P EST HI 40 MIN: CPT | Performed by: INTERNAL MEDICINE

## 2024-12-12 NOTE — PROGRESS NOTES
Chief Complaint   Patient presents with   • thyroid nodule      Referring Provider  Lamar Ordoñez  [address]  [address]  [place] [address]     History of Present Illness:   Amy Clement is a 64 y.o. female with thyroid nodule, goiter, and, abnormal TSH seen as a transfer of care. Formerly seeing Dr. Seals. Her note is reviewed.      Originally thyroid nodules were diagnosed in 1985. She has had an FNA of the left/ isthmus nodule most recently 2015 was read as [place] II. Cytology report in [facility] showed abundant colloid and come changes c/w CLT.     Her history includes the death of her  this year and diagnosis of ER neg/WV+/HER2 positive breast CA.  Currently undergoing chemo which includes steroids (TCHP). As part of her staging, she had a CT neck/chest/ab/pelvis the enlarged left lobe with extension to the upper mediastinum and deviated but patent trachea.   On 11/27/2024, she had TFTs with a mildly decreased TSH. She appears euthyroid. She denies palpitations or tremor. She had some nausea, mild hair loss, and mild weight loss with the chemo.     Her neck appears enlarged, but there are no changes in voice or dysphagia.       Contrast exposure w/ staging CT 10/28/2024    Of note, after chemo, she will likely get surgery and XRT.   Fhx:   thyroid disorder- none    Patient Active Problem List   Diagnosis   • Migraine with aura and without status migrainosus, not intractable   • Thyroid nodule   • Anxiety   • GERD (gastroesophageal reflux disease)   • Chronic hip pain, right   • Postmenopausal bleeding   • S/P dilation and curettage   • Malignant neoplasm of lower-inner quadrant of right breast of female, estrogen receptor negative (HCC)   • Breast cancer metastasized to axillary lymph node, right (HCC)   • Family history of breast cancer   • Chemotherapy induced neutropenia (HCC)   • Port-A-Cath in place   • Goiter      Past Medical History:   Diagnosis Date   • Acute cystitis     • Anxiety     medication for hot flasshes   • Breast lump .    last assessed 01/16/2017   • Dermatitis     last assessed 02/27/2017   • Disease of thyroid gland     last assessed 02/23/2016   • Diverticulitis 11/2020   • Diverticulitis of colon November 25 July 2016 1st dx   • Dysuria     last assessed 04/28/2017   • ETD (Eustachian tube dysfunction), bilateral     last assessed 02/23/2016   • Fibroid 2012   • GERD (gastroesophageal reflux disease) Occasionally   • Glaucoma     Left   • Hematuria    • Migraine 1985   • Miscarriage 1990   • Polyp at cervical os    • Varicella 1966      Past Surgical History:   Procedure Laterality Date   • BREAST BIOPSY Right 2009    2 areas benign   • CATARACT EXTRACTION Bilateral    • COLONOSCOPY     • FL GUIDED CENTRAL VENOUS ACCESS DEVICE INSERTION  11/5/2024   • NC HYSTEROSCOPY BX ENDOMETRIUM&/POLYPC W/WO D&C N/A 02/23/2024    Procedure: DILATATION AND CURETTAGE (D&C) WITH HYSTEROSCOPY;  Surgeon: Joselin Alaniz MD;  Location: [facility];  Service: Gynecology   • NC HYSTEROSCOPY BX ENDOMETRIUM&/POLYPC W/WO D&C N/A 02/23/2024    Procedure: POLYPECTOMY;  Surgeon: Joselin Alaniz MD;  Location: [facility];  Service: Gynecology   • NC INSJ TUNNELED CTR VAD W/SUBQ PORT AGE 5 YR/> N/A 11/5/2024    Procedure: INSERTION VENOUS PORT ( PORT-A-CATH) IR;  Surgeon: Chandler Ball DO;  [facility];  Service: Interventional Radiology   • TUBAL LIGATION     • US GUIDED BREAST BIOPSY RIGHT COMPLETE Right 10/04/2024   • US GUIDED BREAST LYMPH NODE BIOPSY RIGHT Right 10/04/2024      Family History   Problem Relation Age of Onset   • Breast cancer Mother 47   • Cancer Mother         Breast cancer   • Hypertension Father         Late life dx   • Rectal cancer Father    • Migraines Father 85   • Cancer Father         Colon cancer   • No Known Problems Sister    • Throat cancer Brother    • Cancer Brother         bladder   • Heart disease Maternal Grandmother    • No  Known Problems Paternal Grandmother    • No Known Problems Daughter    • Kidney cancer Son 38   • No Known Problems Paternal Aunt      Social History     Tobacco Use   • Smoking status: Never   • Smokeless tobacco: Never   Substance Use Topics   • Alcohol use: Yes     Alcohol/week: 3.0 standard drinks of alcohol     Types: 3 Glasses of wine per week     Comment: occasionally     Allergies   Allergen Reactions   • Monistat [Miconazole] Itching   • Pollen Extract Itching, Swelling and Sneezing         Current Outpatient Medications:   •  Bacillus Coagulans-Inulin (Probiotic) 1-250 BILLION-MG CAPS, Take by mouth, Disp: , Rfl:   •  calcium citrate (CALCITRATE) 950 (200 Ca) MG tablet, Take 1 tablet by mouth daily, Disp: , Rfl:   •  Glucosamine-Chondroitin (GLUCOSAMINE CHONDR COMPLEX PO), Take by mouth daily , Disp: , Rfl:   •  latanoprost (XALATAN) 0.005 % ophthalmic solution, Administer 1 drop to both eyes daily at bedtime, Disp: , Rfl:   •  lidocaine-prilocaine (EMLA) cream, Apply topically as needed for mild pain, Disp: 30 g, Rfl: 2  •  Multiple Vitamins-Minerals (CENTRUM SILVER) tablet, Take by mouth, Disp: , Rfl:   •  omeprazole (PriLOSEC) 20 mg delayed release capsule, TAKE 1 CAPSULE BY MOUTH EVERY DAY, Disp: 30 capsule, Rfl: 1  •  ondansetron (ZOFRAN) 8 mg tablet, Take 1 tablet (8 mg total) by mouth every 8 (eight) hours as needed for nausea or vomiting, Disp: 30 tablet, Rfl: 2  •  PARoxetine (PAXIL) 10 mg tablet, Take 1 tablet (10 mg total) by mouth daily, Disp: 90 tablet, Rfl: 3  •  prochlorperazine (COMPAZINE) 10 mg tablet, Take 1 tablet (10 mg total) by mouth every 6 (six) hours as needed for nausea or vomiting, Disp: 30 tablet, Rfl: 2  •  timolol (TIMOPTIC) 0.5 % ophthalmic solution, Administer 1 drop to both eyes daily, Disp: , Rfl:   •  TURMERIC-GINGER PO, Take by mouth, Disp: , Rfl:   •  acetaminophen (TYLENOL) 500 mg tablet, Take 1 tablet (500 mg total) by mouth every 6 (six) hours as needed for mild pain  "(Patient not taking: Reported on 12/12/2024), Disp: , Rfl:   •  eletriptan (RELPAX) 40 MG tablet, Take 1 tablet (40 mg total) by mouth once as needed for migraine for up to 64 doses may repeat in 2 hours if necessary (Patient not taking: Reported on 12/12/2024), Disp: 16 tablet, Rfl: 3  •  fluticasone (FLONASE) 50 mcg/act nasal spray, 1 spray into each nostril daily (Patient not taking: Reported on 12/12/2024), Disp: 16 g, Rfl: 1  •  methylPREDNISolone 4 MG tablet therapy pack, Use as directed on package (Patient not taking: Reported on 12/12/2024), Disp: 21 tablet, Rfl: 0  Review of Systems   Constitutional:  Positive for unexpected weight change.   HENT:  Negative for trouble swallowing and voice change.    Eyes:  Negative for visual disturbance.   Respiratory:  Negative for cough and shortness of breath.    Cardiovascular:  Negative for palpitations.   Gastrointestinal:  Positive for nausea and vomiting.   Endocrine: Negative for polydipsia.   Musculoskeletal:  Negative for gait problem.   Skin:         See HPI   Neurological:  Negative for tremors and weakness.   Psychiatric/Behavioral:  The patient is not nervous/anxious.        Physical Exam:  Body mass index is 23.24 kg/m².  /62   Pulse 92   Ht 5' 10\" (1.778 m)   Wt 73.5 kg (162 lb)   LMP  (LMP Unknown)   SpO2 97%   BMI 23.24 kg/m²    Wt Readings from Last 3 Encounters:   12/12/24 73.5 kg (162 lb)   12/04/24 73.5 kg (162 lb)   11/29/24 74.5 kg (164 lb 3.2 oz)       GEN: NAD  E/n/m nl facies, hearing intact bilat, tongue midline, lips nl  Eyes: no stare or proptosis, nl lids and conjunctiva, EOMI  Neck: trachea midline, thyroid NT to palpation, mobile on swallowing, visibly enlarged, with left lobe larger then right, and isthmus nodule appreciated, no cervical LAD  CV; heart reg rate s1s2 nl, no m/r/g appreciated,   Resp: CTAB, good effort  Ab+BS  Neuro: no tremor,  MS: no c/c in digits, moves all 4 ext, nl muscle bulk, gait nl  Skin: warm and dry, " no palmar erythema  Psych: nl mood and affect, no gross lapses in memory    DATA:  Labs:   Lab Results   Component Value Date    [id number] 0.119 (L) 11/27/2024            Lab Results   Component Value Date    FREET4 1.03 11/27/2024         Radiology  5/2023  COMPARISON: Thyroid ultrasound 2021, 2017     TECHNIQUE:   Ultrasound of the thyroid was performed with a high frequency linear transducer in transverse and sagittal planes including volumetric imaging sweeps as well as traditional still imaging technique.     FINDINGS:  Normal homogeneous smooth echotexture.  Asymmetric enlargement of the left lobe of the thyroid is again seen and has been stable since multiple prior exams.     Right lobe: 3.6 x 1.0 x 1.1 cm. Volume 1.9 mL  Left lobe:  7.7 x 4.0 x 5.8 cm. Volume 84.2 mL  Isthmus: 0.1  cm.     Nodule #1.  Image 12.  RIGHT midgland nodule measuring 0.7 x 0.5 x 0.5 cm. Unchanged.  COMPOSITION:  2 points, solid or almost completely solid .  ECHOGENICITY:  2 points, hypoechoic.  SHAPE:  0 points, wider-than-tall.  MARGIN: 0 points, smooth.  ECHOGENIC FOCI:  0 points, none or large comet-tail artifacts.  TI-RADS Classification: TR 4 (4-6 points), FNA if > 1.5 cm. Follow if > 1cm        Nodule #2.  Image 28.  LEFT upper pole nodule, previously labeled isthmus measuring 1 x 0.7 x 1 cm.  Unchanged from prior.  COMPOSITION:  2 points, solid or almost completely solid .  ECHOGENICITY:  1 point, hyperechoic or isoechoic.  SHAPE:  0 points, wider-than-tall.  MARGIN: 0 points, smooth.  ECHOGENIC FOCI:  0 points, none or large comet-tail artifacts.  TI-RADS Classification: TR 3 (3 points), FNA if >2.5 cm.  Follow if >1.5 cm.     Diffusely enlarged heterogeneous mid to lower left lobe is unchanged compared to prior exams. It was not described as a discrete nodule previously. It has been biopsied in 2010 and 2015.              IMPRESSION:     No nodule meets current ACR criteria for requiring biopsy or followup  ultrasounds.     Previously biopsied nodule heterogeneous area in the mid to lower left lobe is stable since 2015.    Pathology  12/15/2015 isthmus FNA benign      Impression/Plan:      Problem List Items Addressed This Visit     Thyroid nodule    Cytology report reviewed and was benign. F/u u/s ordered         Relevant Orders    TSH, 3rd generation    T4, free    US thyroid    Goiter - Primary    Abnormal TSH may be related to her steroids. I recommend repeating these which she will get prior to starting her next chemo. She appears euthyroid at this time. We discussed enlarged left lobe with tracheal deviation. We can discuss possible resection once her chemo, surgery, and radiation are complete. Thyroid ultrasound ordered for Spring 2025. She will notify us of changes or compression.               Discussed with the patient and all questioned fully answered. She will call me if any problems arise.        Lucrecia Howard MD

## 2024-12-12 NOTE — LETTER
December 12, 2024     Will Dasilva MD  [address] [facility]  [address]  [facility] [address]    Patient: Amy Clement   YOB: 1960   Date of Visit: 12/12/2024       Dear Dr. Dasilva:    Thank you for referring Amy Clement to me for evaluation. Below are my notes for this consultation.    If you have questions, please do not hesitate to call me. I look forward to following your patient along with you.         Sincerely,        Lucrecia Howard MD        CC: DO Lucrecia Lopez MD  12/12/2024  4:10 PM  Incomplete  Chief Complaint   Patient presents with   • thyroid nodule      Referring Provider  Lamar Ordoñez  [address]  [address]  [place] [address]     History of Present Illness:   Amy Clement is a 64 y.o. female with thyroid nodule, goiterm and, abnormal TSH seen as a transfer of care. Formerly seeing Dr. Seals. Her note is reviewed.      Originally thyroid nodules were diagnosed in 1985. She has had an FNA of the left/ isthmus nodule most recently 2015 was read as [place] II. Cytology report in [facility] showed abundant colloid and come changes c/w CLT.     Her history includes the death of her  this year and diagnosis of ER neg/WA+/HER2 positive breast CA.  Currently undergoing chemo which includes steroids (TCHP). As part of her staging, she had a CT neck/chest/ab/pelvis the enlarged left lobe with extension to the upper mediastinum and deviated but patent trachea.   On 11/27/2024, she had TFTs with a mildly decreased TSH. She appears euthyroid. She denies palpitations or tremor. She had some nausea, mild hair loss, and mild weight loss with the chemo.     Her neck appears enlarged, but there are no changes in voice or dysphagia.       Contrast exposure w/ staging CT 10/28/2024    Of note, after chemo, she will likely get surgery and XRT.   Fhx:   thyroid disorder- none    Patient Active Problem List   Diagnosis   • Migraine with aura and  without status migrainosus, not intractable   • Thyroid nodule   • Anxiety   • GERD (gastroesophageal reflux disease)   • Chronic hip pain, right   • Postmenopausal bleeding   • S/P dilation and curettage   • Malignant neoplasm of lower-inner quadrant of right breast of female, estrogen receptor negative (HCC)   • Breast cancer metastasized to axillary lymph node, right (HCC)   • Family history of breast cancer   • Chemotherapy induced neutropenia (HCC)   • Port-A-Cath in place   • Goiter      Past Medical History:   Diagnosis Date   • Acute cystitis    • Anxiety     medication for hot flasshes   • Breast lump .    last assessed 01/16/2017   • Dermatitis     last assessed 02/27/2017   • Disease of thyroid gland     last assessed 02/23/2016   • Diverticulitis 11/2020   • Diverticulitis of colon November 25 July 2016 1st dx   • Dysuria     last assessed 04/28/2017   • ETD (Eustachian tube dysfunction), bilateral     last assessed 02/23/2016   • Fibroid 2012   • GERD (gastroesophageal reflux disease) Occasionally   • Glaucoma     Left   • Hematuria    • Migraine 1985   • Miscarriage 1990   • Polyp at cervical os    • Varicella 1966      Past Surgical History:   Procedure Laterality Date   • BREAST BIOPSY Right 2009    2 areas benign   • CATARACT EXTRACTION Bilateral    • COLONOSCOPY     • FL GUIDED CENTRAL VENOUS ACCESS DEVICE INSERTION  11/5/2024   • [place] HYSTEROSCOPY BX ENDOMETRIUM&/POLYPC W/WO D&C N/A 02/23/2024    Procedure: DILATATION AND CURETTAGE (D&C) WITH HYSTEROSCOPY;  Surgeon: Joselin Alaniz MD;  [facility];  Service: Gynecology   • NJ HYSTEROSCOPY BX ENDOMETRIUM&/POLYPC W/WO D&C N/A 02/23/2024    Procedure: POLYPECTOMY;  Surgeon: Joselin Alaniz MD;  [facility];  Service: Gynecology   • NJ INSJ TUNNELED CTR VAD W/SUBQ PORT AGE 5 YR/> N/A 11/5/2024    Procedure: INSERTION VENOUS PORT ( PORT-A-CATH) IR;  Surgeon: Chandler Ball DO;  [facility];  Service:  Interventional Radiology   • TUBAL LIGATION     • US GUIDED BREAST BIOPSY RIGHT COMPLETE Right 10/04/2024   • US GUIDED BREAST LYMPH NODE BIOPSY RIGHT Right 10/04/2024      Family History   Problem Relation Age of Onset   • Breast cancer Mother 47   • Cancer Mother         Breast cancer   • Hypertension Father         Late life dx   • Rectal cancer Father    • Migraines Father 85   • Cancer Father         Colon cancer   • No Known Problems Sister    • Throat cancer Brother    • Cancer Brother         bladder   • Heart disease Maternal Grandmother    • No Known Problems Paternal Grandmother    • No Known Problems Daughter    • Kidney cancer Son 38   • No Known Problems Paternal Aunt      Social History     Tobacco Use   • Smoking status: Never   • Smokeless tobacco: Never   Substance Use Topics   • Alcohol use: Yes     Alcohol/week: 3.0 standard drinks of alcohol     Types: 3 Glasses of wine per week     Comment: occasionally     Allergies   Allergen Reactions   • Monistat [Miconazole] Itching   • Pollen Extract Itching, Swelling and Sneezing         Current Outpatient Medications:   •  Bacillus Coagulans-Inulin (Probiotic) 1-250 BILLION-MG CAPS, Take by mouth, Disp: , Rfl:   •  calcium citrate (CALCITRATE) 950 (200 Ca) MG tablet, Take 1 tablet by mouth daily, Disp: , Rfl:   •  Glucosamine-Chondroitin (GLUCOSAMINE CHONDR COMPLEX PO), Take by mouth daily , Disp: , Rfl:   •  latanoprost (XALATAN) 0.005 % ophthalmic solution, Administer 1 drop to both eyes daily at bedtime, Disp: , Rfl:   •  lidocaine-prilocaine (EMLA) cream, Apply topically as needed for mild pain, Disp: 30 g, Rfl: 2  •  Multiple Vitamins-Minerals (CENTRUM SILVER) tablet, Take by mouth, Disp: , Rfl:   •  omeprazole (PriLOSEC) 20 mg delayed release capsule, TAKE 1 CAPSULE BY MOUTH EVERY DAY, Disp: 30 capsule, Rfl: 1  •  ondansetron (ZOFRAN) 8 mg tablet, Take 1 tablet (8 mg total) by mouth every 8 (eight) hours as needed for nausea or vomiting, Disp: 30  "tablet, Rfl: 2  •  PARoxetine (PAXIL) 10 mg tablet, Take 1 tablet (10 mg total) by mouth daily, Disp: 90 tablet, Rfl: 3  •  prochlorperazine (COMPAZINE) 10 mg tablet, Take 1 tablet (10 mg total) by mouth every 6 (six) hours as needed for nausea or vomiting, Disp: 30 tablet, Rfl: 2  •  timolol (TIMOPTIC) 0.5 % ophthalmic solution, Administer 1 drop to both eyes daily, Disp: , Rfl:   •  TURMERIC-GINGER PO, Take by mouth, Disp: , Rfl:   •  acetaminophen (TYLENOL) 500 mg tablet, Take 1 tablet (500 mg total) by mouth every 6 (six) hours as needed for mild pain (Patient not taking: Reported on 12/12/2024), Disp: , Rfl:   •  eletriptan (RELPAX) 40 MG tablet, Take 1 tablet (40 mg total) by mouth once as needed for migraine for up to 64 doses may repeat in 2 hours if necessary (Patient not taking: Reported on 12/12/2024), Disp: 16 tablet, Rfl: 3  •  fluticasone (FLONASE) 50 mcg/act nasal spray, 1 spray into each nostril daily (Patient not taking: Reported on 12/12/2024), Disp: 16 g, Rfl: 1  •  methylPREDNISolone 4 MG tablet therapy pack, Use as directed on package (Patient not taking: Reported on 12/12/2024), Disp: 21 tablet, Rfl: 0  Review of Systems   Constitutional:  Positive for unexpected weight change.   HENT:  Negative for trouble swallowing and voice change.    Eyes:  Negative for visual disturbance.   Respiratory:  Negative for cough and shortness of breath.    Cardiovascular:  Negative for palpitations.   Gastrointestinal:  Positive for nausea and vomiting.   Endocrine: Negative for polydipsia.   Musculoskeletal:  Negative for gait problem.   Skin:         See HPI   Neurological:  Negative for tremors and weakness.   Psychiatric/Behavioral:  The patient is not nervous/anxious.        Physical Exam:  Body mass index is 23.24 kg/m².  /62   Pulse 92   Ht 5' 10\" (1.778 m)   Wt 73.5 kg (162 lb)   LMP  (LMP Unknown)   SpO2 97%   BMI 23.24 kg/m²    Wt Readings from Last 3 Encounters:   12/12/24 73.5 kg (162 " lb)   12/04/24 73.5 kg (162 lb)   11/29/24 74.5 kg (164 lb 3.2 oz)       GEN: NAD  E/n/m nl facies, hearing intact bilat, tongue midline, lips nl  Eyes: no stare or proptosis, nl lids and conjunctiva, EOMI  Neck: trachea midline, thyroid NT to palpation, mobile on swallowing, visibly enlarged, with left lobe larger then right, and isthmus nodule appreciated, no cervical LAD  CV; heart reg rate s1s2 nl, no m/r/g appreciated,   Resp: CTAB, good effort  Ab+BS  Neuro: no tremor,  MS: no c/c in digits, moves all 4 ext, nl muscle bulk, gait nl  Skin: warm and dry, no palmar erythema  Psych: nl mood and affect, no gross lapses in memory    DATA:  Labs:   Lab Results   Component Value Date    WBF8FIRJTPNA 0.119 (L) 11/27/2024            Lab Results   Component Value Date    FREET4 1.03 11/27/2024         Radiology  5/2023  COMPARISON: Thyroid ultrasound 2021, 2017     TECHNIQUE:   Ultrasound of the thyroid was performed with a high frequency linear transducer in transverse and sagittal planes including volumetric imaging sweeps as well as traditional still imaging technique.     FINDINGS:  Normal homogeneous smooth echotexture.  Asymmetric enlargement of the left lobe of the thyroid is again seen and has been stable since multiple prior exams.     Right lobe: 3.6 x 1.0 x 1.1 cm. Volume 1.9 mL  Left lobe:  7.7 x 4.0 x 5.8 cm. Volume 84.2 mL  Isthmus: 0.1  cm.     Nodule #1.  Image 12.  RIGHT midgland nodule measuring 0.7 x 0.5 x 0.5 cm. Unchanged.  COMPOSITION:  2 points, solid or almost completely solid .  ECHOGENICITY:  2 points, hypoechoic.  SHAPE:  0 points, wider-than-tall.  MARGIN: 0 points, smooth.  ECHOGENIC FOCI:  0 points, none or large comet-tail artifacts.  TI-RADS Classification: TR 4 (4-6 points), FNA if > 1.5 cm. Follow if > 1cm        Nodule #2.  Image 28.  LEFT upper pole nodule, previously labeled isthmus measuring 1 x 0.7 x 1 cm.  Unchanged from prior.  COMPOSITION:  2 points, solid or almost completely  solid .  ECHOGENICITY:  1 point, hyperechoic or isoechoic.  SHAPE:  0 points, wider-than-tall.  MARGIN: 0 points, smooth.  ECHOGENIC FOCI:  0 points, none or large comet-tail artifacts.  TI-RADS Classification: TR 3 (3 points), FNA if >2.5 cm.  Follow if >1.5 cm.     Diffusely enlarged heterogeneous mid to lower left lobe is unchanged compared to prior exams. It was not described as a discrete nodule previously. It has been biopsied in 2010 and 2015.              IMPRESSION:     No nodule meets current ACR criteria for requiring biopsy or followup ultrasounds.     Previously biopsied nodule heterogeneous area in the mid to lower left lobe is stable since 2015.    Pathology  12/15/2015 isthmus FNA benign      Impression/Plan:      Problem List Items Addressed This Visit       Thyroid nodule    Cytology report reviewed and was benign. F/u u/s ordered         Relevant Orders    TSH, 3rd generation    T4, free    US thyroid    Goiter - Primary    Abnormal TSH may be related to her steroids. I recommend repeating these which she will get prior to starting her next chemo. She appears euthyroid at this time. We discussed enlarged left lobe with tracheal deviation. We can discuss possible resection once her chemo, surgery, and radiation are complete. Thyroid ultrasound ordered for Spring 2025. She will notify us of changes or compression.               Discussed with the patient and all questioned fully answered. She will call me if any problems arise.        MD Lucrecia Davis MD  12/12/2024  2:40 PM  Sign when Signing Visit  No chief complaint on file.     Referring Provider  Lamar Ordoñez  [address] [address]     History of Present Illness:   Amy Clement is a 64 y.o. female with thyroid nodule seen as a transfer of care. Formerly seeing Dr. Seals. Her note is reviewed.      This was diagnosed in 1985  One time FNAB of left lower ( isthmus) nodule in  2015 was read as [place] II.       S/sx: palpitations, tremor, changes in hair/skin/nails, changes in menstrual cycle, diarrhea, anxiety sleep disturbance, anxiety/nervous feelings/mood changes, blurry vision or double vision    Denies visible changes in neck, changes in voice, or dysphagia. Radiation exposure to head/neck/chest for medical purposes. Family hx of thyroid disease or thyroid cancer.       Contrast exposure  Fhx:   thyroid disorder-     Patient Active Problem List   Diagnosis   • Migraine with aura and without status migrainosus, not intractable   • Thyroid nodule   • Anxiety   • GERD (gastroesophageal reflux disease)   • Chronic hip pain, right   • Postmenopausal bleeding   • S/P dilation and curettage   • Malignant neoplasm of lower-inner quadrant of right breast of female, estrogen receptor negative (HCC)   • Breast cancer metastasized to axillary lymph node, right (HCC)   • Family history of breast cancer   • Chemotherapy induced neutropenia (HCC)   • Port-A-Cath in place      Past Medical History:   Diagnosis Date   • Acute cystitis    • Anxiety     medication for hot flasshes   • Breast lump .    last assessed 01/16/2017   • Dermatitis     last assessed 02/27/2017   • Disease of thyroid gland     last assessed 02/23/2016   • Diverticulitis 11/2020   • Diverticulitis of colon November 25 July 2016 1st dx   • Dysuria     last assessed 04/28/2017   • ETD (Eustachian tube dysfunction), bilateral     last assessed 02/23/2016   • Fibroid 2012   • GERD (gastroesophageal reflux disease) Occasionally   • Glaucoma     Left   • Hematuria    • Migraine 1985   • Miscarriage 1990   • Polyp at cervical os    • Varicella 1966      Past Surgical History:   Procedure Laterality Date   • BREAST BIOPSY Right 2009    2 areas benign   • CATARACT EXTRACTION Bilateral    • COLONOSCOPY     • FL GUIDED CENTRAL VENOUS ACCESS DEVICE INSERTION  11/5/2024   • [place] HYSTEROSCOPY BX ENDOMETRIUM&/POLYPC W/WO D&C N/A 02/23/2024     Procedure: DILATATION AND CURETTAGE (D&C) WITH HYSTEROSCOPY;  Surgeon: Joselin Alaniz MD;  [facility];  Service: Gynecology   • [place] HYSTEROSCOPY BX ENDOMETRIUM&/POLYPC W/WO D&C N/A 02/23/2024    Procedure: POLYPECTOMY;  Surgeon: Joselin Alaniz MD;  [facility];  Service: Gynecology   • [place] INSJ TUNNELED CTR VAD W/SUBQ PORT AGE 5 YR/> N/A 11/5/2024    Procedure: INSERTION VENOUS PORT ( PORT-A-CATH) IR;  Surgeon: Chandler Ball DO;  [facility];  Service: Interventional Radiology   • TUBAL LIGATION     • US GUIDED BREAST BIOPSY RIGHT COMPLETE Right 10/04/2024   • US GUIDED BREAST LYMPH NODE BIOPSY RIGHT Right 10/04/2024      Family History   Problem Relation Age of Onset   • Breast cancer Mother 47   • Cancer Mother         Breast cancer   • Hypertension Father         Late life dx   • Rectal cancer Father    • Migraines Father 85   • Cancer Father         Colon cancer   • No Known Problems Sister    • Throat cancer Brother    • Cancer Brother         bladder   • Heart disease Maternal Grandmother    • No Known Problems Paternal Grandmother    • No Known Problems Daughter    • Kidney cancer Son 38   • No Known Problems Paternal Aunt      Social History     Tobacco Use   • Smoking status: Never   • Smokeless tobacco: Never   Substance Use Topics   • Alcohol use: Yes     Alcohol/week: 3.0 standard drinks of alcohol     Types: 3 Glasses of wine per week     Comment: occasionally     Allergies   Allergen Reactions   • Monistat [Miconazole] Itching   • Pollen Extract Itching, Swelling and Sneezing         Current Outpatient Medications:   •  Bacillus Coagulans-Inulin (Probiotic) 1-250 BILLION-MG CAPS, Take by mouth, Disp: , Rfl:   •  calcium citrate (CALCITRATE) 950 (200 Ca) MG tablet, Take 1 tablet by mouth daily, Disp: , Rfl:   •  Glucosamine-Chondroitin (GLUCOSAMINE CHONDR COMPLEX PO), Take by mouth daily , Disp: , Rfl:   •  latanoprost (XALATAN) 0.005 % ophthalmic  "solution, Administer 1 drop to both eyes daily at bedtime, Disp: , Rfl:   •  lidocaine-prilocaine (EMLA) cream, Apply topically as needed for mild pain, Disp: 30 g, Rfl: 2  •  Multiple Vitamins-Minerals (CENTRUM SILVER) tablet, Take by mouth, Disp: , Rfl:   •  omeprazole (PriLOSEC) 20 mg delayed release capsule, TAKE 1 CAPSULE BY MOUTH EVERY DAY, Disp: 30 capsule, Rfl: 1  •  ondansetron (ZOFRAN) 8 mg tablet, Take 1 tablet (8 mg total) by mouth every 8 (eight) hours as needed for nausea or vomiting, Disp: 30 tablet, Rfl: 2  •  PARoxetine (PAXIL) 10 mg tablet, Take 1 tablet (10 mg total) by mouth daily, Disp: 90 tablet, Rfl: 3  •  prochlorperazine (COMPAZINE) 10 mg tablet, Take 1 tablet (10 mg total) by mouth every 6 (six) hours as needed for nausea or vomiting, Disp: 30 tablet, Rfl: 2  •  timolol (TIMOPTIC) 0.5 % ophthalmic solution, Administer 1 drop to both eyes daily, Disp: , Rfl:   •  TURMERIC-GINGER PO, Take by mouth, Disp: , Rfl:   •  acetaminophen (TYLENOL) 500 mg tablet, Take 1 tablet (500 mg total) by mouth every 6 (six) hours as needed for mild pain (Patient not taking: Reported on 12/12/2024), Disp: , Rfl:   •  eletriptan (RELPAX) 40 MG tablet, Take 1 tablet (40 mg total) by mouth once as needed for migraine for up to 64 doses may repeat in 2 hours if necessary (Patient not taking: Reported on 12/12/2024), Disp: 16 tablet, Rfl: 3  •  fluticasone (FLONASE) 50 mcg/act nasal spray, 1 spray into each nostril daily (Patient not taking: Reported on 12/12/2024), Disp: 16 g, Rfl: 1  •  methylPREDNISolone 4 MG tablet therapy pack, Use as directed on package (Patient not taking: Reported on 12/12/2024), Disp: 21 tablet, Rfl: 0  Review of Systems    Physical Exam:  Body mass index is 23.24 kg/m².  /62   Pulse 92   Ht 5' 10\" (1.778 m)   Wt 73.5 kg (162 lb)   LMP  (LMP Unknown)   SpO2 97%   BMI 23.24 kg/m²    Wt Readings from Last 3 Encounters:   12/12/24 73.5 kg (162 lb)   12/04/24 73.5 kg (162 lb) "   11/29/24 74.5 kg (164 lb 3.2 oz)       GEN: NAD  E/n/m nl facies, hearing intact bilat, tongue midline, lips nl  Eyes: no stare or proptosis, nl lids and conjunctiva, EOMI  Neck: trachea midline, thyroid NT to palpation, nl in size, no nodules or neck masses noted, no cervical LAD  CV; heart reg rate s1s2 nl, no m/r/g appreciated, no VIC  Resp: CTAB, good effort  Ab+BS  Neuro: no tremor, 2+ DTRs in BUE  MS: no c/c in digits, moves all 4 ext, nl muscle bulk, gait nl  Skin: warm and dry, no palmar erythema  Ext: no c/c in digits, no edema bilaterally, 2+ DP and PT pulses bilat, no breaks in skin/ulcers on feet, sensation intact to monofilament testing on plantar surfaces bilat  Psych: nl mood and affect, no gross lapses in memory    DATA:  Labs:   Lab Results   Component Value Date    [id number] 0.119 (L) 11/27/2024   Steroids?        Lab Results   Component Value Date    FREET4 1.03 11/27/2024         Radiology  5/2023  COMPARISON: Thyroid ultrasound 2021, 2017     TECHNIQUE:   Ultrasound of the thyroid was performed with a high frequency linear transducer in transverse and sagittal planes including volumetric imaging sweeps as well as traditional still imaging technique.     FINDINGS:  Normal homogeneous smooth echotexture.  Asymmetric enlargement of the left lobe of the thyroid is again seen and has been stable since multiple prior exams.     Right lobe: 3.6 x 1.0 x 1.1 cm. Volume 1.9 mL  Left lobe:  7.7 x 4.0 x 5.8 cm. Volume 84.2 mL  Isthmus: 0.1  cm.     Nodule #1.  Image 12.  RIGHT midgland nodule measuring 0.7 x 0.5 x 0.5 cm. Unchanged.  COMPOSITION:  2 points, solid or almost completely solid .  ECHOGENICITY:  2 points, hypoechoic.  SHAPE:  0 points, wider-than-tall.  MARGIN: 0 points, smooth.  ECHOGENIC FOCI:  0 points, none or large comet-tail artifacts.  TI-RADS Classification: TR 4 (4-6 points), FNA if > 1.5 cm. Follow if > 1cm        Nodule #2.  Image 28.  LEFT upper pole nodule, previously labeled  isthmus measuring 1 x 0.7 x 1 cm.  Unchanged from prior.  COMPOSITION:  2 points, solid or almost completely solid .  ECHOGENICITY:  1 point, hyperechoic or isoechoic.  SHAPE:  0 points, wider-than-tall.  MARGIN: 0 points, smooth.  ECHOGENIC FOCI:  0 points, none or large comet-tail artifacts.  TI-RADS Classification: TR 3 (3 points), FNA if >2.5 cm.  Follow if >1.5 cm.     Diffusely enlarged heterogeneous mid to lower left lobe is unchanged compared to prior exams. It was not described as a discrete nodule previously. It has been biopsied in 2010 and 2015.              IMPRESSION:     No nodule meets current ACR criteria for requiring biopsy or followup ultrasounds.     Previously biopsied nodule heterogeneous area in the mid to lower left lobe is stable since 2015.    Pathology        Impression/Plan:      Problem List Items Addressed This Visit       Thyroid nodule    Relevant Orders    TSH, 3rd generation    T4, free    US thyroid     Thyroid nodule      Discussed with the patient and all questioned fully answered. She will call me if any problems arise.        Lucrecia Howard MD

## 2024-12-12 NOTE — ASSESSMENT & PLAN NOTE
Abnormal TSH may be related to her steroids. I recommend repeating these which she will get prior to starting her next chemo. She appears euthyroid at this time. We discussed enlarged left lobe with tracheal deviation. We can discuss possible resection once her chemo, surgery, and radiation are complete. Thyroid ultrasound ordered for Spring 2025. She will notify us of changes or compression.

## 2024-12-17 ENCOUNTER — OFFICE VISIT (OUTPATIENT)
Dept: HEMATOLOGY ONCOLOGY | Facility: CLINIC | Age: 64
End: 2024-12-17
Payer: COMMERCIAL

## 2024-12-17 ENCOUNTER — TELEPHONE (OUTPATIENT)
Dept: HEMATOLOGY ONCOLOGY | Facility: CLINIC | Age: 64
End: 2024-12-17

## 2024-12-17 VITALS
WEIGHT: 160 LBS | SYSTOLIC BLOOD PRESSURE: 122 MMHG | OXYGEN SATURATION: 98 % | BODY MASS INDEX: 22.9 KG/M2 | DIASTOLIC BLOOD PRESSURE: 88 MMHG | HEART RATE: 92 BPM | TEMPERATURE: 97.8 F | RESPIRATION RATE: 16 BRPM | HEIGHT: 70 IN

## 2024-12-17 DIAGNOSIS — C50.911 MALIGNANT NEOPLASM OF RIGHT FEMALE BREAST, UNSPECIFIED ESTROGEN RECEPTOR STATUS, UNSPECIFIED SITE OF BREAST (HCC): Primary | ICD-10-CM

## 2024-12-17 DIAGNOSIS — C77.3 BREAST CANCER METASTASIZED TO AXILLARY LYMPH NODE, RIGHT (HCC): Primary | ICD-10-CM

## 2024-12-17 DIAGNOSIS — C50.911 BREAST CANCER METASTASIZED TO AXILLARY LYMPH NODE, RIGHT (HCC): Primary | ICD-10-CM

## 2024-12-17 PROCEDURE — 99214 OFFICE O/P EST MOD 30 MIN: CPT | Performed by: INTERNAL MEDICINE

## 2024-12-17 RX ORDER — ONDANSETRON 8 MG/1
8 TABLET, ORALLY DISINTEGRATING ORAL EVERY 8 HOURS PRN
Qty: 30 TABLET | Refills: 2 | Status: SHIPPED | OUTPATIENT
Start: 2024-12-17

## 2024-12-17 RX ORDER — PALONOSETRON 0.05 MG/ML
0.25 INJECTION, SOLUTION INTRAVENOUS ONCE
Status: CANCELLED
Start: 2024-12-24

## 2024-12-18 ENCOUNTER — HOSPITAL ENCOUNTER (OUTPATIENT)
Dept: INFUSION CENTER | Facility: CLINIC | Age: 64
Discharge: HOME | End: 2024-12-18
Payer: COMMERCIAL

## 2024-12-18 DIAGNOSIS — Z17.1 MALIGNANT NEOPLASM OF LOWER-INNER QUADRANT OF RIGHT BREAST OF FEMALE, ESTROGEN RECEPTOR NEGATIVE (HCC): ICD-10-CM

## 2024-12-18 DIAGNOSIS — Z17.1 MALIGNANT NEOPLASM OF LOWER-INNER QUADRANT OF RIGHT BREAST OF FEMALE, ESTROGEN RECEPTOR NEGATIVE (HCC): Primary | ICD-10-CM

## 2024-12-18 DIAGNOSIS — C50.311 MALIGNANT NEOPLASM OF LOWER-INNER QUADRANT OF RIGHT BREAST OF FEMALE, ESTROGEN RECEPTOR NEGATIVE (HCC): Primary | ICD-10-CM

## 2024-12-18 DIAGNOSIS — Z95.828 PORT-A-CATH IN PLACE: Primary | ICD-10-CM

## 2024-12-18 DIAGNOSIS — E05.90 SUBCLINICAL HYPERTHYROIDISM: ICD-10-CM

## 2024-12-18 DIAGNOSIS — C50.311 MALIGNANT NEOPLASM OF LOWER-INNER QUADRANT OF RIGHT BREAST OF FEMALE, ESTROGEN RECEPTOR NEGATIVE (HCC): ICD-10-CM

## 2024-12-18 LAB
ALBUMIN SERPL BCG-MCNC: 3.9 G/DL (ref 3.5–5)
ALP SERPL-CCNC: 62 U/L (ref 34–104)
ALT SERPL W P-5'-P-CCNC: 22 U/L (ref 7–52)
ANION GAP SERPL CALCULATED.3IONS-SCNC: 4 MMOL/L (ref 4–13)
AST SERPL W P-5'-P-CCNC: 16 U/L (ref 13–39)
BASOPHILS # BLD AUTO: 0.03 THOUSANDS/ÂΜL (ref 0–0.1)
BASOPHILS NFR BLD AUTO: 1 % (ref 0–1)
BILIRUB SERPL-MCNC: 0.26 MG/DL (ref 0.2–1)
BUN SERPL-MCNC: 12 MG/DL (ref 5–25)
CALCIUM SERPL-MCNC: 9.1 MG/DL (ref 8.4–10.2)
CHLORIDE SERPL-SCNC: 107 MMOL/L (ref 96–108)
CO2 SERPL-SCNC: 28 MMOL/L (ref 21–32)
CREAT SERPL-MCNC: 0.59 MG/DL (ref 0.6–1.3)
EOSINOPHIL # BLD AUTO: 0 THOUSAND/ÂΜL (ref 0–0.61)
EOSINOPHIL NFR BLD AUTO: 0 % (ref 0–6)
ERYTHROCYTE [DISTWIDTH] IN BLOOD BY AUTOMATED COUNT: 14.1 % (ref 11.6–15.1)
GFR SERPL CREATININE-BSD FRML MDRD: 97 ML/MIN/1.73SQ M
GLUCOSE SERPL-MCNC: 91 MG/DL (ref 65–140)
HCT VFR BLD AUTO: 35.1 % (ref 34.8–46.1)
HGB BLD-MCNC: 11.3 G/DL (ref 11.5–15.4)
IMM GRANULOCYTES # BLD AUTO: 0.02 THOUSAND/UL (ref 0–0.2)
IMM GRANULOCYTES NFR BLD AUTO: 0 % (ref 0–2)
LYMPHOCYTES # BLD AUTO: 0.92 THOUSANDS/ÂΜL (ref 0.6–4.47)
LYMPHOCYTES NFR BLD AUTO: 15 % (ref 14–44)
MCH RBC QN AUTO: 31.1 PG (ref 26.8–34.3)
MCHC RBC AUTO-ENTMCNC: 32.2 G/DL (ref 31.4–37.4)
MCV RBC AUTO: 97 FL (ref 82–98)
MONOCYTES # BLD AUTO: 0.62 THOUSAND/ÂΜL (ref 0.17–1.22)
MONOCYTES NFR BLD AUTO: 10 % (ref 4–12)
NEUTROPHILS # BLD AUTO: 4.43 THOUSANDS/ÂΜL (ref 1.85–7.62)
NEUTS SEG NFR BLD AUTO: 74 % (ref 43–75)
NRBC BLD AUTO-RTO: 0 /100 WBCS
PLATELET # BLD AUTO: 199 THOUSANDS/UL (ref 149–390)
PMV BLD AUTO: 9.3 FL (ref 8.9–12.7)
POTASSIUM SERPL-SCNC: 3.9 MMOL/L (ref 3.5–5.3)
PROT SERPL-MCNC: 6.2 G/DL (ref 6.4–8.4)
RBC # BLD AUTO: 3.63 MILLION/UL (ref 3.81–5.12)
SODIUM SERPL-SCNC: 139 MMOL/L (ref 135–147)
T4 FREE SERPL-MCNC: 0.86 NG/DL (ref 0.61–1.12)
TSH SERPL DL<=0.05 MIU/L-ACNC: 0.08 UIU/ML (ref 0.45–4.5)
WBC # BLD AUTO: 6.02 THOUSAND/UL (ref 4.31–10.16)

## 2024-12-18 PROCEDURE — 85025 COMPLETE CBC W/AUTO DIFF WBC: CPT | Performed by: INTERNAL MEDICINE

## 2024-12-18 PROCEDURE — 84439 ASSAY OF FREE THYROXINE: CPT

## 2024-12-18 PROCEDURE — 84443 ASSAY THYROID STIM HORMONE: CPT

## 2024-12-18 PROCEDURE — 80053 COMPREHEN METABOLIC PANEL: CPT | Performed by: INTERNAL MEDICINE

## 2024-12-18 RX ORDER — SODIUM CHLORIDE 9 MG/ML
20 INJECTION, SOLUTION INTRAVENOUS ONCE
Status: CANCELLED | OUTPATIENT
Start: 2024-12-20

## 2024-12-18 NOTE — PROGRESS NOTES
Patient here for port lab draw. Patient has no complaints at this time.     When assessing the port prior to accessing, noted bursing on top of the port, and pinkish on the incisional site right above the port. Photo was obtained and uploaded in chart. Reached out to Torrie Ricci RN and , and was given the OK to proceed with blood work.     Accessed port a cath, with no difficulties, 10ml syringe discard, labs collected, flushed and de accessed, Band-aid placed.     Educated patient to keep on eye on the port site, if it appears to get worse to let office know.   AVS printed.   Next appointment reviewed for 12/20/2024 at 730    Walked out of clinic with no issues.

## 2024-12-19 ENCOUNTER — RESULTS FOLLOW-UP (OUTPATIENT)
Dept: OTHER | Facility: HOSPITAL | Age: 64
End: 2024-12-19

## 2024-12-19 DIAGNOSIS — E05.90 SUBCLINICAL HYPERTHYROIDISM: Primary | ICD-10-CM

## 2024-12-19 NOTE — PROGRESS NOTES
Name: Amy Clement      : 1960      MRN: [id number]  Encounter Provider: Kathrin Gonzalez DO  Encounter Date: 2024   Encounter department: [facility] [place]  :  Assessment & Plan  Malignant neoplasm of right female breast, unspecified estrogen receptor status, unspecified site of breast (HCC)    Orders:    ondansetron (ZOFRAN-ODT) 8 mg disintegrating tablet; Take 1 tablet (8 mg total) by mouth every 8 (eight) hours as needed for nausea or vomiting    64-year-old female with a HER2 positive breast cancer being treated in the neoadjuvant setting.  For cycle 3 of TCHP and get a dose reduce Taxotere by 10% and carboplatin from an AUC of 6-5 due to mucositis and GI toxicity.  I am going to bring her back in on [place], day 5 of the cycle, first IV fluids and IV nausea medication.  Hopefully that will make this a little more bearable and we can do it every cycle.  I also recommended that she uses Replens for vaginal moisturizer if she is having vaginal dryness.  We talked about the possibility of hormone blockade after her surgery and radiation are complete.  She does have 70% OR positivity on the lymph nodes so we should at least try it, but if quality of life is not acceptable we can always reevaluate.  I will plan on seeing her back in 3 weeks prior to cycle 4 of TCHP.  She knows to call in the interim with any questions or concerns.    History of Present Illness   Chief Complaint   Patient presents with    Follow-up   Amy Clement is a 64 y.o. female for follow-up of her HER2 positive breast cancer.  She is due for cycle 3 of neoadjuvant TCHP.  On days 4 and 5 last cycle she had a lot of GI toxicity and mucositis.  She vomited multiple times.  She had diarrhea once that was controlled with Imodium.  She is also having significant vulvovaginitis.  She did see her GYN and they did a culture that was negative for Candida or other infectious causes.  She  did not have any problems with her port after the last cycle.       Oncology History   Oncology History Overview Note   10/2024 - right breast and LNBx positive for invasive mammary carcinoma with predominantly lobular features, apocrine and histiocytoid features in less amount, grade 2, ER 1% OK 10% Her2 3+, [id number]    LN biopsy receptors - ER 1% OK 70% Her2 3+    11/8/2024 - start TCHP    12/2024 - cycle 3 - taxotere dose reduced by 10%, carbo dose reduced to AUC 5 due to N/V     Malignant neoplasm of lower-inner quadrant of right breast of female, estrogen receptor negative (HCC)   10/4/2024 Biopsy    A. Right breast ultrasound-guided biopsy  4 o'clock, 2 cm from nipple (ROSA ISELA)  Invasive mammary carcinoma with predominately lobular features   Apocrine and histiocytoid morphologic features  Grade 2  ER <1, OK 10, HER2 3+  Lymphovascular invasion present    B. Right axillary lymph node biopsy (ROSAI SELA)  Metastatic carcinoma, compatible with mammary origin  ER <1, OK 70, HER2 3+    Concordant. Right malignancy appears unifocal; biopsy-proven carcinoma measured 1.7 cm on US. Biopsy-proven metastatic disease to right axillary lymph node. Left breast clear.     10/4/2024 -  Cancer Staged    Staging form: Breast, AJCC 8th Edition  - Clinical stage from 10/4/2024: Stage IIA (cT1c, cN1(f), cM0, G2, ER-, OK+, HER2+) - Signed by Koki Magaña MD on 10/21/2024  Stage prefix: Initial diagnosis  Method of lymph node assessment: Core biopsy  Histologic grading system: 3 grade system       10/15/2024 Genetic Testing    Genetic testing with RNA analysis ordered  [facility]     11/8/2024 -  Chemotherapy    alteplase (CATHFLO), 2 mg, Intracatheter, Every 1 Minute as needed, 2 of 18 cycles  pegfilgrastim (NEULASTA ONPRO), 6 mg, Subcutaneous, Once, 2 of 6 cycles  Administration: 6 mg (11/8/2024), 6 mg (11/29/2024)  fosaprepitant (EMEND) IVPB, 150 mg, Intravenous, Once, 2 of 6 cycles  Administration: 150 mg (11/8/2024), 150 mg  "(11/29/2024)  pertuzumab (PERJETA) IVPB, 840 mg, Intravenous, Once, 2 of 18 cycles  Administration: 840 mg (11/8/2024), 420 mg (11/29/2024)  CARBOplatin (PARAPLATIN) IVPB (GOG AUC DOSING), 722.4 mg, Intravenous, Once, 2 of 6 cycles  Administration: 722.4 mg (11/8/2024), 722.4 mg (11/29/2024)  DOCEtaxel (TAXOTERE) chemo infusion, 75 mg/m2 = 144 mg, Intravenous, Once, 2 of 6 cycles  Dose modification: 67 mg/m2 (original dose 75 mg/m2, Cycle 3, Reason: Nausea/Vomiting, Comment: 10%dose reduction due nausea)  Administration: 144 mg (11/8/2024), 144 mg (11/29/2024)  trastuzumab (HERCEPTIN) chemo infusion, 8 mg/kg = 595 mg, Intravenous, Once, 2 of 18 cycles  Administration: 595 mg (11/8/2024), 447 mg (11/29/2024)     Breast cancer metastasized to axillary lymph node, right (HCC)   10/16/2024 Initial Diagnosis    Breast cancer metastasized to axillary lymph node, right (HCC)        Review of Systems otherwise neg        Objective   /88 (BP Location: Left arm, Patient Position: Sitting, Cuff Size: Adult)   Pulse 92   Temp 97.8 °F (36.6 °C) (Temporal)   Resp 16   Ht 5' 10\" (1.778 m)   Wt 72.6 kg (160 lb)   LMP  (LMP Unknown)   SpO2 98%   BMI 22.96 kg/m²     ECOG   0  Physical Exam    GEN: Alert, awake oriented x3, in no acute distress  HEENT- No pallor, icterus, cyanosis, no oral mucosal lesions,   LAD - no palpable cervical, clavicle, axillary, inguinal LAD  Heart- normal S1 S2, regular rate and rhythm, No murmur, rubs.   Lungs- clear breathing sound bilateral.   Abdomen- soft, Non tender, bowel sounds present  Extremities- No cyanosis, clubbing, edema  Neuro- No focal neurological deficit  Skin - ecchymoses over port, no signs of active infection     Labs: I have reviewed the following labs:  Lab Results   Component Value Date/Time    WBC 6.02 12/18/2024 02:06 PM    RBC 3.63 (L) 12/18/2024 02:06 PM    Hemoglobin 11.3 (L) 12/18/2024 02:06 PM    Hematocrit 35.1 12/18/2024 02:06 PM    MCV 97 12/18/2024 02:06 PM "    MCH 31.1 12/18/2024 02:06 PM    RDW 14.1 12/18/2024 02:06 PM    Platelets 199 12/18/2024 02:06 PM    Segmented % 74 12/18/2024 02:06 PM    Lymphocytes % 15 12/18/2024 02:06 PM    Monocytes % 10 12/18/2024 02:06 PM    Eosinophils Relative 0 12/18/2024 02:06 PM    Basophils Relative 1 12/18/2024 02:06 PM    Immature Grans % 0 12/18/2024 02:06 PM    Absolute Neutrophils 4.43 12/18/2024 02:06 PM     Lab Results   Component Value Date/Time    Potassium 3.9 12/18/2024 02:06 PM    Chloride 107 12/18/2024 02:06 PM    CO2 28 12/18/2024 02:06 PM    BUN 12 12/18/2024 02:06 PM    Creatinine 0.59 (L) 12/18/2024 02:06 PM    Glucose, Fasting 96 11/06/2024 07:13 AM    Calcium 9.1 12/18/2024 02:06 PM    AST 16 12/18/2024 02:06 PM    ALT 22 12/18/2024 02:06 PM    Alkaline Phosphatase 62 12/18/2024 02:06 PM    Total Protein 6.2 (L) 12/18/2024 02:06 PM    Albumin 3.9 12/18/2024 02:06 PM    Total Bilirubin 0.26 12/18/2024 02:06 PM    eGFR 97 12/18/2024 02:06 PM

## 2024-12-20 ENCOUNTER — PATIENT OUTREACH (OUTPATIENT)
Dept: HEMATOLOGY ONCOLOGY | Facility: CLINIC | Age: 64
End: 2024-12-20

## 2024-12-20 ENCOUNTER — HOSPITAL ENCOUNTER (OUTPATIENT)
Dept: INFUSION CENTER | Facility: CLINIC | Age: 64
End: 2024-12-20
Payer: COMMERCIAL

## 2024-12-20 VITALS
HEART RATE: 91 BPM | BODY MASS INDEX: 23.31 KG/M2 | WEIGHT: 162.8 LBS | SYSTOLIC BLOOD PRESSURE: 113 MMHG | RESPIRATION RATE: 18 BRPM | DIASTOLIC BLOOD PRESSURE: 52 MMHG | HEIGHT: 70 IN | TEMPERATURE: 97.8 F

## 2024-12-20 DIAGNOSIS — Z17.1 MALIGNANT NEOPLASM OF LOWER-INNER QUADRANT OF RIGHT BREAST OF FEMALE, ESTROGEN RECEPTOR NEGATIVE (HCC): Primary | ICD-10-CM

## 2024-12-20 DIAGNOSIS — C50.311 MALIGNANT NEOPLASM OF LOWER-INNER QUADRANT OF RIGHT BREAST OF FEMALE, ESTROGEN RECEPTOR NEGATIVE (HCC): Primary | ICD-10-CM

## 2024-12-20 RX ORDER — SODIUM CHLORIDE 9 MG/ML
20 INJECTION, SOLUTION INTRAVENOUS ONCE
Status: COMPLETED | OUTPATIENT
Start: 2024-12-20 | End: 2024-12-20

## 2024-12-20 RX ADMIN — CARBOPLATIN 602 MG: 10 INJECTION, SOLUTION INTRAVENOUS at 13:18

## 2024-12-20 RX ADMIN — DOCETAXEL 128.6 MG: 20 INJECTION, SOLUTION, CONCENTRATE INTRAVENOUS at 12:08

## 2024-12-20 RX ADMIN — SODIUM CHLORIDE 20 ML/HR: 0.9 INJECTION, SOLUTION INTRAVENOUS at 08:00

## 2024-12-20 RX ADMIN — DEXAMETHASONE SODIUM PHOSPHATE: 10 INJECTION, SOLUTION INTRAMUSCULAR; INTRAVENOUS at 08:20

## 2024-12-20 RX ADMIN — PERTUZUMAB 420 MG: 30 INJECTION, SOLUTION, CONCENTRATE INTRAVENOUS at 09:45

## 2024-12-20 RX ADMIN — TRASTUZUMAB 447 MG: 150 INJECTION, POWDER, LYOPHILIZED, FOR SOLUTION INTRAVENOUS at 11:26

## 2024-12-20 RX ADMIN — PROMETHAZINE HYDROCHLORIDE 12.5 MG: 25 INJECTION INTRAMUSCULAR; INTRAVENOUS at 10:46

## 2024-12-20 RX ADMIN — PEGFILGRASTIM 6 MG: KIT SUBCUTANEOUS at 14:35

## 2024-12-20 RX ADMIN — FOSAPREPITANT 150 MG: 150 INJECTION, POWDER, LYOPHILIZED, FOR SOLUTION INTRAVENOUS at 08:57

## 2024-12-20 NOTE — PROGRESS NOTES
Patient here for her OP TCH + P treatment. Patient had a nose bleed when she arrived, and TSH had dropped. Reached out to the office and spoke with Sydni Garrett RN, and was good to go.   Port a cath accessed with positive blood return.   Hewitt man placed during herceptin for 30 minutes an she did a 90 minute cool down.  Port a cath flushed, positive blood return and de-assessed.   Neulasta on pro placed on her Left arm and reviewed removal time of 27 hrs.   Aware to take off on   AVS declined.   Aware of next appointment for 1/8/2025 at 1000 in [place] patient is aware.   Walked out of clinic with no incident.

## 2024-12-20 NOTE — PROGRESS NOTES
Called and check in with patient to make sure all is well and that she had no questions or concerns at this time. Patient mentioned that she is actually at the infusion center now. Patient mentioned that all is well and that she had no questions or concerns at this time. Patient was thankful for my call- did mention to her that if she needed anything to please reach out to me. Patient has my direct contact information.

## 2024-12-24 ENCOUNTER — HOSPITAL ENCOUNTER (OUTPATIENT)
Dept: INFUSION CENTER | Facility: HOSPITAL | Age: 64
Discharge: HOME | End: 2024-12-24
Attending: INTERNAL MEDICINE
Payer: COMMERCIAL

## 2024-12-24 VITALS
DIASTOLIC BLOOD PRESSURE: 58 MMHG | TEMPERATURE: 98.3 F | HEART RATE: 91 BPM | SYSTOLIC BLOOD PRESSURE: 91 MMHG | OXYGEN SATURATION: 98 % | RESPIRATION RATE: 18 BRPM

## 2024-12-24 DIAGNOSIS — C50.911 BREAST CANCER METASTASIZED TO AXILLARY LYMPH NODE, RIGHT (HCC): Primary | ICD-10-CM

## 2024-12-24 DIAGNOSIS — C77.3 BREAST CANCER METASTASIZED TO AXILLARY LYMPH NODE, RIGHT (HCC): Primary | ICD-10-CM

## 2024-12-24 RX ORDER — PALONOSETRON 0.05 MG/ML
0.25 INJECTION, SOLUTION INTRAVENOUS ONCE
Status: CANCELLED
Start: 2024-12-24 | End: 2024-12-24

## 2024-12-24 RX ORDER — PALONOSETRON 0.05 MG/ML
0.25 INJECTION, SOLUTION INTRAVENOUS ONCE
Status: COMPLETED | OUTPATIENT
Start: 2024-12-24 | End: 2024-12-24

## 2024-12-24 RX ADMIN — PALONOSETRON HYDROCHLORIDE 0.25 MG: 0.25 INJECTION INTRAVENOUS at 11:46

## 2024-12-24 RX ADMIN — SODIUM CHLORIDE 1000 ML: 0.9 INJECTION, SOLUTION INTRAVENOUS at 11:45

## 2024-12-24 NOTE — PROGRESS NOTES
Amy Clement  tolerated treatment well with no complications.      Amy Clement is aware of future appt on 1/8/25 at 1000.     AVS printed and given to Amy Clement:  No (Declined by Amy Clement)

## 2024-12-31 ENCOUNTER — NURSE TRIAGE (OUTPATIENT)
Age: 64
End: 2024-12-31

## 2024-12-31 DIAGNOSIS — R19.7 DIARRHEA, UNSPECIFIED TYPE: Primary | ICD-10-CM

## 2024-12-31 RX ORDER — DIPHENOXYLATE HYDROCHLORIDE AND ATROPINE SULFATE 2.5; .025 MG/1; MG/1
1 TABLET ORAL 4 TIMES DAILY PRN
Qty: 30 TABLET | Refills: 0 | Status: SHIPPED | OUTPATIENT
Start: 2024-12-31

## 2024-12-31 NOTE — TELEPHONE ENCOUNTER
Spoke with patient regarding her diarrhea. I explained to her that we will send in lomotil to the [facility] in [place], as requested, today. I advised her to please call us if she continues with diarrhea as we will order stool studies to be done. Patient is aware to call with any worsening symptoms. I informed her that we will touch base on Thursday. Patient verbalized understanding and is in agreement with the plan.

## 2024-12-31 NOTE — TELEPHONE ENCOUNTER
DESCRIPTION     Patient having water diarrhea, has had 3 episodes so far. Denies any fevers, abd pain. Staying hydrated with electrolytes, tea and water    ONSET    Started last night      MEDICATIONS    She took two imodium after first BM and one more pill after her next BM    OTHER SYMPTOMS    Denies other symptoms- questioning if lomotil can be ordered for her and if so, sent to this specific pharmacy since imodium is not helping her.     [facility]- [address] [place] [address]    Phone number: [phone]    Reason for Disposition  • Less than four stools per day    Answer Assessment - Initial Assessment Questions  3. When did the diarrhea start?  Last night    6. Ask the patient to describe symptoms in detail, including number of stools in a 24-hour period. Is there any weight loss?  Has had 3 episodes so far of water diarrhea    7. Urine output and character, including any signs of dehydration (e.g., dry mouth, decreased urine output, lethargy, weakness, decreased skin turgor)  Denies, drinking electrolytes and water    8. Does anything make the diarrhea better or worse? What remedies has the patient tried, and what have been the results?   Tried imodium, took two tablets and then one more after BM    10. Any associated additional symptoms (e.g., abdominal pain or cramps, fever, weight loss, stool incontinence, nausea, or vomiting, decreased urine output)?   Denies    Protocols used: ONC-Diarrhea- ADULT [place]

## 2025-01-07 ENCOUNTER — TELEPHONE (OUTPATIENT)
Dept: HEMATOLOGY ONCOLOGY | Facility: CLINIC | Age: 65
End: 2025-01-07

## 2025-01-07 DIAGNOSIS — C50.911 BREAST CANCER METASTASIZED TO AXILLARY LYMPH NODE, RIGHT (HCC): Primary | ICD-10-CM

## 2025-01-07 DIAGNOSIS — C77.3 BREAST CANCER METASTASIZED TO AXILLARY LYMPH NODE, RIGHT (HCC): Primary | ICD-10-CM

## 2025-01-07 RX ORDER — PALONOSETRON 0.05 MG/ML
0.25 INJECTION, SOLUTION INTRAVENOUS ONCE
Status: CANCELLED
Start: 2025-01-07 | End: 2025-01-07

## 2025-01-07 RX ORDER — PALONOSETRON 0.05 MG/ML
0.25 INJECTION, SOLUTION INTRAVENOUS ONCE
Status: CANCELLED
Start: 2025-01-14 | End: 2025-01-14

## 2025-01-07 NOTE — TELEPHONE ENCOUNTER
Patient scheduled for hydration LVM with details.  Pt coming to infusion tomorrow and will provide updated schedule.

## 2025-01-08 ENCOUNTER — TELEPHONE (OUTPATIENT)
Age: 65
End: 2025-01-08

## 2025-01-08 ENCOUNTER — HOSPITAL ENCOUNTER (OUTPATIENT)
Dept: INFUSION CENTER | Facility: CLINIC | Age: 65
Discharge: HOME | End: 2025-01-08
Payer: COMMERCIAL

## 2025-01-08 DIAGNOSIS — Z95.828 PORT-A-CATH IN PLACE: Primary | ICD-10-CM

## 2025-01-08 DIAGNOSIS — Z17.1 MALIGNANT NEOPLASM OF LOWER-INNER QUADRANT OF RIGHT BREAST OF FEMALE, ESTROGEN RECEPTOR NEGATIVE (HCC): ICD-10-CM

## 2025-01-08 DIAGNOSIS — C50.311 MALIGNANT NEOPLASM OF LOWER-INNER QUADRANT OF RIGHT BREAST OF FEMALE, ESTROGEN RECEPTOR NEGATIVE (HCC): ICD-10-CM

## 2025-01-08 LAB
ALBUMIN SERPL BCG-MCNC: 4 G/DL (ref 3.5–5)
ALP SERPL-CCNC: 74 U/L (ref 34–104)
ALT SERPL W P-5'-P-CCNC: 38 U/L (ref 7–52)
ANION GAP SERPL CALCULATED.3IONS-SCNC: 5 MMOL/L (ref 4–13)
AST SERPL W P-5'-P-CCNC: 24 U/L (ref 13–39)
BASOPHILS # BLD AUTO: 0.03 THOUSANDS/ΜL (ref 0–0.1)
BASOPHILS NFR BLD AUTO: 1 % (ref 0–1)
BILIRUB SERPL-MCNC: 0.38 MG/DL (ref 0.2–1)
BUN SERPL-MCNC: 14 MG/DL (ref 5–25)
CALCIUM SERPL-MCNC: 9 MG/DL (ref 8.4–10.2)
CHLORIDE SERPL-SCNC: 106 MMOL/L (ref 96–108)
CO2 SERPL-SCNC: 28 MMOL/L (ref 21–32)
CREAT SERPL-MCNC: 0.51 MG/DL (ref 0.6–1.3)
EOSINOPHIL # BLD AUTO: 0.09 THOUSAND/ΜL (ref 0–0.61)
EOSINOPHIL NFR BLD AUTO: 2 % (ref 0–6)
ERYTHROCYTE [DISTWIDTH] IN BLOOD BY AUTOMATED COUNT: 15 % (ref 11.6–15.1)
GFR SERPL CREATININE-BSD FRML MDRD: 101 ML/MIN/1.73SQ M
GLUCOSE SERPL-MCNC: 118 MG/DL (ref 65–140)
HCT VFR BLD AUTO: 38.6 % (ref 34.8–46.1)
HGB BLD-MCNC: 12.4 G/DL (ref 11.5–15.4)
IMM GRANULOCYTES # BLD AUTO: 0.02 THOUSAND/UL (ref 0–0.2)
IMM GRANULOCYTES NFR BLD AUTO: 0 % (ref 0–2)
LYMPHOCYTES # BLD AUTO: 0.81 THOUSANDS/ΜL (ref 0.6–4.47)
LYMPHOCYTES NFR BLD AUTO: 15 % (ref 14–44)
MCH RBC QN AUTO: 31.7 PG (ref 26.8–34.3)
MCHC RBC AUTO-ENTMCNC: 32.1 G/DL (ref 31.4–37.4)
MCV RBC AUTO: 99 FL (ref 82–98)
MONOCYTES # BLD AUTO: 0.38 THOUSAND/ΜL (ref 0.17–1.22)
MONOCYTES NFR BLD AUTO: 7 % (ref 4–12)
NEUTROPHILS # BLD AUTO: 4.12 THOUSANDS/ΜL (ref 1.85–7.62)
NEUTS SEG NFR BLD AUTO: 75 % (ref 43–75)
NRBC BLD AUTO-RTO: 0 /100 WBCS
PLATELET # BLD AUTO: 303 THOUSANDS/UL (ref 149–390)
PMV BLD AUTO: 9.6 FL (ref 8.9–12.7)
POTASSIUM SERPL-SCNC: 3.5 MMOL/L (ref 3.5–5.3)
PROT SERPL-MCNC: 6.3 G/DL (ref 6.4–8.4)
RBC # BLD AUTO: 3.91 MILLION/UL (ref 3.81–5.12)
SODIUM SERPL-SCNC: 139 MMOL/L (ref 135–147)
WBC # BLD AUTO: 5.45 THOUSAND/UL (ref 4.31–10.16)

## 2025-01-08 PROCEDURE — 80053 COMPREHEN METABOLIC PANEL: CPT | Performed by: INTERNAL MEDICINE

## 2025-01-08 PROCEDURE — 85025 COMPLETE CBC W/AUTO DIFF WBC: CPT | Performed by: INTERNAL MEDICINE

## 2025-01-08 NOTE — TELEPHONE ENCOUNTER
Left  for patient and advised her that Dr. Gonzalez has a meeting during her appt time. I asked if she would mind doing the appt closer to 4pm. Callback number left.

## 2025-01-08 NOTE — PROGRESS NOTES
Patient presents today for lab draw via port. Port accessed with positive blood return. Port flushed and de accessed without complications. Patient tolerated procedure well. AVS declined. Patient aware of next appointment on 1/10 at 7:30.

## 2025-01-09 ENCOUNTER — TELEMEDICINE (OUTPATIENT)
Age: 65
End: 2025-01-09
Payer: COMMERCIAL

## 2025-01-09 DIAGNOSIS — Z17.1 MALIGNANT NEOPLASM OF LOWER-INNER QUADRANT OF RIGHT BREAST OF FEMALE, ESTROGEN RECEPTOR NEGATIVE (HCC): Primary | ICD-10-CM

## 2025-01-09 DIAGNOSIS — C50.311 MALIGNANT NEOPLASM OF LOWER-INNER QUADRANT OF RIGHT BREAST OF FEMALE, ESTROGEN RECEPTOR NEGATIVE (HCC): Primary | ICD-10-CM

## 2025-01-09 PROCEDURE — 99213 OFFICE O/P EST LOW 20 MIN: CPT | Performed by: INTERNAL MEDICINE

## 2025-01-09 RX ORDER — SODIUM CHLORIDE 9 MG/ML
20 INJECTION, SOLUTION INTRAVENOUS ONCE
Status: CANCELLED | OUTPATIENT
Start: 2025-01-10

## 2025-01-10 ENCOUNTER — TELEPHONE (OUTPATIENT)
Dept: SURGICAL ONCOLOGY | Facility: CLINIC | Age: 65
End: 2025-01-10

## 2025-01-10 ENCOUNTER — TELEPHONE (OUTPATIENT)
Dept: HEMATOLOGY ONCOLOGY | Facility: CLINIC | Age: 65
End: 2025-01-10

## 2025-01-10 ENCOUNTER — HOSPITAL ENCOUNTER (OUTPATIENT)
Dept: INFUSION CENTER | Facility: CLINIC | Age: 65
End: 2025-01-10
Payer: COMMERCIAL

## 2025-01-10 ENCOUNTER — TELEPHONE (OUTPATIENT)
Age: 65
End: 2025-01-10

## 2025-01-10 VITALS
RESPIRATION RATE: 18 BRPM | BODY MASS INDEX: 23.34 KG/M2 | WEIGHT: 163 LBS | DIASTOLIC BLOOD PRESSURE: 51 MMHG | HEART RATE: 98 BPM | HEIGHT: 70 IN | SYSTOLIC BLOOD PRESSURE: 109 MMHG | TEMPERATURE: 98.6 F

## 2025-01-10 DIAGNOSIS — C50.311 MALIGNANT NEOPLASM OF LOWER-INNER QUADRANT OF RIGHT BREAST OF FEMALE, ESTROGEN RECEPTOR NEGATIVE (HCC): Primary | ICD-10-CM

## 2025-01-10 DIAGNOSIS — Z17.1 MALIGNANT NEOPLASM OF LOWER-INNER QUADRANT OF RIGHT BREAST OF FEMALE, ESTROGEN RECEPTOR NEGATIVE (HCC): Primary | ICD-10-CM

## 2025-01-10 PROCEDURE — 96377 APPLICATON ON-BODY INJECTOR: CPT

## 2025-01-10 PROCEDURE — 96367 TX/PROPH/DG ADDL SEQ IV INF: CPT

## 2025-01-10 PROCEDURE — 96413 CHEMO IV INFUSION 1 HR: CPT

## 2025-01-10 PROCEDURE — 96417 CHEMO IV INFUS EACH ADDL SEQ: CPT

## 2025-01-10 PROCEDURE — 96375 TX/PRO/DX INJ NEW DRUG ADDON: CPT

## 2025-01-10 RX ORDER — SODIUM CHLORIDE 9 MG/ML
20 INJECTION, SOLUTION INTRAVENOUS ONCE
Status: COMPLETED | OUTPATIENT
Start: 2025-01-10 | End: 2025-01-10

## 2025-01-10 RX ADMIN — DEXAMETHASONE SODIUM PHOSPHATE: 10 INJECTION, SOLUTION INTRAMUSCULAR; INTRAVENOUS at 08:16

## 2025-01-10 RX ADMIN — DOCETAXEL 128.6 MG: 20 INJECTION, SOLUTION, CONCENTRATE INTRAVENOUS at 11:24

## 2025-01-10 RX ADMIN — FOSAPREPITANT 150 MG: 150 INJECTION, POWDER, LYOPHILIZED, FOR SOLUTION INTRAVENOUS at 08:40

## 2025-01-10 RX ADMIN — PROMETHAZINE HYDROCHLORIDE 12.5 MG: 25 INJECTION INTRAMUSCULAR; INTRAVENOUS at 09:57

## 2025-01-10 RX ADMIN — PEGFILGRASTIM 6 MG: KIT SUBCUTANEOUS at 13:48

## 2025-01-10 RX ADMIN — PERTUZUMAB 420 MG: 30 INJECTION, SOLUTION, CONCENTRATE INTRAVENOUS at 09:25

## 2025-01-10 RX ADMIN — SODIUM CHLORIDE 20 ML/HR: 0.9 INJECTION, SOLUTION INTRAVENOUS at 08:16

## 2025-01-10 RX ADMIN — CARBOPLATIN 602 MG: 10 INJECTION INTRAVENOUS at 12:27

## 2025-01-10 RX ADMIN — TRASTUZUMAB 447 MG: 150 INJECTION, POWDER, LYOPHILIZED, FOR SOLUTION INTRAVENOUS at 10:48

## 2025-01-10 NOTE — PROGRESS NOTES
Pt presents for chemotherapy offering no compliants. Pt tolerated treatment without incident. Paxman utilized throughout treatment, tolerated well. AVS declined. Next appointment reviewed on 1/14 at 1pm.

## 2025-01-10 NOTE — TELEPHONE ENCOUNTER
Called patient and advised of appt with Danisha and Dr Gonzalez.  Dates and times are fine for patient.

## 2025-01-10 NOTE — TELEPHONE ENCOUNTER
Pt would like to know if Dr. Magaña would like her to get any scans or blood work completed prior to Dr. Magaña scheduling for their upcoming surgery? Pt can be reached at [phone]. Thank you.

## 2025-01-10 NOTE — TELEPHONE ENCOUNTER
Called patient to offer pre-op appointment on 03/12/2025 to discuss surgery planning. Patient agreeable.

## 2025-01-10 NOTE — TELEPHONE ENCOUNTER
Called patient back to discuss. Per Dr. Magaña, appointment moved up to 02/26/2025 @ 8:30am. Discussed that Dr. Magaña ordered a right axillary US to be done prior to her appointment, and she should expect a call to have this scheduled.    Patient agreeable. No further questions or concerns at this time.

## 2025-01-11 NOTE — PROGRESS NOTES
Virtual Regular Visit  Name: Amy Clement      : 1960      MRN: [id number]  Encounter Provider: Kathrin Gonzalez DO  Encounter Date: 2025   Encounter department: [facility] H[facility]ATOLOGY ON[facility]      Verification of patient location:  Patient is located at Home in the following state in which I hold an active license [place] :  Assessment & Plan  Malignant neoplasm of lower-inner quadrant of right breast of female, estrogen receptor negative (HCC)         64-year-old female with a node positive HER2 positive breast cancer who is due for cycle 4 of neoadjuvant TC pH.  We will continue without any further dose adjustments.  The intervention of IV fluids and IV nausea medications a day 5 has really helped her tolerate the treatment.  She will see my nurse practitioner prior to cycle 5 as I will be out of the office and I will see her prior to cycle 6.  I have reached out to Dr. Archana Ramires to let them know the finish date for chemotherapy so they can order any imaging and make plans for surgery.  We did discuss adjuvant endocrine therapy as well as her lymph node was 70% [place] positive.  We will likely use an aromatase inhibitor as long as she tolerates it.  She knows to call in the interim with any questions or concerns.    Encounter provider Kathrin Gonzalez DO    The patient was identified by name and date of birth. Amy Clement was informed that this is a telemedicine visit and that the visit is being conducted through the Epic Embedded platform. She agrees to proceed..  My office door was closed. No one else was in the room.  She acknowledged consent and understanding of privacy and security of the video platform. The patient has agreed to participate and understands they can discontinue the visit at any time.    Patient is aware this is a billable service.     History of Present Illness     HPI    64-year-old female who is being treated in the neoadjuvant setting for a  node positive HER2 positive breast cancer.  For cycle 3 of TC pH I dose reduce the Taxotere by 10% in the carboplatin from an AUC of 6-5.  IV fluids and IV nausea medication 5 days after treatment also greatly helped with her nausea.  Fatigue is starting to accumulate but she can still function and regular life.  She denies any fevers.  She had a minor cold that is better now.  She denies any neuropathy.    Review of Systems otherwise neg    Objective   LMP  (LMP Unknown)     Physical Exam    AAOx3 NAD    Visit Time  Total Visit Duration: 11 min

## 2025-01-14 ENCOUNTER — HOSPITAL ENCOUNTER (OUTPATIENT)
Dept: INFUSION CENTER | Facility: CLINIC | Age: 65
Discharge: HOME | End: 2025-01-14
Payer: COMMERCIAL

## 2025-01-14 VITALS
TEMPERATURE: 98.7 F | RESPIRATION RATE: 18 BRPM | DIASTOLIC BLOOD PRESSURE: 50 MMHG | SYSTOLIC BLOOD PRESSURE: 108 MMHG | HEART RATE: 97 BPM

## 2025-01-14 DIAGNOSIS — C77.3 BREAST CANCER METASTASIZED TO AXILLARY LYMPH NODE, RIGHT (HCC): Primary | ICD-10-CM

## 2025-01-14 DIAGNOSIS — C50.911 BREAST CANCER METASTASIZED TO AXILLARY LYMPH NODE, RIGHT (HCC): Primary | ICD-10-CM

## 2025-01-14 RX ORDER — PALONOSETRON 0.05 MG/ML
0.25 INJECTION, SOLUTION INTRAVENOUS ONCE
Status: COMPLETED | OUTPATIENT
Start: 2025-01-14 | End: 2025-01-14

## 2025-01-14 RX ORDER — PALONOSETRON 0.05 MG/ML
0.25 INJECTION, SOLUTION INTRAVENOUS ONCE
Start: 2025-02-04 | End: 2025-02-04

## 2025-01-14 RX ADMIN — SODIUM CHLORIDE 1000 ML: 0.9 INJECTION, SOLUTION INTRAVENOUS at 13:27

## 2025-01-14 RX ADMIN — PALONOSETRON HYDROCHLORIDE 0.25 MG: 0.25 INJECTION INTRAVENOUS at 13:31

## 2025-01-14 NOTE — PROGRESS NOTES
Pt presents for IV hydration, offering no complaints. Patient had hematoma over port site. Patient agreeable to placing a peripheral IV and letting port heal. Picture placed in media. Pt tolerated treatment without incident. PIV removed. AVS declined, next appointment reviewed on 1/29 at 3pm.

## 2025-01-17 ENCOUNTER — PATIENT OUTREACH (OUTPATIENT)
Dept: HEMATOLOGY ONCOLOGY | Facility: CLINIC | Age: 65
End: 2025-01-17

## 2025-01-17 NOTE — PROGRESS NOTES
Called and spoke with patient for a follow up call to make sure all is well.  Mentioned to patient I wanted to address any questions or concerns patient might have at this time. Patient mentioned all is well, asked patient about her eating and drinking patient stated that she has been eating well. Patient did mention some nausea however she takes medication for it. Patient mentions she is still driving and has family who helps her when she is unable to drive. Stated to patient that should she have any questions,concerns or any new barriers to care to please reach out to me. Patient was thankful for my call.

## 2025-01-21 ENCOUNTER — PATIENT MESSAGE (OUTPATIENT)
Age: 65
End: 2025-01-21

## 2025-01-21 ENCOUNTER — NURSE TRIAGE (OUTPATIENT)
Age: 65
End: 2025-01-21

## 2025-01-21 DIAGNOSIS — R21 RASH: ICD-10-CM

## 2025-01-21 DIAGNOSIS — R21 RASH: Primary | ICD-10-CM

## 2025-01-21 RX ORDER — CLINDAMYCIN PHOSPHATE 10 UG/ML
LOTION TOPICAL 2 TIMES DAILY
Qty: 60 ML | Refills: 1 | Status: SHIPPED | OUTPATIENT
Start: 2025-01-21 | End: 2025-01-21 | Stop reason: SDUPTHER

## 2025-01-21 NOTE — TELEPHONE ENCOUNTER
Patient spoke with [facility] pharmacy tech, their phone lines were down since 11am and they are now working but RX was never received. Please re-send to [facility] in [place].

## 2025-01-21 NOTE — TELEPHONE ENCOUNTER
"DESCRIPTION Rash    SEVERITY Moderate - severe    ONSET Friday 1/17    CAUSE TCPH, C4 completed 1/10    MEDICATIONS benadryl nightly helps with itching, alternates hydrocortisone cream and calamine cream with some relief of itching    OTHER SYMPTOMS Denies    Advised patient to continue with benadryl as directed and hydrocortisone cream for the itching, sending pictures via [employer] message for Dr. Gonzalez to review and we will give her a callback with additional recommendations    She prefers [facility] pharmacy on [address] in [place], [place] if any scripts are needed    Best callback number: [phone]    Reason for Disposition   SEVERE itching    Answer Assessment - Initial Assessment Questions  1. APPEARANCE of RASH: \"Describe the rash.\" (e.g., spots, blisters, raised areas, skin peeling, scaly)      Flat blister-like areas    2. SIZE: \"How big are the spots?\" (e.g., tip of pen, eraser, coin; inches, centimeters)      Differ in size, sending pictures     3. LOCATION: \"Where is the rash located?\"      Arms, back and face    4. COLOR: \"What color is the rash?\" (Note: It is difficult to assess rash color in people with darker-colored skin. When this situation occurs, simply ask the caller to describe what they see.)      Red    5. ONSET: \"When did the rash begin?\"      Friday night 1/17    6. FEVER: \"Do you have a fever?\" If Yes, ask: \"What is your temperature, how was it measured, and when did it start?\"      Denies    7. ITCHING: \"Does the rash itch?\" If Yes, ask: \"How bad is the itch?\" (Scale 1-10; or mild, moderate, severe)      Yes severe itching    8. CAUSE: \"What do you think is causing the rash?\"      Chemo    9. MEDICINE FACTORS: \"Have you started any new medicines within the last 2 weeks?\" (e.g., antibiotics)       Denies    10. OTHER SYMPTOMS: \"Do you have any other symptoms?\" (e.g., dizziness, headache, sore throat, joint pain)        Denies    Protocols used: Rash or Redness - Widespread-Adult-[place]    "

## 2025-01-22 RX ORDER — CLINDAMYCIN PHOSPHATE 10 UG/ML
LOTION TOPICAL 2 TIMES DAILY
Qty: 60 ML | Refills: 1 | Status: SHIPPED | OUTPATIENT
Start: 2025-01-22

## 2025-01-29 ENCOUNTER — HOSPITAL ENCOUNTER (OUTPATIENT)
Dept: INFUSION CENTER | Facility: CLINIC | Age: 65
Discharge: HOME | End: 2025-01-29
Payer: COMMERCIAL

## 2025-01-29 DIAGNOSIS — Z95.828 PORT-A-CATH IN PLACE: Primary | ICD-10-CM

## 2025-01-29 DIAGNOSIS — Z17.1 MALIGNANT NEOPLASM OF LOWER-INNER QUADRANT OF RIGHT BREAST OF FEMALE, ESTROGEN RECEPTOR NEGATIVE (HCC): ICD-10-CM

## 2025-01-29 DIAGNOSIS — C50.311 MALIGNANT NEOPLASM OF LOWER-INNER QUADRANT OF RIGHT BREAST OF FEMALE, ESTROGEN RECEPTOR NEGATIVE (HCC): ICD-10-CM

## 2025-01-29 LAB
ALBUMIN SERPL BCG-MCNC: 4.2 G/DL (ref 3.5–5)
ALP SERPL-CCNC: 74 U/L (ref 34–104)
ALT SERPL W P-5'-P-CCNC: 41 U/L (ref 7–52)
ANION GAP SERPL CALCULATED.3IONS-SCNC: 4 MMOL/L (ref 4–13)
AST SERPL W P-5'-P-CCNC: 28 U/L (ref 13–39)
BASOPHILS # BLD AUTO: 0.02 THOUSANDS/ΜL (ref 0–0.1)
BASOPHILS NFR BLD AUTO: 0 % (ref 0–1)
BILIRUB SERPL-MCNC: 0.31 MG/DL (ref 0.2–1)
BUN SERPL-MCNC: 14 MG/DL (ref 5–25)
CALCIUM SERPL-MCNC: 8.8 MG/DL (ref 8.4–10.2)
CHLORIDE SERPL-SCNC: 106 MMOL/L (ref 96–108)
CO2 SERPL-SCNC: 28 MMOL/L (ref 21–32)
CREAT SERPL-MCNC: 0.43 MG/DL (ref 0.6–1.3)
EOSINOPHIL # BLD AUTO: 0.14 THOUSAND/ΜL (ref 0–0.61)
EOSINOPHIL NFR BLD AUTO: 2 % (ref 0–6)
ERYTHROCYTE [DISTWIDTH] IN BLOOD BY AUTOMATED COUNT: 14.3 % (ref 11.6–15.1)
GFR SERPL CREATININE-BSD FRML MDRD: 107 ML/MIN/1.73SQ M
GLUCOSE SERPL-MCNC: 96 MG/DL (ref 65–140)
HCT VFR BLD AUTO: 37 % (ref 34.8–46.1)
HGB BLD-MCNC: 11.8 G/DL (ref 11.5–15.4)
IMM GRANULOCYTES # BLD AUTO: 0.03 THOUSAND/UL (ref 0–0.2)
IMM GRANULOCYTES NFR BLD AUTO: 1 % (ref 0–2)
LYMPHOCYTES # BLD AUTO: 1.02 THOUSANDS/ΜL (ref 0.6–4.47)
LYMPHOCYTES NFR BLD AUTO: 18 % (ref 14–44)
MCH RBC QN AUTO: 31.8 PG (ref 26.8–34.3)
MCHC RBC AUTO-ENTMCNC: 31.9 G/DL (ref 31.4–37.4)
MCV RBC AUTO: 100 FL (ref 82–98)
MONOCYTES # BLD AUTO: 0.49 THOUSAND/ΜL (ref 0.17–1.22)
MONOCYTES NFR BLD AUTO: 9 % (ref 4–12)
NEUTROPHILS # BLD AUTO: 4.04 THOUSANDS/ΜL (ref 1.85–7.62)
NEUTS SEG NFR BLD AUTO: 70 % (ref 43–75)
NRBC BLD AUTO-RTO: 0 /100 WBCS
PLATELET # BLD AUTO: 240 THOUSANDS/UL (ref 149–390)
PMV BLD AUTO: 9.5 FL (ref 8.9–12.7)
POTASSIUM SERPL-SCNC: 4 MMOL/L (ref 3.5–5.3)
PROT SERPL-MCNC: 6.5 G/DL (ref 6.4–8.4)
RBC # BLD AUTO: 3.71 MILLION/UL (ref 3.81–5.12)
SODIUM SERPL-SCNC: 138 MMOL/L (ref 135–147)
WBC # BLD AUTO: 5.74 THOUSAND/UL (ref 4.31–10.16)

## 2025-01-29 PROCEDURE — 80053 COMPREHEN METABOLIC PANEL: CPT | Performed by: INTERNAL MEDICINE

## 2025-01-29 PROCEDURE — 85025 COMPLETE CBC W/AUTO DIFF WBC: CPT | Performed by: INTERNAL MEDICINE

## 2025-01-29 NOTE — PROGRESS NOTES
Pt here for labs, offering no complaints.  Right port accessed with positive blood return noted, labs drawn, port flushed and de-accessed without incident.  AVS declined.  Aware of next appt 1/31 @ 730. Walked out in stable condition.     Ataxic gait

## 2025-01-30 ENCOUNTER — TELEMEDICINE (OUTPATIENT)
Dept: HEMATOLOGY ONCOLOGY | Facility: CLINIC | Age: 65
End: 2025-01-30
Payer: COMMERCIAL

## 2025-01-30 DIAGNOSIS — Z17.1 MALIGNANT NEOPLASM OF LOWER-INNER QUADRANT OF RIGHT BREAST OF FEMALE, ESTROGEN RECEPTOR NEGATIVE (HCC): Primary | ICD-10-CM

## 2025-01-30 DIAGNOSIS — C50.311 MALIGNANT NEOPLASM OF LOWER-INNER QUADRANT OF RIGHT BREAST OF FEMALE, ESTROGEN RECEPTOR NEGATIVE (HCC): Primary | ICD-10-CM

## 2025-01-30 PROCEDURE — 99212 OFFICE O/P EST SF 10 MIN: CPT

## 2025-01-30 RX ORDER — SODIUM CHLORIDE 9 MG/ML
20 INJECTION, SOLUTION INTRAVENOUS ONCE
Status: CANCELLED | OUTPATIENT
Start: 2025-01-31

## 2025-01-30 NOTE — PROGRESS NOTES
Virtual Regular Visit  Name: Amy Clement      : 1960      MRN: [id number]  Encounter Provider: ELY Salazar  Encounter Date: 2025   Encounter department: [facility]      Verification of patient location:  Patient is located at Home in the following state in which I hold an active license [place] :  Assessment & Plan  Malignant neoplasm of lower-inner quadrant of right breast of female, estrogen receptor negative (HCC)    64-year-old female with node positive HER2 positive right-sided breast cancer.  Patient is tolerating treatment fairly well after the dose reduction and IV fluids with antiemetics on the fifth day after treatment.  We will proceed with cycle 5 of docetaxel, carboplatin, Herceptin, Perjeta at current dose.  Recommend patient get her clindamycin refilled should she start experiencing the rash again.  She had the rash worsen this time around, recommend she contact our office immediately.    Will see patient back in the office prior to cycle 6 with Dr. Gonzalez.  She is aware to contact us for any additional questions/concerns or worsening symptoms.      Encounter provider ELY Salazar    The patient was identified by name and date of birth. Amy Clement was informed that this is a telemedicine visit and that the visit is being conducted through the Epic Embedded platform. She agrees to proceed..  My office door was closed. No one else was in the room.  She acknowledged consent and understanding of privacy and security of the video platform. The patient has agreed to participate and understands they can discontinue the visit at any time.    Patient is aware this is a billable service.     History of Present Illness     HPI  Amy Clement is a 64-year-old female with node positive HER2 positive right-sided breast cancer being treated in the neoadjuvant setting.  She presents for follow-up prior to cycle 5 of [facility].  Patient  reports after cycle 4, she developed a worsening rash than her previous cycles which is spread from her arms, nostrils, face, around her eyes, back.  She reports it was extremely itchy.  Patient had reached out to our office and she was prescribed clindamycin lotion by Dr. Gonzalez, which offered relief.  She has a little bit of the rash above her eyebrow currently.    Patient reports her fatigue is stable.  Patient has had intermittent dry cough.  Reports nausea for one week after treatment with antiemetics offering relief.  Patient had one bout of diarrhea, Imodium offering relief.  No mouth sores, neuropathy, chest pain, fevers or infection.  Reports no bone pain after Neulasta injection she has been taking Claritin 5 days prior to the injection.    Labs reviewed, okay for treatment:  1/29/2025: Hgb 11.8, , WBC 5.74, platelets 240,000, creatinine 0.43, EGFR 107, liver enzymes WNL        Visit Time  Total Visit Duration: 10    I spent 10 minutes in chart review, counseling, coordination of care, and documentation.    This note has been generated by voice recognition software system.  Therefore, there may be spelling, grammar, and or syntax errors. Please contact if questions arise.

## 2025-01-31 ENCOUNTER — HOSPITAL ENCOUNTER (OUTPATIENT)
Dept: INFUSION CENTER | Facility: CLINIC | Age: 65
End: 2025-01-31
Payer: COMMERCIAL

## 2025-01-31 VITALS
DIASTOLIC BLOOD PRESSURE: 64 MMHG | HEART RATE: 98 BPM | WEIGHT: 161.6 LBS | BODY MASS INDEX: 23.13 KG/M2 | HEIGHT: 70 IN | SYSTOLIC BLOOD PRESSURE: 105 MMHG | TEMPERATURE: 97.8 F | RESPIRATION RATE: 18 BRPM

## 2025-01-31 DIAGNOSIS — C50.311 MALIGNANT NEOPLASM OF LOWER-INNER QUADRANT OF RIGHT BREAST OF FEMALE, ESTROGEN RECEPTOR NEGATIVE (HCC): Primary | ICD-10-CM

## 2025-01-31 DIAGNOSIS — Z17.1 MALIGNANT NEOPLASM OF LOWER-INNER QUADRANT OF RIGHT BREAST OF FEMALE, ESTROGEN RECEPTOR NEGATIVE (HCC): Primary | ICD-10-CM

## 2025-01-31 PROCEDURE — 96375 TX/PRO/DX INJ NEW DRUG ADDON: CPT

## 2025-01-31 PROCEDURE — 96413 CHEMO IV INFUSION 1 HR: CPT

## 2025-01-31 PROCEDURE — 96377 APPLICATON ON-BODY INJECTOR: CPT

## 2025-01-31 PROCEDURE — 96417 CHEMO IV INFUS EACH ADDL SEQ: CPT

## 2025-01-31 PROCEDURE — 96367 TX/PROPH/DG ADDL SEQ IV INF: CPT

## 2025-01-31 RX ORDER — SODIUM CHLORIDE 9 MG/ML
20 INJECTION, SOLUTION INTRAVENOUS ONCE
Status: COMPLETED | OUTPATIENT
Start: 2025-01-31 | End: 2025-01-31

## 2025-01-31 RX ADMIN — PROMETHAZINE HYDROCHLORIDE 12.5 MG: 25 INJECTION INTRAMUSCULAR; INTRAVENOUS at 10:40

## 2025-01-31 RX ADMIN — FOSAPREPITANT 150 MG: 150 INJECTION, POWDER, LYOPHILIZED, FOR SOLUTION INTRAVENOUS at 08:26

## 2025-01-31 RX ADMIN — TRASTUZUMAB 447 MG: 150 INJECTION, POWDER, LYOPHILIZED, FOR SOLUTION INTRAVENOUS at 09:45

## 2025-01-31 RX ADMIN — PERTUZUMAB 420 MG: 30 INJECTION, SOLUTION, CONCENTRATE INTRAVENOUS at 09:07

## 2025-01-31 RX ADMIN — CARBOPLATIN 602 MG: 10 INJECTION, SOLUTION INTRAVENOUS at 12:24

## 2025-01-31 RX ADMIN — DEXAMETHASONE SODIUM PHOSPHATE: 10 INJECTION, SOLUTION INTRAMUSCULAR; INTRAVENOUS at 08:02

## 2025-01-31 RX ADMIN — SODIUM CHLORIDE 20 ML/HR: 0.9 INJECTION, SOLUTION INTRAVENOUS at 08:02

## 2025-01-31 RX ADMIN — DOCETAXEL 128.6 MG: 20 INJECTION, SOLUTION, CONCENTRATE INTRAVENOUS at 11:16

## 2025-01-31 RX ADMIN — PEGFILGRASTIM 6 MG: KIT SUBCUTANEOUS at 14:40

## 2025-01-31 NOTE — PROGRESS NOTES
Patient arrives to infusion center for Perjata, Herceptin, Taxotere, Carbo Chemotherapy. Patient offers no complaints today. Port accessed, patient tolerated entire infusion without complication. Patient utilized [employer] cooling system during infusion. Port de-accessed. AVS offered.     Next appointment: 2/4/25 @ 1300

## 2025-02-04 ENCOUNTER — HOSPITAL ENCOUNTER (OUTPATIENT)
Dept: INFUSION CENTER | Facility: CLINIC | Age: 65
Discharge: HOME | End: 2025-02-04
Payer: COMMERCIAL

## 2025-02-04 VITALS
HEART RATE: 94 BPM | RESPIRATION RATE: 18 BRPM | SYSTOLIC BLOOD PRESSURE: 102 MMHG | TEMPERATURE: 98.2 F | DIASTOLIC BLOOD PRESSURE: 56 MMHG

## 2025-02-04 DIAGNOSIS — C77.3 BREAST CANCER METASTASIZED TO AXILLARY LYMPH NODE, RIGHT (HCC): Primary | ICD-10-CM

## 2025-02-04 DIAGNOSIS — C50.911 BREAST CANCER METASTASIZED TO AXILLARY LYMPH NODE, RIGHT (HCC): Primary | ICD-10-CM

## 2025-02-04 RX ORDER — PALONOSETRON 0.05 MG/ML
0.25 INJECTION, SOLUTION INTRAVENOUS ONCE
Status: COMPLETED | OUTPATIENT
Start: 2025-02-04 | End: 2025-02-04

## 2025-02-04 RX ORDER — PALONOSETRON 0.05 MG/ML
0.25 INJECTION, SOLUTION INTRAVENOUS ONCE
Start: 2025-02-25 | End: 2025-02-25

## 2025-02-04 RX ADMIN — PALONOSETRON HYDROCHLORIDE 0.25 MG: 0.25 INJECTION INTRAVENOUS at 13:11

## 2025-02-04 RX ADMIN — SODIUM CHLORIDE 1000 ML: 0.9 INJECTION, SOLUTION INTRAVENOUS at 13:08

## 2025-02-04 NOTE — PROGRESS NOTES
Pt here today for hydration, offers no complaints , infusion completed w/o complications, pt aware of their next appt on 2/19 at 3 pm AVS declined and pt D/C home.   
VISUAL CHANGES

## 2025-02-10 ENCOUNTER — NURSE TRIAGE (OUTPATIENT)
Age: 65
End: 2025-02-10

## 2025-02-10 ENCOUNTER — TELEPHONE (OUTPATIENT)
Age: 65
End: 2025-02-10

## 2025-02-10 DIAGNOSIS — Z17.1 MALIGNANT NEOPLASM OF LOWER-INNER QUADRANT OF RIGHT BREAST OF FEMALE, ESTROGEN RECEPTOR NEGATIVE (HCC): Primary | ICD-10-CM

## 2025-02-10 DIAGNOSIS — C50.311 MALIGNANT NEOPLASM OF LOWER-INNER QUADRANT OF RIGHT BREAST OF FEMALE, ESTROGEN RECEPTOR NEGATIVE (HCC): Primary | ICD-10-CM

## 2025-02-10 NOTE — TELEPHONE ENCOUNTER
Pt calling in regarding symptoms around her port, pt states she spoke with someone earlier around 12 or 1 pm.      Pt states that since Friday morning she has had tenderness, and soreness around her port site, pt is unable to turn her neck, pt states site is swollen and has swelling in her neck as well. Pt is concerned with the symptoms she's experiencing.    Please advise, thank you!

## 2025-02-10 NOTE — TELEPHONE ENCOUNTER
----- Message from Zuleika JACKSON sent at 2/10/2025  1:13 PM EST -----  Pt said she woke up and her neck was swollen and painful near the port. She said it has gotten worse

## 2025-02-10 NOTE — TELEPHONE ENCOUNTER
"DESCRIPTION Pain and swelling to right neck above port site    SEVERITY Moderate     ONSET Friday 2/7 started with tenderness to port site, Saturday 2/8 mild swelling relieved with ice and tylenol, much worse swelling today    CAUSE Last chemo 1/31, last port access 2/4    MEDICATIONS Tylenol    OTHER SYMPTOMS 6-8/10 pain - worsens with movement  Denies fever, SOB, redness, pus, drainage    Patient sending picture of area via [employer] message. Advised her to use tylenol 1000mg every 8 hrs as needed, alternate ice/warm compress above port site, and will review for additional recommendations with Dr. Gonzalez. Pt agreeable with port study if ordered.     Best callback number: [phone]    Reason for Disposition   Nursing judgment    Answer Assessment - Initial Assessment Questions  1. REASON FOR CALL: \"What is your main concern right now?\"      Swelling to right neck above port area    2. ONSET: \"When did the symptoms start?\"      Tenderness to port area started Friday, swelling started Saturday which improved with tylenol and ice, this morning swelling was much worse    4. FEVER: \"Do you have a fever?\"      Denies    5. OTHER SYMPTOMS: \"Do you have any other new symptoms?\"      6-8/10 pain    Protocols used: No Protocol Available-Adult-[place]    "

## 2025-02-11 ENCOUNTER — HOSPITAL ENCOUNTER (OUTPATIENT)
Dept: NON INVASIVE DIAGNOSTICS | Facility: HOSPITAL | Age: 65
Discharge: HOME | End: 2025-02-11
Attending: INTERNAL MEDICINE

## 2025-02-11 DIAGNOSIS — C50.311 MALIGNANT NEOPLASM OF LOWER-INNER QUADRANT OF RIGHT BREAST OF FEMALE, ESTROGEN RECEPTOR NEGATIVE (HCC): ICD-10-CM

## 2025-02-11 DIAGNOSIS — I82.629 ACUTE DEEP VEIN THROMBOSIS (DVT) OF UPPER EXTREMITY, UNSPECIFIED LATERALITY, UNSPECIFIED VEIN (HCC): Primary | ICD-10-CM

## 2025-02-11 DIAGNOSIS — Z17.1 MALIGNANT NEOPLASM OF LOWER-INNER QUADRANT OF RIGHT BREAST OF FEMALE, ESTROGEN RECEPTOR NEGATIVE (HCC): ICD-10-CM

## 2025-02-11 NOTE — PROGRESS NOTES
Patient was diagnosed with IJ DVT associated with port.  Port is still usable.  Will send eliquis starter pack to her pharmacy.

## 2025-02-11 NOTE — PROGRESS NOTES
Patient was seen today for a port site check.  Patient has a right chest port that was placed back in November 2024.  Patient was last seen at [facility] on 2/4/25 for hydration.  Patient reports that on 2/7/25 she began to have some swelling and discomfort to her right neck.    Patient does have some obvious swelling and tenderness to her right neck. Area is warm to touch.    The right internal jugular vein was evaluated using ultrasound with Dr. Cerrato. Thrombus was visualized within the right IJ.    Patient was advised that she should use a warm compress to her right neck intermittently, can use NSAIDs for discomfort.    A secure chat message was sent to Dr. Gonzalez with the findings along with the recommendation of the initiation of oral anticoagulation.    The port is ok to use for continued treatment.    Patient advised to follow-up with Dr. Gonzalez.

## 2025-02-14 ENCOUNTER — TELEPHONE (OUTPATIENT)
Age: 65
End: 2025-02-14

## 2025-02-14 ENCOUNTER — PATIENT OUTREACH (OUTPATIENT)
Dept: HEMATOLOGY ONCOLOGY | Facility: CLINIC | Age: 65
End: 2025-02-14

## 2025-02-14 NOTE — TELEPHONE ENCOUNTER
Pt called asking if there was a note in her chart about a missed phone call.    Reviewed that there was not.

## 2025-02-14 NOTE — PROGRESS NOTES
Called Amy and had to leave a voicemail.   Asked that she call back to let us know which pharmacy she prefers to use so I can make sure this is updated in her chart. [facility] has been removed in the meantime.

## 2025-02-14 NOTE — PROGRESS NOTES
I reached out and spoke with Amy to follow up and to review for any new changes in barriers to care and offer supportive services as needed.     Barriers noted previously and outcome of interventions;  Previously patient mentioned nausea. Currently nausea has mostly subsided. Patient did mention she was upset about her medication being sent to the wrong pharmacy more than once. Patient mentioned that recently her medication was sent to [facility]. Amy stated that this is frustrating due to the fact that she live in the [place] area. Amy mentioned that she would like for this to not continue to go on. She additionally stated that [facility] was never listed as a pharmacy nor should it have been. Patient asked that I forward her message to amanda and 's team as this causes for a delay in her care.     Reviewed for any new barriers as noted below.    Are you having any side effects from your treatment? NO.    Are you eating and drinking normally? YES.    Have you been experiencing any pain related to your cancer diagnosis? Yes, describe: Patient mentioned she has surgery recently and that the pain has subsided however she has been dealing with swelling.  Swelling is located in patient's neck- patient also mentioned being on a blood thinner currently.     If not already established, have needs changed for a palliative care referral? NO.    Do you have a good support system? YES.    Are you interested in any support groups? N/A.    How are you doing with transportation to your appointments? WELL, patient has no issues currently as she is still able to drive.    Do you have any questions or concerns regarding your treatment plan? NO.    Any new financial concerns for your household or medical bills? NO.    Do you know when your upcoming appointments are? YES.    Future Appointments   Date Time Provider Department Center   2/18/2025  3:40 PM Kathrin Gonzalez DO HEM ONC [facility]   2/19/2025  3:00 PM [facility] INF QUICK CHAIR 1 [facility] Infusion [facility]   2/21/2025  7:30 AM [facility] INF CHAIR 7 [facility]   2/24/2025  9:45 AM [facility] US RBC 2 MO RBC US [facility] RBC   2/25/2025 12:30 PM [facility] INF CHAIR 5 [facility] Infusion [facility]   2/26/2025  8:30 AM Koki Magaña MD SURG ONC ALL [facility]On[facility]   3/12/2025  2:30 PM [facility] IN[facility] QUICK CHAIR 1 [facility]fusion [facility]   3/14/2025  9:00 AM [facility] INF CHAIR 11 [facility]   4/2/2025 11:00 AM [facility] INF QUICK CHAIR 1 [facility]   4/4/2025  9:00 AM [facility] INF CHAIR 6 [facility] Infusion [facility]   4/23/2025  2:00 PM [facility] INF QUICK CHAIR 1 [facility] Infusion [facility]   4/25/2025  8:30 AM [facility] INF CHAIR 15 [facility] Infusion [facility]   5/14/2025  1:30 PM [facility] INF QUICK CHAIR 1 [facility] Infusion [facility]   5/16/2025  8:30 AM [facility] INF CHAIR 5 [facility] Infusion [facility]   6/3/2025  1:00 PM Will Dasilva MD [facility]   6/30/2025 11:40 AM Lucrecia Howard MD [facility]          Based on individual needs I will follow up with them in 4/6 weeks. I have provided my direct contact information and welcome them to contact me if their needs as discussed above change. They were appreciative for the call.

## 2025-02-18 ENCOUNTER — OFFICE VISIT (OUTPATIENT)
Dept: HEMATOLOGY ONCOLOGY | Facility: CLINIC | Age: 65
End: 2025-02-18
Payer: COMMERCIAL

## 2025-02-18 VITALS
SYSTOLIC BLOOD PRESSURE: 102 MMHG | HEIGHT: 70 IN | TEMPERATURE: 98.4 F | OXYGEN SATURATION: 98 % | HEART RATE: 94 BPM | BODY MASS INDEX: 23.05 KG/M2 | RESPIRATION RATE: 16 BRPM | DIASTOLIC BLOOD PRESSURE: 72 MMHG | WEIGHT: 161 LBS

## 2025-02-18 DIAGNOSIS — Z51.81 THERAPEUTIC DRUG MONITORING: ICD-10-CM

## 2025-02-18 DIAGNOSIS — C50.911 BREAST CANCER METASTASIZED TO AXILLARY LYMPH NODE, RIGHT (HCC): Primary | ICD-10-CM

## 2025-02-18 DIAGNOSIS — C77.3 BREAST CANCER METASTASIZED TO AXILLARY LYMPH NODE, RIGHT (HCC): Primary | ICD-10-CM

## 2025-02-18 DIAGNOSIS — Z00.00 HEALTHCARE MAINTENANCE: ICD-10-CM

## 2025-02-18 PROCEDURE — 99215 OFFICE O/P EST HI 40 MIN: CPT | Performed by: INTERNAL MEDICINE

## 2025-02-18 RX ORDER — BIMATOPROST 3 UG/ML
1 SOLUTION TOPICAL
Qty: 5 ML | Refills: 1 | Status: SHIPPED | OUTPATIENT
Start: 2025-02-18

## 2025-02-19 ENCOUNTER — DOCUMENTATION (OUTPATIENT)
Dept: HEMATOLOGY ONCOLOGY | Facility: CLINIC | Age: 65
End: 2025-02-19

## 2025-02-19 ENCOUNTER — TELEPHONE (OUTPATIENT)
Age: 65
End: 2025-02-19

## 2025-02-19 ENCOUNTER — HOSPITAL ENCOUNTER (OUTPATIENT)
Dept: INFUSION CENTER | Facility: CLINIC | Age: 65
Discharge: HOME | End: 2025-02-19
Payer: COMMERCIAL

## 2025-02-19 DIAGNOSIS — Z17.1 MALIGNANT NEOPLASM OF LOWER-INNER QUADRANT OF RIGHT BREAST OF FEMALE, ESTROGEN RECEPTOR NEGATIVE (HCC): ICD-10-CM

## 2025-02-19 DIAGNOSIS — Z95.828 PORT-A-CATH IN PLACE: Primary | ICD-10-CM

## 2025-02-19 DIAGNOSIS — C50.311 MALIGNANT NEOPLASM OF LOWER-INNER QUADRANT OF RIGHT BREAST OF FEMALE, ESTROGEN RECEPTOR NEGATIVE (HCC): ICD-10-CM

## 2025-02-19 LAB
ALBUMIN SERPL BCG-MCNC: 4 G/DL (ref 3.5–5)
ALP SERPL-CCNC: 96 U/L (ref 34–104)
ALT SERPL W P-5'-P-CCNC: 25 U/L (ref 7–52)
ANION GAP SERPL CALCULATED.3IONS-SCNC: 5 MMOL/L (ref 4–13)
AST SERPL W P-5'-P-CCNC: 17 U/L (ref 13–39)
BASOPHILS # BLD AUTO: 0.02 THOUSANDS/ΜL (ref 0–0.1)
BASOPHILS NFR BLD AUTO: 0 % (ref 0–1)
BILIRUB SERPL-MCNC: 0.27 MG/DL (ref 0.2–1)
BUN SERPL-MCNC: 15 MG/DL (ref 5–25)
CALCIUM SERPL-MCNC: 8.9 MG/DL (ref 8.4–10.2)
CHLORIDE SERPL-SCNC: 107 MMOL/L (ref 96–108)
CO2 SERPL-SCNC: 27 MMOL/L (ref 21–32)
CREAT SERPL-MCNC: 0.44 MG/DL (ref 0.6–1.3)
EOSINOPHIL # BLD AUTO: 0.01 THOUSAND/ΜL (ref 0–0.61)
EOSINOPHIL NFR BLD AUTO: 0 % (ref 0–6)
ERYTHROCYTE [DISTWIDTH] IN BLOOD BY AUTOMATED COUNT: 12.8 % (ref 11.6–15.1)
GFR SERPL CREATININE-BSD FRML MDRD: 107 ML/MIN/1.73SQ M
GLUCOSE SERPL-MCNC: 98 MG/DL (ref 65–140)
HCT VFR BLD AUTO: 34.2 % (ref 34.8–46.1)
HGB BLD-MCNC: 11.2 G/DL (ref 11.5–15.4)
IMM GRANULOCYTES # BLD AUTO: 0.03 THOUSAND/UL (ref 0–0.2)
IMM GRANULOCYTES NFR BLD AUTO: 0 % (ref 0–2)
LYMPHOCYTES # BLD AUTO: 0.96 THOUSANDS/ΜL (ref 0.6–4.47)
LYMPHOCYTES NFR BLD AUTO: 13 % (ref 14–44)
MCH RBC QN AUTO: 32.7 PG (ref 26.8–34.3)
MCHC RBC AUTO-ENTMCNC: 32.7 G/DL (ref 31.4–37.4)
MCV RBC AUTO: 100 FL (ref 82–98)
MONOCYTES # BLD AUTO: 0.57 THOUSAND/ΜL (ref 0.17–1.22)
MONOCYTES NFR BLD AUTO: 8 % (ref 4–12)
NEUTROPHILS # BLD AUTO: 5.66 THOUSANDS/ΜL (ref 1.85–7.62)
NEUTS SEG NFR BLD AUTO: 79 % (ref 43–75)
NRBC BLD AUTO-RTO: 0 /100 WBCS
PLATELET # BLD AUTO: 313 THOUSANDS/UL (ref 149–390)
PMV BLD AUTO: 8.8 FL (ref 8.9–12.7)
POTASSIUM SERPL-SCNC: 3.8 MMOL/L (ref 3.5–5.3)
PROT SERPL-MCNC: 6.6 G/DL (ref 6.4–8.4)
RBC # BLD AUTO: 3.42 MILLION/UL (ref 3.81–5.12)
SODIUM SERPL-SCNC: 139 MMOL/L (ref 135–147)
WBC # BLD AUTO: 7.25 THOUSAND/UL (ref 4.31–10.16)

## 2025-02-19 PROCEDURE — 80053 COMPREHEN METABOLIC PANEL: CPT | Performed by: INTERNAL MEDICINE

## 2025-02-19 PROCEDURE — 85025 COMPLETE CBC W/AUTO DIFF WBC: CPT | Performed by: INTERNAL MEDICINE

## 2025-02-19 NOTE — PROGRESS NOTES
Pt to clinic for port flush and lab draw via port, offers no complaints today, tolerated procedure without complications, aware of next appointment on 2/21/25 at 0730am, port de-accessed, avs declined.

## 2025-02-19 NOTE — TELEPHONE ENCOUNTER
Call received by Amy.     Patient is requesting to lower dosage for Taxotere on chemotherapy. Per patient she did discuss this with  on 2/18/25. Patients last chemotherapy is scheduled for 2/21.      Please call patient to discuss.     Thanks!

## 2025-02-19 NOTE — PROGRESS NOTES
Referral to radiation oncology received from Dr. Gonzalez.  Chart reviewed by oncology nurse navigator at this time.      Diagnosis: breast cancer.  Consider for post-op RT to the breast.  Patient is completing NAC.  Patient is scheduled to see Dr Magaña on 2/26/25 for pre-op visit.  Surgery not yet scheduled.  Checked in with med onc and surg onc RN's.  Deferring referral at this time until patient is seen by Dr Magaña and surgery date is scheduled.

## 2025-02-20 ENCOUNTER — TELEPHONE (OUTPATIENT)
Dept: HEMATOLOGY ONCOLOGY | Facility: CLINIC | Age: 65
End: 2025-02-20

## 2025-02-20 RX ORDER — SODIUM CHLORIDE 9 MG/ML
20 INJECTION, SOLUTION INTRAVENOUS ONCE
Status: CANCELLED | OUTPATIENT
Start: 2025-02-21

## 2025-02-20 NOTE — TELEPHONE ENCOUNTER
Spoke with patient and informed her that Dr. Gonzalez dose reduced her Taxotere by another 10%, so a total of 20%. Patient verbalized understanding and is in agreement with the plan. Patient also stated that she has a little bit of a tickle in her throat today and wanted to know if she can take a vitamin C with Zinc. I told her that is OK to take, just not in large doses. I advised her to call if she develops any other symptoms or if a fever develops. Patient verbalized understanding and is in agreement with the plan.

## 2025-02-20 NOTE — PROGRESS NOTES
TIME OUT preformed with Torrie Ricci RN, Per Dr. Gonzalez Patient to have dose reduction of taxotere 67mg/m2 to 60mg/m2 due to fatigue. Pharmacy made aware.

## 2025-02-20 NOTE — TELEPHONE ENCOUNTER
Title: Taxotere dose reduced     Date patient scheduled: 2/21/25    Taxotere dose reduced from 67mg/m2 to 60mg/m2     Office RN notified patient?? Yes, patient made aware via telephone call     Spoke with infusion center at [facility]    Is the patient scheduled within 24 hours?? If yes, follow up with verbal telephone call.    Office RN to route to INF  pool. Infusion  pool routes to INF TECH POOL.    Infusion tech to receive message, confirm scheduled treatment duration matches ordered treatment duration or adjust accordingly, and re-link appointment request orders.    Infusion tech to notify pharmacy and finance.

## 2025-02-21 ENCOUNTER — HOSPITAL ENCOUNTER (OUTPATIENT)
Dept: INFUSION CENTER | Facility: CLINIC | Age: 65
End: 2025-02-21
Payer: COMMERCIAL

## 2025-02-21 VITALS
DIASTOLIC BLOOD PRESSURE: 72 MMHG | HEART RATE: 102 BPM | WEIGHT: 160.8 LBS | RESPIRATION RATE: 18 BRPM | HEIGHT: 70 IN | BODY MASS INDEX: 23.02 KG/M2 | SYSTOLIC BLOOD PRESSURE: 105 MMHG | TEMPERATURE: 96.8 F

## 2025-02-21 DIAGNOSIS — C50.311 MALIGNANT NEOPLASM OF LOWER-INNER QUADRANT OF RIGHT BREAST OF FEMALE, ESTROGEN RECEPTOR NEGATIVE (HCC): Primary | ICD-10-CM

## 2025-02-21 DIAGNOSIS — Z17.1 MALIGNANT NEOPLASM OF LOWER-INNER QUADRANT OF RIGHT BREAST OF FEMALE, ESTROGEN RECEPTOR NEGATIVE (HCC): Primary | ICD-10-CM

## 2025-02-21 PROCEDURE — 96375 TX/PRO/DX INJ NEW DRUG ADDON: CPT

## 2025-02-21 PROCEDURE — 96417 CHEMO IV INFUS EACH ADDL SEQ: CPT

## 2025-02-21 PROCEDURE — 96367 TX/PROPH/DG ADDL SEQ IV INF: CPT

## 2025-02-21 PROCEDURE — 96413 CHEMO IV INFUSION 1 HR: CPT

## 2025-02-21 PROCEDURE — 96377 APPLICATON ON-BODY INJECTOR: CPT

## 2025-02-21 RX ORDER — SODIUM CHLORIDE 9 MG/ML
20 INJECTION, SOLUTION INTRAVENOUS ONCE
Status: COMPLETED | OUTPATIENT
Start: 2025-02-21 | End: 2025-02-21

## 2025-02-21 RX ADMIN — PERTUZUMAB 420 MG: 30 INJECTION, SOLUTION, CONCENTRATE INTRAVENOUS at 09:43

## 2025-02-21 RX ADMIN — PROMETHAZINE HYDROCHLORIDE 12.5 MG: 25 INJECTION INTRAMUSCULAR; INTRAVENOUS at 08:35

## 2025-02-21 RX ADMIN — SODIUM CHLORIDE 20 ML/HR: 0.9 INJECTION, SOLUTION INTRAVENOUS at 08:11

## 2025-02-21 RX ADMIN — PEGFILGRASTIM 6 MG: KIT SUBCUTANEOUS at 15:11

## 2025-02-21 RX ADMIN — CARBOPLATIN 602 MG: 600 INJECTION, SOLUTION INTRAVENOUS at 12:35

## 2025-02-21 RX ADMIN — TRASTUZUMAB 447 MG: 150 INJECTION, POWDER, LYOPHILIZED, FOR SOLUTION INTRAVENOUS at 10:27

## 2025-02-21 RX ADMIN — DOCETAXEL 115.2 MG: 20 INJECTION, SOLUTION, CONCENTRATE INTRAVENOUS at 11:27

## 2025-02-21 RX ADMIN — DEXAMETHASONE SODIUM PHOSPHATE: 10 INJECTION, SOLUTION INTRAMUSCULAR; INTRAVENOUS at 08:11

## 2025-02-21 RX ADMIN — FOSAPREPITANT 150 MG: 150 INJECTION, POWDER, LYOPHILIZED, FOR SOLUTION INTRAVENOUS at 09:00

## 2025-02-21 NOTE — PROGRESS NOTES
Pt to clinic for Perjeta, Herceptin, Taxotere, and Carboplatin. Pt reports fatigue and a rash that the office is aware of. Tolerated infusion and use of the Paxman machine without complications. Aware of next appointment on 2/25/25 at 1230. AVS declined. Port de-accessed.

## 2025-02-21 NOTE — PROGRESS NOTES
Name: Amy Clement      : 1960      MRN: [id number]  Encounter Provider: Kathrin Gonzalez DO  Encounter Date: 2025   Encounter department: [facility] [place]  :  Assessment & Plan  Breast cancer metastasized to axillary lymph node, right (HCC)    Orders:    Ambulatory Referral to Obstetrics / Gynecology; Future    Ambulatory Referral to Radiation Oncology; Future    bimatoprost (LATISSE) 0.03 % ophthalmic solution; Administer 1 drop to both eyes daily at bedtime Place one drop on applicator and apply evenly along the skin of the upper eyelid at base of eyelashes once daily at bedtime; repeat procedure for second eye (use a clean applicator).    Healthcare maintenance    Orders:    Ambulatory Referral to Dermatology; Future    Therapeutic drug monitoring    Orders:    Echo complete w/ contrast if indicated; Future      64-year-old female with HER2 positive breast cancer being treated in the neoadjuvant setting with TCP H.  Due to the buildup of fatigue we will dose reduce the last cycle of Taxotere by a total of 20% and leave all the rest of the dosing of the carboplatin Perjeta and Herceptin the same.  We will get an echo before her surgery.  I am going to repeat a Doppler right before surgery to ensure that her thrombosis is resolving.  If everything looks good I will talk to Dr. Magaña about removing her port at the time of surgery.  She may need Kadcyla in the adjuvant setting based on her pathologic response but we can give that through peripheral IV.  If she gets a complete response we could also consider subcutaneous Perjeta and Herceptin.  I gave her a prescription for Latisse to help with her eyebrows growing back.  I referred her to radiation oncology to start talking about adjuvant radiation.  I have referred her to Dr. Rachael Freitas for assistance with menopausal symptom management.  We will be giving her adjuvant endocrine therapy and she does have  some pre-existing issues with vaginal dryness.  I also referred the patient to dermatology at her request for routine skin check.  I will plan on seeing her 2 weeks after surgery to review her pathologic response.  She will get me that date as soon as she has it.  She will be seeing Dr. Magaña on February 26.  She knows to call in the interim with any questions or concerns.  No follow-ups on file.    History of Present Illness   Chief Complaint   Patient presents with    Follow-up     64-year-old female with HER2 positive breast cancer being treated in the neoadjuvant setting.  She is due for cycle 6 of TC pH.  Since her last visit she was found to have a right sided IJ DVT associated with her port and was started on Eliquis.  She is not having any bleeding issues.  She is having a buildup of fatigue.  She denies any neuropathy.  She has had the loss of her eyebrows.  She denies any chest pain shortness of breath.  No bowel problems.  No pain.    Oncology History   Cancer Staging   Malignant neoplasm of lower-inner quadrant of right breast of female, estrogen receptor negative (HCC)  Staging form: Breast, AJCC 8th Edition  - Clinical stage from 10/4/2024: Stage IIA (cT1c, cN1(f), cM0, G2, ER-, NH+, HER2+) - Signed by Koki Magaña MD on 10/21/2024  Stage prefix: Initial diagnosis  Method of lymph node assessment: Core biopsy  Histologic grading system: 3 grade system  Oncology History Overview Note   10/2024 - right breast and LNBx positive for invasive mammary carcinoma with predominantly lobular features, apocrine and histiocytoid features in less amount, grade 2, ER 1% NH 10% Her2 3+, [id number]    LN biopsy receptors - ER 1% NH 70% Her2 3+    11/8/2024 - start TCHP    12/2024 - cycle 3 - taxotere dose reduced by 10%, carbo dose reduced to AUC 5 due to N/V    2/2025 - cycle 6 - taxotere dose reduced by 20% due to fatigue    Started on eliquis due to right IJ DVT associated with port     Malignant neoplasm of  lower-inner quadrant of right breast of female, estrogen receptor negative (HCC)   10/4/2024 Biopsy    A. Right breast ultrasound-guided biopsy  4 o'clock, 2 cm from nipple (ROSA ISELA)  Invasive mammary carcinoma with predominately lobular features   Apocrine and histiocytoid morphologic features  Grade 2  ER <1, DC 10, HER2 3+  Lymphovascular invasion present    B. Right axillary lymph node biopsy (ROSA ISELA)  Metastatic carcinoma, compatible with mammary origin  ER <1, DC 70, HER2 3+    Concordant. Right malignancy appears unifocal; biopsy-proven carcinoma measured 1.7 cm on US. Biopsy-proven metastatic disease to right axillary lymph node. Left breast clear.     10/4/2024 -  Cancer Staged    Staging form: Breast, AJCC 8th Edition  - Clinical stage from 10/4/2024: Stage IIA (cT1c, cN1(f), cM0, G2, ER-, DC+, HER2+) - Signed by Koki Magaña MD on 10/21/2024  Stage prefix: Initial diagnosis  Method of lymph node assessment: Core biopsy  Histologic grading system: 3 grade system       10/15/2024 Genetic Testing    Genetic testing with RNA analysis ordered  Amb     11/8/2024 -  Chemotherapy    alteplase (CATHFLO), 2 mg, Intracatheter, Every 1 Minute as needed, 5 of 18 cycles  pegfilgrastim (NEULASTA ONPRO), 6 mg, Subcutaneous, Once, 5 of 6 cycles  Administration: 6 mg (11/8/2024), 6 mg (11/29/2024), 6 mg (12/20/2024), 6 mg (1/10/2025), 6 mg (1/31/2025)  fosaprepitant (EMEND) IVPB, 150 mg, Intravenous, Once, 5 of 6 cycles  Administration: 150 mg (11/8/2024), 150 mg (11/29/2024), 150 mg (12/20/2024), 150 mg (1/10/2025), 150 mg (1/31/2025)  pertuzumab (PERJETA) IVPB, 840 mg, Intravenous, Once, 5 of 18 cycles  Administration: 840 mg (11/8/2024), 420 mg (11/29/2024), 420 mg (12/20/2024), 420 mg (1/10/2025), 420 mg (1/31/2025)  CARBOplatin (PARAPLATIN) IVPB ([facility] AUC DOSING), 722.4 mg, Intravenous, Once, 5 of 6 cycles  Administration: 722.4 mg (11/8/2024), 722.4 mg (11/29/2024), 602 mg (12/20/2024), 602 mg (1/10/2025), 602 mg  "(1/31/2025)  DOCEtaxel (TAXOTERE) chemo infusion, 75 mg/m2 = 144 mg, Intravenous, Once, 5 of 6 cycles  Dose modification: 67 mg/m2 (original dose 75 mg/m2, Cycle 3, Reason: Nausea/Vomiting, Comment: 10%dose reduction due nausea), 60.3 mg/m2 (original dose 75 mg/m2, Cycle 6, Reason: Dose modified as per discussion with consulting physician, Comment: dose reduction), 60 mg/m2 (original dose 75 mg/m2, Cycle 6, Reason: Dose modified as per discussion with consulting physician, Comment: total 20% dose reduction)  Administration: 144 mg (11/8/2024), 144 mg (11/29/2024), 128.6 mg (12/20/2024), 128.6 mg (1/10/2025), 128.6 mg (1/31/2025)  trastuzumab (HERCEPTIN) chemo infusion, 8 mg/kg = 595 mg, Intravenous, Once, 5 of 18 cycles  Administration: 595 mg (11/8/2024), 447 mg (11/29/2024), 447 mg (12/20/2024), 447 mg (1/10/2025), 447 mg (1/31/2025)     Breast cancer metastasized to axillary lymph node, right (HCC)   10/16/2024 Initial Diagnosis    Breast cancer metastasized to axillary lymph node, right (HCC)        Pertinent Medical History      02/20/25: Patient started on Eliquis for a right sided IJ DVT associated with her port     Review of Systems e neg        Objective   /72 (BP Location: Left arm, Patient Position: Sitting, Cuff Size: Adult)   Pulse 94   Temp 98.4 °F (36.9 °C) (Temporal)   Resp 16   Ht 5' 10\" (1.778 m)   Wt 73 kg (161 lb)   LMP  (LMP Unknown)   SpO2 98%   BMI 23.10 kg/m²     Pain Screening:  Pain Score: 0-No pain  ECOG   0  Physical Exam    GEN: Alert, awake oriented x3, in no acute distress  HEENT- No pallor, icterus, cyanosis, no oral mucosal lesions, right sided neck swelling  LAD - no palpable cervical, clavicle, axillary, inguinal LAD  Heart- normal S1 S2, regular rate and rhythm, No murmur, rubs.   Lungs- clear breathing sound bilateral.   Abdomen- soft, Non tender, bowel sounds present  Extremities- No cyanosis, clubbing, edema  Neuro- No focal neurological deficit  Breast - breast " mass no palpable    Labs: I have reviewed the following labs:  Lab Results   Component Value Date/Time    WBC 7.25 02/19/2025 03:13 PM    RBC 3.42 (L) 02/19/2025 03:13 PM    Hemoglobin 11.2 (L) 02/19/2025 03:13 PM    Hematocrit 34.2 (L) 02/19/2025 03:13 PM     (H) 02/19/2025 03:13 PM    MCH 32.7 02/19/2025 03:13 PM    RDW 12.8 02/19/2025 03:13 PM    Platelets 313 02/19/2025 03:13 PM    Segmented % 79 (H) 02/19/2025 03:13 PM    Lymphocytes % 13 (L) 02/19/2025 03:13 PM    Monocytes % 8 02/19/2025 03:13 PM    Eosinophils Relative 0 02/19/2025 03:13 PM    Basophils Relative 0 02/19/2025 03:13 PM    Immature Grans % 0 02/19/2025 03:13 PM    Absolute Neutrophils 5.66 02/19/2025 03:13 PM     Lab Results   Component Value Date/Time    Potassium 3.8 02/19/2025 03:13 PM    Chloride 107 02/19/2025 03:13 PM    CO2 27 02/19/2025 03:13 PM    BUN 15 02/19/2025 03:13 PM    Creatinine 0.44 (L) 02/19/2025 03:13 PM    Glucose, Fasting 96 11/06/2024 07:13 AM    Calcium 8.9 02/19/2025 03:13 PM    AST 17 02/19/2025 03:13 PM    ALT 25 02/19/2025 03:13 PM    Alkaline Phosphatase 96 02/19/2025 03:13 PM    Total Protein 6.6 02/19/2025 03:13 PM    Albumin 4.0 02/19/2025 03:13 PM    Total Bilirubin 0.27 02/19/2025 03:13 PM    eGFR 107 02/19/2025 03:13 PM           Administrative Statements   I have spent a total time of 45 minutes in caring for this patient on the day of the visit/encounter including Diagnostic results, Prognosis, Risks and benefits of tx options, Instructions for management, Impressions, Counseling / Coordination of care, Documenting in the medical record, Reviewing/placing orders in the medical record (including tests, medications, and/or procedures), Obtaining or reviewing history  , and Communicating with other healthcare professionals .

## 2025-02-21 NOTE — ASSESSMENT & PLAN NOTE
Orders:    Ambulatory Referral to Obstetrics / Gynecology; Future    Ambulatory Referral to Radiation Oncology; Future    bimatoprost (LATISSE) 0.03 % ophthalmic solution; Administer 1 drop to both eyes daily at bedtime Place one drop on applicator and apply evenly along the skin of the upper eyelid at base of eyelashes once daily at bedtime; repeat procedure for second eye (use a clean applicator).

## 2025-02-24 ENCOUNTER — HOSPITAL ENCOUNTER (OUTPATIENT)
Dept: ULTRASOUND IMAGING | Facility: CLINIC | Age: 65
Discharge: HOME | End: 2025-02-24
Payer: COMMERCIAL

## 2025-02-24 DIAGNOSIS — C50.311 MALIGNANT NEOPLASM OF LOWER-INNER QUADRANT OF RIGHT BREAST OF FEMALE, ESTROGEN RECEPTOR NEGATIVE (HCC): ICD-10-CM

## 2025-02-24 DIAGNOSIS — Z17.1 MALIGNANT NEOPLASM OF LOWER-INNER QUADRANT OF RIGHT BREAST OF FEMALE, ESTROGEN RECEPTOR NEGATIVE (HCC): ICD-10-CM

## 2025-02-24 PROCEDURE — 76642 ULTRASOUND BREAST LIMITED: CPT

## 2025-02-25 ENCOUNTER — HOSPITAL ENCOUNTER (OUTPATIENT)
Dept: INFUSION CENTER | Facility: CLINIC | Age: 65
Discharge: HOME | End: 2025-02-25
Payer: COMMERCIAL

## 2025-02-25 VITALS
HEART RATE: 96 BPM | DIASTOLIC BLOOD PRESSURE: 77 MMHG | SYSTOLIC BLOOD PRESSURE: 121 MMHG | TEMPERATURE: 98 F | RESPIRATION RATE: 18 BRPM

## 2025-02-25 DIAGNOSIS — C50.911 BREAST CANCER METASTASIZED TO AXILLARY LYMPH NODE, RIGHT (HCC): Primary | ICD-10-CM

## 2025-02-25 DIAGNOSIS — C77.3 BREAST CANCER METASTASIZED TO AXILLARY LYMPH NODE, RIGHT (HCC): Primary | ICD-10-CM

## 2025-02-25 PROCEDURE — 96361 HYDRATE IV INFUSION ADD-ON: CPT

## 2025-02-25 PROCEDURE — 96374 THER/PROPH/DIAG INJ IV PUSH: CPT

## 2025-02-25 RX ORDER — PALONOSETRON 0.05 MG/ML
0.25 INJECTION, SOLUTION INTRAVENOUS ONCE
Start: 2025-03-18 | End: 2025-03-18

## 2025-02-25 RX ORDER — PALONOSETRON 0.05 MG/ML
0.25 INJECTION, SOLUTION INTRAVENOUS ONCE
Status: COMPLETED | OUTPATIENT
Start: 2025-02-25 | End: 2025-02-25

## 2025-02-25 RX ADMIN — SODIUM CHLORIDE 1000 ML: 0.9 INJECTION, SOLUTION INTRAVENOUS at 12:42

## 2025-02-25 RX ADMIN — PALONOSETRON HYDROCHLORIDE 0.25 MG: 0.25 INJECTION INTRAVENOUS at 12:44

## 2025-02-25 NOTE — PROGRESS NOTES
Pt here for hydration infusion, offering no complaints.  Right port accessed with positive blood return noted.  Tolerated infusion without incident.  Port flushed and de-accessed. AVS declined. Next appt is 3/12 @ 230 pm. Walked out in stable condition.

## 2025-02-26 ENCOUNTER — OFFICE VISIT (OUTPATIENT)
Dept: SURGICAL ONCOLOGY | Facility: CLINIC | Age: 65
End: 2025-02-26
Payer: COMMERCIAL

## 2025-02-26 VITALS
BODY MASS INDEX: 23.19 KG/M2 | DIASTOLIC BLOOD PRESSURE: 78 MMHG | HEART RATE: 90 BPM | WEIGHT: 162 LBS | TEMPERATURE: 97.2 F | HEIGHT: 70 IN | SYSTOLIC BLOOD PRESSURE: 124 MMHG | OXYGEN SATURATION: 94 %

## 2025-02-26 DIAGNOSIS — C77.3 BREAST CANCER METASTASIZED TO AXILLARY LYMPH NODE, RIGHT (HCC): ICD-10-CM

## 2025-02-26 DIAGNOSIS — C50.911 BREAST CANCER METASTASIZED TO AXILLARY LYMPH NODE, RIGHT (HCC): ICD-10-CM

## 2025-02-26 DIAGNOSIS — Z17.1 MALIGNANT NEOPLASM OF LOWER-INNER QUADRANT OF RIGHT BREAST OF FEMALE, ESTROGEN RECEPTOR NEGATIVE (HCC): Primary | ICD-10-CM

## 2025-02-26 DIAGNOSIS — Z95.828 PORT-A-CATH IN PLACE: ICD-10-CM

## 2025-02-26 DIAGNOSIS — Z13.71 BRCA NEGATIVE: ICD-10-CM

## 2025-02-26 DIAGNOSIS — Z92.21 S/P CHEMOTHERAPY, TIME SINCE LESS THAN 4 WEEKS: ICD-10-CM

## 2025-02-26 DIAGNOSIS — C50.311 MALIGNANT NEOPLASM OF LOWER-INNER QUADRANT OF RIGHT BREAST OF FEMALE, ESTROGEN RECEPTOR NEGATIVE (HCC): Primary | ICD-10-CM

## 2025-02-26 PROCEDURE — 99215 OFFICE O/P EST HI 40 MIN: CPT | Performed by: SURGERY

## 2025-02-26 RX ORDER — ACETAMINOPHEN 10 MG/ML
1000 INJECTION, SOLUTION INTRAVENOUS
OUTPATIENT
Start: 2025-02-26 | End: 2025-02-27

## 2025-02-26 RX ORDER — TRAMADOL HYDROCHLORIDE 50 MG/1
50 TABLET ORAL EVERY 8 HOURS PRN
Qty: 9 TABLET | Refills: 0 | Status: CANCELLED | OUTPATIENT
Start: 2025-02-26

## 2025-02-26 RX ORDER — OXYCODONE AND ACETAMINOPHEN 5; 325 MG/1; MG/1
1 TABLET ORAL EVERY 6 HOURS PRN
Qty: 12 TABLET | Refills: 0 | Status: SHIPPED | OUTPATIENT
Start: 2025-02-26

## 2025-02-26 NOTE — PROGRESS NOTES
Name: Amy Clement      : 1960      MRN: [id number]  Encounter Provider: Koki Magaña MD  Encounter Date: 2025   Encounter department: [facility] [place]  :  Assessment & Plan  Malignant neoplasm of lower-inner quadrant of right breast of female, estrogen receptor negative (HCC)    Orders:    Case request operating room: RIGHT BREAST ROSA ISELA LOCALIZED LUMPECTOMY WITH BIOPSY LYMPH NODE SENTINEL, EXISION OF CLIPPED RIGHT AXILLARY NODE, POSSIBLE RIGHT AXILLARY DISSECTION, PORT REMOVAL; Standing    UA w Reflex to Microscopic w Reflex to Culture; Future    CBC and differential; Future    Comprehensive metabolic panel; Future    EKG 12 lead; Future    XR chest pa and lateral; Future    NM lymphatic breast; Future    oxyCODONE-acetaminophen (Percocet) 5-325 mg per tablet; Take 1 tablet by mouth every 6 (six) hours as needed for moderate pain Max Daily Amount: 4 tablets    64-year-old female status post neoadjuvant chemotherapy for a clinical T1c N1 HER2 positive invasive carcinoma of the right breast.  She completed neoadjuvant chemotherapy on 2025.  She presents today to discuss surgical management.  Her genetic testing was negative.  She has unifocal disease and is a good candidate for breast conservation.  She was clinically node-positive but has converted to clinically node-negative on exam and imaging.  I counseled her on a ROSA ISELA localized lumpectomy of the right breast along with lymphatic mapping, sentinel node biopsy and removal of the previously positive clipped node.  All of these nodes will be evaluated intraoperatively.  If positive, axillary dissection would be performed.  She recently developed a clot around the port and is on Eliquis.  We will need to hold the Eliquis preoperatively.  She would like to have the port removed.  She understands that she will need to follow-up with medical oncology postoperatively and will also need radiation therapy.  She  is requesting to see Dr. Forman at the [facility].  All of her questions were answered.  Consent was signed today in the office.  She will be scheduled for surgery in approximately 1 month.  Breast cancer metastasized to axillary lymph node, right (HCC)         Port-A-Cath in place         BRCA negative         S/P chemotherapy, time since less than 4 weeks                 History of Present Illness   Amy Clement is a 64 y.o. year old female who presents status post neoadjuvant chemotherapy to discuss surgical management.  She completed standard chemo on 2/21/2025.  Her main concern is a rash.  She also got a blood clot from the port and would like to have the port removed.  She is currently on Eliquis.  Her genetic testing was negative.    Oncology History   Cancer Staging   Malignant neoplasm of lower-inner quadrant of right breast of female, estrogen receptor negative (HCC)  Staging form: Breast, AJCC 8th Edition  - Clinical stage from 10/4/2024: Stage IIA (cT1c, cN1(f), cM0, G2, ER-, GA+, HER2+) - Signed by Koki Magaña MD on 10/21/2024  Stage prefix: Initial diagnosis  Method of lymph node assessment: Core biopsy  Histologic grading system: 3 grade system  Oncology History Overview Note   10/2024 - right breast and LNBx positive for invasive mammary carcinoma with predominantly lobular features, apocrine and histiocytoid features in less amount, grade 2, ER 1% GA 10% Her2 3+, [id number]    LN biopsy receptors - ER 1% GA 70% Her2 3+    11/8/2024 - start TCHP    12/2024 - cycle 3 - taxotere dose reduced by 10%, carbo dose reduced to AUC 5 due to N/V    2/2025 - cycle 6 - taxotere dose reduced by 20% due to fatigue    Started on eliquis due to right IJ DVT associated with port     Malignant neoplasm of lower-inner quadrant of right breast of female, estrogen receptor negative (HCC)   10/4/2024 Biopsy    A. Right breast ultrasound-guided biopsy  4 o'clock, 2 cm from nipple (ROSA ISELA)  Invasive mammary carcinoma  with predominately lobular features   Apocrine and histiocytoid morphologic features  Grade 2  ER <1, SD 10, HER2 3+  Lymphovascular invasion present    B. Right axillary lymph node biopsy (ROSA ISELA)  Metastatic carcinoma, compatible with mammary origin  ER <1, SD 70, HER2 3+    Concordant. Right malignancy appears unifocal; biopsy-proven carcinoma measured 1.7 cm on US. Biopsy-proven metastatic disease to right axillary lymph node. Left breast clear.     10/4/2024 -  Cancer Staged    Staging form: Breast, AJCC 8th Edition  - Clinical stage from 10/4/2024: Stage IIA (cT1c, cN1(f), cM0, G2, ER-, SD+, HER2+) - Signed by Koki Magaña MD on 10/21/2024  Stage prefix: Initial diagnosis  Method of lymph node assessment: Core biopsy  Histologic grading system: 3 grade system       10/15/2024 Genetic Testing    VUS of AXIN2, MSH6  [employer] BRCAplus & Cancer +RNAInsight - A total of 59 genes were evaluated with RNA insight: APC, JUAN J, BAP1, BARD1, BMPR1A, BRCA1, BRCA2, BRIP1, CDH1, CDK4, CDKN1B, CDKN2A, CHEK2, DICER1, FH, FLCN, KIF1B, MAX, MEN1, MET, MLH1, MSH2, MSH6, MUTYH, NF1, NTHL1, PALB2, PMS2, POT1, PTEN, RAD51C, RAD51D, RB1, RET, SDHA, SDHAF2, SDHB, SDHC, SDHD, SMAD4, SMARCA4, STK11, OIHZ852, TP53, TSC1, TSC2 and VHL (sequencing and deletion/duplication); AXIN2, CTNNA1, EGLN1, HOXB13, KIT, MITF, MSH3, PDGFRA, POLD1 and POLE (sequencing only); EPCAM and GREM1 (deletion/duplication only).      11/8/2024 -  Chemotherapy    alteplase (CATHFLO), 2 mg, Intracatheter, Every 1 Minute as needed, 6 of 18 cycles  pegfilgrastim (NEULASTA ONPRO), 6 mg, Subcutaneous, Once, 6 of 6 cycles  Administration: 6 mg (11/8/2024), 6 mg (11/29/2024), 6 mg (12/20/2024), 6 mg (1/10/2025), 6 mg (1/31/2025), 6 mg (2/21/2025)  fosaprepitant (EMEND) IVPB, 150 mg, Intravenous, Once, 6 of 6 cycles  Administration: 150 mg (11/8/2024), 150 mg (11/29/2024), 150 mg (12/20/2024), 150 mg (1/10/2025), 150 mg (1/31/2025), 150 mg (2/21/2025)  pertuzumab (PERJETA)  IVPB, 840 mg, Intravenous, Once, 6 of 18 cycles  Administration: 840 mg (11/8/2024), 420 mg (11/29/2024), 420 mg (12/20/2024), 420 mg (1/10/2025), 420 mg (1/31/2025), 420 mg (2/21/2025)  CARBOplatin (PARAPLATIN) IVPB ([facility] [place]), 722.4 mg, Intravenous, Once, 6 of 6 cycles  Administration: 722.4 mg (11/8/2024), 722.4 mg (11/29/2024), 602 mg (12/20/2024), 602 mg (1/10/2025), 602 mg (1/31/2025), 602 mg (2/21/2025)  DOCEtaxel (TAXOTERE) chemo infusion, 75 mg/m2 = 144 mg, Intravenous, Once, 6 of 6 cycles  Dose modification: 67 mg/m2 (original dose 75 mg/m2, Cycle 3, Reason: Nausea/Vomiting, Comment: 10%dose reduction due nausea), 60.3 mg/m2 (original dose 75 mg/m2, Cycle 6, Reason: Dose modified as per discussion with consulting physician, Comment: dose reduction), 60 mg/m2 (original dose 75 mg/m2, Cycle 6, Reason: Dose modified as per discussion with consulting physician, Comment: total 20% dose reduction)  Administration: 144 mg (11/8/2024), 144 mg (11/29/2024), 128.6 mg (12/20/2024), 128.6 mg (1/10/2025), 128.6 mg (1/31/2025), 115.2 mg (2/21/2025)  trastuzumab (HERCEPTIN) chemo infusion, 8 mg/kg = 595 mg, Intravenous, Once, 6 of 18 cycles  Administration: 595 mg (11/8/2024), 447 mg (11/29/2024), 447 mg (12/20/2024), 447 mg (1/10/2025), 447 mg (1/31/2025), 447 mg (2/21/2025)     2/24/2025 Observation    Right breast axilla US    At least almost complete imaging response to neoadjuvant chemotherapy. The biopsy-proven carcinoma has markedly decreased in size; it currently measures 6 mm x 2 mm x 5 mm and previously measured 15 mm x 13 mm x 17 mm.  The measurements on the current exam may be artifactual; there may be a complete response. Previously noted adenopathy has resolved; cortical thickness currently measures 3mm and previously measured 11mm     Breast cancer metastasized to axillary lymph node, right (HCC)   10/16/2024 Initial Diagnosis    Breast cancer metastasized to axillary lymph node, right (HCC)         Review of Systems   Constitutional: Negative.  Negative for appetite change, fever and unexpected weight change.   HENT: Negative.  Negative for trouble swallowing.    Eyes: Negative.    Respiratory: Negative.  Negative for cough and shortness of breath.    Cardiovascular: Negative.  Negative for chest pain.   Gastrointestinal: Negative.  Negative for abdominal pain, nausea and vomiting.   Endocrine: Negative.    Genitourinary: Negative.  Negative for dysuria.   Musculoskeletal: Negative.  Negative for arthralgias and myalgias.   Skin:  Positive for rash.   Allergic/Immunologic: Negative.    Neurological: Negative.  Negative for headaches.   Hematological: Negative.  Negative for adenopathy. Does not bruise/bleed easily.   Psychiatric/Behavioral: Negative.      A complete review of systems is negative other than that noted above in the HPI.          Medical History Reviewed by provider this encounter:  Tobacco  Allergies  Meds  Problems  Med Hx  Surg Hx  Fam Hx     .  Past Medical History   Past Medical History:   Diagnosis Date    Acute cystitis     Anxiety     medication for hot flasshes    Breast lump .    last assessed 01/16/2017    Dermatitis     last assessed 02/27/2017    Disease of thyroid gland     last assessed 02/23/2016    Diverticulitis 11/2020    Diverticulitis of colon November 25 July 2016 1st dx    Dysuria     last assessed 04/28/2017    ETD (Eustachian tube dysfunction), bilateral     last assessed 02/23/2016    Fibroid 2012    GERD (gastroesophageal reflux disease) Occasionally    Glaucoma     Left    Hematuria     History of chemotherapy     Migraine 1985    Miscarriage 1990    Polyp at cervical os     Varicella 1966     Past Surgical History:   Procedure Laterality Date    BREAST BIOPSY Right 2009    2 areas benign    CATARACT EXTRACTION Bilateral     COLONOSCOPY      FL GUIDED CENTRAL VENOUS ACCESS DEVICE INSERTION  11/5/2024    SC HYSTEROSCOPY BX ENDOMETRIUM&/POLYPC W/WO D&C N/A  02/23/2024    Procedure: DILATATION AND CURETTAGE (D&C) WITH HYSTEROSCOPY;  Surgeon: Joselin Alaniz MD;  Location: [facility];  Service: Gynecology    OH HYSTEROSCOPY BX ENDOMETRIUM&/POLYPC W/WO D&C N/A 02/23/2024    Procedure: POLYPECTOMY;  Surgeon: Joselin Alaniz MD;  Location: [facility];  Service: Gynecology    OH INSJ TUNNELED CTR VAD W/SUBQ PORT AGE 5 YR/> N/A 11/5/2024    Procedure: INSERTION VENOUS PORT ( PORT-A-CATH) IR;  Surgeon: Chandler Ball DO;  [facility];  Service: Interventional Radiology    TUBAL LIGATION      US GUIDED BREAST BIOPSY RIGHT COMPLETE Right 10/04/2024    US GUIDED BREAST LYMPH NODE BIOPSY RIGHT Right 10/04/2024     Family History   Problem Relation Age of Onset    Breast cancer Mother 47    Cancer Mother         Breast cancer    Hypertension Father         Late life dx    Rectal cancer Father     Migraines Father 85    Cancer Father         Colon cancer    No Known Problems Sister     Throat cancer Brother     Cancer Brother         bladder    Heart disease Maternal Grandmother     No Known Problems Paternal Grandmother     No Known Problems Daughter     Kidney cancer Son 38    No Known Problems Paternal Aunt       reports that she has never smoked. She has never used smokeless tobacco. She reports current alcohol use of about 3.0 standard drinks of alcohol per week. She reports that she does not use drugs.  Current Outpatient Medications   Medication Instructions    apixaban (Eliquis) 5 mg Take 2 tablets (10 mg total) by mouth 2 (two) times a day for 7 days, THEN 1 tablet (5 mg total) 2 (two) times a day for 23 days.    Bacillus Coagulans-Inulin (Probiotic) 1-250 BILLION-MG CAPS Take by mouth    bimatoprost (LATISSE) 0.03 % ophthalmic solution 1 drop, Both Eyes, Daily at bedtime, Place one drop on applicator and apply evenly along the skin of the upper eyelid at base of eyelashes once daily at bedtime; repeat procedure for second eye (use a clean  applicator).    calcium citrate (CALCITRATE) 950 (200 Ca) MG tablet 1 tablet, Daily    clindamycin (CLEOCIN T) 1 % lotion Topical, 2 times daily, Apply to affected areas.    diphenoxylate-atropine (LOMOTIL) 2.5-0.025 mg per tablet 1 tablet, Oral, 4 times daily PRN    Glucosamine-Chondroitin (GLUCOSAMINE CHONDR COMPLEX PO) Daily    latanoprost (XALATAN) 0.005 % ophthalmic solution 1 drop, Daily at bedtime    lidocaine-prilocaine (EMLA) cream Topical, As needed    Multiple Vitamins-Minerals (CENTRUM SILVER) tablet Take by mouth    omeprazole (PriLOSEC) 20 mg delayed release capsule TAKE 1 CAPSULE BY MOUTH EVERY DAY    ondansetron (ZOFRAN-ODT) 8 mg, Oral, Every 8 hours PRN    oxyCODONE-acetaminophen (Percocet) 5-325 mg per tablet 1 tablet, Oral, Every 6 hours PRN    PARoxetine (PAXIL) 10 mg, Oral, Daily    prochlorperazine (COMPAZINE) 10 mg, Oral, Every 6 hours PRN    timolol (TIMOPTIC) 0.5 % ophthalmic solution 1 drop, Daily    TURMERIC-GINGER PO Take by mouth     Allergies   Allergen Reactions    Monistat [Miconazole] Itching    Pollen Extract Itching, Swelling and Sneezing      Current Outpatient Medications on File Prior to Visit   Medication Sig Dispense Refill    apixaban (Eliquis) 5 mg Take 2 tablets (10 mg total) by mouth 2 (two) times a day for 7 days, THEN 1 tablet (5 mg total) 2 (two) times a day for 23 days. 74 tablet 0    Bacillus Coagulans-Inulin (Probiotic) 1-250 BILLION-MG CAPS Take by mouth      bimatoprost (LATISSE) 0.03 % ophthalmic solution Administer 1 drop to both eyes daily at bedtime Place one drop on applicator and apply evenly along the skin of the upper eyelid at base of eyelashes once daily at bedtime; repeat procedure for second eye (use a clean applicator). 5 mL 1    calcium citrate (CALCITRATE) 950 (200 Ca) MG tablet Take 1 tablet by mouth daily      clindamycin (CLEOCIN T) 1 % lotion Apply topically 2 (two) times a day Apply to affected areas. 60 mL 1    diphenoxylate-atropine (LOMOTIL)  2.5-0.025 mg per tablet Take 1 tablet by mouth 4 (four) times a day as needed for diarrhea 30 tablet 0    Glucosamine-Chondroitin (GLUCOSAMINE CHONDR COMPLEX PO) Take by mouth daily       latanoprost (XALATAN) 0.005 % ophthalmic solution Administer 1 drop to both eyes daily at bedtime      lidocaine-prilocaine (EMLA) cream Apply topically as needed for mild pain 30 g 2    Multiple Vitamins-Minerals (CENTRUM SILVER) tablet Take by mouth      omeprazole (PriLOSEC) 20 mg delayed release capsule TAKE 1 CAPSULE BY MOUTH EVERY DAY 30 capsule 1    ondansetron (ZOFRAN-ODT) 8 mg disintegrating tablet Take 1 tablet (8 mg total) by mouth every 8 (eight) hours as needed for nausea or vomiting 30 tablet 2    PARoxetine (PAXIL) 10 mg tablet Take 1 tablet (10 mg total) by mouth daily 90 tablet 3    prochlorperazine (COMPAZINE) 10 mg tablet Take 1 tablet (10 mg total) by mouth every 6 (six) hours as needed for nausea or vomiting 30 tablet 2    timolol (TIMOPTIC) 0.5 % ophthalmic solution Administer 1 drop to both eyes daily      TURMERIC-GINGER PO Take by mouth      [DISCONTINUED] acetaminophen (TYLENOL) 500 mg tablet Take 1 tablet (500 mg total) by mouth every 6 (six) hours as needed for mild pain (Patient not taking: Reported on 12/12/2024)      [DISCONTINUED] eletriptan (RELPAX) 40 MG tablet Take 1 tablet (40 mg total) by mouth once as needed for migraine for up to 64 doses may repeat in 2 hours if necessary (Patient not taking: Reported on 12/12/2024) 16 tablet 3    [DISCONTINUED] fluticasone (FLONASE) 50 mcg/act nasal spray 1 spray into each nostril daily (Patient not taking: Reported on 12/12/2024) 16 g 1    [DISCONTINUED] methylPREDNISolone 4 MG tablet therapy pack Use as directed on package (Patient not taking: Reported on 12/12/2024) 21 tablet 0     Current Facility-Administered Medications on File Prior to Visit   Medication Dose Route Frequency Provider Last Rate Last Admin    alteplase (CATHFLO) injection 2 mg  2 mg  "Intracatheter Q1MIN PRN Kathrin Agostino, DO        [COMPLETED] palonosetron (ALOXI) injection 0.25 mg  0.25 mg Intravenous Once Kathrin Agostino, DO   0.25 mg at 02/25/25 1244    [COMPLETED] sodium chloride 0.9 % bolus 1,000 mL  1,000 mL Intravenous Once Kathrin Agostino, DO   Stopped at 02/25/25 1342      Social History     Tobacco Use    Smoking status: Never    Smokeless tobacco: Never   Vaping Use    Vaping status: Never Used   Substance and Sexual Activity    Alcohol use: Yes     Alcohol/week: 3.0 standard drinks of alcohol     Types: 3 Glasses of wine per week     Comment: occasionally    Drug use: No    Sexual activity: Yes     Partners: Male     Birth control/protection: Post-menopausal          Objective   /78 (BP Location: Left arm, Patient Position: Sitting, Cuff Size: Standard)   Pulse 90   Temp (!) 97.2 °F (36.2 °C) (Temporal)   Ht 5' 10\" (1.778 m)   Wt 73.5 kg (162 lb)   LMP  (LMP Unknown)   SpO2 94%   BMI 23.24 kg/m²     Pain Screening:  Pain Score: 0-No pain  ECOG    Physical Exam  Constitutional:       General: She is not in acute distress.     Appearance: Normal appearance. She is well-developed.   HENT:      Head: Normocephalic and atraumatic.   Cardiovascular:      Heart sounds: Normal heart sounds.   Pulmonary:      Breath sounds: Normal breath sounds.   Chest:   Breasts:     Right: No swelling, bleeding, inverted nipple, mass, nipple discharge, skin change or tenderness.      Left: No swelling, bleeding, inverted nipple, mass, nipple discharge, skin change or tenderness.      Comments: Port right infraclavicular  Abdominal:      Palpations: Abdomen is soft.   Musculoskeletal:      Right lower leg: No edema.      Left lower leg: No edema.   Lymphadenopathy:      Upper Body:      Right upper body: No supraclavicular, axillary or pectoral adenopathy.      Left upper body: No supraclavicular, axillary or pectoral adenopathy.   Neurological:      Mental Status: She is alert and oriented " to person, place, and time.   Psychiatric:         Mood and Affect: Mood normal.          Labs: I have reviewed pertinent labs.     Tawkers showed a variant of uncertain significance but no mutations      Hospital Outpatient Visit on 02/19/2025   Component Date Value Ref Range Status    WBC 02/19/2025 7.25  4.31 - 10.16 Thousand/uL Final    RBC 02/19/2025 3.42 (L)  3.81 - 5.12 Million/uL Final    Hemoglobin 02/19/2025 11.2 (L)  11.5 - 15.4 g/dL Final    Hematocrit 02/19/2025 34.2 (L)  34.8 - 46.1 % Final    MCV 02/19/2025 100 (H)  82 - 98 fL Final    MCH 02/19/2025 32.7  26.8 - 34.3 pg Final    MCHC 02/19/2025 32.7  31.4 - 37.4 g/dL Final    RDW 02/19/2025 12.8  11.6 - 15.1 % Final    MPV 02/19/2025 8.8 (L)  8.9 - 12.7 fL Final    Platelets 02/19/2025 313  149 - 390 Thousands/uL Final    nRBC 02/19/2025 0  /100 WBCs Final    Segmented % 02/19/2025 79 (H)  43 - 75 % Final    Immature Grans % 02/19/2025 0  0 - 2 % Final    Lymphocytes % 02/19/2025 13 (L)  14 - 44 % Final    Monocytes % 02/19/2025 8  4 - 12 % Final    Eosinophils Relative 02/19/2025 0  0 - 6 % Final    Basophils Relative 02/19/2025 0  0 - 1 % Final    Absolute Neutrophils 02/19/2025 5.66  1.85 - 7.62 Thousands/µL Final    Absolute Immature Grans 02/19/2025 0.03  0.00 - 0.20 Thousand/uL Final    Absolute Lymphocytes 02/19/2025 0.96  0.60 - 4.47 Thousands/µL Final    Absolute Monocytes 02/19/2025 0.57  0.17 - 1.22 Thousand/µL Final    Eosinophils Absolute 02/19/2025 0.01  0.00 - 0.61 Thousand/µL Final    Basophils Absolute 02/19/2025 0.02  0.00 - 0.10 Thousands/µL Final    Sodium 02/19/2025 139  135 - 147 mmol/L Final    Potassium 02/19/2025 3.8  3.5 - 5.3 mmol/L Final    Chloride 02/19/2025 107  96 - 108 mmol/L Final    CO2 02/19/2025 27  21 - 32 mmol/L Final    ANION GAP 02/19/2025 5  4 - 13 mmol/L Final    BUN 02/19/2025 15  5 - 25 mg/dL Final    Creatinine 02/19/2025 0.44 (L)  0.60 - 1.30 mg/dL Final    Standardized to IDMS reference method     Glucose 02/19/2025 98  65 - 140 mg/dL Final    If the patient is fasting, the ADA then defines impaired fasting glucose as > 100 mg/dL and diabetes as > or equal to 123 mg/dL.    Calcium 02/19/2025 8.9  8.4 - 10.2 mg/dL Final    AST 02/19/2025 17  13 - 39 U/L Final    ALT 02/19/2025 25  7 - 52 U/L Final    Specimen collection should occur prior to Sulfasalazine administration due to the potential for falsely depressed results.     Alkaline Phosphatase 02/19/2025 96  34 - 104 U/L Final    Total Protein 02/19/2025 6.6  6.4 - 8.4 g/dL Final    Albumin 02/19/2025 4.0  3.5 - 5.0 g/dL Final    Total Bilirubin 02/19/2025 0.27  0.20 - 1.00 mg/dL Final    Use of this assay is not recommended for patients undergoing treatment with eltrombopag due to the potential for falsely elevated results.  N-acetyl-p-benzoquinone imine (metabolite of Acetaminophen) will generate erroneously low results in samples for patients that have taken an overdose of Acetaminophen.    eGFR 02/19/2025 107  ml/min/1.73sq m Final   Hospital Outpatient Visit on 01/29/2025   Component Date Value Ref Range Status    WBC 01/29/2025 5.74  4.31 - 10.16 Thousand/uL Final    RBC 01/29/2025 3.71 (L)  3.81 - 5.12 Million/uL Final    Hemoglobin 01/29/2025 11.8  11.5 - 15.4 g/dL Final    Hematocrit 01/29/2025 37.0  34.8 - 46.1 % Final    MCV 01/29/2025 100 (H)  82 - 98 fL Final    MCH 01/29/2025 31.8  26.8 - 34.3 pg Final    MCHC 01/29/2025 31.9  31.4 - 37.4 g/dL Final    RDW 01/29/2025 14.3  11.6 - 15.1 % Final    MPV 01/29/2025 9.5  8.9 - 12.7 fL Final    Platelets 01/29/2025 240  149 - 390 Thousands/uL Final    nRBC 01/29/2025 0  /100 WBCs Final    Segmented % 01/29/2025 70  43 - 75 % Final    Immature Grans % 01/29/2025 1  0 - 2 % Final    Lymphocytes % 01/29/2025 18  14 - 44 % Final    Monocytes % 01/29/2025 9  4 - 12 % Final    Eosinophils Relative 01/29/2025 2  0 - 6 % Final    Basophils Relative 01/29/2025 0  0 - 1 % Final    Absolute Neutrophils  01/29/2025 4.04  1.85 - 7.62 Thousands/µL Final    Absolute Immature Grans 01/29/2025 0.03  0.00 - 0.20 Thousand/uL Final    Absolute Lymphocytes 01/29/2025 1.02  0.60 - 4.47 Thousands/µL Final    Absolute Monocytes 01/29/2025 0.49  0.17 - 1.22 Thousand/µL Final    Eosinophils Absolute 01/29/2025 0.14  0.00 - 0.61 Thousand/µL Final    Basophils Absolute 01/29/2025 0.02  0.00 - 0.10 Thousands/µL Final    Sodium 01/29/2025 138  135 - 147 mmol/L Final    Potassium 01/29/2025 4.0  3.5 - 5.3 mmol/L Final    Chloride 01/29/2025 106  96 - 108 mmol/L Final    CO2 01/29/2025 28  21 - 32 mmol/L Final    ANION GAP 01/29/2025 4  4 - 13 mmol/L Final    BUN 01/29/2025 14  5 - 25 mg/dL Final    Creatinine 01/29/2025 0.43 (L)  0.60 - 1.30 mg/dL Final    Standardized to IDMS reference method    Glucose 01/29/2025 96  65 - 140 mg/dL Final    If the patient is fasting, the ADA then defines impaired fasting glucose as > 100 mg/dL and diabetes as > or equal to 123 mg/dL.    Calcium 01/29/2025 8.8  8.4 - 10.2 mg/dL Final    AST 01/29/2025 28  13 - 39 U/L Final    ALT 01/29/2025 41  7 - 52 U/L Final    Specimen collection should occur prior to Sulfasalazine administration due to the potential for falsely depressed results.     Alkaline Phosphatase 01/29/2025 74  34 - 104 U/L Final    Total Protein 01/29/2025 6.5  6.4 - 8.4 g/dL Final    Albumin 01/29/2025 4.2  3.5 - 5.0 g/dL Final    Total Bilirubin 01/29/2025 0.31  0.20 - 1.00 mg/dL Final    Use of this assay is not recommended for patients undergoing treatment with eltrombopag due to the potential for falsely elevated results.  N-acetyl-p-benzoquinone imine (metabolite of Acetaminophen) will generate erroneously low results in samples for patients that have taken an overdose of Acetaminophen.    eGFR 01/29/2025 107  ml/min/1.73sq m Final     Pathology: I have reviewed pathology reports described above.    Radiology Results Review: I personally reviewed the following image studies in  PACS and associated radiology reports: Postbiopsy mammogram right breast 10/4/2024. My interpretation of the radiology images/reports is: Unifocal carcinoma with active Sirisha in the breast and axilla.  Concordance: yes    2/24/2025 ultrasound of the right breast and axilla shows a markedly decreased size of the tumor measuring 6 mm down from 17 mm, lymph node in the axilla with cortical thickness of 3 mm down from 11 mm

## 2025-02-26 NOTE — ASSESSMENT & PLAN NOTE
Orders:    Case request operating room: RIGHT BREAST ROSA ISELA LOCALIZED LUMPECTOMY WITH BIOPSY LYMPH NODE SENTINEL, EXISION OF CLIPPED RIGHT AXILLARY NODE, POSSIBLE RIGHT AXILLARY DISSECTION, PORT REMOVAL; Standing    UA w Reflex to Microscopic w Reflex to Culture; Future    CBC and differential; Future    Comprehensive metabolic panel; Future    EKG 12 lead; Future    XR chest pa and lateral; Future    NM lymphatic breast; Future    oxyCODONE-acetaminophen (Percocet) 5-325 mg per tablet; Take 1 tablet by mouth every 6 (six) hours as needed for moderate pain Max Daily Amount: 4 tablets    64-year-old female status post neoadjuvant chemotherapy for a clinical T1c N1 HER2 positive invasive carcinoma of the right breast.  She completed neoadjuvant chemotherapy on 2/21/2025.  She presents today to discuss surgical management.  Her genetic testing was negative.  She has unifocal disease and is a good candidate for breast conservation.  She was clinically node-positive but has converted to clinically node-negative on exam and imaging.  I counseled her on a ROSA ISELA localized lumpectomy of the right breast along with lymphatic mapping, sentinel node biopsy and removal of the previously positive clipped node.  All of these nodes will be evaluated intraoperatively.  If positive, axillary dissection would be performed.  She recently developed a clot around the port and is on Eliquis.  We will need to hold the Eliquis preoperatively.  She would like to have the port removed.  She understands that she will need to follow-up with medical oncology postoperatively and will also need radiation therapy.  She is requesting to see Dr. Forman at the [facility].  All of her questions were answered.  Consent was signed today in the office.  She will be scheduled for surgery in approximately 1 month.

## 2025-02-27 ENCOUNTER — DOCUMENTATION (OUTPATIENT)
Dept: HEMATOLOGY ONCOLOGY | Facility: CLINIC | Age: 65
End: 2025-02-27

## 2025-02-27 NOTE — PROGRESS NOTES
Referral to radiation oncology received.  Chart reviewed by oncology nurse navigator at this time.      Diagnosis: breast cancer.  Patient is completing NAC.  Surgery scheduled for 3/28/25.  Patient will see Dr Magaña post op on 4/14/25.  After review of chart, instructions for scheduling added to referral and sent to be scheduled as advised.

## 2025-03-04 ENCOUNTER — HOSPITAL ENCOUNTER (OUTPATIENT)
Dept: NON INVASIVE DIAGNOSTICS | Facility: CLINIC | Age: 65
Discharge: HOME | End: 2025-03-04
Payer: COMMERCIAL

## 2025-03-04 VITALS
HEIGHT: 70 IN | WEIGHT: 162 LBS | HEART RATE: 98 BPM | BODY MASS INDEX: 23.19 KG/M2 | DIASTOLIC BLOOD PRESSURE: 78 MMHG | SYSTOLIC BLOOD PRESSURE: 124 MMHG

## 2025-03-04 DIAGNOSIS — Z51.81 THERAPEUTIC DRUG MONITORING: ICD-10-CM

## 2025-03-04 LAB
AORTIC ROOT: 3.2 CM
ASCENDING AORTA: 3.3 CM
BSA FOR ECHO PROCEDURE: 1.91 M2
E WAVE DECELERATION TIME: 138 MS
E/A RATIO: 0.69
FRACTIONAL SHORTENING: 29 (ref 28–44)
INTERVENTRICULAR SEPTUM IN DIASTOLE (PARASTERNAL SHORT AXIS VIEW): 1 CM
INTERVENTRICULAR SEPTUM: 1 CM (ref 0.6–1.1)
LAAS-AP2: 15 CM2
LAAS-AP4: 15.7 CM2
LEFT ATRIUM SIZE: 3.6 CM
LEFT ATRIUM VOLUME (MOD BIPLANE): 44 ML
LEFT ATRIUM VOLUME INDEX (MOD BIPLANE): 23 ML/M2
LEFT INTERNAL DIMENSION IN SYSTOLE: 3.5 CM (ref 2.1–4)
LEFT VENTRICULAR INTERNAL DIMENSION IN DIASTOLE: 4.9 CM (ref 3.5–6)
LEFT VENTRICULAR POSTERIOR WALL IN END DIASTOLE: 1 CM
LEFT VENTRICULAR STROKE VOLUME: 63 ML
LV EF US.2D.A4C+ESTIMATED: 59 %
LVSV (TEICH): 63 ML
MV E'TISSUE VEL-SEP: 9 CM/S
MV PEAK A VEL: 0.84 M/S
MV PEAK E VEL: 58 CM/S
MV STENOSIS PRESSURE HALF TIME: 40 MS
MV VALVE AREA P 1/2 METHOD: 5.5
RA PRESSURE ESTIMATED: 8 MMHG
RIGHT ATRIUM AREA SYSTOLE A4C: 11.4 CM2
RIGHT VENTRICLE ID DIMENSION: 3.2 CM
RV PSP: 26 MMHG
SL CV LEFT ATRIUM LENGTH A2C: 4.4 CM
SL CV LV EF: 58
SL CV PED ECHO LEFT VENTRICLE DIASTOLIC VOLUME (MOD BIPLANE) 2D: 113 ML
SL CV PED ECHO LEFT VENTRICLE SYSTOLIC VOLUME (MOD BIPLANE) 2D: 49 ML
TR MAX PG: 18 MMHG
TR PEAK VELOCITY: 2.1 M/S
TRICUSPID ANNULAR PLANE SYSTOLIC EXCURSION: 2 CM
TRICUSPID VALVE PEAK REGURGITATION VELOCITY: 2.11 M/S

## 2025-03-04 PROCEDURE — 93356 MYOCRD STRAIN IMG SPCKL TRCK: CPT

## 2025-03-04 PROCEDURE — 93306 TTE W/DOPPLER COMPLETE: CPT | Performed by: INTERNAL MEDICINE

## 2025-03-04 PROCEDURE — 93306 TTE W/DOPPLER COMPLETE: CPT

## 2025-03-04 PROCEDURE — 93356 MYOCRD STRAIN IMG SPCKL TRCK: CPT | Performed by: INTERNAL MEDICINE

## 2025-03-06 ENCOUNTER — NURSE TRIAGE (OUTPATIENT)
Dept: HEMATOLOGY ONCOLOGY | Facility: CLINIC | Age: 65
End: 2025-03-06

## 2025-03-06 DIAGNOSIS — I82.629 ACUTE DEEP VEIN THROMBOSIS (DVT) OF UPPER EXTREMITY, UNSPECIFIED LATERALITY, UNSPECIFIED VEIN (HCC): ICD-10-CM

## 2025-03-06 NOTE — TELEPHONE ENCOUNTER
Reason for call:   [x] Refill   [] Prior Auth  [] Other:     Office:   [] PCP/Provider -   [x] Specialty/Provider - : Kathrin Gonzalez DO     Medication: apixaban (Eliquis) 5 mg     Dose/Frequency: Multiple Dosages:     Quantity: 74 tabs    Pharmacy: [facility] [place], [place] - [address]   Does the patient have enough for 3 days?   [x] Yes   [] No - Send as HP to POD    Mail Away Pharmacy   Does the patient have enough for 10 days?   [] Yes   [] No - Send as HP to POD

## 2025-03-11 ENCOUNTER — OFFICE VISIT (OUTPATIENT)
Dept: LAB | Facility: HOSPITAL | Age: 65
End: 2025-03-11
Payer: COMMERCIAL

## 2025-03-11 ENCOUNTER — HOSPITAL ENCOUNTER (OUTPATIENT)
Dept: RADIOLOGY | Facility: HOSPITAL | Age: 65
Discharge: HOME | End: 2025-03-11
Payer: COMMERCIAL

## 2025-03-11 DIAGNOSIS — C50.311 MALIGNANT NEOPLASM OF LOWER-INNER QUADRANT OF RIGHT BREAST OF FEMALE, ESTROGEN RECEPTOR NEGATIVE (HCC): ICD-10-CM

## 2025-03-11 DIAGNOSIS — I82.629 ACUTE DEEP VEIN THROMBOSIS (DVT) OF UPPER EXTREMITY, UNSPECIFIED LATERALITY, UNSPECIFIED VEIN (HCC): Primary | ICD-10-CM

## 2025-03-11 DIAGNOSIS — Z17.1 MALIGNANT NEOPLASM OF LOWER-INNER QUADRANT OF RIGHT BREAST OF FEMALE, ESTROGEN RECEPTOR NEGATIVE (HCC): ICD-10-CM

## 2025-03-11 LAB
ATRIAL RATE: 90 BPM
P AXIS: 52 DEGREES
PR INTERVAL: 152 MS
QRS AXIS: 60 DEGREES
QRSD INTERVAL: 78 MS
QT INTERVAL: 364 MS
QTC INTERVAL: 446 MS
T WAVE AXIS: 50 DEGREES
VENTRICULAR RATE: 90 BPM

## 2025-03-11 PROCEDURE — 93005 ELECTROCARDIOGRAM TRACING: CPT

## 2025-03-11 PROCEDURE — 93010 ELECTROCARDIOGRAM REPORT: CPT | Performed by: INTERNAL MEDICINE

## 2025-03-11 PROCEDURE — 71046 X-RAY EXAM CHEST 2 VIEWS: CPT

## 2025-03-11 NOTE — TELEPHONE ENCOUNTER
apixaban (Eliquis) 5 mg   Pharmacy: [facility]- [place], [place] - [address]  [phone]    Please send to correct pharmacy, patient expressed she is running low on medication and really needs this sent to the correct pharmacy, please call her when it is. #[phone]

## 2025-03-11 NOTE — TELEPHONE ENCOUNTER
"Left message for patient informing her that refill has been sent to [facility].   Reason for Disposition   Prescription prescribed recently is not at pharmacy and triager has access to patient's EMR and prescription is recorded in the EMR    Answer Assessment - Initial Assessment Questions  1. NAME of MEDICINE: \"What medicine(s) are you calling about?\"      Eliquis    2. QUESTION: \"What is your question?\" (e.g., double dose of medicine, side effect)      Patient requests this be sent to [facility] instead    3. PRESCRIBER: \"Who prescribed the medicine?\" Reason: if prescribed by specialist, call should be referred to that group.      Danisha GRAVES    Protocols used: Medication Question Call-Adult-[place]    "

## 2025-03-11 NOTE — TELEPHONE ENCOUNTER
Patient called refill line upset that meds was not filled, she only has a few left. Requesting a 14 day supply as her port will be removed in 2 weeks.  Confirmed [facility]

## 2025-03-12 ENCOUNTER — HOSPITAL ENCOUNTER (OUTPATIENT)
Dept: INFUSION CENTER | Facility: CLINIC | Age: 65
Discharge: HOME | End: 2025-03-12
Payer: COMMERCIAL

## 2025-03-12 DIAGNOSIS — C50.911 BREAST CANCER METASTASIZED TO AXILLARY LYMPH NODE, RIGHT (HCC): ICD-10-CM

## 2025-03-12 DIAGNOSIS — C77.3 BREAST CANCER METASTASIZED TO AXILLARY LYMPH NODE, RIGHT (HCC): Primary | ICD-10-CM

## 2025-03-12 DIAGNOSIS — C50.311 MALIGNANT NEOPLASM OF LOWER-INNER QUADRANT OF RIGHT BREAST OF FEMALE, ESTROGEN RECEPTOR NEGATIVE (HCC): ICD-10-CM

## 2025-03-12 DIAGNOSIS — C77.3 BREAST CANCER METASTASIZED TO AXILLARY LYMPH NODE, RIGHT (HCC): ICD-10-CM

## 2025-03-12 DIAGNOSIS — C50.911 BREAST CANCER METASTASIZED TO AXILLARY LYMPH NODE, RIGHT (HCC): Primary | ICD-10-CM

## 2025-03-12 DIAGNOSIS — Z17.1 MALIGNANT NEOPLASM OF LOWER-INNER QUADRANT OF RIGHT BREAST OF FEMALE, ESTROGEN RECEPTOR NEGATIVE (HCC): ICD-10-CM

## 2025-03-12 DIAGNOSIS — Z95.828 PORT-A-CATH IN PLACE: Primary | ICD-10-CM

## 2025-03-12 LAB
ALBUMIN SERPL BCG-MCNC: 4.3 G/DL (ref 3.5–5)
ALP SERPL-CCNC: 85 U/L (ref 34–104)
ALT SERPL W P-5'-P-CCNC: 40 U/L (ref 7–52)
ANION GAP SERPL CALCULATED.3IONS-SCNC: 5 MMOL/L (ref 4–13)
AST SERPL W P-5'-P-CCNC: 25 U/L (ref 13–39)
BASOPHILS # BLD AUTO: 0.03 THOUSANDS/ÂΜL (ref 0–0.1)
BASOPHILS NFR BLD AUTO: 1 % (ref 0–1)
BILIRUB SERPL-MCNC: 0.33 MG/DL (ref 0.2–1)
BUN SERPL-MCNC: 14 MG/DL (ref 5–25)
CALCIUM SERPL-MCNC: 9 MG/DL (ref 8.4–10.2)
CHLORIDE SERPL-SCNC: 104 MMOL/L (ref 96–108)
CO2 SERPL-SCNC: 27 MMOL/L (ref 21–32)
CREAT SERPL-MCNC: 0.47 MG/DL (ref 0.6–1.3)
EOSINOPHIL # BLD AUTO: 0.01 THOUSAND/ÂΜL (ref 0–0.61)
EOSINOPHIL NFR BLD AUTO: 0 % (ref 0–6)
ERYTHROCYTE [DISTWIDTH] IN BLOOD BY AUTOMATED COUNT: 13.3 % (ref 11.6–15.1)
GFR SERPL CREATININE-BSD FRML MDRD: 104 ML/MIN/1.73SQ M
GLUCOSE SERPL-MCNC: 85 MG/DL (ref 65–140)
HCT VFR BLD AUTO: 37.4 % (ref 34.8–46.1)
HGB BLD-MCNC: 12.3 G/DL (ref 11.5–15.4)
IMM GRANULOCYTES # BLD AUTO: 0.02 THOUSAND/UL (ref 0–0.2)
IMM GRANULOCYTES NFR BLD AUTO: 0 % (ref 0–2)
LYMPHOCYTES # BLD AUTO: 0.95 THOUSANDS/ÂΜL (ref 0.6–4.47)
LYMPHOCYTES NFR BLD AUTO: 15 % (ref 14–44)
MCH RBC QN AUTO: 32.7 PG (ref 26.8–34.3)
MCHC RBC AUTO-ENTMCNC: 32.9 G/DL (ref 31.4–37.4)
MCV RBC AUTO: 100 FL (ref 82–98)
MONOCYTES # BLD AUTO: 0.48 THOUSAND/ÂΜL (ref 0.17–1.22)
MONOCYTES NFR BLD AUTO: 7 % (ref 4–12)
NEUTROPHILS # BLD AUTO: 5.08 THOUSANDS/ÂΜL (ref 1.85–7.62)
NEUTS SEG NFR BLD AUTO: 77 % (ref 43–75)
NRBC BLD AUTO-RTO: 0 /100 WBCS
PLATELET # BLD AUTO: 200 THOUSANDS/UL (ref 149–390)
PMV BLD AUTO: 9.2 FL (ref 8.9–12.7)
POTASSIUM SERPL-SCNC: 3.6 MMOL/L (ref 3.5–5.3)
PROT SERPL-MCNC: 6.3 G/DL (ref 6.4–8.4)
RBC # BLD AUTO: 3.76 MILLION/UL (ref 3.81–5.12)
SODIUM SERPL-SCNC: 136 MMOL/L (ref 135–147)
WBC # BLD AUTO: 6.57 THOUSAND/UL (ref 4.31–10.16)

## 2025-03-12 PROCEDURE — 81003 URINALYSIS AUTO W/O SCOPE: CPT

## 2025-03-12 PROCEDURE — 80053 COMPREHEN METABOLIC PANEL: CPT | Performed by: INTERNAL MEDICINE

## 2025-03-12 PROCEDURE — 85025 COMPLETE CBC W/AUTO DIFF WBC: CPT | Performed by: INTERNAL MEDICINE

## 2025-03-12 NOTE — PROGRESS NOTES
Pt presents for port a cath flush. Pt offers no complaints. Port accessed, labs drawn, flushed, saline locked and de-accessed without difficulty. AVS declined, Next appointment reviewed on 3/14 at 9am.

## 2025-03-13 ENCOUNTER — TELEPHONE (OUTPATIENT)
Dept: HEMATOLOGY ONCOLOGY | Facility: CLINIC | Age: 65
End: 2025-03-13

## 2025-03-13 ENCOUNTER — APPOINTMENT (OUTPATIENT)
Dept: LAB | Facility: HOSPITAL | Age: 65
End: 2025-03-13
Attending: SURGERY
Payer: COMMERCIAL

## 2025-03-13 LAB
BILIRUB UR QL STRIP: NEGATIVE
CLARITY UR: CLEAR
COLOR UR: NORMAL
GLUCOSE UR STRIP-MCNC: NEGATIVE MG/DL
HGB UR QL STRIP.AUTO: NEGATIVE
KETONES UR STRIP-MCNC: NEGATIVE MG/DL
LEUKOCYTE ESTERASE UR QL STRIP: NEGATIVE
NITRITE UR QL STRIP: NEGATIVE
PH UR STRIP.AUTO: 5.5 [PH]
PROT UR STRIP-MCNC: NEGATIVE MG/DL
SP GR UR STRIP.AUTO: 1.02 (ref 1–1.03)
UROBILINOGEN UR STRIP-ACNC: <2 MG/DL

## 2025-03-13 RX ORDER — SODIUM CHLORIDE 9 MG/ML
20 INJECTION, SOLUTION INTRAVENOUS ONCE
Status: CANCELLED | OUTPATIENT
Start: 2025-03-20

## 2025-03-13 NOTE — TELEPHONE ENCOUNTER
Patient would like to use flora for her treatments moving forward. Please reschedule appt for tomorrow as I called [facility] and there is no availability for the patient to get flora tomorrow. Patient can be rescheduled to next week and will use flora.

## 2025-03-14 ENCOUNTER — HOSPITAL ENCOUNTER (OUTPATIENT)
Dept: INFUSION CENTER | Facility: CLINIC | Age: 65
Discharge: HOME | End: 2025-03-14

## 2025-03-17 ENCOUNTER — ANESTHESIA EVENT (OUTPATIENT)
Dept: PERIOP | Facility: HOSPITAL | Age: 65
End: 2025-03-17
Payer: COMMERCIAL

## 2025-03-18 ENCOUNTER — PATIENT OUTREACH (OUTPATIENT)
Dept: HEMATOLOGY ONCOLOGY | Facility: CLINIC | Age: 65
End: 2025-03-18

## 2025-03-18 NOTE — PROGRESS NOTES
I reached out and spoke with Amy to follow up and to review for any new changes in barriers to care and offer supportive services as needed.     Barriers noted previously and outcome of interventions;  Previously patient was upset about her medication being sent to the wrong pharmacy.  Since then that problem has been resolved.  Currently patient has no questions or concerns. She mentioned she just completed chemotherapy and has surgery coming up. Amy mentions that things are well and moving along.    Reviewed for any new barriers as noted below.    Are you having any side effects from your treatment?No    Are you eating and drinking normally? yes    Have you been experiencing any uncontrolled pain related to your cancer diagnosis? No    If not already established, have needs changed for a palliative care referral? no    Do you have a good support system? Yes     Are you interested in any support groups? Not at this time.    How are you doing with transportation to your appointments? Patient is still driving herself to and from appointments. Patient also mentioned she has her family who have been helping her with getting to and from appointments as well. Currently patient has no concerns or issues with transportation at this time.     Do you have any questions or concerns regarding your treatment plan? No    Any new financial concerns for your household or medical bills? No    Do you know when your upcoming appointments are? yes  Future Appointments   Date Time Provider Department Center   3/20/2025 10:30 AM [facility] INF CHAIR 7 MO Infusion [facility]   3/21/2025 10:15 AM BE PAT ROOM 2 RN BE [facility]   3/25/2025 12:00 PM Kathrin Gonzalez DO HEM ONC [facility]   3/28/2025 11:00 AM AL NM 2 AL NM [facility]   4/10/2025  7:30 AM MO INF CHAIR 7 MO Infusion [facility]   4/14/2025 11:30 AM Koki Magaña MD SURG ONC [facility]Onc   4/28/2025 10:00 AM Nila Forman MD MO Rad Onc [facility]   5/1/2025  7:30 AM MO INF  CHAIR 7 MO Infusion [facility]   5/22/2025  7:30 AM MO INF CHAIR 7 MO Infusion [facility]   6/3/2025  1:00 PM Will Dasilva MD IM [facility]   6/23/2025 12:00 PM MO US 1 MO US MO HOSP   6/24/2025  2:00 PM Sanam Mensah MD OBGYN [facility]   6/30/2025 11:40 AM Lucrecia Howard MD DIAB [facility]          Based on individual needs I will follow up with them in 4/5 weeks. I have provided my direct contact information and welcome them to contact me if their needs as discussed above change. They were appreciative for the call.

## 2025-03-20 ENCOUNTER — PATIENT OUTREACH (OUTPATIENT)
Dept: CASE MANAGEMENT | Facility: HOSPITAL | Age: 65
End: 2025-03-20

## 2025-03-20 ENCOUNTER — TELEPHONE (OUTPATIENT)
Age: 65
End: 2025-03-20

## 2025-03-20 ENCOUNTER — HOSPITAL ENCOUNTER (OUTPATIENT)
Dept: INFUSION CENTER | Facility: CLINIC | Age: 65
Discharge: HOME | End: 2025-03-20
Payer: COMMERCIAL

## 2025-03-20 VITALS
BODY MASS INDEX: 23.11 KG/M2 | HEIGHT: 70 IN | TEMPERATURE: 97.5 F | RESPIRATION RATE: 18 BRPM | DIASTOLIC BLOOD PRESSURE: 56 MMHG | SYSTOLIC BLOOD PRESSURE: 112 MMHG | HEART RATE: 85 BPM | WEIGHT: 161.4 LBS

## 2025-03-20 DIAGNOSIS — Z17.1 MALIGNANT NEOPLASM OF LOWER-INNER QUADRANT OF RIGHT BREAST OF FEMALE, ESTROGEN RECEPTOR NEGATIVE (HCC): Primary | ICD-10-CM

## 2025-03-20 DIAGNOSIS — C50.311 MALIGNANT NEOPLASM OF LOWER-INNER QUADRANT OF RIGHT BREAST OF FEMALE, ESTROGEN RECEPTOR NEGATIVE (HCC): Primary | ICD-10-CM

## 2025-03-20 PROCEDURE — 96413 CHEMO IV INFUSION 1 HR: CPT

## 2025-03-20 PROCEDURE — 96375 TX/PRO/DX INJ NEW DRUG ADDON: CPT

## 2025-03-20 PROCEDURE — 96417 CHEMO IV INFUS EACH ADDL SEQ: CPT

## 2025-03-20 RX ORDER — SODIUM CHLORIDE 9 MG/ML
20 INJECTION, SOLUTION INTRAVENOUS ONCE
Status: COMPLETED | OUTPATIENT
Start: 2025-03-20 | End: 2025-03-20

## 2025-03-20 RX ADMIN — PROMETHAZINE HYDROCHLORIDE 12.5 MG: 25 INJECTION INTRAMUSCULAR; INTRAVENOUS at 11:00

## 2025-03-20 RX ADMIN — PERTUZUMAB 420 MG: 30 INJECTION, SOLUTION, CONCENTRATE INTRAVENOUS at 11:43

## 2025-03-20 RX ADMIN — SODIUM CHLORIDE 20 ML/HR: 0.9 INJECTION, SOLUTION INTRAVENOUS at 10:59

## 2025-03-20 RX ADMIN — TRASTUZUMAB 447 MG: 150 INJECTION, POWDER, LYOPHILIZED, FOR SOLUTION INTRAVENOUS at 12:19

## 2025-03-20 NOTE — TELEPHONE ENCOUNTER
Spoke with patient to inform her she has an appointment with Dr. Gonzalez on 3/25, but Dr. Gonzalez would like to see her post-op for a 2 week follow-up unless patient has concerns she would like to see Dr. Gonzalez for prior. Patient stated she is okay to see Dr. Gonzalez post-op and does not need to see her prior. Appointment is scheduled for 4/25 at 11am. Confirmed appointment date, time, and location with patient. Patient verbalized understanding and is in agreement with the plan.

## 2025-03-20 NOTE — PROGRESS NOTES
MSW checked in with pt in the infusion center, we have previously spoken on the phone a few times but never met in person.  She was happy for the visit and tells me that she's now done with chemo and having a lumpectomy next week, with radiation to follow in 4-6 weeks.  She notes some anxiety over the surgery and that she hasn't been under general anesthesia in a very long time.  Her children will be with her after the surgery to help but she is expecting minimal recovery as she was only prescribed pain meds for one day.  She says that the [employer] cold cap has worked great for her and other than some areas of thinning, she's kept the majority of her hair.  She says that she will be eligible for Medicare in September and she's not sure the timeframe for when to apply.  We discussed that they will be sending her a letter with information probably in the next 3 months and that she'll need a supplement, which she was already aware of.  She agrees that she will likely start getting a lot of mail from insurance companies about a supplement plan.  I encouraged her to reach out to the [employer] insurance counseling program or speak to a  about what plans would make the most sense for her.  Otherwise, she tells me that she is managing well at home in the months after her 's passing.  Her long term plan is to relocate to the [place] area to be closer to her children and grandchildren, who all live within that general area.  She thanked me for stopping in and talking and denies any other needs or concerns at this time.

## 2025-03-20 NOTE — PROGRESS NOTES
Pt presents for perjeta/ herceptin, reports fatigue and diarrhea managed with imodium. Pt tolerated infusion, and paxman cold cap. Aware of next appt on 4/10 0730. AVS declined, d/c from unit stable.

## 2025-03-21 RX ORDER — LANOLIN ALCOHOL/MO/W.PET/CERES
50 CREAM (GRAM) TOPICAL DAILY
COMMUNITY

## 2025-03-21 NOTE — PRE-PROCEDURE INSTRUCTIONS
Pre-Surgery Instructions:   Medication Instructions    apixaban (Eliquis) 5 mg Instructions provided by MD-  PATIENT INSTRUCTED BY SURGEONS OFFICE TO HOLD ELIQUIS 3/26, 3/27 AND DAY OF SURGERY 3/28     Bacillus Coagulans-Inulin (Probiotic) 1-250 BILLION-MG CAPS Hold day of surgery.    bimatoprost (LATISSE) 0.03 % ophthalmic solution Take night before surgery    calcium citrate (CALCITRATE) 950 (200 Ca) MG tablet Instructions provided by MD    clindamycin (CLEOCIN T) 1 % lotion Stop taking 1 day prior to surgery.    Glucosamine-Chondroitin (GLUCOSAMINE CHONDR COMPLEX PO) Stop taking 7 days prior to surgery.    latanoprost (XALATAN) 0.005 % ophthalmic solution Take night before surgery    lidocaine-prilocaine (EMLA) cream Stop taking 1 day prior to surgery.    Multiple Vitamins-Minerals (CENTRUM SILVER) tablet Stop taking 7 days prior to surgery.    omeprazole (PriLOSEC) 20 mg delayed release capsule Take day of surgery.    ondansetron (ZOFRAN-ODT) 8 mg disintegrating tablet Uses PRN- OK to take day of surgery    prochlorperazine (COMPAZINE) 10 mg tablet Uses PRN- OK to take day of surgery    pyridoxine (VITAMIN B6) 50 mg tablet Instructions provided by MD    timolol (TIMOPTIC) 0.5 % ophthalmic solution Take day of surgery.    TURMERIC-MECHE PO Stop taking 7 days prior to surgery.      Medication instructions for day of surgery reviewed. Please take all instructed medications with only a sip of water.       You will receive a call one business day prior to surgery with an arrival time and hospital directions. If your surgery is scheduled on a Monday, the hospital will be calling you on the Friday prior to your surgery. If you have not heard from anyone by 8pm, please call the hospital supervisor through the hospital  at [phone]. ([place] [phone] or [place] [phone]).    Do not eat or drink anything after midnight the night before your surgery, including candy, mints, lifesavers, or chewing  gum. Do not drink alcohol 24hrs before your surgery. Try not to smoke at least 24hrs before your surgery.       Follow the pre surgery showering instructions as listed in the “My Surgical Experience Booklet” or otherwise provided by your surgeon's office. Do not use a blade to shave the surgical area 1 week before surgery. It is okay to use a clean electric clippers up to 24 hours before surgery. Do not apply any lotions, creams, including makeup, cologne, deodorant, or perfumes after showering on the day of your surgery. Do not use dry shampoo, hair spray, hair gel, or any type of hair products.     No contact lenses, eye make-up, or artificial eyelashes. Remove nail polish, including gel polish, and any artificial, gel, or acrylic nails if possible. Remove all jewelry including rings and body piercing jewelry.     Wear causal clothing that is easy to take on and off. Consider your type of surgery.    Keep any valuables, jewelry, piercings at home. Please bring any specially ordered equipment (sling, braces) if indicated.    Arrange for a responsible person to drive you to and from the hospital on the day of your surgery. Please confirm the visitor policy for the day of your procedure when you receive your phone call with an arrival time.     Call the surgeon's office with any new illnesses, exposures, or additional questions prior to surgery.    Please reference your “My Surgical Experience Booklet” for additional information to prepare for your upcoming surgery.

## 2025-03-28 ENCOUNTER — HOSPITAL ENCOUNTER (OUTPATIENT)
Dept: MAMMOGRAPHY | Facility: HOSPITAL | Age: 65
Discharge: HOME | End: 2025-03-28
Payer: COMMERCIAL

## 2025-03-28 ENCOUNTER — HOSPITAL ENCOUNTER (OUTPATIENT)
Dept: NUCLEAR MEDICINE | Facility: HOSPITAL | Age: 65
Discharge: HOME | End: 2025-03-28
Attending: SURGERY
Payer: COMMERCIAL

## 2025-03-28 ENCOUNTER — ANESTHESIA (OUTPATIENT)
Dept: PERIOP | Facility: HOSPITAL | Age: 65
End: 2025-03-28
Payer: COMMERCIAL

## 2025-03-28 ENCOUNTER — APPOINTMENT (OUTPATIENT)
Dept: MAMMOGRAPHY | Facility: HOSPITAL | Age: 65
End: 2025-03-28
Payer: COMMERCIAL

## 2025-03-28 ENCOUNTER — HOSPITAL ENCOUNTER (OUTPATIENT)
Facility: HOSPITAL | Age: 65
Setting detail: OUTPATIENT SURGERY
Discharge: HOME | End: 2025-03-28
Attending: SURGERY | Admitting: SURGERY
Payer: COMMERCIAL

## 2025-03-28 VITALS
HEART RATE: 77 BPM | DIASTOLIC BLOOD PRESSURE: 59 MMHG | BODY MASS INDEX: 22.66 KG/M2 | RESPIRATION RATE: 16 BRPM | TEMPERATURE: 97.3 F | SYSTOLIC BLOOD PRESSURE: 104 MMHG | OXYGEN SATURATION: 95 % | HEIGHT: 70 IN | WEIGHT: 158.29 LBS

## 2025-03-28 DIAGNOSIS — Z17.1 MALIGNANT NEOPLASM OF LOWER-INNER QUADRANT OF RIGHT BREAST OF FEMALE, ESTROGEN RECEPTOR NEGATIVE (HCC): Primary | ICD-10-CM

## 2025-03-28 DIAGNOSIS — Z17.1 MALIGNANT NEOPLASM OF LOWER-INNER QUADRANT OF RIGHT BREAST OF FEMALE, ESTROGEN RECEPTOR NEGATIVE (HCC): ICD-10-CM

## 2025-03-28 DIAGNOSIS — C50.311 MALIGNANT NEOPLASM OF LOWER-INNER QUADRANT OF RIGHT BREAST OF FEMALE, ESTROGEN RECEPTOR NEGATIVE (HCC): ICD-10-CM

## 2025-03-28 DIAGNOSIS — C50.311 MALIGNANT NEOPLASM OF LOWER-INNER QUADRANT OF RIGHT BREAST OF FEMALE, ESTROGEN RECEPTOR NEGATIVE (HCC): Primary | ICD-10-CM

## 2025-03-28 PROCEDURE — 36590 REMOVAL TUNNELED CV CATH: CPT | Performed by: SURGERY

## 2025-03-28 PROCEDURE — 19301 PARTIAL MASTECTOMY: CPT | Performed by: SURGERY

## 2025-03-28 PROCEDURE — NC001 PR NO CHARGE: Performed by: SURGERY

## 2025-03-28 PROCEDURE — 88341 IMHCHEM/IMCYTCHM EA ADD ANTB: CPT | Performed by: STUDENT IN AN ORGANIZED HEALTH CARE EDUCATION/TRAINING PROGRAM

## 2025-03-28 PROCEDURE — 88333 PATH CONSLTJ SURG CYTO XM 1: CPT | Performed by: STUDENT IN AN ORGANIZED HEALTH CARE EDUCATION/TRAINING PROGRAM

## 2025-03-28 PROCEDURE — 38900 IO MAP OF SENT LYMPH NODE: CPT | Performed by: SURGERY

## 2025-03-28 PROCEDURE — A9541 TC99M SULFUR COLLOID: HCPCS

## 2025-03-28 PROCEDURE — 38900 IO MAP OF SENT LYMPH NODE: CPT | Performed by: PHYSICIAN ASSISTANT

## 2025-03-28 PROCEDURE — 19301 PARTIAL MASTECTOMY: CPT | Performed by: PHYSICIAN ASSISTANT

## 2025-03-28 PROCEDURE — 38525 BIOPSY/REMOVAL LYMPH NODES: CPT | Performed by: PHYSICIAN ASSISTANT

## 2025-03-28 PROCEDURE — 36590 REMOVAL TUNNELED CV CATH: CPT | Performed by: PHYSICIAN ASSISTANT

## 2025-03-28 PROCEDURE — 88300 SURGICAL PATH GROSS: CPT | Performed by: STUDENT IN AN ORGANIZED HEALTH CARE EDUCATION/TRAINING PROGRAM

## 2025-03-28 PROCEDURE — 38525 BIOPSY/REMOVAL LYMPH NODES: CPT | Performed by: SURGERY

## 2025-03-28 PROCEDURE — 78195 LYMPH SYSTEM IMAGING: CPT

## 2025-03-28 PROCEDURE — 88342 IMHCHEM/IMCYTCHM 1ST ANTB: CPT | Performed by: STUDENT IN AN ORGANIZED HEALTH CARE EDUCATION/TRAINING PROGRAM

## 2025-03-28 PROCEDURE — 88307 TISSUE EXAM BY PATHOLOGIST: CPT | Performed by: STUDENT IN AN ORGANIZED HEALTH CARE EDUCATION/TRAINING PROGRAM

## 2025-03-28 PROCEDURE — 88305 TISSUE EXAM BY PATHOLOGIST: CPT | Performed by: STUDENT IN AN ORGANIZED HEALTH CARE EDUCATION/TRAINING PROGRAM

## 2025-03-28 RX ORDER — MEPERIDINE HYDROCHLORIDE 25 MG/ML
12.5 INJECTION INTRAMUSCULAR; INTRAVENOUS; SUBCUTANEOUS
Status: DISCONTINUED | OUTPATIENT
Start: 2025-03-28 | End: 2025-03-28 | Stop reason: HOSPADM

## 2025-03-28 RX ORDER — KETOROLAC TROMETHAMINE 30 MG/ML
INJECTION, SOLUTION INTRAMUSCULAR; INTRAVENOUS AS NEEDED
Status: DISCONTINUED | OUTPATIENT
Start: 2025-03-28 | End: 2025-03-28

## 2025-03-28 RX ORDER — FENTANYL CITRATE 50 UG/ML
INJECTION, SOLUTION INTRAMUSCULAR; INTRAVENOUS AS NEEDED
Status: DISCONTINUED | OUTPATIENT
Start: 2025-03-28 | End: 2025-03-28

## 2025-03-28 RX ORDER — SODIUM CHLORIDE, SODIUM LACTATE, POTASSIUM CHLORIDE, CALCIUM CHLORIDE 600; 310; 30; 20 MG/100ML; MG/100ML; MG/100ML; MG/100ML
125 INJECTION, SOLUTION INTRAVENOUS CONTINUOUS
Status: DISCONTINUED | OUTPATIENT
Start: 2025-03-28 | End: 2025-03-28 | Stop reason: HOSPADM

## 2025-03-28 RX ORDER — PHENYLEPHRINE HCL IN 0.9% NACL 1 MG/10 ML
SYRINGE (ML) INTRAVENOUS AS NEEDED
Status: DISCONTINUED | OUTPATIENT
Start: 2025-03-28 | End: 2025-03-28

## 2025-03-28 RX ORDER — ROCURONIUM BROMIDE 10 MG/ML
INJECTION, SOLUTION INTRAVENOUS AS NEEDED
Status: DISCONTINUED | OUTPATIENT
Start: 2025-03-28 | End: 2025-03-28

## 2025-03-28 RX ORDER — ONDANSETRON 2 MG/ML
INJECTION INTRAMUSCULAR; INTRAVENOUS AS NEEDED
Status: DISCONTINUED | OUTPATIENT
Start: 2025-03-28 | End: 2025-03-28

## 2025-03-28 RX ORDER — PROPOFOL 10 MG/ML
INJECTION, EMULSION INTRAVENOUS AS NEEDED
Status: DISCONTINUED | OUTPATIENT
Start: 2025-03-28 | End: 2025-03-28

## 2025-03-28 RX ORDER — DEXAMETHASONE SODIUM PHOSPHATE 10 MG/ML
INJECTION, SOLUTION INTRAMUSCULAR; INTRAVENOUS AS NEEDED
Status: DISCONTINUED | OUTPATIENT
Start: 2025-03-28 | End: 2025-03-28

## 2025-03-28 RX ORDER — ONDANSETRON 2 MG/ML
4 INJECTION INTRAMUSCULAR; INTRAVENOUS ONCE AS NEEDED
Status: DISCONTINUED | OUTPATIENT
Start: 2025-03-28 | End: 2025-03-28 | Stop reason: HOSPADM

## 2025-03-28 RX ORDER — PROMETHAZINE HYDROCHLORIDE 25 MG/ML
INJECTION, SOLUTION INTRAMUSCULAR; INTRAVENOUS AS NEEDED
Status: DISCONTINUED | OUTPATIENT
Start: 2025-03-28 | End: 2025-03-28

## 2025-03-28 RX ORDER — OXYCODONE HYDROCHLORIDE 5 MG/1
5 TABLET ORAL EVERY 6 HOURS PRN
Refills: 0 | Status: DISCONTINUED | OUTPATIENT
Start: 2025-03-28 | End: 2025-03-28 | Stop reason: HOSPADM

## 2025-03-28 RX ORDER — ISOSULFAN BLUE 50 MG/5ML
INJECTION, SOLUTION SUBCUTANEOUS AS NEEDED
Status: DISCONTINUED | OUTPATIENT
Start: 2025-03-28 | End: 2025-03-28 | Stop reason: HOSPADM

## 2025-03-28 RX ORDER — CEFAZOLIN SODIUM 2 G/50ML
2000 SOLUTION INTRAVENOUS ONCE
Status: COMPLETED | OUTPATIENT
Start: 2025-03-28 | End: 2025-03-28

## 2025-03-28 RX ORDER — BUPIVACAINE HYDROCHLORIDE 5 MG/ML
INJECTION, SOLUTION EPIDURAL; INTRACAUDAL; PERINEURAL AS NEEDED
Status: DISCONTINUED | OUTPATIENT
Start: 2025-03-28 | End: 2025-03-28 | Stop reason: HOSPADM

## 2025-03-28 RX ORDER — MAGNESIUM HYDROXIDE 1200 MG/15ML
LIQUID ORAL AS NEEDED
Status: DISCONTINUED | OUTPATIENT
Start: 2025-03-28 | End: 2025-03-28 | Stop reason: HOSPADM

## 2025-03-28 RX ORDER — HYDROMORPHONE HCL/PF 1 MG/ML
0.5 SYRINGE (ML) INJECTION
Status: DISCONTINUED | OUTPATIENT
Start: 2025-03-28 | End: 2025-03-28 | Stop reason: HOSPADM

## 2025-03-28 RX ORDER — MIDAZOLAM HYDROCHLORIDE 2 MG/2ML
INJECTION, SOLUTION INTRAMUSCULAR; INTRAVENOUS AS NEEDED
Status: DISCONTINUED | OUTPATIENT
Start: 2025-03-28 | End: 2025-03-28

## 2025-03-28 RX ORDER — ACETAMINOPHEN 10 MG/ML
1000 INJECTION, SOLUTION INTRAVENOUS
Status: COMPLETED | OUTPATIENT
Start: 2025-03-28 | End: 2025-03-28

## 2025-03-28 RX ORDER — PROPOFOL 10 MG/ML
INJECTION, EMULSION INTRAVENOUS CONTINUOUS PRN
Status: DISCONTINUED | OUTPATIENT
Start: 2025-03-28 | End: 2025-03-28

## 2025-03-28 RX ORDER — FENTANYL CITRATE/PF 50 MCG/ML
25 SYRINGE (ML) INJECTION
Status: DISCONTINUED | OUTPATIENT
Start: 2025-03-28 | End: 2025-03-28 | Stop reason: HOSPADM

## 2025-03-28 RX ADMIN — Medication 100 MCG: at 13:41

## 2025-03-28 RX ADMIN — DEXAMETHASONE SODIUM PHOSPHATE 10 MG: 10 INJECTION, SOLUTION INTRAMUSCULAR; INTRAVENOUS at 12:50

## 2025-03-28 RX ADMIN — Medication 100 MCG: at 13:02

## 2025-03-28 RX ADMIN — FENTANYL CITRATE 25 MCG: 50 INJECTION INTRAMUSCULAR; INTRAVENOUS at 16:03

## 2025-03-28 RX ADMIN — FENTANYL CITRATE 25 MCG: 50 INJECTION INTRAMUSCULAR; INTRAVENOUS at 15:53

## 2025-03-28 RX ADMIN — ONDANSETRON 4 MG: 2 INJECTION INTRAMUSCULAR; INTRAVENOUS at 14:28

## 2025-03-28 RX ADMIN — CEFAZOLIN SODIUM 2000 MG: 2 SOLUTION INTRAVENOUS at 12:56

## 2025-03-28 RX ADMIN — FENTANYL CITRATE 25 MCG: 50 INJECTION INTRAMUSCULAR; INTRAVENOUS at 15:58

## 2025-03-28 RX ADMIN — SODIUM CHLORIDE, SODIUM LACTATE, POTASSIUM CHLORIDE, AND CALCIUM CHLORIDE: .6; .31; .03; .02 INJECTION, SOLUTION INTRAVENOUS at 13:58

## 2025-03-28 RX ADMIN — ROCURONIUM 20 MG: 50 INJECTION, SOLUTION INTRAVENOUS at 14:42

## 2025-03-28 RX ADMIN — Medication 50 MCG: at 13:46

## 2025-03-28 RX ADMIN — Medication 200 MCG: at 15:10

## 2025-03-28 RX ADMIN — PROPOFOL 50 MCG/KG/MIN: 10 INJECTION, EMULSION INTRAVENOUS at 12:54

## 2025-03-28 RX ADMIN — ROCURONIUM 50 MG: 50 INJECTION, SOLUTION INTRAVENOUS at 12:50

## 2025-03-28 RX ADMIN — Medication 100 MCG: at 12:58

## 2025-03-28 RX ADMIN — Medication 100 MCG: at 14:59

## 2025-03-28 RX ADMIN — Medication 100 MCG: at 14:57

## 2025-03-28 RX ADMIN — ACETAMINOPHEN 1000 MG: 10 INJECTION INTRAVENOUS at 10:44

## 2025-03-28 RX ADMIN — FENTANYL CITRATE 50 MCG: 50 INJECTION INTRAMUSCULAR; INTRAVENOUS at 12:50

## 2025-03-28 RX ADMIN — FENTANYL CITRATE 25 MCG: 50 INJECTION INTRAMUSCULAR; INTRAVENOUS at 13:51

## 2025-03-28 RX ADMIN — ROCURONIUM 10 MG: 50 INJECTION, SOLUTION INTRAVENOUS at 13:51

## 2025-03-28 RX ADMIN — DEXMEDETOMIDINE HYDROCHLORIDE 8 MCG: 100 INJECTION, SOLUTION INTRAVENOUS at 14:48

## 2025-03-28 RX ADMIN — FENTANYL CITRATE 25 MCG: 50 INJECTION INTRAMUSCULAR; INTRAVENOUS at 14:34

## 2025-03-28 RX ADMIN — PROMETHAZINE HYDROCHLORIDE 6.25 MG: 25 INJECTION INTRAMUSCULAR; INTRAVENOUS at 14:24

## 2025-03-28 RX ADMIN — Medication 100 MCG: at 13:58

## 2025-03-28 RX ADMIN — MIDAZOLAM 2 MG: 1 INJECTION INTRAMUSCULAR; INTRAVENOUS at 12:48

## 2025-03-28 RX ADMIN — SODIUM CHLORIDE, SODIUM LACTATE, POTASSIUM CHLORIDE, AND CALCIUM CHLORIDE 125 ML/HR: .6; .31; .03; .02 INJECTION, SOLUTION INTRAVENOUS at 10:41

## 2025-03-28 RX ADMIN — SUGAMMADEX 200 MG: 100 INJECTION, SOLUTION INTRAVENOUS at 15:15

## 2025-03-28 RX ADMIN — Medication 100 MCG: at 14:25

## 2025-03-28 RX ADMIN — Medication 100 MCG: at 13:16

## 2025-03-28 RX ADMIN — KETOROLAC TROMETHAMINE 15 MG: 30 INJECTION, SOLUTION INTRAMUSCULAR; INTRAVENOUS at 13:24

## 2025-03-28 RX ADMIN — Medication 100 MCG: at 13:22

## 2025-03-28 RX ADMIN — PROPOFOL 150 MG: 10 INJECTION, EMULSION INTRAVENOUS at 12:50

## 2025-03-28 NOTE — OP NOTE
OPERATIVE REPORT  PATIENT NAME: Amy Clement    :  1960  MRN: [id number]  Pt Location: [facility] OR ROOM 05    SURGERY DATE: 3/28/2025    Surgeons and Role:     * Koki Magaña MD - Primary     * Santiago Sandoval PA-C - Assisting    Preop Diagnosis:  Malignant neoplasm of lower-inner quadrant of right breast of female, estrogen receptor negative (HCC) [C50.311, Z17.1]    Post-Op Diagnosis Codes:     * Malignant neoplasm of lower-inner quadrant of right breast of female, estrogen receptor negative (HCC) [C50.311, Z17.1]    Procedure(s):  Right - RIGHT BREAST ROSA ISELA LOCALIZED LUMPECTOMY. BIOPSY LYMPH NODE SENTINEL. LYMPHATIC MAPPING & LYMPHOSCINTIGRAPHY. EXISION OF CLIPPED RIGHT AXILLARY NODE. 1100 NUC MED  Right - REMOVAL VENOUS PORT (PORT-A-CATH)  Injection of blue dye  Use of gamma probe    Specimen(s):  ID Type Source Tests Collected by Time Destination   1 : right breast lumpectomy, short stitch superior, long stitch lateral Tissue Breast, Right TISSUE EXAM Koki Magaña MD 3/28/2025 1340    2 : right breast lumpectomy, stitch marks true new medial margin Tissue Breast, Right TISSUE EXAM Koki Magaña MD 3/28/2025 1342    3 : right breast lumpectomy, stitch marks true new inferior margin Tissue Breast, Right TISSUE EXAM Koki Magaña MD 3/28/2025 1343    4 : right breast lumpectomy, stitch marks true new posterior margin Tissue Breast, Right TISSUE EXAM Koki Magaña MD 3/28/2025 1343    5 : PORT-A CATH Tissue Foreign body TISSUE EXAM Koki Magaña MD 3/28/2025 1347    6 : clipped node right axilla Tissue Lymph Node, Zirconia TISSUE EXAM Koki Magaña MD 3/28/2025 1355        Estimated Blood Loss:   Minimal    Drains:  * No LDAs found *    Anesthesia Type:   General    Operative Indications:  Malignant neoplasm of lower-inner quadrant of right breast of female, estrogen receptor negative (HCC) [C50.311, Z17.1]      Operative Findings:  Rosa Isela reflector in lumpectomy and axillary node; rare atypical cells in node  on touch prep      Complications:   None    Procedure and Technique:  Amy is a 65 yo female s/p neoadjuvant chemotherapy presenting for surgical management. She was counseled on a right traci localized lumpectomy with lymphatic mapping, sentinel node biopsy, removal of clipped axillary node and possible axillary dissection. She also requested port removal. She presented the day of surgery to the radiology suite and underwent lymphoscintigraphy of the right breast.  From there she went to the operating room.  She had preoperative antibiotics.  She was administered general anesthesia.  She had a 5 cc injection of Lymphazurin into the right subareolar plexus.  She was prepped and draped in the usual standard fashion.  Timeout was performed.  Attention was turned to the lower inner aspect of the right breast.  The Traci reflector was identified using the  probe.  The skin was marked in this location.  Half percent Marcaine plain was injected for local anesthesia.  A circumareolar incision was created at the medial aspect of the nipple areolar complex with a small overlying skin paddle.  Electrocautery was used to dissect margins superior, medial, inferior, lateral and posterior.  The savvy probe was used throughout to help guide dissection.  The specimen was marked with a short stitch superior and a long stitch lateral.  This was interrogated to confirm reflector removal.  It was also imaged in the operating room revealing Traci reflector and residual density closest to the medial, inferior and posterior margins.  Therefore new margins were excised in these locations and sutures were placed on the true margins.  Of note the posterior extent was taken down to the muscle.  All breast specimens were submitted to pathology in formalin.  Attention was then turned to the right axilla.  The Traci reflector was again identified in the axilla at the lower axillary hairline.  Additional half percent Marcaine plain was  injected for local anesthesia.  An incision was created through the skin and subcutaneous tissue.  Electrocautery was used to dissect through the remaining subcutaneous tissue and clavipectoral fascia to enter the axillary fat pad.  Deep in the mid axilla was a blue stained and radioactive which also contained the Sirisha reflector.  This was elevated into the wound using an Allis clamp.  Hemoclips were placed on superficial vessels.  The node was excised and imaged to confirm removal of the Sirisha clip.  It was then submitted to pathology for touch prep analysis.  Pathology reported rare atypical cells but no definitive carcinoma and opted to defer to final pathology.  Her axillary wound was irrigated and hemostasis was achieved.  Surgicel gauze was used within the axilla.  The breast wound was also irrigated and hemostasis was achieved.  Hemoclips were placed within the lumpectomy cavity.  The wounds were closed in a layered fashion using 3-0 Vicryl to close the cavity and the breast.  Multiple interrupted 3-0 Monocryl suture and a running 4-0 Monocryl subcuticular stitch were used for skin closure in the breast and axilla.  Attention was then turned to the port site.  Additional half percent Marcaine plain was injected for local anesthesia.  The previous incision was incised.  Electrocautery was used to dissect down to the level of the port.  This was removed intact without difficulty.  Pressure was held for 10 minutes.  The port was submitted for gross only.  The wound was irrigated and hemostasis was achieved.  A small piece of Surgicel was placed along the muscle.  The wound was closed using interrupted 3-0 Monocryl suture and a running 4-0 Monocryl subcuticular stitch.  All counts were correct.  Surgical glue, fluffs and a bra were applied.  The patient was then extubated and taken to recovery in stable condition.  A physician assistant was required during the procedure for retraction, tissue handling, dissection  and suturing; no residents were available.    Patient Disposition:  extubated and stable    Gillespie Node Biopsy for Breast Cancer - Right  Operation performed with curative intent. Yes   Tracer(s) used to identify sentinel nodes in the upfront surgery (non-neoadjuvant) setting (select all that apply). N/A   Tracer(s) used to identify sentinel nodes in the neoadjuvant setting (select all that apply). Dye and Radioactive tracer   All nodes (colored or non-colored) present at the end of a dye-filled lymphatic channel were removed. Yes   All significantly radioactive nodes were removed. Yes   All palpably suspicious nodes were removed. N/A   Biopsy-proven positive nodes marked with clips prior to chemotherapy were identified and removed. Yes                 SIGNATURE: Koki Magaña MD  DATE: March 28, 2025  TIME: 3:06 PM                 (1) More than 48 hours/None

## 2025-03-28 NOTE — H&P
Name: Amy Clement      : 1960      MRN: [id number]  Encounter Provider: No name on file  Encounter Date: 2025   Encounter department: [facility] OPERATING ROOM  :  Assessment & Plan            History of Present Illness   Amy Clement is a 64 y.o. year old female who presents status post neoadjuvant chemotherapy to discuss surgical management.  She completed standard chemo on 2025.  Her main concern is a rash.  She also got a blood clot from the port and would like to have the port removed.  She is currently on Eliquis.  Her genetic testing was negative.    Oncology History   Cancer Staging   Malignant neoplasm of lower-inner quadrant of right breast of female, estrogen receptor negative (HCC)  Staging form: Breast, AJCC 8th Edition  - Clinical stage from 10/4/2024: Stage IIA (cT1c, cN1(f), cM0, G2, ER-, MI+, HER2+) - Signed by Koki Magaña MD on 10/21/2024  Stage prefix: Initial diagnosis  Method of lymph node assessment: Core biopsy  Histologic grading system: 3 grade system  Oncology History Overview Note   10/2024 - right breast and LNBx positive for invasive mammary carcinoma with predominantly lobular features, apocrine and histiocytoid features in less amount, grade 2, ER 1% MI 10% Her2 3+, [id number]    LN biopsy receptors - ER 1% MI 70% Her2 3+    2024 - start TCHP    2024 - cycle 3 - taxotere dose reduced by 10%, carbo dose reduced to AUC 5 due to N/V    2025 - cycle 6 - taxotere dose reduced by 20% due to fatigue    Started on eliquis due to right IJ DVT associated with port     Malignant neoplasm of lower-inner quadrant of right breast of female, estrogen receptor negative (HCC)   10/4/2024 Biopsy    A. Right breast ultrasound-guided biopsy  4 o'clock, 2 cm from nipple (ROSA ISELA)  Invasive mammary carcinoma with predominately lobular features   Apocrine and histiocytoid morphologic features  Grade 2  ER <1, MI 10, HER2 3+  Lymphovascular invasion  present    B. Right axillary lymph node biopsy (ROSA ISELA)  Metastatic carcinoma, compatible with mammary origin  ER <1, MD 70, HER2 3+    Concordant. Right malignancy appears unifocal; biopsy-proven carcinoma measured 1.7 cm on US. Biopsy-proven metastatic disease to right axillary lymph node. Left breast clear.     10/4/2024 -  Cancer Staged    Staging form: Breast, AJCC 8th Edition  - Clinical stage from 10/4/2024: Stage IIA (cT1c, cN1(f), cM0, G2, ER-, MD+, HER2+) - Signed by Koki Magaña MD on 10/21/2024  Stage prefix: Initial diagnosis  Method of lymph node assessment: Core biopsy  Histologic grading system: 3 grade system       10/15/2024 Genetic Testing    VUS of AXIN2, MSH6  [employer] BRCAplus & Cancer +RNAInsight - A total of 59 genes were evaluated with RNA insight: APC, JUAN J, BAP1, BARD1, BMPR1A, BRCA1, BRCA2, BRIP1, CDH1, CDK4, CDKN1B, CDKN2A, CHEK2, DICER1, FH, FLCN, KIF1B, MAX, MEN1, MET, MLH1, MSH2, MSH6, MUTYH, NF1, NTHL1, PALB2, PMS2, POT1, PTEN, RAD51C, RAD51D, RB1, RET, SDHA, SDHAF2, SDHB, SDHC, SDHD, SMAD4, SMARCA4, STK11, VGFT789, TP53, TSC1, TSC2 and VHL (sequencing and deletion/duplication); AXIN2, CTNNA1, EGLN1, HOXB13, KIT, MITF, MSH3, PDGFRA, POLD1 and POLE (sequencing only); EPCAM and GREM1 (deletion/duplication only).      11/8/2024 -  Chemotherapy    alteplase (CATHFLO), 2 mg, Intracatheter, Every 1 Minute as needed, 7 of 18 cycles  pegfilgrastim (NEULASTA ONPRO), 6 mg, Subcutaneous, Once, 6 of 6 cycles  Administration: 6 mg (11/8/2024), 6 mg (11/29/2024), 6 mg (12/20/2024), 6 mg (1/10/2025), 6 mg (1/31/2025), 6 mg (2/21/2025)  fosaprepitant (EMEND) IVPB, 150 mg, Intravenous, Once, 6 of 6 cycles  Administration: 150 mg (11/8/2024), 150 mg (11/29/2024), 150 mg (12/20/2024), 150 mg (1/10/2025), 150 mg (1/31/2025), 150 mg (2/21/2025)  pertuzumab (PERJETA) IVPB, 840 mg, Intravenous, Once, 7 of 18 cycles  Administration: 840 mg (11/8/2024), 420 mg (11/29/2024), 420 mg (12/20/2024), 420 mg  (1/10/2025), 420 mg (1/31/2025), 420 mg (2/21/2025), 420 mg (3/20/2025)  CARBOplatin (PARAPLATIN) IVPB (GOG AUC DOSING), 722.4 mg, Intravenous, Once, 6 of 6 cycles  Administration: 722.4 mg (11/8/2024), 722.4 mg (11/29/2024), 602 mg (12/20/2024), 602 mg (1/10/2025), 602 mg (1/31/2025), 602 mg (2/21/2025)  DOCEtaxel (TAXOTERE) chemo infusion, 75 mg/m2 = 144 mg, Intravenous, Once, 6 of 6 cycles  Dose modification: 67 mg/m2 (original dose 75 mg/m2, Cycle 3, Reason: Nausea/Vomiting, Comment: 10%dose reduction due nausea), 60.3 mg/m2 (original dose 75 mg/m2, Cycle 6, Reason: Dose modified as per discussion with consulting physician, Comment: dose reduction), 60 mg/m2 (original dose 75 mg/m2, Cycle 6, Reason: Dose modified as per discussion with consulting physician, Comment: total 20% dose reduction)  Administration: 144 mg (11/8/2024), 144 mg (11/29/2024), 128.6 mg (12/20/2024), 128.6 mg (1/10/2025), 128.6 mg (1/31/2025), 115.2 mg (2/21/2025)  trastuzumab (HERCEPTIN) chemo infusion, 8 mg/kg = 595 mg, Intravenous, Once, 7 of 18 cycles  Administration: 595 mg (11/8/2024), 447 mg (11/29/2024), 447 mg (12/20/2024), 447 mg (1/10/2025), 447 mg (1/31/2025), 447 mg (2/21/2025), 447 mg (3/20/2025)     2/24/2025 Observation    Right breast axilla US    At least almost complete imaging response to neoadjuvant chemotherapy. The biopsy-proven carcinoma has markedly decreased in size; it currently measures 6 mm x 2 mm x 5 mm and previously measured 15 mm x 13 mm x 17 mm.  The measurements on the current exam may be artifactual; there may be a complete response. Previously noted adenopathy has resolved; cortical thickness currently measures 3mm and previously measured 11mm     Breast cancer metastasized to axillary lymph node, right (HCC)   10/16/2024 Initial Diagnosis    Breast cancer metastasized to axillary lymph node, right (HCC)        Review of Systems   Constitutional: Negative.  Negative for appetite change, fever and  unexpected weight change.   HENT: Negative.  Negative for trouble swallowing.    Eyes: Negative.    Respiratory: Negative.  Negative for cough and shortness of breath.    Cardiovascular: Negative.  Negative for chest pain.   Gastrointestinal: Negative.  Negative for abdominal pain, nausea and vomiting.   Endocrine: Negative.    Genitourinary: Negative.  Negative for dysuria.   Musculoskeletal: Negative.  Negative for arthralgias and myalgias.   Skin:  Positive for rash.   Allergic/Immunologic: Negative.    Neurological: Negative.  Negative for headaches.   Hematological: Negative.  Negative for adenopathy. Does not bruise/bleed easily.   Psychiatric/Behavioral: Negative.      A complete review of systems is negative other than that noted above in the HPI.          Medical History Reviewed by provider this encounter:  Allergies  Meds     .  Past Medical History   Past Medical History:   Diagnosis Date    Acute cystitis     Anxiety     medication for hot flasshes    Breast lump .    last assessed 01/16/2017    Dermatitis     last assessed 02/27/2017    Disease of thyroid gland     last assessed 02/23/2016    Diverticulitis 11/2020    Diverticulitis of colon November 25 July 2016 1st dx    Dysuria     last assessed 04/28/2017    ETD (Eustachian tube dysfunction), bilateral     last assessed 02/23/2016    Fibroid 2012    GERD (gastroesophageal reflux disease) Occasionally    Glaucoma     Left    Hematuria     History of chemotherapy     Migraine 1985    Miscarriage 1990    Polyp at cervical os     PONV (postoperative nausea and vomiting)     Varicella 1966     Past Surgical History:   Procedure Laterality Date    BREAST BIOPSY Right 2009    2 areas benign    CATARACT EXTRACTION Bilateral     COLONOSCOPY      FL GUIDED CENTRAL VENOUS ACCESS DEVICE INSERTION  11/5/2024    UT HYSTEROSCOPY BX ENDOMETRIUM&/POLYPC W/WO D&C N/A 02/23/2024    Procedure: DILATATION AND CURETTAGE (D&C) WITH HYSTEROSCOPY;  Surgeon: Joselin  MD Corbin;  Location: [facility];  Service: Gynecology    MS HYSTEROSCOPY BX ENDOMETRIUM&/POLYPC W/WO D&C N/A 02/23/2024    Procedure: POLYPECTOMY;  Surgeon: Joselin Alaniz MD;  Location: [facility];  Service: Gynecology    MS INSJ TUNNELED CTR VAD W/SUBQ PORT AGE 5 YR/> N/A 11/5/2024    Procedure: INSERTION VENOUS PORT ( PORT-A-CATH) IR;  Surgeon: Chandler Ball DO;  [facility];  Service: Interventional Radiology    TUBAL LIGATION      US GUIDED BREAST BIOPSY RIGHT COMPLETE Right 10/04/2024    US GUIDED BREAST LYMPH NODE BIOPSY RIGHT Right 10/04/2024     Family History   Problem Relation Age of Onset    Breast cancer Mother 47    Cancer Mother         Breast cancer    Hypertension Father         Late life dx    Rectal cancer Father     Migraines Father 85    Cancer Father         Colon cancer    No Known Problems Sister     Throat cancer Brother     Cancer Brother         bladder    Heart disease Maternal Grandmother     No Known Problems Paternal Grandmother     No Known Problems Daughter     Kidney cancer Son 38    No Known Problems Paternal Aunt       reports that she has never smoked. She has never used smokeless tobacco. She reports current alcohol use of about 3.0 standard drinks of alcohol per week. She reports that she does not use drugs.  Current Outpatient Medications   Medication Instructions    apixaban (ELIQUIS) 5 mg, Oral, 2 times daily    Bacillus Coagulans-Inulin (Probiotic) 1-250 BILLION-MG CAPS Take by mouth    bimatoprost (LATISSE) 0.03 % ophthalmic solution 1 drop, Both Eyes, Daily at bedtime, Place one drop on applicator and apply evenly along the skin of the upper eyelid at base of eyelashes once daily at bedtime; repeat procedure for second eye (use a clean applicator).    calcium citrate (CALCITRATE) 950 (200 Ca) MG tablet 1 tablet, Daily    clindamycin (CLEOCIN T) 1 % lotion Topical, 2 times daily, Apply to affected areas.    diphenoxylate-atropine (LOMOTIL)  2.5-0.025 mg per tablet 1 tablet, Oral, 4 times daily PRN    Glucosamine-Chondroitin (GLUCOSAMINE CHONDR COMPLEX PO) Daily    latanoprost (XALATAN) 0.005 % ophthalmic solution 1 drop, Daily at bedtime    lidocaine-prilocaine (EMLA) cream Topical, As needed    Multiple Vitamins-Minerals (CENTRUM SILVER) tablet Take by mouth    omeprazole (PriLOSEC) 20 mg delayed release capsule TAKE 1 CAPSULE BY MOUTH EVERY DAY    ondansetron (ZOFRAN-ODT) 8 mg, Oral, Every 8 hours PRN    oxyCODONE-acetaminophen (Percocet) 5-325 mg per tablet 1 tablet, Oral, Every 6 hours PRN    PARoxetine (PAXIL) 10 mg, Oral, Daily    prochlorperazine (COMPAZINE) 10 mg, Oral, Every 6 hours PRN    pyridoxine (VITAMIN B6) 50 mg, Daily    timolol (TIMOPTIC) 0.5 % ophthalmic solution 1 drop, Daily    TURMERIC-GINGER PO Take by mouth     Allergies   Allergen Reactions    Monistat [Miconazole] Itching    Pollen Extract Itching, Swelling and Sneezing      No current facility-administered medications on file prior to encounter.     Current Outpatient Medications on File Prior to Encounter   Medication Sig Dispense Refill    Bacillus Coagulans-Inulin (Probiotic) 1-250 BILLION-MG CAPS Take by mouth      bimatoprost (LATISSE) 0.03 % ophthalmic solution Administer 1 drop to both eyes daily at bedtime Place one drop on applicator and apply evenly along the skin of the upper eyelid at base of eyelashes once daily at bedtime; repeat procedure for second eye (use a clean applicator). 5 mL 1    calcium citrate (CALCITRATE) 950 (200 Ca) MG tablet Take 1 tablet by mouth daily      clindamycin (CLEOCIN T) 1 % lotion Apply topically 2 (two) times a day Apply to affected areas. 60 mL 1    latanoprost (XALATAN) 0.005 % ophthalmic solution Administer 1 drop to both eyes daily at bedtime      lidocaine-prilocaine (EMLA) cream Apply topically as needed for mild pain 30 g 2    omeprazole (PriLOSEC) 20 mg delayed release capsule TAKE 1 CAPSULE BY MOUTH EVERY DAY 30 capsule 1     "ondansetron (ZOFRAN-ODT) 8 mg disintegrating tablet Take 1 tablet (8 mg total) by mouth every 8 (eight) hours as needed for nausea or vomiting 30 tablet 2    prochlorperazine (COMPAZINE) 10 mg tablet Take 1 tablet (10 mg total) by mouth every 6 (six) hours as needed for nausea or vomiting 30 tablet 2    pyridoxine (VITAMIN B6) 50 mg tablet Take 50 mg by mouth daily      timolol (TIMOPTIC) 0.5 % ophthalmic solution Administer 1 drop to both eyes daily      diphenoxylate-atropine (LOMOTIL) 2.5-0.025 mg per tablet Take 1 tablet by mouth 4 (four) times a day as needed for diarrhea 30 tablet 0    Glucosamine-Chondroitin (GLUCOSAMINE CHONDR COMPLEX PO) Take by mouth daily       Multiple Vitamins-Minerals (CENTRUM SILVER) tablet Take by mouth      oxyCODONE-acetaminophen (Percocet) 5-325 mg per tablet Take 1 tablet by mouth every 6 (six) hours as needed for moderate pain Max Daily Amount: 4 tablets (Patient not taking: Reported on 3/21/2025) 12 tablet 0    PARoxetine (PAXIL) 10 mg tablet Take 1 tablet (10 mg total) by mouth daily (Patient not taking: Reported on 3/21/2025) 90 tablet 3    TURMERIC-GINGER PO Take by mouth        Social History     Tobacco Use    Smoking status: Never    Smokeless tobacco: Never   Vaping Use    Vaping status: Never Used   Substance and Sexual Activity    Alcohol use: Yes     Alcohol/week: 3.0 standard drinks of alcohol     Types: 3 Glasses of wine per week     Comment: occasionally. last drink more than a month ago    Drug use: No    Sexual activity: Yes     Partners: Male     Birth control/protection: Post-menopausal          Objective   /51   Pulse 73   Temp 97.8 °F (36.6 °C) (Temporal)   Resp 16   Ht 5' 10\" (1.778 m)   Wt 71.8 kg (158 lb 4.6 oz)   LMP  (LMP Unknown)   SpO2 94%   BMI 22.71 kg/m²     Pain Screening:     ECOG    Physical Exam  Constitutional:       General: She is not in acute distress.     Appearance: Normal appearance. She is well-developed.   HENT:      Head: " Normocephalic and atraumatic.   Cardiovascular:      Heart sounds: Normal heart sounds.   Pulmonary:      Breath sounds: Normal breath sounds.   Chest:   Breasts:     Right: No swelling, bleeding, inverted nipple, mass, nipple discharge, skin change or tenderness.      Left: No swelling, bleeding, inverted nipple, mass, nipple discharge, skin change or tenderness.      Comments: Port right infraclavicular  Abdominal:      Palpations: Abdomen is soft.   Musculoskeletal:      Right lower leg: No edema.      Left lower leg: No edema.   Lymphadenopathy:      Upper Body:      Right upper body: No supraclavicular, axillary or pectoral adenopathy.      Left upper body: No supraclavicular, axillary or pectoral adenopathy.   Neurological:      Mental Status: She is alert and oriented to person, place, and time.   Psychiatric:         Mood and Affect: Mood normal.          Labs: I have reviewed pertinent labs.     Vu Eggs Overnight showed a variant of uncertain significance but no mutations      Hospital Outpatient Visit on 03/12/2025   Component Date Value Ref Range Status    WBC 03/12/2025 6.57  4.31 - 10.16 Thousand/uL Final    RBC 03/12/2025 3.76 (L)  3.81 - 5.12 Million/uL Final    Hemoglobin 03/12/2025 12.3  11.5 - 15.4 g/dL Final    Hematocrit 03/12/2025 37.4  34.8 - 46.1 % Final    MCV 03/12/2025 100 (H)  82 - 98 fL Final    MCH 03/12/2025 32.7  26.8 - 34.3 pg Final    MCHC 03/12/2025 32.9  31.4 - 37.4 g/dL Final    RDW 03/12/2025 13.3  11.6 - 15.1 % Final    MPV 03/12/2025 9.2  8.9 - 12.7 fL Final    Platelets 03/12/2025 200  149 - 390 Thousands/uL Final    nRBC 03/12/2025 0  /100 WBCs Final    Segmented % 03/12/2025 77 (H)  43 - 75 % Final    Immature Grans % 03/12/2025 0  0 - 2 % Final    Lymphocytes % 03/12/2025 15  14 - 44 % Final    Monocytes % 03/12/2025 7  4 - 12 % Final    Eosinophils Relative 03/12/2025 0  0 - 6 % Final    Basophils Relative 03/12/2025 1  0 - 1 % Final    Absolute Neutrophils 03/12/2025 5.08   1.85 - 7.62 Thousands/µL Final    Absolute Immature Grans 03/12/2025 0.02  0.00 - 0.20 Thousand/uL Final    Absolute Lymphocytes 03/12/2025 0.95  0.60 - 4.47 Thousands/µL Final    Absolute Monocytes 03/12/2025 0.48  0.17 - 1.22 Thousand/µL Final    Eosinophils Absolute 03/12/2025 0.01  0.00 - 0.61 Thousand/µL Final    Basophils Absolute 03/12/2025 0.03  0.00 - 0.10 Thousands/µL Final    Sodium 03/12/2025 136  135 - 147 mmol/L Final    Potassium 03/12/2025 3.6  3.5 - 5.3 mmol/L Final    Chloride 03/12/2025 104  96 - 108 mmol/L Final    CO2 03/12/2025 27  21 - 32 mmol/L Final    ANION GAP 03/12/2025 5  4 - 13 mmol/L Final    BUN 03/12/2025 14  5 - 25 mg/dL Final    Creatinine 03/12/2025 0.47 (L)  0.60 - 1.30 mg/dL Final    Standardized to IDMS reference method    Glucose 03/12/2025 85  65 - 140 mg/dL Final    If the patient is fasting, the ADA then defines impaired fasting glucose as > 100 mg/dL and diabetes as > or equal to 123 mg/dL.    Calcium 03/12/2025 9.0  8.4 - 10.2 mg/dL Final    AST 03/12/2025 25  13 - 39 U/L Final    ALT 03/12/2025 40  7 - 52 U/L Final    Specimen collection should occur prior to Sulfasalazine administration due to the potential for falsely depressed results.     Alkaline Phosphatase 03/12/2025 85  34 - 104 U/L Final    Total Protein 03/12/2025 6.3 (L)  6.4 - 8.4 g/dL Final    Albumin 03/12/2025 4.3  3.5 - 5.0 g/dL Final    Total Bilirubin 03/12/2025 0.33  0.20 - 1.00 mg/dL Final    Use of this assay is not recommended for patients undergoing treatment with eltrombopag due to the potential for falsely elevated results.  N-acetyl-p-benzoquinone imine (metabolite of Acetaminophen) will generate erroneously low results in samples for patients that have taken an overdose of Acetaminophen.    eGFR 03/12/2025 104  ml/min/1.73sq m Final    Color, UA 03/13/2025 Light Yellow   Final    Clarity, UA 03/13/2025 Clear   Final    Specific Gravity, UA 03/13/2025 1.017  1.003 - 1.030 Final    pH, UA  03/13/2025 5.5  4.5, 5.0, 5.5, 6.0, 6.5, 7.0, 7.5, 8.0 Final    Leukocytes, UA 03/13/2025 Negative  Negative Final    Nitrite, UA 03/13/2025 Negative  Negative Final    Protein, UA 03/13/2025 Negative  Negative mg/dl Final    Glucose, UA 03/13/2025 Negative  Negative mg/dl Final    Ketones, UA 03/13/2025 Negative  Negative mg/dl Final    Urobilinogen, UA 03/13/2025 <2.0  <2.0 mg/dl mg/dl Final    Bilirubin, UA 03/13/2025 Negative  Negative Final    Occult Blood, UA 03/13/2025 Negative  Negative Final   Office Visit on 03/11/2025   Component Date Value Ref Range Status    Ventricular Rate 03/11/2025 90  BPM Final    Atrial Rate 03/11/2025 90  BPM Final    HI Interval 03/11/2025 152  ms Final    QRSD Interval 03/11/2025 78  ms Final    QT Interval 03/11/2025 364  ms Final    QTC Interval 03/11/2025 446  ms Final    P Axis 03/11/2025 52  degrees Final    QRS [place] 03/11/2025 60  degrees Final    T Wave [place] 03/11/2025 50  degrees Final   Hospital Outpatient Visit on 03/04/2025   Component Date Value Ref Range Status    BSA 03/04/2025 1.91  m2 Final    A4C EF 03/04/2025 59  % Final    LVIDd 03/04/2025 4.90  cm Final    LVIDS 03/04/2025 3.50  cm Final    IVSd 03/04/2025 1.00  cm Final    LVPWd 03/04/2025 1.00  cm Final    FS 03/04/2025 29  28 - 44 Final    MV E' Tissue Velocity Septal 03/04/2025 9  cm/s Final    LA Volume Index (BP) 03/04/2025 23.0  mL/m2 Final    E/A ratio 03/04/2025 0.69   Final    E wave deceleration time 03/04/2025 138  ms Final    MV Peak E Myles 03/04/2025 58  cm/s Final    MV Peak A Myles 03/04/2025 0.84  m/s Final    RVID d 03/04/2025 3.2  cm Final    Tricuspid annular plane systolic e* 03/04/2025 2.00  cm Final    LA size 03/04/2025 3.6  cm Final    LA length (A2C) 03/04/2025 4.40  cm Final    LA volume (BP) 03/04/2025 44  mL Final    RAA A4C 03/04/2025 11.4  cm2 Final    MV stenosis pressure 1/2 time 03/04/2025 40  ms Final    MV valve area p 1/2 method 03/04/2025 5.50   Final    TR Peak Myles  03/04/2025 2.1  m/s Final    Triscuspid Valve Regurgitation Pea* 03/04/2025 18.0  mmHg Final    Ao root 03/04/2025 3.20  cm Final    Asc Ao 03/04/2025 3.3  cm Final    Tricuspid valve peak regurgitation* 03/04/2025 2.11  m/s Final    Left ventricular stroke volume (2D) 03/04/2025 63.00  mL Final    IVS 03/04/2025 1  cm Final    LEFT VENTRICLE SYSTOLIC VOLUME (MO* 03/04/2025 49  mL Final    LV DIASTOLIC VOLUME (MOD BIPLANE) * 03/04/2025 113  mL Final    Left [facility] Area-systolic Four Tati* 03/04/2025 15.7  cm2 Final    Left [facility] Area-systolic Apical T* 03/04/2025 15  cm2 Final    LVSV, 2D 03/04/2025 63  mL Final    LV EF 03/04/2025 58   Final    Est. RA pres 03/04/2025 8.0  mmHg Final    Right Ventricular Peak Systolic Pr* 03/04/2025 26.00  mmHg Final     Pathology: I have reviewed pathology reports described above.    Radiology Results Review: I personally reviewed the following image studies in PACS and associated radiology reports: Postbiopsy mammogram right breast 10/4/2024. My interpretation of the radiology images/reports is: Unifocal carcinoma with active Sirisha in the breast and axilla.  Concordance: yes    2/24/2025 ultrasound of the right breast and axilla shows a markedly decreased size of the tumor measuring 6 mm down from 17 mm, lymph node in the axilla with cortical thickness of 3 mm down from 11 mm

## 2025-03-28 NOTE — ANESTHESIA POSTPROCEDURE EVALUATION
Post-Op Assessment Note    CV Status:  Stable    Pain management: adequate       Mental Status:  Sleepy   Hydration Status:  Euvolemic   PONV Controlled:  Controlled   Airway Patency:  Patent  Two or more mitigation strategies used for obstructive sleep apnea   Post Op Vitals Reviewed: Yes    No anethesia notable event occurred.    Staff: Anesthesiologist, CRNA           Last Filed PACU Vitals:  Vitals Value Taken Time   Temp 97 °F (36.1 °C) 03/28/25 1525   Pulse 72 03/28/25 1526   /57 03/28/25 1525   Resp 12 03/28/25 1525   SpO2 99 % 03/28/25 1526   Vitals shown include unfiled device data.

## 2025-03-28 NOTE — ANESTHESIA POSTPROCEDURE EVALUATION
Post-Op Assessment Note    CV Status:  Stable  Pain Score: 3    Pain management: adequate       Mental Status:  Alert and awake   Hydration Status:  Euvolemic   PONV Controlled:  Controlled   Airway Patency:  Patent  Airway: intubated     Post Op Vitals Reviewed: Yes    No anethesia notable event occurred.    Staff: Anesthesiologist           Last Filed PACU Vitals:  Vitals Value Taken Time   Temp 98.6 °F (37 °C) 03/28/25 1613   Pulse 70 03/28/25 1615   /56 03/28/25 1613   Resp 26 03/28/25 1615   SpO2 94 % 03/28/25 1615   Vitals shown include unfiled device data.    Modified Kathleen:     Vitals Value Taken Time   Activity 2 03/28/25 1613   Respiration 2 03/28/25 1613   Circulation 2 03/28/25 1613   Consciousness 2 03/28/25 1613   Oxygen Saturation 2 03/28/25 1613     Modified Kathleen Score: 10

## 2025-03-28 NOTE — ANESTHESIA PREPROCEDURE EVALUATION
Procedure:  RIGHT BREAST ROSA ISELA LOCALIZED LUMPECTOMY, BIOPSY LYMPH NODE SENTINEL, LYMPHATIC MAPPING & LYMPHOSCINTIGRAPHY, EXISION OF CLIPPED RIGHT AXILLARY NODE, POSSIBLE RIGHT AXILLARY DISSECTION; 1100 NUC MED (Right: Breast)  REMOVAL VENOUS PORT (PORT-A-CATH) (Right: Chest)    Relevant Problems   ANESTHESIA (within normal limits)      CARDIO   (+) Migraine with aura and without status migrainosus, not intractable      GI/HEPATIC   (+) GERD (gastroesophageal reflux disease)      GYN   (+) Malignant neoplasm of lower-inner quadrant of right breast of female, estrogen receptor negative (HCC)      NEURO/PSYCH   (+) Anxiety   (+) Migraine with aura and without status migrainosus, not intractable      Endocrine   (+) Thyroid nodule      Obstetrics/Gynecology   (+) Postmenopausal bleeding      Oncology   (+) S/P chemotherapy, time since less than 4 weeks      Other   (+) Breast cancer metastasized to axillary lymph node, right (HCC)   (+) Port-A-Cath in place    2025 Echo:    Left Ventricle: Left ventricular cavity size is normal. Wall thickness is normal. The left ventricular ejection fraction is 58%. Systolic function is normal. Wall motion is normal. Diastolic function is normal.  Global longitudinal strain was decreased at -16.7%, especially reduced in the anterior and lateral walls.    Right Ventricle: Right ventricular cavity size is normal. Systolic function is normal. Moderator band was present.    Mitral Valve: There is mild regurgitation.    Tricuspid Valve: There is trace regurgitation. The right ventricular systolic pressure is normal. The estimated right ventricular systolic pressure is 26.00 mmHg.    Pulmonic Valve: There is mild regurgitation.    IVC/SVC: The right atrial pressure is estimated at 8.0 mmHg. The inferior vena cava is normal in size. Respirophasic changes were blunted (less than 50% variation).    Physical Exam    Airway  Comment: Fullness in neck at thyroid  Mallampati score: II  TM Distance:  >3 FB  Neck ROM: full     Dental   No notable dental hx     Cardiovascular  Rhythm: regular, Rate: normal, Cardiovascular exam normal    Pulmonary  Pulmonary exam normal Breath sounds clear to auscultation    Other Findings  post-pubertal.      Anesthesia Plan  ASA Score- 3     Anesthesia Type- general with ASA Monitors.         Additional Monitors:     Airway Plan: ETT.    Comment: Requests phenergan to help with migraine ha which she has experienced post op in the past    Marked enlargement of the left lobe of the thyroid gland which measures 6.1 x 4.5 x 7.7 cm extending into the upper mediastinum and deviating the trachea to the right..       Plan Factors-Exercise tolerance (METS): >4 METS.    Chart reviewed. EKG reviewed.  Existing labs reviewed. Patient summary reviewed.    Patient is not a current smoker.              Induction- intravenous.    Postoperative Plan-         Informed Consent- Anesthetic plan and risks discussed with patient.  I personally reviewed this patient with the CRNA. Discussed and agreed on the Anesthesia Plan with the CRNA..      NPO Status:  Vitals Value Taken Time   Date of last liquid 03/28/25 03/28/25 1015   Time of last liquid 0745 03/28/25 1015   Date of last solid 03/27/25 03/28/25 1015   Time of last solid 2200 03/28/25 1015

## 2025-03-28 NOTE — DISCHARGE INSTR - AVS FIRST PAGE
POST-OPERATIVE CARE INSTRUCTIONS       Care after your procedure:   General  Rest and relax for 24 hours, then gradually return to normal activities.  Do not perform any heavy lifting or strenuous physical activities for 14 days.  Your activity restrictions will be re-evaluated at your post op visit.  Drink clear liquids until you are certain there is no nausea, then resume a normal diet.  Do not drink alcohol, drive any vehicle, operate mechanical equipment or make critical decisions for at least 24 hours and until you are off any narcotic pain medications.  The Incision  Your incision is closed with:   dissolvable stiches just underneath the skin.                The incision is also covered with:                          clear waterproof glue  A gauze-pad is covering the wound.  Wound care  Remove your gauze-pad after 24 hours.   You may then shower using soap and water to clean your incision. Gently dry the wound.  You may redress your wound with additional gauze and tape if you choose.  A little bruising at the wound site is normal.    Medication  Resume all previous medications  Pain Medication Instructions: over the counter tylenol or prescription percocet if needed          Other (If applicable)  Wear a post-surgical bra around the clock.  May use ice to the incision site(s) for the next 24-48 hours, twice daily.   Call your  doctor if you have any of the following:  Redness, swelling, heat, drainage, and/or bleeding from your wound  Chills or fever ( above 101' F )  Pain, not relieved with the above medications  If you have any questions or problems call our office [phone]    Follow-up appointment:  As scheduled

## 2025-03-31 ENCOUNTER — TELEPHONE (OUTPATIENT)
Age: 65
End: 2025-03-31

## 2025-03-31 ENCOUNTER — HOSPITAL ENCOUNTER (OUTPATIENT)
Dept: VASCULAR ULTRASOUND | Facility: HOSPITAL | Age: 65
Discharge: HOME | End: 2025-03-31
Attending: INTERNAL MEDICINE
Payer: COMMERCIAL

## 2025-03-31 DIAGNOSIS — I82.629 ACUTE DEEP VEIN THROMBOSIS (DVT) OF UPPER EXTREMITY, UNSPECIFIED LATERALITY, UNSPECIFIED VEIN (HCC): ICD-10-CM

## 2025-03-31 DIAGNOSIS — I82.629 ACUTE DEEP VEIN THROMBOSIS (DVT) OF UPPER EXTREMITY, UNSPECIFIED LATERALITY, UNSPECIFIED VEIN (HCC): Primary | ICD-10-CM

## 2025-03-31 PROCEDURE — 93971 EXTREMITY STUDY: CPT

## 2025-03-31 NOTE — TELEPHONE ENCOUNTER
How does the incision look? WNL    Do you have fever or chills? No    Are you having any pain? Yes     Is it controlled with your pain medication? Yes    What medications are you currently taking for pain control? Tylenol 500mg at 10am with improvement, pain well controlled  Last took percocet 5am today   Reviewed maximum daily tylenol dose    Do you have a drain(s)? No    Verify post-op appointment date and time  [x] 4/14 11:30am with Dr. Magaña    Do you have any other questions or concerns? Patient has not resumes her Eliquis since surgery. I advised her per discharge instructions she was ok to restart Elqiuis yesterday 3/30. Patient would like to know if it is necessary to restart Eliquis now that her port is removed.    Best callback number: [phone], ok to leave VM

## 2025-03-31 NOTE — TELEPHONE ENCOUNTER
Left VM for patient to let her know that she does not need to restart the Eliquis right now, but we do want to schedule her for a stat venous duplex. I advised her once we have the result we will let her know if she needs to continue the Eliquis. Callback number left for patient.

## 2025-04-01 ENCOUNTER — TELEPHONE (OUTPATIENT)
Dept: HEMATOLOGY ONCOLOGY | Facility: CLINIC | Age: 65
End: 2025-04-01

## 2025-04-01 DIAGNOSIS — I82.629 ACUTE DEEP VEIN THROMBOSIS (DVT) OF UPPER EXTREMITY, UNSPECIFIED LATERALITY, UNSPECIFIED VEIN (HCC): Primary | ICD-10-CM

## 2025-04-01 PROCEDURE — 93971 EXTREMITY STUDY: CPT | Performed by: INTERNAL MEDICINE

## 2025-04-01 NOTE — TELEPHONE ENCOUNTER
Telephone Encounter: Medical Oncology:  Amy Clement 64 y.o. female MRN: [id number]  Unit/Bed#:  Encounter: [id number]    Telephone Encounter:  Telephone Query:  Description:    69-year-old female with history of node positive HER2 positive right-sided breast cancer status post neoadjuvant treatment and underwent lumpectomy/port removal on 3/28 with prior neoadjuvant treatment course complicated by acute IJ vein thrombosis associated with port around 2/11 with repeat ultrasound showing chronic DVT with no clot propagation or new clot from 3/31. Patient called this evening to inquire about whether or not she should restart her Eliquis.    Discussed with patient will reach out to primary team to contact her in a.m. with further recommendations    René Ulrich D.O.  Hematology-Oncology Fellow (PGY-4)

## 2025-04-01 NOTE — TELEPHONE ENCOUNTER
Spoke with patient who had her venous duplex completed yesterday 3/31 and would like to know if she should restart her Eliquis 5mg BID tonight. I let her know Dr. Gonzalez is now out of office but I will review with the covering provider and will contact her with updates. Epic secure chat sent to Dr. Ulrich. She verbalized understanding and agreeable with plan.    Best callback number: [phone]

## 2025-04-02 ENCOUNTER — TELEPHONE (OUTPATIENT)
Age: 65
End: 2025-04-02

## 2025-04-02 ENCOUNTER — TELEPHONE (OUTPATIENT)
Dept: SURGICAL ONCOLOGY | Facility: CLINIC | Age: 65
End: 2025-04-02

## 2025-04-02 PROCEDURE — 88341 IMHCHEM/IMCYTCHM EA ADD ANTB: CPT | Performed by: STUDENT IN AN ORGANIZED HEALTH CARE EDUCATION/TRAINING PROGRAM

## 2025-04-02 PROCEDURE — 88305 TISSUE EXAM BY PATHOLOGIST: CPT | Performed by: STUDENT IN AN ORGANIZED HEALTH CARE EDUCATION/TRAINING PROGRAM

## 2025-04-02 PROCEDURE — 88300 SURGICAL PATH GROSS: CPT | Performed by: STUDENT IN AN ORGANIZED HEALTH CARE EDUCATION/TRAINING PROGRAM

## 2025-04-02 PROCEDURE — 88307 TISSUE EXAM BY PATHOLOGIST: CPT | Performed by: STUDENT IN AN ORGANIZED HEALTH CARE EDUCATION/TRAINING PROGRAM

## 2025-04-02 PROCEDURE — 88342 IMHCHEM/IMCYTCHM 1ST ANTB: CPT | Performed by: STUDENT IN AN ORGANIZED HEALTH CARE EDUCATION/TRAINING PROGRAM

## 2025-04-02 NOTE — TELEPHONE ENCOUNTER
E mail received today:    From: Millicent Garces <[email or web address]>   Sent: Wednesday, April 2, 2025 12:05 PM  To: [employer] <[email or web address]>  Cc: INFUSION FINANCIAL REPS <[email or web address]>; [employer] <[email or web address]>  Subject: Amy Clement 1960 - MRN: [id number]    Hello,    Coverage for above [employer] issue. Patient has [employer] with our insurance since July 2024. Patient is coming back as inactive on RTE and I am not able to pull her up on our “My ” portal. I reached out to the patient and she stated nothing has changed and she has been paying into [employer].     I am not sure what the next steps would be. Can you please assist?     Thank you,     Millicent Garces CMA ([facility])   Precertification Specialist    From: Millicent Garces <[email or web address]>   Sent: Wednesday, April 2, 2025 12:06 PM  To: [employer] <[email or web address]>  Cc: INFUSION FINANCIAL REPS <[email or web address]>; [employer] <[email or web address]>  Subject: RE: Amy Clement 1960 - MRN: [id number]    ** Patient is scheduled for full chemo treatment Herceptin + Perjeta on 4/10 at [facility]    Millicent Garces CMA ([facility])   Precertification Specialist    I placed a call to [facility] to check the patient's eligibility benefits [phone].   They're only taking VM messages due to heavy volume at this time, so I left my call back information and the information on the patient for them to get back to me.     From: Brayan Bean <[email or web address]>   Sent: Wednesday, April 2, 2025 12:45 PM  To: Millicent Garces <[email or web address]>; [employer] <[email or web address]>  Cc: INFUSION FINANCIAL REPS <[email or web address]>; [employer]  <[email or web address]>  Subject: RE: Amy Clement 1960 - MRN: [id number]    I will review.      Regards    Brayan Bean  Oncology

## 2025-04-03 ENCOUNTER — TELEPHONE (OUTPATIENT)
Dept: SURGICAL ONCOLOGY | Facility: CLINIC | Age: 65
End: 2025-04-03

## 2025-04-03 ENCOUNTER — OFFICE VISIT (OUTPATIENT)
Age: 65
End: 2025-04-03
Payer: COMMERCIAL

## 2025-04-03 VITALS
HEART RATE: 77 BPM | DIASTOLIC BLOOD PRESSURE: 56 MMHG | BODY MASS INDEX: 22.67 KG/M2 | OXYGEN SATURATION: 97 % | WEIGHT: 158 LBS | RESPIRATION RATE: 18 BRPM | SYSTOLIC BLOOD PRESSURE: 109 MMHG | TEMPERATURE: 97.2 F

## 2025-04-03 DIAGNOSIS — H00.014 HORDEOLUM EXTERNUM OF LEFT UPPER EYELID: Primary | ICD-10-CM

## 2025-04-03 PROCEDURE — 99213 OFFICE O/P EST LOW 20 MIN: CPT | Performed by: PHYSICIAN ASSISTANT

## 2025-04-03 RX ORDER — ERYTHROMYCIN 5 MG/G
0.5 OINTMENT OPHTHALMIC EVERY 8 HOURS SCHEDULED
Qty: 21 G | Refills: 0 | Status: SHIPPED | OUTPATIENT
Start: 2025-04-03 | End: 2025-04-10

## 2025-04-03 RX ORDER — OLOPATADINE HYDROCHLORIDE 1 MG/ML
1 SOLUTION/ DROPS OPHTHALMIC 2 TIMES DAILY
Qty: 5 ML | Refills: 0 | Status: SHIPPED | OUTPATIENT
Start: 2025-04-03

## 2025-04-03 NOTE — TELEPHONE ENCOUNTER
Followed up on the patient's benefits on [employer] today and saw patient active. Sent screen print Millicent VANCE to review.

## 2025-04-03 NOTE — PROGRESS NOTES
[facility]        NAME: Amy Clement is a 64 y.o. female  : 1960    MRN: [id number]  DATE: April 3, 2025  TIME: 12:11 PM    Assessment and Plan   Hordeolum externum of left upper eyelid [H00.014]  1. Hordeolum externum of left upper eyelid  erythromycin (ILOTYCIN) ophthalmic ointment    olopatadine (PATANOL) 0.1 % ophthalmic solution          Follow-up with eye specialist if no improvement    The patient and/or parent/legal guardian verbalized understanding of exam findings and   Treatment plan. We engaged in the shared decision-making process and treatment options were   discussed at length with the patient.  All questions, concerns and complaints were answered and   addressed to the patient's' and/or parent/legal guardians's satisfaction.    Patient Instructions   There are no Patient Instructions on file for this visit.    Follow up with PCP in 3-5 days.  Proceed to  ER if symptoms worsen.    If tests are performed, our office will contact you with results only if   changes need to made to the care plan discussed with you at the visit.   [facility]     Chief Complaint     Chief Complaint   Patient presents with   • Eye Problem     Pt states she developed left eye swelling and itching since yesterday. Pt used warm and cold compresses.          History of Present Illness       HPI  Reports she developed left eye upper eyelid swelling itching since yesterday.  No visual disturbances.  No discharge.  No use of contacts.  She has been doing cold and hot compresses.  Also taking Tylenol and oral antihistamine.    Review of Systems   Review of Systems  All other related systems reviewed with patient or accompanying historian and are negative except as noted in HPI    Current Medications       Current Outpatient Medications:   •  apixaban (Eliquis) 5 mg, Take 1 tablet (5 mg total) by mouth 2 (two) times a day, Disp: 60 tablet, Rfl: 1  •  Bacillus  Coagulans-Inulin (Probiotic) 1-250 BILLION-MG CAPS, Take by mouth, Disp: , Rfl:   •  calcium citrate (CALCITRATE) 950 (200 Ca) MG tablet, Take 1 tablet by mouth daily, Disp: , Rfl:   •  erythromycin (ILOTYCIN) ophthalmic ointment, Administer 0.5 inches into the left eye every 8 (eight) hours for 7 days, Disp: 21 g, Rfl: 0  •  Glucosamine-Chondroitin (GLUCOSAMINE CHONDR COMPLEX PO), Take by mouth daily , Disp: , Rfl:   •  latanoprost (XALATAN) 0.005 % ophthalmic solution, Administer 1 drop to both eyes daily at bedtime, Disp: , Rfl:   •  lidocaine-prilocaine (EMLA) cream, Apply topically as needed for mild pain, Disp: 30 g, Rfl: 2  •  Multiple Vitamins-Minerals (CENTRUM SILVER) tablet, Take by mouth, Disp: , Rfl:   •  olopatadine (PATANOL) 0.1 % ophthalmic solution, Administer 1 drop into the left eye 2 (two) times a day, Disp: 5 mL, Rfl: 0  •  omeprazole (PriLOSEC) 20 mg delayed release capsule, TAKE 1 CAPSULE BY MOUTH EVERY DAY, Disp: 30 capsule, Rfl: 1  •  prochlorperazine (COMPAZINE) 10 mg tablet, Take 1 tablet (10 mg total) by mouth every 6 (six) hours as needed for nausea or vomiting, Disp: 30 tablet, Rfl: 2  •  pyridoxine (VITAMIN B6) 50 mg tablet, Take 50 mg by mouth daily, Disp: , Rfl:   •  timolol (TIMOPTIC) 0.5 % ophthalmic solution, Administer 1 drop to both eyes daily, Disp: , Rfl:   •  TURMERIC-GINGER PO, Take by mouth, Disp: , Rfl:   •  bimatoprost (LATISSE) 0.03 % ophthalmic solution, Administer 1 drop to both eyes daily at bedtime Place one drop on applicator and apply evenly along the skin of the upper eyelid at base of eyelashes once daily at bedtime; repeat procedure for second eye (use a clean applicator). (Patient not taking: Reported on 4/3/2025), Disp: 5 mL, Rfl: 1  •  clindamycin (CLEOCIN T) 1 % lotion, Apply topically 2 (two) times a day Apply to affected areas. (Patient not taking: Reported on 4/3/2025), Disp: 60 mL, Rfl: 1  •  diphenoxylate-atropine (LOMOTIL) 2.5-0.025 mg per tablet, Take  1 tablet by mouth 4 (four) times a day as needed for diarrhea (Patient not taking: Reported on 4/3/2025), Disp: 30 tablet, Rfl: 0  •  ondansetron (ZOFRAN-ODT) 8 mg disintegrating tablet, Take 1 tablet (8 mg total) by mouth every 8 (eight) hours as needed for nausea or vomiting (Patient not taking: Reported on 4/3/2025), Disp: 30 tablet, Rfl: 2  •  oxyCODONE-acetaminophen (Percocet) 5-325 mg per tablet, Take 1 tablet by mouth every 6 (six) hours as needed for moderate pain Max Daily Amount: 4 tablets, Disp: 12 tablet, Rfl: 0  •  PARoxetine (PAXIL) 10 mg tablet, Take 1 tablet (10 mg total) by mouth daily (Patient not taking: Reported on 3/21/2025), Disp: 90 tablet, Rfl: 3    Current Allergies     Allergies as of 04/03/2025 - Reviewed 04/03/2025   Allergen Reaction Noted   • Monistat [miconazole] Itching 01/23/2024   • Pollen extract Itching, Swelling, and Sneezing 08/08/2018            The following portions of the patient's history were reviewed and updated as appropriate: allergies, current medications, past family history, past medical history, past social history, past surgical history and problem list.     Past Medical History:   Diagnosis Date   • Acute cystitis    • Anxiety     medication for hot flasshes   • Breast lump .    last assessed 01/16/2017   • Dermatitis     last assessed 02/27/2017   • Disease of thyroid gland     last assessed 02/23/2016   • Diverticulitis 11/2020   • Diverticulitis of colon November 25 July 2016 1st dx   • Dysuria     last assessed 04/28/2017   • ETD (Eustachian tube dysfunction), bilateral     last assessed 02/23/2016   • Fibroid 2012   • GERD (gastroesophageal reflux disease) Occasionally   • Glaucoma     Left   • Hematuria    • History of chemotherapy    • Migraine 1985   • Miscarriage 1990   • Polyp at cervical os    • PONV (postoperative nausea and vomiting)    • Varicella 1966       Past Surgical History:   Procedure Laterality Date   • BREAST BIOPSY Right 2009    2 areas  benign   • CATARACT EXTRACTION Bilateral    • COLONOSCOPY     • FL GUIDED CENTRAL VENOUS ACCESS DEVICE INSERTION  11/5/2024   • PA BX/EXC LYMPH NODE OPEN SUPERFICIAL Right 3/28/2025    Procedure: RIGHT BREAST ROSA ISELA LOCALIZED LUMPECTOMY, BIOPSY LYMPH NODE SENTINEL, LYMPHATIC MAPPING & LYMPHOSCINTIGRAPHY, EXISION OF CLIPPED RIGHT AXILLARY NODE, 1100 NUC MED;  Surgeon: Koki Magaña MD;  [facility];  Service: Surgical Oncology   • PA EXC BREAST LES PREOP PLMT RAD MARKER OPEN 1 LES Right 3/28/2025    Procedure: RIGHT BREAST ROSA ISELA LOCALIZED LUMPECTOMY, BIOPSY LYMPH NODE SENTINEL, LYMPHATIC MAPPING & LYMPHOSCINTIGRAPHY, EXISION OF CLIPPED RIGHT AXILLARY NODE, 1100 NUC MED;  Surgeon: Koki Magaña MD;  [facility];  Service: Surgical Oncology   • PA HYSTEROSCOPY BX ENDOMETRIUM&/POLYPC W/WO D&C N/A 02/23/2024    Procedure: DILATATION AND CURETTAGE (D&C) WITH HYSTEROSCOPY;  Surgeon: Joselin Alaniz MD;  Location: [facility];  Service: Gynecology   • PA HYSTEROSCOPY BX ENDOMETRIUM&/POLYPC W/WO D&C N/A 02/23/2024    Procedure: POLYPECTOMY;  Surgeon: Joselin Alaniz MD;  [facility];  Service: Gynecology   • PA INSJ TUNNELED CTR VAD W/SUBQ PORT AGE 5 YR/> N/A 11/5/2024    Procedure: INSERTION VENOUS PORT ( PORT-A-CATH) IR;  Surgeon: Chandler Ball DO;  [facility];  Service: Interventional Radiology   • PA INTRAOP SENTINEL LYMPH NODE ID W/DYE INJECTION Right 3/28/2025    Procedure: RIGHT BREAST ROSA ISELA LOCALIZED LUMPECTOMY, BIOPSY LYMPH NODE SENTINEL, LYMPHATIC MAPPING & LYMPHOSCINTIGRAPHY, EXISION OF CLIPPED RIGHT AXILLARY NODE, 1100 NUC MED;  Surgeon: Koki Magaña MD;  [facility];  Service: Surgical Oncology   • PA MASTECTOMY PARTIAL Right 3/28/2025    Procedure: RIGHT BREAST ROSA ISELA LOCALIZED LUMPECTOMY, BIOPSY LYMPH NODE SENTINEL, LYMPHATIC MAPPING & LYMPHOSCINTIGRAPHY, EXISION OF CLIPPED RIGHT AXILLARY NODE, 1100 NUC MED;  Surgeon: Koki Magaña MD;  [facility];  Service: Surgical Oncology   • AL RMVL YUVAL CTR VAD W/SUBQ PORT/ CTR/PRPH INSJ Right 3/28/2025    Procedure: REMOVAL VENOUS PORT (PORT-A-CATH);  Surgeon: Koki Magaña MD;  [facility];  Service: Surgical Oncology   • TUBAL LIGATION     • US GUIDED BREAST BIOPSY RIGHT COMPLETE Right 10/04/2024   • US GUIDED BREAST LYMPH NODE BIOPSY RIGHT Right 10/04/2024       Family History   Problem Relation Age of Onset   • Breast cancer Mother 47   • Cancer Mother         Breast cancer   • Hypertension Father         Late life dx   • Rectal cancer Father    • Migraines Father 85   • Cancer Father         Colon cancer   • No Known Problems Sister    • Throat cancer Brother    • Cancer Brother         bladder   • Heart disease Maternal Grandmother    • No Known Problems Paternal Grandmother    • No Known Problems Daughter    • Kidney cancer Son 38   • No Known Problems Paternal Aunt          Medications have been verified.        Objective   /56   Pulse 77   Temp (!) 97.2 °F (36.2 °C)   Resp 18   Wt 71.7 kg (158 lb)   LMP  (LMP Unknown)   SpO2 97%   BMI 22.67 kg/m²   No LMP recorded (lmp unknown). Patient is postmenopausal.       Physical Exam     Physical Exam  Constitutional:       General: She is not in acute distress.     Appearance: She is well-developed.   HENT:      Head: Normocephalic and atraumatic.   Eyes:      General: No scleral icterus.        Right eye: No discharge.         Left eye: No discharge.      Extraocular Movements: Extraocular movements intact.      Conjunctiva/sclera: Conjunctivae normal.      Pupils: Pupils are equal, round, and reactive to light.      Comments: Hordeolum externum of the left upper eyelid with mild swelling   Neck:      Trachea: No tracheal deviation.   Pulmonary:      Effort: Pulmonary effort is normal. No respiratory distress.      Breath sounds: No stridor.   Musculoskeletal:      Cervical back: Normal range of motion.   Skin:     General: Skin is  "warm and dry.      Findings: No erythema.   Neurological:      Mental Status: She is alert and oriented to person, place, and time.   Psychiatric:         Behavior: Behavior normal.         Ortho Exam        Procedures  No Procedures performed today        Note: Portions of this record may have been created with voice recognition software. Occasional wrong word or \"sound a like\" substitutions may have occurred due to the inherent limitations of voice recognition software. Please read the chart carefully and recognize, using context, where substitutions have occurred.*      "

## 2025-04-08 ENCOUNTER — TELEPHONE (OUTPATIENT)
Dept: HEMATOLOGY ONCOLOGY | Facility: CLINIC | Age: 65
End: 2025-04-08

## 2025-04-08 NOTE — TELEPHONE ENCOUNTER
Left detailed VM for patient to let her know that her inf appt on 4/10 is going to be canceled because Dr. Gonzalez has to see her for her post-op visit before getting any additional treatments. I asked that she call me back directly to discuss further.

## 2025-04-10 ENCOUNTER — HOSPITAL ENCOUNTER (OUTPATIENT)
Dept: INFUSION CENTER | Facility: CLINIC | Age: 65
Discharge: HOME | End: 2025-04-10

## 2025-04-10 DIAGNOSIS — Z17.1 MALIGNANT NEOPLASM OF LOWER-INNER QUADRANT OF RIGHT BREAST OF FEMALE, ESTROGEN RECEPTOR NEGATIVE (HCC): ICD-10-CM

## 2025-04-10 DIAGNOSIS — C50.311 MALIGNANT NEOPLASM OF LOWER-INNER QUADRANT OF RIGHT BREAST OF FEMALE, ESTROGEN RECEPTOR NEGATIVE (HCC): ICD-10-CM

## 2025-04-14 ENCOUNTER — TELEPHONE (OUTPATIENT)
Dept: MAMMOGRAPHY | Facility: CLINIC | Age: 65
End: 2025-04-14

## 2025-04-14 ENCOUNTER — TELEPHONE (OUTPATIENT)
Age: 65
End: 2025-04-14

## 2025-04-14 ENCOUNTER — OFFICE VISIT (OUTPATIENT)
Dept: SURGICAL ONCOLOGY | Facility: CLINIC | Age: 65
End: 2025-04-14

## 2025-04-14 VITALS
WEIGHT: 154 LBS | SYSTOLIC BLOOD PRESSURE: 118 MMHG | BODY MASS INDEX: 22.05 KG/M2 | OXYGEN SATURATION: 98 % | HEIGHT: 70 IN | HEART RATE: 81 BPM | TEMPERATURE: 97.4 F | DIASTOLIC BLOOD PRESSURE: 70 MMHG

## 2025-04-14 DIAGNOSIS — C50.311 MALIGNANT NEOPLASM OF LOWER-INNER QUADRANT OF RIGHT BREAST OF FEMALE, ESTROGEN RECEPTOR NEGATIVE (HCC): Primary | ICD-10-CM

## 2025-04-14 DIAGNOSIS — Z17.1 MALIGNANT NEOPLASM OF LOWER-INNER QUADRANT OF RIGHT BREAST OF FEMALE, ESTROGEN RECEPTOR NEGATIVE (HCC): Primary | ICD-10-CM

## 2025-04-14 PROCEDURE — 99024 POSTOP FOLLOW-UP VISIT: CPT | Performed by: SURGERY

## 2025-04-14 NOTE — PROGRESS NOTES
64 y.o. female is here today s/p right lumpectomy and sentinel node biopsy as well as port removal. She reports feeling well overall with brusing in the breast but no additional concerns.      Physical Exam  Constitutional:       General: She is not in acute distress.     Appearance: Normal appearance.   Chest:   Breasts:     Right: Skin change (Well-healing incisions in the breast, axilla and port site with no signs of infection, resolving ecchymosis in the breast) present.   Neurological:      Mental Status: She is alert and oriented to person, place, and time.   Psychiatric:         Mood and Affect: Mood normal.           Diagnoses and all orders for this visit:    Malignant neoplasm of lower-inner quadrant of right breast of female, estrogen receptor negative (HCC)  -     Mammo diagnostic bilateral w 3d and cad; Future        Assessment/Plan: 64-year-old female status post right breast conservation following neoadjuvant chemotherapy.  She is healing well with no signs of infection.  I advised her to use a warm compress to the resolving ecchymosis.  She is already scheduled to follow-up with medical oncology.  She has a radiation consult in place as well.  I will order her diagnostic mammogram for October.  We will see her again in 6 months for survivorship visit or sooner should the need arise.

## 2025-04-14 NOTE — TELEPHONE ENCOUNTER
Call received by Amy,       Patient called [facility] to schedule Diagnostic Mammogram, patient is currently scheduled for 10/8/2026 at 11:30AM. Patient called the office requesting if  can also put an order for US breast due to patient having dense breast. Patient had US breast done 10/4/24.       Patient will like a call back.       Thank you!

## 2025-04-15 NOTE — TELEPHONE ENCOUNTER
Returned patient's call to discuss US order. Discussed that if RBC feels US is indicated at time of dx mammo, US will performed without needing separate US order.    Patient expressed understanding. No further questions or concerns at this time. Patient will keep dx mammo as scheduled.

## 2025-04-18 ENCOUNTER — PATIENT OUTREACH (OUTPATIENT)
Dept: HEMATOLOGY ONCOLOGY | Facility: CLINIC | Age: 65
End: 2025-04-18

## 2025-04-18 NOTE — PROGRESS NOTES
Called and LVM for follow up with patient. Mentioned that I am her patient navigator and I wanted to check in with her to address any questions,concerns or any new barriers to care. Stated that should patient have any questions or concerns she can reach me directly as I was calling form my direct line.

## 2025-04-25 ENCOUNTER — TELEPHONE (OUTPATIENT)
Dept: HEMATOLOGY ONCOLOGY | Facility: CLINIC | Age: 65
End: 2025-04-25

## 2025-04-25 ENCOUNTER — OFFICE VISIT (OUTPATIENT)
Dept: HEMATOLOGY ONCOLOGY | Facility: CLINIC | Age: 65
End: 2025-04-25
Payer: COMMERCIAL

## 2025-04-25 VITALS
SYSTOLIC BLOOD PRESSURE: 119 MMHG | DIASTOLIC BLOOD PRESSURE: 77 MMHG | WEIGHT: 158 LBS | HEART RATE: 81 BPM | RESPIRATION RATE: 15 BRPM | TEMPERATURE: 97.2 F | HEIGHT: 70 IN | BODY MASS INDEX: 22.62 KG/M2 | OXYGEN SATURATION: 98 %

## 2025-04-25 DIAGNOSIS — R21 RASH: Primary | ICD-10-CM

## 2025-04-25 DIAGNOSIS — C50.311 MALIGNANT NEOPLASM OF LOWER-INNER QUADRANT OF RIGHT BREAST OF FEMALE, ESTROGEN RECEPTOR NEGATIVE (HCC): ICD-10-CM

## 2025-04-25 DIAGNOSIS — Z17.1 MALIGNANT NEOPLASM OF LOWER-INNER QUADRANT OF RIGHT BREAST OF FEMALE, ESTROGEN RECEPTOR NEGATIVE (HCC): ICD-10-CM

## 2025-04-25 PROCEDURE — 99214 OFFICE O/P EST MOD 30 MIN: CPT | Performed by: INTERNAL MEDICINE

## 2025-04-25 RX ORDER — METHYLPREDNISOLONE 4 MG/1
TABLET ORAL
Qty: 1 EACH | Refills: 0 | Status: SHIPPED | OUTPATIENT
Start: 2025-04-25

## 2025-04-27 NOTE — PROGRESS NOTES
Name: Amy Clement      : 1960      MRN: [id number]  Encounter Provider: Kathrin Gonzalez DO  Encounter Date: 2025   Encounter department: [facility] [place]  :  Assessment & Plan  Rash    Orders:    methylPREDNISolone 4 MG tablet therapy pack; Use as directed on package    Malignant neoplasm of lower-inner quadrant of right breast of female, estrogen receptor negative (HCC)         We will continue Perjeta and Herceptin to complete a year of therapy.  Her port has been removed so I will give it to her as Phesgo subcutaneously.  I gave her a Medrol Dosepak to see if that helps with the rash and she will be seeing dermatology.  She is seeing radiation on Monday.  I am going to attempt to give her an aromatase inhibitor after radiation.  If it is well-tolerated she will take it.  Her ER was negative but her MD was 70% on her lymph node.  There may be some benefit and if she tolerates it she will take anastrozole for 5 years.  For right now she just wants a break from any other new medications so we meet again in 2 months to discuss the aromatase inhibitor.  She knows to call in the interim with any questions or concerns.    Return in about 2 months (around 2025) for 20 minute follow up visit.    History of Present Illness   Chief Complaint   Patient presents with    Post-op     64-year-old female status post right-sided lumpectomy with sentinel lymph node biopsy for ER negative MD positive HER2 positive breast cancer.  Her final pathologic stage showed in situ disease only.  Ejection fraction prior to surgery was 58%.  She still has a rash on the arms and trunk after chemotherapy.  Topical steroids and Benadryl is not helping the rash.  She is still on Eliquis and her port has been removed.  There was a residual clot in the IJ and I am having her see vascular before we decided for stopping anticoagulation.    Oncology History   Cancer Staging   Malignant  neoplasm of lower-inner quadrant of right breast of female, estrogen receptor negative (HCC)  Staging form: Breast, AJCC 8th Edition  - Clinical stage from 10/4/2024: Stage IIA (cT1c, cN1(f), cM0, G2, ER-, NJ+, HER2+) - Signed by Koki Magaña MD on 10/21/2024  Stage prefix: Initial diagnosis  Method of lymph node assessment: Core biopsy  Histologic grading system: 3 grade system  - Pathologic stage from 3/28/2025: ypTis (DCIS), pN0(sn), cM0 - Signed by Koki Magaña MD on 4/14/2025  Stage prefix: Post-therapy  Method of lymph node assessment: [place] lymph node biopsy  Oncology History Overview Note    10/2024 - right breast and LNBx positive for invasive mammary carcinoma with predominantly lobular features, apocrine and histiocytoid features in less amount, grade 2, ER 1% NJ 10% Her2 3+, [id number]     LN biopsy receptors - ER 1% NJ 70% Her2 3+     11/8/2024 - start TCHP     12/2024 - cycle 3 - taxotere dose reduced by 10%, carbo dose reduced to AUC 5 due to N/V     2/2025 - cycle 6 - taxotere dose reduced by 20% due to fatigue     Started on eliquis due to right IJ DVT associated with port    3/28/2024 - port removed at surgery, right sided partial mastectomy with SLNBX, [id number]    Will continue the rest of treatment with phesgo - 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 Right breast ultrasound-guided biopsy  4 o'clock, 2 cm from nipple (ROSA ISELA)  Invasive mammary carcinoma with predominately lobular features   Apocrine and histiocytoid morphologic features  Grade 2  ER <1, NJ 10, HER2 3+  Lymphovascular invasion present    B. Right axillary lymph node biopsy (ROSA ISELA)  Metastatic carcinoma, compatible with mammary origin  ER <1, NJ 70, HER2 3+    Concordant. Right malignancy appears unifocal; biopsy-proven carcinoma measured 1.7 cm on US. Biopsy-proven  metastatic disease to right axillary lymph node. Left breast clear.     10/4/2024 -  Cancer Staged    Staging form: Breast, AJCC 8th Edition  - Clinical stage from 10/4/2024: Stage IIA (cT1c, cN1(f), cM0, G2, ER-, TN+, HER2+) - Signed by Koki Magaña MD on 10/21/2024  Stage prefix: Initial diagnosis  Method of lymph node assessment: Core biopsy  Histologic grading system: 3 grade system       10/15/2024 Genetic Testing    VUS of AXIN2, MSH6  [facility] +RNAInsight - A total of 59 genes were evaluated with RNA insight: APC, JUAN J, BAP1, BARD1, BMPR1A, BRCA1, BRCA2, BRIP1, CDH1, CDK4, CDKN1B, CDKN2A, CHEK2, DICER1, FH, FLCN, KIF1B, MAX, MEN1, MET, MLH1, MSH2, MSH6, MUTYH, NF1, NTHL1, PALB2, PMS2, POT1, PTEN, RAD51C, RAD51D, RB1, RET, SDHA, SDHAF2, SDHB, SDHC, SDHD, SMAD4, SMARCA4, STK11, HNJE098, TP53, TSC1, TSC2 and VHL (sequencing and deletion/duplication); AXIN2, CTNNA1, EGLN1, HOXB13, KIT, MITF, MSH3, PDGFRA, POLD1 and POLE (sequencing only); EPCAM and GREM1 (deletion/duplication only).      11/8/2024 -  Chemotherapy    pertuzumab (PERJETA) IVPB, 840 mg, 7 of 7 cycles  Administration: 840 mg (11/8/2024), 420 mg (11/29/2024), 420 mg (12/20/2024), 420 mg (1/10/2025), 420 mg (1/31/2025), 420 mg (2/21/2025), 420 mg (3/20/2025)  CARBOplatin (PARAPLATIN) IVPB (GOG AUC DOSING), 722.4 mg, 6 of 6 cycles  Administration: 722.4 mg (11/8/2024), 722.4 mg (11/29/2024), 602 mg (12/20/2024), 602 mg (1/10/2025), 602 mg (1/31/2025), 602 mg (2/21/2025)  DOCEtaxel (TAXOTERE) chemo infusion, 75 mg/m2 = 144 mg, 6 of 6 cycles  Dose modification: 67 mg/m2 (original dose 75 mg/m2, Cycle 3, Reason: Nausea/Vomiting, Comment: 10%dose reduction due nausea), 60.3 mg/m2 (original dose 75 mg/m2, Cycle 6, Reason: Dose modified as per discussion with consulting physician, Comment: dose reduction), 60 mg/m2 (original dose 75 mg/m2, Cycle 6, Reason: Dose modified as per discussion with consulting physician, Comment: total 20% dose  reduction)  Administration: 144 mg (11/8/2024), 144 mg (11/29/2024), 128.6 mg (12/20/2024), 128.6 mg (1/10/2025), 128.6 mg (1/31/2025), 115.2 mg (2/21/2025)  trastuzumab (HERCEPTIN) chemo infusion, 8 mg/kg = 595 mg, 7 of 7 cycles  Administration: 595 mg (11/8/2024), 447 mg (11/29/2024), 447 mg (12/20/2024), 447 mg (1/10/2025), 447 mg (1/31/2025), 447 mg (2/21/2025), 447 mg (3/20/2025)  pertuzumab, trastuzumab, and hyaluronidase (Phesgo) subcutaneous injection Soln, 15 mL (original dose ), 0 of 11 cycles  Dose modification: 15 mL (Cycle 8), 10 mL (Cycle 9)     2/24/2025 Observation    Right breast axilla US    At least almost complete imaging response to neoadjuvant chemotherapy. The biopsy-proven carcinoma has markedly decreased in size; it currently measures 6 mm x 2 mm x 5 mm and previously measured 15 mm x 13 mm x 17 mm.  The measurements on the current exam may be artifactual; there may be a complete response. Previously noted adenopathy has resolved; cortical thickness currently measures 3mm and previously measured 11mm     3/28/2025 Surgery    Right breast ROSA ISELA localized lumpectomy w/ SLNB, removal of venous port    DCIS  Grade 3  4mm  No residual invasive carcinoma  All margins negative for DCIS  0/1 lymph node     3/28/2025 -  Cancer Staged    Staging form: Breast, AJCC 8th Edition  - Pathologic stage from 3/28/2025: ypTis (DCIS), pN0(sn), cM0 - Signed by Koki Magaña MD on 4/14/2025  Stage prefix: Post-therapy  Method of lymph node assessment: [place] lymph node biopsy       Breast cancer metastasized to axillary lymph node, right (HCC)   10/16/2024 Initial Diagnosis    Breast cancer metastasized to axillary lymph node, right (HCC)        Pertinent Medical History   02/20/25: Patient started on Eliquis for a right sided IJ DVT associated with her port    04/27/25:       Review of Systems otherwise neg        Objective   /77 (BP Location: Left arm, Patient Position: Sitting, Cuff Size: Standard)    "Pulse 81   Temp (!) 97.2 °F (36.2 °C) (Temporal)   Resp 15   Ht 5' 10\" (1.778 m)   Wt 71.7 kg (158 lb)   LMP  (LMP Unknown)   SpO2 98%   BMI 22.67 kg/m²     Pain Screening:  Pain Score: 0-No pain  ECOG   0  Physical Exam    GEN: Alert, awake oriented x3, in no acute distress  HEENT- No pallor, icterus, cyanosis, no oral mucosal lesions,   LAD - no palpable cervical, clavicle, axillary, inguinal LAD  Heart- normal S1 S2, regular rate and rhythm, No murmur, rubs.   Lungs- clear breathing sound bilateral.   Abdomen- soft, Non tender, bowel sounds present  Extremities- No cyanosis, clubbing, edema  Neuro- No focal neurological deficit  Breast - right breast incision well healed    Labs: I have reviewed the following labs:  Lab Results   Component Value Date/Time    WBC 6.57 03/12/2025 02:41 PM    RBC 3.76 (L) 03/12/2025 02:41 PM    Hemoglobin 12.3 03/12/2025 02:41 PM    Hematocrit 37.4 03/12/2025 02:41 PM     (H) 03/12/2025 02:41 PM    MCH 32.7 03/12/2025 02:41 PM    RDW 13.3 03/12/2025 02:41 PM    Platelets 200 03/12/2025 02:41 PM    Segmented % 77 (H) 03/12/2025 02:41 PM    Lymphocytes % 15 03/12/2025 02:41 PM    Monocytes % 7 03/12/2025 02:41 PM    Eosinophils Relative 0 03/12/2025 02:41 PM    Basophils Relative 1 03/12/2025 02:41 PM    Immature Grans % 0 03/12/2025 02:41 PM    Absolute Neutrophils 5.08 03/12/2025 02:41 PM     Lab Results   Component Value Date/Time    Potassium 3.6 03/12/2025 02:41 PM    Chloride 104 03/12/2025 02:41 PM    CO2 27 03/12/2025 02:41 PM    BUN 14 03/12/2025 02:41 PM    Creatinine 0.47 (L) 03/12/2025 02:41 PM    Glucose, Fasting 96 11/06/2024 07:13 AM    Calcium 9.0 03/12/2025 02:41 PM    AST 25 03/12/2025 02:41 PM    ALT 40 03/12/2025 02:41 PM    Alkaline Phosphatase 85 03/12/2025 02:41 PM    Total Protein 6.3 (L) 03/12/2025 02:41 PM    Albumin 4.3 03/12/2025 02:41 PM    Total Bilirubin 0.33 03/12/2025 02:41 PM    eGFR 104 03/12/2025 02:41 PM       Radiology Results " Review: I have reviewed radiology reports from prior to surgery including: Echocardiogram.

## 2025-04-28 ENCOUNTER — CONSULT (OUTPATIENT)
Dept: RADIATION ONCOLOGY | Facility: CLINIC | Age: 65
End: 2025-04-28
Attending: RADIOLOGY
Payer: COMMERCIAL

## 2025-04-28 VITALS
TEMPERATURE: 98.1 F | BODY MASS INDEX: 22.53 KG/M2 | SYSTOLIC BLOOD PRESSURE: 108 MMHG | DIASTOLIC BLOOD PRESSURE: 70 MMHG | WEIGHT: 157 LBS | HEART RATE: 72 BPM | OXYGEN SATURATION: 98 %

## 2025-04-28 DIAGNOSIS — C50.311 MALIGNANT NEOPLASM OF LOWER-INNER QUADRANT OF RIGHT BREAST OF FEMALE, ESTROGEN RECEPTOR NEGATIVE (HCC): ICD-10-CM

## 2025-04-28 DIAGNOSIS — Z17.1 MALIGNANT NEOPLASM OF LOWER-INNER QUADRANT OF RIGHT BREAST OF FEMALE, ESTROGEN RECEPTOR NEGATIVE (HCC): ICD-10-CM

## 2025-04-28 PROCEDURE — 99245 OFF/OP CONSLTJ NEW/EST HI 55: CPT | Performed by: RADIOLOGY

## 2025-04-28 PROCEDURE — 99211 OFF/OP EST MAY X REQ PHY/QHP: CPT | Performed by: RADIOLOGY

## 2025-04-28 NOTE — PROGRESS NOTES
Amy Clement 1960 is a 64 y.o. female who was diagnosed with right breast, ER negative, ME low positive, HER2 positive. She had abnormal screening mammogram then underwent biopsy followed by neoadjuvant chemotherapy and right breast lumpectomy with sentinel lymph node biopsy on 3/28/25. She is being referred by Dr. Magaña and presents today for consult.        10/4/24 US B/L breasts screening & B/L mammogram screening  RIGHT  1) MASS [B]  Mammo screening bilateral w 3d & cad: There is an oval mass with indistinct margins seen in the inner central region of the right breast in the anterior depth.   US breast screening bilateral complete (ABUS): There is a 15 mm oval, hypoechoic mass with indistinct margins seen in the right breast at 4 o'clock, 3.4 cm from the nipple.   Left  Mammo screening bilateral w 3d & cad  There are no suspicious masses, grouped microcalcifications or areas of unexplained architectural distortion. The skin and nipple areolar complex are unremarkable.   US breast screening bilateral complete (ABUS)  There are no suspicious masses or areas of architectural distortion.   RECOMMENDATION:       - Ultrasound at the current time for the right breast.       - Routine screening mammogram in 1 year for the left breast.        10/4/24 US guided biopsies right breast and right axilla        10/18/24 Dr. Gonzalez  newly diagnosed ER negative, ME low positive, HER2 3+ right-sided breast cancer.   discussed that standard of care is neoadjuvant chemotherapy with Taxotere, carboplatin, Herceptin, Perjeta.   will need an echo and a port prior to starting treatment.   going to get baseline staging studies of a CT and bone scan.   She is ER negative but does have ME positivity more so in the lymph node. We can consider adjuvant endocrine therapy as long as this is well-tolerated later. That is something we can discuss after surgery as well.         10/21/24 Dr. Magaña  I am able to palpate a mass in the  breast and axilla on exam today. She already met with medical oncology who discussed neoadjuvant therapy with her. Staging scans are scheduled.   If her scans show no evidence of systemic disease, she will return to me for surgical management. We discussed both lumpectomy versus mastectomy. If she has any actionable gene mutations, she would likely proceed with a double mastectomy. Outside of that setting she is a good candidate for breast conservation.   She understands that she would need radiation therapy.         10/28/24 CT C/A/P w contrast  Medial right breast mass and presumed involved single enlarged right axillary lymph node.  Tiny sclerotic focus in the proximal humerus on the right is less dense than typically seen with a bone island. A tiny metastatic focus cannot be entirely excluded.  Otherwise no metastatic disease to the chest, abdomen, or pelvis.  Large left thyroid lobe goiter extending into the mediastinum and deviating the trachea to the right.  Fluid attenuating focus in the right adnexa may represent an ovarian cyst or could represent focal dilatation of the fallopian tube.  Numerous uterine fibroids  Sigmoid diverticulosis without diverticulitis.        11/1/24 Bone scan  1. No scintigraphic evidence of osseous metastasis.         11/5/24 IR port placement        11/7/24 MRI humerus right w wo contrast  No discrete lesion seen within the right humerus. No abnormal enhancement.  Mild glenohumeral joint osteoarthrosis.  Mild distal supraspinatus tendinosis and mild subacromial/subdeltoid bursitis.  Enlarged lymph node in the right lateral chest wall.        11/8/24-  - 2/21/25 Started TCH + P         11/19/24 Dr. Gonzalez  received cycle 1 of neoadjuvant TCHP. She tolerated it well.         2/18/25 Dr. Gonzalez  Due to the buildup of fatigue we will dose reduce the last cycle of Taxotere by a total of 20% and leave all the rest of the dosing of the carboplatin Perjeta and Herceptin the same.    repeat a Doppler right before surgery to ensure that her thrombosis is resolving.   If everything looks good I will talk to Dr. Magaña about removing her port at the time of surgery. She may need Kadcyla in the adjuvant setting based on her pathologic response but we can give that through peripheral IV.   If she gets a complete response we could also consider subcutaneous Perjeta and Herceptin.   referred her to radiation oncology to start talking about adjuvant radiation         2/24/25 US breast right  RIGHT  1) MASS [B]: The biopsy-proven carcinoma has markedly decreased in size; it currently measures 6 mm x 2 mm x 5 mm and previously measured 15 mm x 13 mm x 17 mm.  The measurements on the current exam may be artifactual; there may be a complete response.  2) LYMPH NODE [C]: Previously noted adenopathy has resolved; cortical thickness currently measures 3 mm and previously measured 11 mm.   RECOMMENDATION:       - Surgical consultation for the right breast.        2/26/25 Dr. Magaña  status post neoadjuvant chemotherapy for a clinical T1c N1 HER2 positive invasive carcinoma of the right breast. She completed neoadjuvant chemotherapy on 2/21/2025.   has unifocal disease and is a good candidate for breast conservation. She was clinically node-positive but has converted to clinically node-negative on exam and imaging.   counseled her on a ROSA ISELA localized lumpectomy of the right breast along with lymphatic mapping, sentinel node biopsy and removal of the previously positive clipped node.   If positive, axillary dissection would be performed. She recently developed a clot around the port and is on Eliquis.    understands that she will need to follow-up with medical oncology postoperatively and will also need radiation therapy.  She is requesting to see Dr. Forman at the [facility].         3/28/25 RIGHT BREAST ROSA ISELA LOCALIZED LUMPECTOMY, BIOPSY LYMPH NODE SENTINEL, LYMPHATIC MAPPING & LYMPHOSCINTIGRAPHY, EXISION OF  CLIPPED RIGHT AXILLARY NODE, 1100 NUC MED (Right: Breast)       REMOVAL VENOUS PORT      Procedure   Excision (less than total mastectomy)   Specimen Laterality   Right   TUMOR   Histologic Type   No residual invasive carcinoma   Tumor Size   No residual invasive carcinoma   Ductal Carcinoma In Situ (DCIS)   Present       Only DCIS is present after presurgical (neoadjuvant) therapy   Size (Extent) of DCIS   Estimated size (extent) of DCIS is at least (Millimeters): 4 mm   Architectural Patterns   Solid   Nuclear Grade   Grade III (high)   Necrosis   Not identified   Number of Blocks with DCIS   2   Number of Blocks Examined   24   Lymphatic and / or Vascular Invasion   Not identified   Treatment Effect in the Breast   No residual invasive carcinoma is present in the breast after presurgical therapy   Treatment Effect in the Lymph Nodes   No lymph node metastases. Fibrous scarring or histiocytic aggregates, possibly related to prior lymph node metastases with pathologic complete response   Residual Cancer [place] (RCB) Parameters       Residual Cancer [place] Class   RCB-0 (pCR)   MARGINS   Margin Status for DCIS   All margins negative for DCIS   Distance from DCIS to Closest Margin   Greater than: 2 mm   REGIONAL LYMPH NODES   Regional Lymph Node Status   All regional lymph nodes negative for tumor   Total Number of Lymph Nodes Examined (sentinel and non-sentinel)   1   Number of [place] Nodes Examined   1   pTNM CLASSIFICATION (AJCC 8th Edition)   Reporting of pT, pN, and (when applicable) pM categories is based on information available to the pathologist at the time the report is issued. As per the AJCC (Chapter 1, 8th Ed.) it is the managing physician's responsibility to establish the final pathologic stage based upon all pertinent information, including but potentially not limited to this pathology report.   Modified Classification   y   pT Category   pTis (DCIS)   pN Category   pN0   N Suffix   (sn)   .             3/31/25 VAS upper limb venous duplex  Impression  RIGHT UPPER LIMB:  Evidence suggestive of chronic occlusive deep vein thrombosis noted in the  Internal jugular vein.  No evidence of superficial thrombophlebitis noted.  Doppler evaluation shows a normal response to augmentation maneuvers.  LEFT UPPER LIMB LIMITED:  Evaluation shows no evidence of thrombus in the internal jugular vein,  subclavian vein, and the innominate vein.           4/14/25 Dr. Magaña  status post right breast conservation following neoadjuvant chemotherapy. She is healing well with no signs of infection.   will order her diagnostic mammogram for October.   Follow up 6 months        4/25/25 Dr. Gonzalez  will continue Perjeta and Herceptin to complete a year of therapy.   port has been removed so I will give it to her as Phesgo subcutaneously.   I am going to attempt to give her an aromatase inhibitor after radiation. If it is well-tolerated she will take it. Her ER was negative but her FL was 70% on her lymph node. There may be some benefit and if she tolerates it she will take anastrozole for 5 years.            Upcoming:  10/8/25 Mammogram  10/16/25 Surg Onc    Oncology History Overview Note    10/2024 - right breast and LNBx positive for invasive mammary carcinoma with predominantly lobular features, apocrine and histiocytoid features in less amount, grade 2, ER 1% FL 10% Her2 3+, [id number]     LN biopsy receptors - ER 1% FL 70% Her2 3+     11/8/2024 - start TCHP     12/2024 - cycle 3 - taxotere dose reduced by 10%, carbo dose reduced to AUC 5 due to N/V     2/2025 - cycle 6 - taxotere dose reduced by 20% due to fatigue     Started on eliquis due to right IJ DVT associated with port    3/28/2024 - port removed at surgery, right sided partial mastectomy with SLNBX, [id number]    Will continue the rest of treatment with phesgo - residual chronic DVT in IJ, continuing eliquis until vascular eval     Malignant neoplasm of lower-inner quadrant of  right breast of female, estrogen receptor negative (HCC)   10/4/2024 Biopsy    A. Right breast ultrasound-guided biopsy  4 o'clock, 2 cm from nipple (ROSA ISELA)  Invasive mammary carcinoma with predominately lobular features   Apocrine and histiocytoid morphologic features  Grade 2  ER <1, AZ 10, HER2 3+  Lymphovascular invasion present    B. Right axillary lymph node biopsy (ROSA ISELA)  Metastatic carcinoma, compatible with mammary origin  ER <1, AZ 70, HER2 3+    Concordant. Right malignancy appears unifocal; biopsy-proven carcinoma measured 1.7 cm on US. Biopsy-proven metastatic disease to right axillary lymph node. Left breast clear.     10/4/2024 -  Cancer Staged    Staging form: Breast, AJCC 8th Edition  - Clinical stage from 10/4/2024: Stage IIA (cT1c, cN1(f), cM0, G2, ER-, AZ+, HER2+) - Signed by Koki Magaña MD on 10/21/2024  Stage prefix: Initial diagnosis  Method of lymph node assessment: Core biopsy  Histologic grading system: 3 grade system       10/15/2024 Genetic Testing    VUS of AXIN2, MSH6  [employer] & Cancer +RNAInsight - A total of 59 genes were evaluated with RNA insight: APC, JUAN J, BAP1, BARD1, BMPR1A, BRCA1, BRCA2, BRIP1, CDH1, CDK4, CDKN1B, CDKN2A, CHEK2, DICER1, FH, FLCN, KIF1B, MAX, MEN1, MET, MLH1, MSH2, MSH6, MUTYH, NF1, NTHL1, PALB2, PMS2, POT1, PTEN, RAD51C, RAD51D, RB1, RET, SDHA, SDHAF2, SDHB, SDHC, SDHD, SMAD4, SMARCA4, STK11, SGKH927, TP53, TSC1, TSC2 and VHL (sequencing and deletion/duplication); AXIN2, CTNNA1, EGLN1, HOXB13, KIT, MITF, MSH3, PDGFRA, POLD1 and POLE (sequencing only); EPCAM and GREM1 (deletion/duplication only).      11/8/2024 -  Chemotherapy    pertuzumab (PERJETA) IVPB, 840 mg, 7 of 7 cycles  Administration: 840 mg (11/8/2024), 420 mg (11/29/2024), 420 mg (12/20/2024), 420 mg (1/10/2025), 420 mg (1/31/2025), 420 mg (2/21/2025), 420 mg (3/20/2025)  CARBOplatin (PARAPLATIN) IVPB ([facility] [place]), 722.4 mg, 6 of 6 cycles  Administration: 722.4 mg (11/8/2024), 722.4 mg  (11/29/2024), 602 mg (12/20/2024), 602 mg (1/10/2025), 602 mg (1/31/2025), 602 mg (2/21/2025)  DOCEtaxel (TAXOTERE) chemo infusion, 75 mg/m2 = 144 mg, 6 of 6 cycles  Dose modification: 67 mg/m2 (original dose 75 mg/m2, Cycle 3, Reason: Nausea/Vomiting, Comment: 10%dose reduction due nausea), 60.3 mg/m2 (original dose 75 mg/m2, Cycle 6, Reason: Dose modified as per discussion with consulting physician, Comment: dose reduction), 60 mg/m2 (original dose 75 mg/m2, Cycle 6, Reason: Dose modified as per discussion with consulting physician, Comment: total 20% dose reduction)  Administration: 144 mg (11/8/2024), 144 mg (11/29/2024), 128.6 mg (12/20/2024), 128.6 mg (1/10/2025), 128.6 mg (1/31/2025), 115.2 mg (2/21/2025)  trastuzumab (HERCEPTIN) chemo infusion, 8 mg/kg = 595 mg, 7 of 7 cycles  Administration: 595 mg (11/8/2024), 447 mg (11/29/2024), 447 mg (12/20/2024), 447 mg (1/10/2025), 447 mg (1/31/2025), 447 mg (2/21/2025), 447 mg (3/20/2025)  pertuzumab, trastuzumab, and hyaluronidase (Phesgo) subcutaneous injection Soln, 15 mL (original dose ), 0 of 11 cycles  Dose modification: 15 mL (Cycle 8), 10 mL (Cycle 9)     2/24/2025 Observation    Right breast axilla US    At least almost complete imaging response to neoadjuvant chemotherapy. The biopsy-proven carcinoma has markedly decreased in size; it currently measures 6 mm x 2 mm x 5 mm and previously measured 15 mm x 13 mm x 17 mm.  The measurements on the current exam may be artifactual; there may be a complete response. Previously noted adenopathy has resolved; cortical thickness currently measures 3mm and previously measured 11mm     3/28/2025 Surgery    Right breast ROSA ISELA localized lumpectomy w/ SLNB, removal of venous port    DCIS  Grade 3  4mm  No residual invasive carcinoma  All margins negative for DCIS  0/1 lymph node     3/28/2025 -  Cancer Staged    Staging form: Breast, AJCC 8th Edition  - Pathologic stage from 3/28/2025: ypTis (DCIS), pN0(sn), cM0 - Signed  by Koki Magaña MD on 2025  Stage prefix: Post-therapy  Method of lymph node assessment: [place] lymph node biopsy       Breast cancer metastasized to axillary lymph node, right (HCC)   10/16/2024 Initial Diagnosis    Breast cancer metastasized to axillary lymph node, right (HCC)         Review of Systems:  Review of Systems   Constitutional:  Positive for fatigue and unexpected weight change (lost 7 lbs with chemo).   HENT: Negative.     Eyes: Negative.    Respiratory: Negative.     Cardiovascular: Negative.    Gastrointestinal: Negative.    Genitourinary: Negative.    Musculoskeletal:  Positive for arthralgias (right hip and toes).   Skin:  Positive for rash (right axilla and B/L arms) and wound (right breast surgical wounds healing).   Allergic/Immunologic: Positive for environmental allergies and immunocompromised state.   Neurological: Negative.    Hematological:  Bruises/bleeds easily.   Psychiatric/Behavioral:  Positive for sleep disturbance.        Clinical Trial: no    OB/GYN History:  The patient underwent menarche at 14 years  Menopause Status Post  No LMP recorded (lmp unknown). Patient is postmenopausal.  Menopause at 54} years.  Menopause Reason: natural  Hormone replacement therapy: no.     5   Para 4   Age at first delivery being 21 years.   Nursing: yes.   Birth control pills: yes.  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, side effects, SIM    MST: COMPLETED    Implantable Devices (Port, Pacemaker, pain stimulator): NO    Hip Replacement: NO      [unfilled]  Health Maintenance   Topic Date Due    Osteoporosis Screening  Never done    COVID-19 Vaccine (7 - Moderna risk 2024-25 season) 2025    Depression Screening  2025    Breast Cancer Screening: Mammogram  10/04/2025    Cervical Cancer Screening  10/26/2025    Annual Physical  2025    BMI: Adult  2026     Colorectal Cancer Screening  08/14/2027    DTaP,Tdap,and Td Vaccines (2 - Td or Tdap) 08/08/2028    RSV Vaccine for Pregnant Patients and Patients Age 60+ Years  Completed    Zoster Vaccine  Completed    Pneumococcal Vaccine: Pediatrics (0 to 5 Years) and At-Risk Patients (6 to 64 Years)  Completed    Influenza Vaccine  Completed    HIV Screening  Addressed    Hepatitis C Screening  Addressed    Meningococcal B Vaccine  Aged Out    RSV Vaccine age 0-20 Months  Aged Out    HIB Vaccine  Aged Out    IPV Vaccine  Aged Out    Hepatitis A Vaccine  Aged Out    Meningococcal ACWY Vaccine  Aged Out    HPV Vaccine  Aged Out     Past Medical History:   Diagnosis Date    Acute cystitis     Anxiety     medication for hot flasshes    Breast lump .    last assessed 01/16/2017    Dermatitis     last assessed 02/27/2017    Disease of thyroid gland     last assessed 02/23/2016    Diverticulitis 11/2020    Diverticulitis of colon November 25 July 2016 1st dx    Dysuria     last assessed 04/28/2017    ETD (Eustachian tube dysfunction), bilateral     last assessed 02/23/2016    Fibroid 2012    GERD (gastroesophageal reflux disease) Occasionally    Glaucoma     Left    Hematuria     History of chemotherapy     Migraine 1985    Miscarriage 1990    Polyp at cervical os     PONV (postoperative nausea and vomiting)     Varicella 1966     Past Surgical History:   Procedure Laterality Date    BREAST BIOPSY Right 2009    2 areas benign    CATARACT EXTRACTION Bilateral     COLONOSCOPY      FL GUIDED CENTRAL VENOUS ACCESS DEVICE INSERTION  11/5/2024    FL BX/EXC LYMPH NODE OPEN SUPERFICIAL Right 3/28/2025    Procedure: RIGHT BREAST ROSA ISELA LOCALIZED LUMPECTOMY, BIOPSY LYMPH NODE SENTINEL, LYMPHATIC MAPPING & LYMPHOSCINTIGRAPHY, EXISION OF CLIPPED RIGHT AXILLARY NODE, 1100 NUC MED;  Surgeon: Koki Magaña MD;  Location: [facility];  Service: Surgical Oncology    FL EXC BREAST LES PREOP PLMT RAD MARKER OPEN 1 LES Right 3/28/2025    Procedure:  RIGHT BREAST ROSA ISELA LOCALIZED LUMPECTOMY, BIOPSY LYMPH NODE SENTINEL, LYMPHATIC MAPPING & LYMPHOSCINTIGRAPHY, EXISION OF CLIPPED RIGHT AXILLARY NODE, 1100 NUC MED;  Surgeon: Koki Magaña MD;  [facility];  Service: Surgical Oncology    HI HYSTEROSCOPY BX ENDOMETRIUM&/POLYPC W/WO D&C N/A 02/23/2024    Procedure: DILATATION AND CURETTAGE (D&C) WITH HYSTEROSCOPY;  Surgeon: Joselin Alaniz MD;  Location: [facility];  Service: Gynecology    HI HYSTEROSCOPY BX ENDOMETRIUM&/POLYPC W/WO D&C N/A 02/23/2024    Procedure: POLYPECTOMY;  Surgeon: Joselin Alaniz MD;  [facility]: [facility];  Service: Gynecology    HI INSJ TUNNELED CTR VAD W/SUBQ PORT AGE 5 YR/> N/A 11/5/2024    Procedure: INSERTION VENOUS PORT ( PORT-A-CATH) IR;  Surgeon: Chandler Ball DO;  [facility];  Service: Interventional Radiology    HI INTRAOP SENTINEL LYMPH NODE ID W/DYE INJECTION Right 3/28/2025    Procedure: RIGHT BREAST ROSA ISELA LOCALIZED LUMPECTOMY, BIOPSY LYMPH NODE SENTINEL, LYMPHATIC MAPPING & LYMPHOSCINTIGRAPHY, EXISION OF CLIPPED RIGHT AXILLARY NODE, 1100 NUC MED;  Surgeon: Koki Magaña MD;  [facility];  Service: Surgical Oncology    HI MASTECTOMY PARTIAL Right 3/28/2025    Procedure: RIGHT BREAST ROSA ISELA LOCALIZED LUMPECTOMY, BIOPSY LYMPH NODE SENTINEL, LYMPHATIC MAPPING & LYMPHOSCINTIGRAPHY, EXISION OF CLIPPED RIGHT AXILLARY NODE, 1100 NUC MED;  Surgeon: Koki Magaña MD;  [facility];  Service: Surgical Oncology    HI RMVL YUVAL CTR VAD W/SUBQ PORT/ CTR/PRPH INSJ Right 3/28/2025    Procedure: REMOVAL VENOUS PORT (PORT-A-CATH);  Surgeon: Koki Magaña MD;  [facility];  Service: Surgical Oncology    TUBAL LIGATION      US GUIDED BREAST BIOPSY RIGHT COMPLETE Right 10/04/2024    US GUIDED BREAST LYMPH NODE BIOPSY RIGHT Right 10/04/2024     Family History   Problem Relation Age of Onset    Breast cancer Mother 47    Cancer Mother         Breast cancer    Hypertension Father         Late  life dx    Rectal cancer Father     Migraines Father 85    Cancer Father         Colon cancer    No Known Problems Sister     Throat cancer Brother     Cancer Brother         bladder    Heart disease Maternal Grandmother     No Known Problems Paternal Grandmother     No Known Problems Daughter     Kidney cancer Son 38    No Known Problems Paternal Aunt      Social History     Tobacco Use    Smoking status: Never    Smokeless tobacco: Never   Vaping Use    Vaping status: Never Used   Substance Use Topics    Alcohol use: Yes     Alcohol/week: 3.0 standard drinks of alcohol     Types: 3 Glasses of wine per week     Comment: occasionally. last drink more than a month ago    Drug use: No        Current Outpatient Medications:     apixaban (Eliquis) 5 mg, Take 1 tablet (5 mg total) by mouth 2 (two) times a day, Disp: 60 tablet, Rfl: 1    Bacillus Coagulans-Inulin (Probiotic) 1-250 BILLION-MG CAPS, Take by mouth, Disp: , Rfl:     bimatoprost (LATISSE) 0.03 % ophthalmic solution, Administer 1 drop to both eyes daily at bedtime Place one drop on applicator and apply evenly along the skin of the upper eyelid at base of eyelashes once daily at bedtime; repeat procedure for second eye (use a clean applicator). (Patient not taking: Reported on 4/3/2025), Disp: 5 mL, Rfl: 1    calcium citrate (CALCITRATE) 950 (200 Ca) MG tablet, Take 1 tablet by mouth daily, Disp: , Rfl:     clindamycin (CLEOCIN T) 1 % lotion, Apply topically 2 (two) times a day Apply to affected areas. (Patient not taking: Reported on 4/3/2025), Disp: 60 mL, Rfl: 1    diphenoxylate-atropine (LOMOTIL) 2.5-0.025 mg per tablet, Take 1 tablet by mouth 4 (four) times a day as needed for diarrhea (Patient not taking: Reported on 4/3/2025), Disp: 30 tablet, Rfl: 0    Glucosamine-Chondroitin (GLUCOSAMINE CHONDR COMPLEX PO), Take by mouth daily , Disp: , Rfl:     latanoprost (XALATAN) 0.005 % ophthalmic solution, Administer 1 drop to both eyes daily at bedtime, Disp: ,  Rfl:     lidocaine-prilocaine (EMLA) cream, Apply topically as needed for mild pain, Disp: 30 g, Rfl: 2    methylPREDNISolone 4 MG tablet therapy pack, Use as directed on package, Disp: 1 each, Rfl: 0    Multiple Vitamins-Minerals (CENTRUM SILVER) tablet, Take by mouth, Disp: , Rfl:     olopatadine (PATANOL) 0.1 % ophthalmic solution, Administer 1 drop into the left eye 2 (two) times a day, Disp: 5 mL, Rfl: 0    omeprazole (PriLOSEC) 20 mg delayed release capsule, TAKE 1 CAPSULE BY MOUTH EVERY DAY, Disp: 30 capsule, Rfl: 1    ondansetron (ZOFRAN-ODT) 8 mg disintegrating tablet, Take 1 tablet (8 mg total) by mouth every 8 (eight) hours as needed for nausea or vomiting (Patient not taking: Reported on 4/3/2025), Disp: 30 tablet, Rfl: 2    oxyCODONE-acetaminophen (Percocet) 5-325 mg per tablet, Take 1 tablet by mouth every 6 (six) hours as needed for moderate pain Max Daily Amount: 4 tablets, Disp: 12 tablet, Rfl: 0    PARoxetine (PAXIL) 10 mg tablet, Take 1 tablet (10 mg total) by mouth daily (Patient not taking: Reported on 3/21/2025), Disp: 90 tablet, Rfl: 3    prochlorperazine (COMPAZINE) 10 mg tablet, Take 1 tablet (10 mg total) by mouth every 6 (six) hours as needed for nausea or vomiting, Disp: 30 tablet, Rfl: 2    pyridoxine (VITAMIN B6) 50 mg tablet, Take 50 mg by mouth daily, Disp: , Rfl:     timolol (TIMOPTIC) 0.5 % ophthalmic solution, Administer 1 drop to both eyes daily, Disp: , Rfl:     TURMERIC-GINGER PO, Take by mouth, Disp: , Rfl:   Allergies   Allergen Reactions    Monistat [Miconazole] Itching    Pollen Extract Itching, Swelling and Sneezing      There were no vitals filed for this visit.

## 2025-04-28 NOTE — PROGRESS NOTES
Consultation Visit   Name: Amy Clement      : 1960      MRN: [id number]  Encounter Provider: Nila Forman MD  Encounter Date: 2025   Encounter department: [facility] RADIATION ONCOLOGY  :  Assessment & Plan  Malignant neoplasm of lower-inner quadrant of right breast of female, estrogen receptor negative (HCC)  Amy Clement 1960 is a 64 y.o. female with a history of clinical stage IIA ([id number]) G2 invasive mammary carcinoma with lobular features of the right breast.  Tumor cells were ER negative, WY low positive, HER2 positive. Axillary node was ER negative, WY positive, and HER2 positive.  She underwent neoadjuvant chemotherapy with TCPH with excellent partial response.  She underwent right breast lumpectomy with sentinel lymph node biopsy on 3/28/25.  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  She continues on Herceptin and Perjeta maintenance with possible AI therapy to follow.      Given her clinical presentation, adjuvant radiation to the right breast with possible axillary tali inclusion.  Total dose of 40Gy with boost to 50Gy or simultaneous integrated boost to 48Gy.  This would provide local regional control benefit and possible survival benefit.  This is as per NCCN guidelines.    The rationale and potential benefits, as well as the risks and acute and late side effects and potential toxicities of radiation were discussed with the patient at length. Side effects discussed included, but were not limited to: Fatigue, skin erythema, hyperpigmentation, desquamation, fibrosis, shrinkage of the breast resulting asymmetry, rib weakening, pulmonary fibrosis, and lymphedema.    The patient was given the opportunity to ask questions and all questions were answered to her satisfaction.   She wished to proceed with the recommended treatment plan.      Regarding her irritation in axilla, this does appear to  be related to irritative dermatitis. She notes that her bra rubs this area.  I recommended a barrier emollient for, such as Aquaphor, in an effort to relieve symptoms.    -CT simulation scheduled as she is well-healed.    Orders:    Ambulatory Referral to Radiation Oncology        History of Present Illness   Chief Complaint   Patient presents with    Breast Cancer    Consult   Pertinent Medical History   Amy Clement 1960 is a 64 y.o. female with a history of stage IIA invasive mammary carcinoma of the right breast.  Tumor cells were ER negative, [place] low positive, HER2 positive. Neoadjuvant chemotherapy with TCPH was delivered with excellent partial response.  She underwent right breast lumpectomy with sentinel lymph node biopsy on 3/28/25. She is being referred by Dr. Magaña and presents today for consult.     The patient presented with abnormal screening mammogram of the right breast.  10/4/24 US B/L breasts screening & B/L mammogram screening  RIGHT  1) MASS [B]  Mammo screening bilateral w 3d & cad: There is an oval mass with indistinct margins seen in the inner central region of the right breast in the anterior depth.   US breast screening bilateral complete (ABUS): There is a 15 mm oval, hypoechoic mass with indistinct margins seen in the right breast at 4 o'clock, 3.4 cm from the nipple.   Left  Mammo screening bilateral w 3d & cad  There are no suspicious masses, grouped microcalcifications or areas of unexplained architectural distortion. The skin and nipple areolar complex are unremarkable.   US breast screening bilateral complete (ABUS)  There are no suspicious masses or areas of architectural distortion.   RECOMMENDATION:       - Ultrasound at the current time for the right breast.       - Routine screening mammogram in 1 year for the left breast.        10/4/24 US guided biopsies right breast and right axilla  Pathology from 4:00 breast lesion demonstrated grade 2 invasive mammary carcinoma  with predominant lobular features associated with DCIS.  LVI was present.  Tumor cells were ER(-), WY weak (+), and HER2(+).  Axillary lymph node demonstrated metastatic carcinoma compatible with mammary origin.  Tumor cells were ER(-), WY(+) at 70%, and HER2(+).      10/18/24 Dr. Gonzalez  newly diagnosed ER negative, WY low positive, HER2 3+ right-sided breast cancer.   discussed that standard of care is neoadjuvant chemotherapy with Taxotere, carboplatin, Herceptin, Perjeta.   will need an echo and a port prior to starting treatment.   going to get baseline staging studies of a CT and bone scan.   She is ER negative but does have WY positivity more so in the lymph node. We can consider adjuvant endocrine therapy as long as this is well-tolerated later. That is something we can discuss after surgery as well.      10/21/24 Dr. Magaña  I am able to palpate a mass in the breast and axilla on exam today. She already met with medical oncology who discussed neoadjuvant therapy with her. Staging scans are scheduled.   If her scans show no evidence of systemic disease, she will return to me for surgical management. We discussed both lumpectomy versus mastectomy. If she has any actionable gene mutations, she would likely proceed with a double mastectomy. Outside of that setting she is a good candidate for breast conservation.   She understands that she would need radiation therapy.      10/28/24 CT C/A/P w contrast  Medial right breast mass and presumed involved single enlarged right axillary lymph node.  Tiny sclerotic focus in the proximal humerus on the right is less dense than typically seen with a bone island. A tiny metastatic focus cannot be entirely excluded.  Otherwise no metastatic disease to the chest, abdomen, or pelvis.  Large left thyroid lobe goiter extending into the mediastinum and deviating the trachea to the right.  Fluid attenuating focus in the right adnexa may represent an ovarian cyst or could  represent focal dilatation of the fallopian tube.  Numerous uterine fibroids  Sigmoid diverticulosis without diverticulitis.     11/1/24 Bone scan  1. No scintigraphic evidence of osseous metastasis.      11/5/24 IR port placement     11/7/24 MRI humerus right w wo contrast  No discrete lesion seen within the right humerus. No abnormal enhancement.  Mild glenohumeral joint osteoarthrosis.  Mild distal supraspinatus tendinosis and mild subacromial/subdeltoid bursitis.  Enlarged lymph node in the right lateral chest wall.     11/8/24-  - 2/21/25 Started TCH + P    Final cycle had dose reduction due to significant fatigue.  She suffered DVT and        2/18/25 Dr. Gonzalez  Due to the buildup of fatigue we will dose reduce the last cycle of Taxotere by a total of 20% and leave all the rest of the dosing of the carboplatin Perjeta and Herceptin the same.   repeat a Doppler right before surgery to ensure that her thrombosis is resolving.   If everything looks good I will talk to Dr. Magaña about removing her port at the time of surgery. She may need Kadcyla in the adjuvant setting based on her pathologic response but we can give that through peripheral IV.   If she gets a complete response we could also consider subcutaneous Perjeta and Herceptin.   referred her to radiation oncology to start talking about adjuvant radiation         2/24/25 US breast right  RIGHT  1) MASS [B]: The biopsy-proven carcinoma has markedly decreased in size; it currently measures 6 mm x 2 mm x 5 mm and previously measured 15 mm x 13 mm x 17 mm.  The measurements on the current exam may be artifactual; there may be a complete response.  2) LYMPH NODE [C]: Previously noted adenopathy has resolved; cortical thickness currently measures 3 mm and previously measured 11 mm.   RECOMMENDATION:       - Surgical consultation for the right breast.     2/26/25 Dr. Magaña  status post neoadjuvant chemotherapy for a clinical T1c N1 HER2 positive invasive  carcinoma of the right breast. She completed neoadjuvant chemotherapy on 2/21/2025.   has unifocal disease and is a good candidate for breast conservation. She was clinically node-positive but has converted to clinically node-negative on exam and imaging.   counseled her on a ROSA ISELA localized lumpectomy of the right breast along with lymphatic mapping, sentinel node biopsy and removal of the previously positive clipped node.   If positive, axillary dissection would be performed. She recently developed a clot around the port and is on Eliquis.    understands that she will need to follow-up with medical oncology postoperatively and will also need radiation therapy.  She is requesting to see Dr. Forman at the [facility].      3/28/25 RIGHT BREAST ROSA ISELA LOCALIZED LUMPECTOMY, BIOPSY LYMPH NODE SENTINEL, LYMPHATIC MAPPING & LYMPHOSCINTIGRAPHY, EXISION OF CLIPPED RIGHT AXILLARY NODE, 1100 NUC MED (Right: Breast)       REMOVAL VENOUS PORT   Procedure   Excision (less than total mastectomy)   Specimen Laterality   Right   TUMOR   Histologic Type   No residual invasive carcinoma   Tumor Size   No residual invasive carcinoma   Ductal Carcinoma In Situ (DCIS)   Present       Only DCIS is present after presurgical (neoadjuvant) therapy   Size (Extent) of DCIS   Estimated size (extent) of DCIS is at least (Millimeters): 4 mm   Architectural Patterns   Solid   Nuclear Grade   Grade III (high)   Necrosis   Not identified   Number of Blocks with DCIS   2   Number of Blocks Examined   24   Lymphatic and / or Vascular Invasion   Not identified   Treatment Effect in the Breast   No residual invasive carcinoma is present in the breast after presurgical therapy   Treatment Effect in the Lymph Nodes   No lymph node metastases. Fibrous scarring or histiocytic aggregates, possibly related to prior lymph node metastases with pathologic complete response   Residual Cancer [place] (RCB) Parameters       Residual Cancer [place] Class   RCB-0 (pCR)    MARGINS   Margin Status for DCIS   All margins negative for DCIS   Distance from DCIS to Closest Margin   Greater than: 2 mm   REGIONAL LYMPH NODES   Regional Lymph Node Status   All regional lymph nodes negative for tumor   Total Number of Lymph Nodes Examined (sentinel and non-sentinel)   1   Number of [place] Nodes Examined   1   pTNM CLASSIFICATION (AJCC 8th Edition)   Reporting of pT, pN, and (when applicable) pM categories is based on information available to the pathologist at the time the report is issued. As per the AJCC (Chapter 1, 8th Ed.) it is the managing physician's responsibility to establish the final pathologic stage based upon all pertinent information, including but potentially not limited to this pathology report.   Modified Classification   y   pT Category   pTis (DCIS)   pN Category   pN0   N Suffix   (sn)   .         3/31/25 VAS upper limb venous duplex  Impression  RIGHT UPPER LIMB:  Evidence suggestive of chronic occlusive deep vein thrombosis noted in the  Internal jugular vein.  No evidence of superficial thrombophlebitis noted.  Doppler evaluation shows a normal response to augmentation maneuvers.  LEFT UPPER LIMB LIMITED:  Evaluation shows no evidence of thrombus in the internal jugular vein,  subclavian vein, and the innominate vein.        4/14/25 Dr. Magaña  status post right breast conservation following neoadjuvant chemotherapy. She is healing well with no signs of infection.   will order her diagnostic mammogram for October.   Follow up 6 months     4/25/25 Dr. Gonzalez  will continue Perjeta and Herceptin to complete a year of therapy.   port has been removed so I will give it to her as Phesgo subcutaneously.   I am going to attempt to give her an aromatase inhibitor after radiation. If it is well-tolerated she will take it. Her ER was negative but her AL was 70% on her lymph node. There may be some benefit and if she tolerates it she will take anastrozole for 5 years.      The  patient denies breast pain or tenderness.  She denies palpable nodules, suspicious skin changes, or nipple discharge of the breasts bilaterally.  She denies upper extremity edema or shoulder restriction.  She does not irritation and mild rash/erythema of the right axillary scar.       Upcoming:  10/8/25 Mammogram  10/16/25 Surg Onc     Oncology History   Cancer Staging   Malignant neoplasm of lower-inner quadrant of right breast of female, estrogen receptor negative (HCC)  Staging form: Breast, AJCC 8th Edition  - Clinical stage from 10/4/2024: Stage IIA (cT1c, cN1(f), cM0, G2, ER-, AZ+, HER2+) - Signed by Koki Magaña MD on 10/21/2024  Stage prefix: Initial diagnosis  Method of lymph node assessment: Core biopsy  Histologic grading system: 3 grade system  - Pathologic stage from 3/28/2025: ypTis (DCIS), pN0(sn), cM0 - Signed by Koki Magaña MD on 4/14/2025  Stage prefix: Post-therapy  Method of lymph node assessment: [place] lymph node biopsy  Oncology History Overview Note    10/2024 - right breast and LNBx positive for invasive mammary carcinoma with predominantly lobular features, apocrine and histiocytoid features in less amount, grade 2, ER 1% AZ 10% Her2 3+, [id number]     LN biopsy receptors - ER 1% AZ 70% Her2 3+     11/8/2024 - start TCHP     12/2024 - cycle 3 - taxotere dose reduced by 10%, carbo dose reduced to AUC 5 due to N/V     2/2025 - cycle 6 - taxotere dose reduced by 20% due to fatigue     Started on eliquis due to right IJ DVT associated with port    3/28/2024 - port removed at surgery, right sided partial mastectomy with SLNBX, [id number]    Will continue the rest of treatment with phesgo - 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 Right breast ultrasound-guided biopsy  4 o'clock, 2 cm from nipple (ROSA ISELA)  Invasive mammary carcinoma with  predominately lobular features   Apocrine and histiocytoid morphologic features  Grade 2  ER <1, CA 10, HER2 3+  Lymphovascular invasion present    B. Right axillary lymph node biopsy (ROSA ISELA)  Metastatic carcinoma, compatible with mammary origin  ER <1, CA 70, HER2 3+    Concordant. Right malignancy appears unifocal; biopsy-proven carcinoma measured 1.7 cm on US. Biopsy-proven metastatic disease to right axillary lymph node. Left breast clear.     10/4/2024 -  Cancer Staged    Staging form: Breast, AJCC 8th Edition  - Clinical stage from 10/4/2024: Stage IIA (cT1c, cN1(f), cM0, G2, ER-, CA+, HER2+) - Signed by Koki Magaña MD on 10/21/2024  Stage prefix: Initial diagnosis  Method of lymph node assessment: Core biopsy  Histologic grading system: 3 grade system       10/15/2024 Genetic Testing    VUS of AXIN2, MSH6  [facility] BRCA[facility] & Cancer +RNAInsight - A total of 59 genes were evaluated with RNA insight: APC, JUAN J, BAP1, BARD1, BMPR1A, BRCA1, BRCA2, BRIP1, CDH1, CDK4, CDKN1B, CDKN2A, CHEK2, DICER1, FH, FLCN, KIF1B, MAX, MEN1, MET, MLH1, MSH2, MSH6, MUTYH, NF1, NTHL1, PALB2, PMS2, POT1, PTEN, RAD51C, RAD51D, RB1, RET, SDHA, SDHAF2, SDHB, SDHC, SDHD, SMAD4, SMARCA4, STK11, YIAY906, TP53, TSC1, TSC2 and VHL (sequencing and deletion/duplication); AXIN2, CTNNA1, EGLN1, HOXB13, KIT, MITF, MSH3, PDGFRA, POLD1 and POLE (sequencing only); EPCAM and GREM1 (deletion/duplication only).      11/8/2024 -  Chemotherapy    pertuzumab (PERJETA) IVPB, 840 mg, 7 of 7 cycles  Administration: 840 mg (11/8/2024), 420 mg (11/29/2024), 420 mg (12/20/2024), 420 mg (1/10/2025), 420 mg (1/31/2025), 420 mg (2/21/2025), 420 mg (3/20/2025)  CARBOplatin (PARAPLATIN) IVPB ([facility] [place] AUC DOSING), 722.4 mg, 6 of 6 cycles  Administration: 722.4 mg (11/8/2024), 722.4 mg (11/29/2024), 602 mg (12/20/2024), 602 mg (1/10/2025), 602 mg (1/31/2025), 602 mg (2/21/2025)  DOCEtaxel (TAXOTERE) chemo infusion, 75 mg/m2 = 144 mg, 6 of 6 cycles  Dose modification: 67  mg/m2 (original dose 75 mg/m2, Cycle 3, Reason: Nausea/Vomiting, Comment: 10%dose reduction due nausea), 60.3 mg/m2 (original dose 75 mg/m2, Cycle 6, Reason: Dose modified as per discussion with consulting physician, Comment: dose reduction), 60 mg/m2 (original dose 75 mg/m2, Cycle 6, Reason: Dose modified as per discussion with consulting physician, Comment: total 20% dose reduction)  Administration: 144 mg (11/8/2024), 144 mg (11/29/2024), 128.6 mg (12/20/2024), 128.6 mg (1/10/2025), 128.6 mg (1/31/2025), 115.2 mg (2/21/2025)  trastuzumab (HERCEPTIN) chemo infusion, 8 mg/kg = 595 mg, 7 of 7 cycles  Administration: 595 mg (11/8/2024), 447 mg (11/29/2024), 447 mg (12/20/2024), 447 mg (1/10/2025), 447 mg (1/31/2025), 447 mg (2/21/2025), 447 mg (3/20/2025)  pertuzumab, trastuzumab, and hyaluronidase (Phesgo) subcutaneous injection Soln, 15 mL (original dose ), 0 of 11 cycles  Dose modification: 15 mL (Cycle 8), 10 mL (Cycle 9)     2/24/2025 Observation    Right breast axilla US    At least almost complete imaging response to neoadjuvant chemotherapy. The biopsy-proven carcinoma has markedly decreased in size; it currently measures 6 mm x 2 mm x 5 mm and previously measured 15 mm x 13 mm x 17 mm.  The measurements on the current exam may be artifactual; there may be a complete response. Previously noted adenopathy has resolved; cortical thickness currently measures 3mm and previously measured 11mm     3/28/2025 Surgery    Right breast ROSA ISELA localized lumpectomy w/ SLNB, removal of venous port    DCIS  Grade 3  4mm  No residual invasive carcinoma  All margins negative for DCIS  0/1 lymph node     3/28/2025 -  Cancer Staged    Staging form: Breast, AJCC 8th Edition  - Pathologic stage from 3/28/2025: ypTis (DCIS), pN0(sn), cM0 - Signed by Koki Magaña MD on 4/14/2025  Stage prefix: Post-therapy  Method of lymph node assessment: [place] lymph node biopsy       Breast cancer metastasized to axillary lymph node, right  (HCC)   10/16/2024 Initial Diagnosis    Breast cancer metastasized to axillary lymph node, right (HCC)        Review of Systems Refer to nursing note.    Past Medical History:   Diagnosis Date    Acute cystitis     Anxiety     medication for hot flasshes    Breast cancer (HCC)     Breast lump .    last assessed 01/16/2017    Dermatitis     last assessed 02/27/2017    Disease of thyroid gland     last assessed 02/23/2016    Diverticulitis 11/2020    Diverticulitis of colon November 25 July 2016 1st dx    Dysuria     last assessed 04/28/2017    ETD (Eustachian tube dysfunction), bilateral     last assessed 02/23/2016    Fibroid 2012    GERD (gastroesophageal reflux disease) Occasionally    Glaucoma     Left    Hematuria     History of chemotherapy     Migraine 1985    Miscarriage 1990    Polyp at cervical os     PONV (postoperative nausea and vomiting)     Varicella 1966     Past Surgical History:   Procedure Laterality Date    BREAST BIOPSY Right 2009    2 areas benign    CATARACT EXTRACTION Bilateral     COLONOSCOPY      FL GUIDED CENTRAL VENOUS ACCESS DEVICE INSERTION  11/5/2024    CT BX/EXC LYMPH NODE OPEN SUPERFICIAL Right 3/28/2025    Procedure: RIGHT BREAST ROSA ISELA LOCALIZED LUMPECTOMY, BIOPSY LYMPH NODE SENTINEL, LYMPHATIC MAPPING & LYMPHOSCINTIGRAPHY, EXISION OF CLIPPED RIGHT AXILLARY NODE, 1100 NUC MED;  Surgeon: Koki Magaña MD;  Location: [facility];  Service: Surgical Oncology    CT EXC BREAST LES PREOP PLMT RAD MARKER OPEN 1 LES Right 3/28/2025    Procedure: RIGHT BREAST ROSA ISELA LOCALIZED LUMPECTOMY, BIOPSY LYMPH NODE SENTINEL, LYMPHATIC MAPPING & LYMPHOSCINTIGRAPHY, EXISION OF CLIPPED RIGHT AXILLARY NODE, 1100 NUC MED;  Surgeon: Koki Magaña MD;  Location: [facility];  Service: Surgical Oncology    CT HYSTEROSCOPY BX ENDOMETRIUM&/POLYPC W/WO D&C N/A 02/23/2024    Procedure: DILATATION AND CURETTAGE (D&C) WITH HYSTEROSCOPY;  Surgeon: Joselin Alaniz MD;  Location: [facility];  Service: Gynecology     DC HYSTEROSCOPY BX ENDOMETRIUM&/POLYPC W/WO D&C N/A 02/23/2024    Procedure: POLYPECTOMY;  Surgeon: Joselin Alaniz MD;  Location: [facility];  Service: Gynecology    DC INSJ TUNNELED CTR VAD W/SUBQ PORT AGE 5 YR/> N/A 11/5/2024    Procedure: INSERTION VENOUS PORT ( PORT-A-CATH) IR;  Surgeon: Chandler Ball DO;  [facility];  Service: Interventional Radiology    DC INTRAOP SENTINEL LYMPH NODE ID W/DYE INJECTION Right 3/28/2025    Procedure: RIGHT BREAST ROSA ISELA LOCALIZED LUMPECTOMY, BIOPSY LYMPH NODE SENTINEL, LYMPHATIC MAPPING & LYMPHOSCINTIGRAPHY, EXISION OF CLIPPED RIGHT AXILLARY NODE, 1100 NUC MED;  Surgeon: Koki Magaña MD;  [facility];  Service: Surgical Oncology    DC MASTECTOMY PARTIAL Right 3/28/2025    Procedure: RIGHT BREAST ROSA ISELA LOCALIZED LUMPECTOMY, BIOPSY LYMPH NODE SENTINEL, LYMPHATIC MAPPING & LYMPHOSCINTIGRAPHY, EXISION OF CLIPPED RIGHT AXILLARY NODE, 1100 NUC MED;  Surgeon: Koki Magaña MD;  [facility];  Service: Surgical Oncology    DC RMVL YUVAL CTR VAD W/SUBQ PORT/ CTR/PRPH INSJ Right 3/28/2025    Procedure: REMOVAL VENOUS PORT (PORT-A-CATH);  Surgeon: Koki Magaña MD;  [facility];  Service: Surgical Oncology    TUBAL LIGATION      US GUIDED BREAST BIOPSY RIGHT COMPLETE Right 10/04/2024    US GUIDED BREAST LYMPH NODE BIOPSY RIGHT Right 10/04/2024       Current Outpatient Medications on File Prior to Visit   Medication Sig Dispense Refill    apixaban (Eliquis) 5 mg Take 1 tablet (5 mg total) by mouth 2 (two) times a day 60 tablet 1    Bacillus Coagulans-Inulin (Probiotic) 1-250 BILLION-MG CAPS Take by mouth      calcium citrate (CALCITRATE) 950 (200 Ca) MG tablet Take 1 tablet by mouth daily      diphenoxylate-atropine (LOMOTIL) 2.5-0.025 mg per tablet Take 1 tablet by mouth 4 (four) times a day as needed for diarrhea 30 tablet 0    Glucosamine-Chondroitin (GLUCOSAMINE CHONDR COMPLEX PO) Take by mouth daily       latanoprost (XALATAN) 0.005 %  ophthalmic solution Administer 1 drop to both eyes daily at bedtime      Multiple Vitamins-Minerals (CENTRUM SILVER) tablet Take by mouth      omeprazole (PriLOSEC) 20 mg delayed release capsule TAKE 1 CAPSULE BY MOUTH EVERY DAY 30 capsule 1    ondansetron (ZOFRAN-ODT) 8 mg disintegrating tablet Take 1 tablet (8 mg total) by mouth every 8 (eight) hours as needed for nausea or vomiting 30 tablet 2    pyridoxine (VITAMIN B6) 50 mg tablet Take 50 mg by mouth daily      timolol (TIMOPTIC) 0.5 % ophthalmic solution Administer 1 drop to both eyes daily      TURMERIC-GINGER PO Take by mouth      bimatoprost (LATISSE) 0.03 % ophthalmic solution Administer 1 drop to both eyes daily at bedtime Place one drop on applicator and apply evenly along the skin of the upper eyelid at base of eyelashes once daily at bedtime; repeat procedure for second eye (use a clean applicator). (Patient not taking: Reported on 4/3/2025) 5 mL 1    clindamycin (CLEOCIN T) 1 % lotion Apply topically 2 (two) times a day Apply to affected areas. (Patient not taking: Reported on 4/3/2025) 60 mL 1    lidocaine-prilocaine (EMLA) cream Apply topically as needed for mild pain 30 g 2    methylPREDNISolone 4 MG tablet therapy pack Use as directed on package (Patient not taking: Reported on 4/28/2025) 1 each 0    olopatadine (PATANOL) 0.1 % ophthalmic solution Administer 1 drop into the left eye 2 (two) times a day 5 mL 0    oxyCODONE-acetaminophen (Percocet) 5-325 mg per tablet Take 1 tablet by mouth every 6 (six) hours as needed for moderate pain Max Daily Amount: 4 tablets 12 tablet 0    PARoxetine (PAXIL) 10 mg tablet Take 1 tablet (10 mg total) by mouth daily (Patient not taking: Reported on 3/21/2025) 90 tablet 3    prochlorperazine (COMPAZINE) 10 mg tablet Take 1 tablet (10 mg total) by mouth every 6 (six) hours as needed for nausea or vomiting 30 tablet 2     No current facility-administered medications on file prior to visit.      Social History      Tobacco Use    Smoking status: Never    Smokeless tobacco: Never   Vaping Use    Vaping status: Never Used   Substance and Sexual Activity    Alcohol use: Not Currently     Comment: occasionally. last drink more than a month ago    Drug use: Not Currently     Types: Marijuana     Comment: medical marijuana    Sexual activity: Yes     Partners: Male     Birth control/protection: Post-menopausal        Objective   /70   Pulse 72   Temp 98.1 °F (36.7 °C)   Wt 71.2 kg (157 lb)   LMP  (LMP Unknown)   SpO2 98%   BMI 22.53 kg/m²     Pain Screening:  Pain Score: 0-No pain  ECOG  0  Physical Exam  Vitals and nursing note reviewed.   Constitutional:       General: She is not in acute distress.  Cardiovascular:      Rate and Rhythm: Normal rate.   Pulmonary:      Breath sounds: No wheezing, rhonchi or rales.   Chest:          Comments: Breast exam demonstrates breasts are symmetric in size and contour.  There is a well healed medial devan-areolar lumpectomy scar associated with mild retraction in right breast.  Healing ecchymosis lateral to areolar.  Well-healed right axillary scar with small mild irritative erythematous skin changes over scar.  No desquamation, edema, or tender.  No palpable nodules or suspicious skin changes of the breasts bilaterally.  Abdominal:      Palpations: Abdomen is soft.      Tenderness: There is no abdominal tenderness.   Musculoskeletal:      Comments: Full range of motion upper extremities and no edema bilaterally   Lymphadenopathy:      Cervical: No cervical adenopathy.      Upper Body:      Right upper body: No supraclavicular or axillary adenopathy.      Left upper body: No supraclavicular or axillary adenopathy.   Neurological:      Mental Status: She is alert and oriented to person, place, and time.      Gait: Gait normal.          Radiology Results: I have reviewed radiology images/reports described above.  Pathology Results: I have reviewed pathology reports described  above.    Administrative Statements   I have spent a total time of 60 minutes in caring for this patient on the day of the visit/encounter including Diagnostic results, Prognosis, Risks and benefits of tx options, Documenting in the medical record, Reviewing/placing orders in the medical record (including tests, medications, and/or procedures), and Obtaining or reviewing history  .

## 2025-04-28 NOTE — LETTER
2025     Koki Magaña MD  [address] [address]    Patient: Amy Clement   YOB: 1960   Date of Visit: 2025       Dear Dr. Koki Magaña MD:    Thank you for referring Amy Clement to me for evaluation. Below are my notes for this consultation.    If you have questions, please do not hesitate to call me. I look forward to following your patient along with you.         Sincerely,        Nila Forman MD        CC: No Recipients    Nila Forman MD  2025  7:11 PM  Sign when Signing Visit  Consultation Visit   Name: Amy Clement      : 1960      MRN: [id number]  Encounter Provider: Nila Forman MD  Encounter Date: 2025   Encounter department: [facility] RADIATION ONCOLOGY  :  Assessment & Plan  Malignant neoplasm of lower-inner quadrant of right breast of female, estrogen receptor negative (HCC)  Amy Clement 1960 is a 64 y.o. female with a history of clinical stage IIA ([id number]) G2 invasive mammary carcinoma with lobular features of the right breast.  Tumor cells were ER negative, SD low positive, HER2 positive. Axillary node was ER negative, SD positive, and HER2 positive.  She underwent neoadjuvant chemotherapy with TCPH with excellent partial response.  She underwent right breast lumpectomy with sentinel lymph node biopsy on 3/28/25.  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  She continues on Herceptin and Perjeta maintenance with possible AI therapy to follow.      Given her clinical presentation, adjuvant radiation to the right breast with possible axillary tali inclusion.  Total dose of 40Gy with boost to 50Gy or simultaneous integrated boost to 48Gy.  This would provide local regional control benefit and possible survival benefit.  This is as per NCCN guidelines.    The rationale and potential benefits, as well as the risks and  acute and late side effects and potential toxicities of radiation were discussed with the patient at length. Side effects discussed included, but were not limited to: Fatigue, skin erythema, hyperpigmentation, desquamation, fibrosis, shrinkage of the breast resulting asymmetry, rib weakening, pulmonary fibrosis, and lymphedema.    The patient was given the opportunity to ask questions and all questions were answered to her satisfaction.   She wished to proceed with the recommended treatment plan.      Regarding her irritation in axilla, this does appear to be related to irritative dermatitis. She notes that her bra rubs this area.  I recommended a barrier emollient for, such as Aquaphor, in an effort to relieve symptoms.    -CT simulation scheduled as she is well-healed.    Orders:  •  Ambulatory Referral to Radiation Oncology        History of Present Illness   Chief Complaint   Patient presents with   • Breast Cancer   • Consult   Pertinent Medical History   Amy Clement 1960 is a 64 y.o. female with a history of stage IIA invasive mammary carcinoma of the right breast.  Tumor cells were ER negative, HI low positive, HER2 positive. Neoadjuvant chemotherapy with TCPH was delivered with excellent partial response.  She underwent right breast lumpectomy with sentinel lymph node biopsy on 3/28/25. She is being referred by Dr. Magaña and presents today for consult.     The patient presented with abnormal screening mammogram of the right breast.  10/4/24 US B/L breasts screening & B/L mammogram screening  RIGHT  1) MASS [B]  Mammo screening bilateral w 3d & cad: There is an oval mass with indistinct margins seen in the inner central region of the right breast in the anterior depth.   US breast screening bilateral complete (ABUS): There is a 15 mm oval, hypoechoic mass with indistinct margins seen in the right breast at 4 o'clock, 3.4 cm from the nipple.   Left  Mammo screening bilateral w 3d & cad  There are no  suspicious masses, grouped microcalcifications or areas of unexplained architectural distortion. The skin and nipple areolar complex are unremarkable.   US breast screening bilateral complete (ABUS)  There are no suspicious masses or areas of architectural distortion.   RECOMMENDATION:       - Ultrasound at the current time for the right breast.       - Routine screening mammogram in 1 year for the left breast.        10/4/24 US guided biopsies right breast and right axilla  Pathology from 4:00 breast lesion demonstrated grade 2 invasive mammary carcinoma with predominant lobular features associated with DCIS.  LVI was present.  Tumor cells were ER(-), AZ weak (+), and HER2(+).  Axillary lymph node demonstrated metastatic carcinoma compatible with mammary origin.  Tumor cells were ER(-), AZ(+) at 70%, and HER2(+).      10/18/24 Dr. Gonzalez  newly diagnosed ER negative, AZ low positive, HER2 3+ right-sided breast cancer.   discussed that standard of care is neoadjuvant chemotherapy with Taxotere, carboplatin, Herceptin, Perjeta.   will need an echo and a port prior to starting treatment.   going to get baseline staging studies of a CT and bone scan.   She is ER negative but does have AZ positivity more so in the lymph node. We can consider adjuvant endocrine therapy as long as this is well-tolerated later. That is something we can discuss after surgery as well.      10/21/24 Dr. Magaña  I am able to palpate a mass in the breast and axilla on exam today. She already met with medical oncology who discussed neoadjuvant therapy with her. Staging scans are scheduled.   If her scans show no evidence of systemic disease, she will return to me for surgical management. We discussed both lumpectomy versus mastectomy. If she has any actionable gene mutations, she would likely proceed with a double mastectomy. Outside of that setting she is a good candidate for breast conservation.   She understands that she would need radiation  therapy.      10/28/24 CT C/A/P w contrast  Medial right breast mass and presumed involved single enlarged right axillary lymph node.  Tiny sclerotic focus in the proximal humerus on the right is less dense than typically seen with a bone island. A tiny metastatic focus cannot be entirely excluded.  Otherwise no metastatic disease to the chest, abdomen, or pelvis.  Large left thyroid lobe goiter extending into the mediastinum and deviating the trachea to the right.  Fluid attenuating focus in the right adnexa may represent an ovarian cyst or could represent focal dilatation of the fallopian tube.  Numerous uterine fibroids  Sigmoid diverticulosis without diverticulitis.     11/1/24 Bone scan  1. No scintigraphic evidence of osseous metastasis.      11/5/24 IR port placement     11/7/24 MRI humerus right w wo contrast  No discrete lesion seen within the right humerus. No abnormal enhancement.  Mild glenohumeral joint osteoarthrosis.  Mild distal supraspinatus tendinosis and mild subacromial/subdeltoid bursitis.  Enlarged lymph node in the right lateral chest wall.     11/8/24-  - 2/21/25 Started TCH + P    Final cycle had dose reduction due to significant fatigue.  She suffered DVT and        2/18/25 Dr. Gonzalez  Due to the buildup of fatigue we will dose reduce the last cycle of Taxotere by a total of 20% and leave all the rest of the dosing of the carboplatin Perjeta and Herceptin the same.   repeat a Doppler right before surgery to ensure that her thrombosis is resolving.   If everything looks good I will talk to Dr. Magaña about removing her port at the time of surgery. She may need Kadcyla in the adjuvant setting based on her pathologic response but we can give that through peripheral IV.   If she gets a complete response we could also consider subcutaneous Perjeta and Herceptin.   referred her to radiation oncology to start talking about adjuvant radiation         2/24/25 US breast right  RIGHT  1) MASS [B]:  The biopsy-proven carcinoma has markedly decreased in size; it currently measures 6 mm x 2 mm x 5 mm and previously measured 15 mm x 13 mm x 17 mm.  The measurements on the current exam may be artifactual; there may be a complete response.  2) LYMPH NODE [C]: Previously noted adenopathy has resolved; cortical thickness currently measures 3 mm and previously measured 11 mm.   RECOMMENDATION:       - Surgical consultation for the right breast.     2/26/25 Dr. Magaña  status post neoadjuvant chemotherapy for a clinical T1c N1 HER2 positive invasive carcinoma of the right breast. She completed neoadjuvant chemotherapy on 2/21/2025.   has unifocal disease and is a good candidate for breast conservation. She was clinically node-positive but has converted to clinically node-negative on exam and imaging.   counseled her on a ROSA ISELA localized lumpectomy of the right breast along with lymphatic mapping, sentinel node biopsy and removal of the previously positive clipped node.   If positive, axillary dissection would be performed. She recently developed a clot around the port and is on Eliquis.    understands that she will need to follow-up with medical oncology postoperatively and will also need radiation therapy.  She is requesting to see Dr. Forman at the [facility].      3/28/25 RIGHT BREAST ROSA ISELA LOCALIZED LUMPECTOMY, BIOPSY LYMPH NODE SENTINEL, LYMPHATIC MAPPING & LYMPHOSCINTIGRAPHY, EXISION OF CLIPPED RIGHT AXILLARY NODE, 1100 NUC MED (Right: Breast)       REMOVAL VENOUS PORT   Procedure   Excision (less than total mastectomy)   Specimen Laterality   Right   TUMOR   Histologic Type   No residual invasive carcinoma   Tumor Size   No residual invasive carcinoma   Ductal Carcinoma In Situ (DCIS)   Present       Only DCIS is present after presurgical (neoadjuvant) therapy   Size (Extent) of DCIS   Estimated size (extent) of DCIS is at least (Millimeters): 4 mm   Architectural Patterns   Solid   Nuclear Grade   Grade III (high)    Necrosis   Not identified   Number of Blocks with DCIS   2   Number of Blocks Examined   24   Lymphatic and / or Vascular Invasion   Not identified   Treatment Effect in the Breast   No residual invasive carcinoma is present in the breast after presurgical therapy   Treatment Effect in the Lymph Nodes   No lymph node metastases. Fibrous scarring or histiocytic aggregates, possibly related to prior lymph node metastases with pathologic complete response   Residual Cancer [place] (RCB) Parameters       Residual Cancer [place] Class   RCB-0 (pCR)   MARGINS   Margin Status for DCIS   All margins negative for DCIS   Distance from DCIS to Closest Margin   Greater than: 2 mm   REGIONAL LYMPH NODES   Regional Lymph Node Status   All regional lymph nodes negative for tumor   Total Number of Lymph Nodes Examined (sentinel and non-sentinel)   1   Number of Delta Nodes Examined   1   pTNM CLASSIFICATION (AJCC 8th Edition)   Reporting of pT, pN, and (when applicable) pM categories is based on information available to the pathologist at the time the report is issued. As per the AJCC (Chapter 1, 8th Ed.) it is the managing physician's responsibility to establish the final pathologic stage based upon all pertinent information, including but potentially not limited to this pathology report.   Modified Classification   y   pT Category   pTis (DCIS)   pN Category   pN0   N Suffix   (sn)   .         3/31/25 VAS upper limb venous duplex  Impression  RIGHT UPPER LIMB:  Evidence suggestive of chronic occlusive deep vein thrombosis noted in the  Internal jugular vein.  No evidence of superficial thrombophlebitis noted.  Doppler evaluation shows a normal response to augmentation maneuvers.  LEFT UPPER LIMB LIMITED:  Evaluation shows no evidence of thrombus in the internal jugular vein,  subclavian vein, and the innominate vein.        4/14/25 Dr. Magaña  status post right breast conservation following neoadjuvant chemotherapy. She is  healing well with no signs of infection.   will order her diagnostic mammogram for October.   Follow up 6 months     4/25/25 Dr. Gonzalez  will continue Perjeta and Herceptin to complete a year of therapy.   port has been removed so I will give it to her as Phesgo subcutaneously.   I am going to attempt to give her an aromatase inhibitor after radiation. If it is well-tolerated she will take it. Her ER was negative but her MT was 70% on her lymph node. There may be some benefit and if she tolerates it she will take anastrozole for 5 years.      The patient denies breast pain or tenderness.  She denies palpable nodules, suspicious skin changes, or nipple discharge of the breasts bilaterally.  She denies upper extremity edema or shoulder restriction.  She does not irritation and mild rash/erythema of the right axillary scar.       Upcoming:  10/8/25 Mammogram  10/16/25 Surg Onc     Oncology History  Cancer Staging   Malignant neoplasm of lower-inner quadrant of right breast of female, estrogen receptor negative (HCC)  Staging form: Breast, AJCC 8th Edition  - Clinical stage from 10/4/2024: Stage IIA (cT1c, cN1(f), cM0, G2, ER-, MT+, HER2+) - Signed by Koki Magaña MD on 10/21/2024  Stage prefix: Initial diagnosis  Method of lymph node assessment: Core biopsy  Histologic grading system: 3 grade system  - Pathologic stage from 3/28/2025: ypTis (DCIS), pN0(sn), cM0 - Signed by Koki Magaña MD on 4/14/2025  Stage prefix: Post-therapy  Method of lymph node assessment: [place] lymph node biopsy  Oncology History Overview Note    10/2024 - right breast and LNBx positive for invasive mammary carcinoma with predominantly lobular features, apocrine and histiocytoid features in less amount, grade 2, ER 1% MT 10% Her2 3+, [id number]     LN biopsy receptors - ER 1% MT 70% Her2 3+     11/8/2024 - start TCHP     12/2024 - cycle 3 - taxotere dose reduced by 10%, carbo dose reduced to AUC 5 due to N/V     2/2025 - cycle 6 - taxotere  dose reduced by 20% due to fatigue     Started on eliquis due to right IJ DVT associated with port    3/28/2024 - port removed at surgery, right sided partial mastectomy with SLNBX, [id number]    Will continue the rest of treatment with phesgo - 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 Right breast ultrasound-guided biopsy  4 o'clock, 2 cm from nipple (ROSA ISELA)  Invasive mammary carcinoma with predominately lobular features   Apocrine and histiocytoid morphologic features  Grade 2  ER <1, AK 10, HER2 3+  Lymphovascular invasion present    B. Right axillary lymph node biopsy (ROSA ISELA)  Metastatic carcinoma, compatible with mammary origin  ER <1, AK 70, HER2 3+    Concordant. Right malignancy appears unifocal; biopsy-proven carcinoma measured 1.7 cm on US. Biopsy-proven metastatic disease to right axillary lymph node. Left breast clear.     10/4/2024 -  Cancer Staged    Staging form: Breast, AJCC 8th Edition  - Clinical stage from 10/4/2024: Stage IIA (cT1c, cN1(f), cM0, G2, ER-, AK+, HER2+) - Signed by Koki Magaña MD on 10/21/2024  Stage prefix: Initial diagnosis  Method of lymph node assessment: Core biopsy  Histologic grading system: 3 grade system       10/15/2024 Genetic Testing    VUS of AXIN2, MSH6  [facility] BRCA[employer] & Cancer +RNAInsight - A total of 59 genes were evaluated with RNA insight: APC, JUAN J, BAP1, BARD1, BMPR1A, BRCA1, BRCA2, BRIP1, CDH1, CDK4, CDKN1B, CDKN2A, CHEK2, DICER1, FH, FLCN, KIF1B, MAX, MEN1, MET, MLH1, MSH2, MSH6, MUTYH, NF1, NTHL1, PALB2, PMS2, POT1, PTEN, RAD51C, RAD51D, RB1, RET, SDHA, SDHAF2, SDHB, SDHC, SDHD, SMAD4, SMARCA4, STK11, NKSM175, TP53, TSC1, TSC2 and VHL (sequencing and deletion/duplication); AXIN2, CTNNA1, EGLN1, HOXB13, KIT, MITF, MSH3, PDGFRA, POLD1 and POLE (sequencing only); EPCAM and GREM1 (deletion/duplication only).       11/8/2024 -  Chemotherapy    pertuzumab (PERJETA) IVPB, 840 mg, 7 of 7 cycles  Administration: 840 mg (11/8/2024), 420 mg (11/29/2024), 420 mg (12/20/2024), 420 mg (1/10/2025), 420 mg (1/31/2025), 420 mg (2/21/2025), 420 mg (3/20/2025)  CARBOplatin (PARAPLATIN) IVPB (GOG AUC DOSING), 722.4 mg, 6 of 6 cycles  Administration: 722.4 mg (11/8/2024), 722.4 mg (11/29/2024), 602 mg (12/20/2024), 602 mg (1/10/2025), 602 mg (1/31/2025), 602 mg (2/21/2025)  DOCEtaxel (TAXOTERE) chemo infusion, 75 mg/m2 = 144 mg, 6 of 6 cycles  Dose modification: 67 mg/m2 (original dose 75 mg/m2, Cycle 3, Reason: Nausea/Vomiting, Comment: 10%dose reduction due nausea), 60.3 mg/m2 (original dose 75 mg/m2, Cycle 6, Reason: Dose modified as per discussion with consulting physician, Comment: dose reduction), 60 mg/m2 (original dose 75 mg/m2, Cycle 6, Reason: Dose modified as per discussion with consulting physician, Comment: total 20% dose reduction)  Administration: 144 mg (11/8/2024), 144 mg (11/29/2024), 128.6 mg (12/20/2024), 128.6 mg (1/10/2025), 128.6 mg (1/31/2025), 115.2 mg (2/21/2025)  trastuzumab (HERCEPTIN) chemo infusion, 8 mg/kg = 595 mg, 7 of 7 cycles  Administration: 595 mg (11/8/2024), 447 mg (11/29/2024), 447 mg (12/20/2024), 447 mg (1/10/2025), 447 mg (1/31/2025), 447 mg (2/21/2025), 447 mg (3/20/2025)  pertuzumab, trastuzumab, and hyaluronidase (Phesgo) subcutaneous injection Soln, 15 mL (original dose ), 0 of 11 cycles  Dose modification: 15 mL (Cycle 8), 10 mL (Cycle 9)     2/24/2025 Observation    Right breast axilla US    At least almost complete imaging response to neoadjuvant chemotherapy. The biopsy-proven carcinoma has markedly decreased in size; it currently measures 6 mm x 2 mm x 5 mm and previously measured 15 mm x 13 mm x 17 mm.  The measurements on the current exam may be artifactual; there may be a complete response. Previously noted adenopathy has resolved; cortical thickness currently measures 3mm and  previously measured 11mm     3/28/2025 Surgery    Right breast ROSA ISELA localized lumpectomy w/ SLNB, removal of venous port    DCIS  Grade 3  4mm  No residual invasive carcinoma  All margins negative for DCIS  0/1 lymph node     3/28/2025 -  Cancer Staged    Staging form: Breast, AJCC 8th Edition  - Pathologic stage from 3/28/2025: ypTis (DCIS), pN0(sn), cM0 - Signed by Koki Magaña MD on 4/14/2025  Stage prefix: Post-therapy  Method of lymph node assessment: [place] lymph node biopsy       Breast cancer metastasized to axillary lymph node, right (HCC)   10/16/2024 Initial Diagnosis    Breast cancer metastasized to axillary lymph node, right (HCC)        Review of Systems Refer to nursing note.    Past Medical History:   Diagnosis Date   • Acute cystitis    • Anxiety     medication for hot flasshes   • Breast cancer (HCC)    • Breast lump .    last assessed 01/16/2017   • Dermatitis     last assessed 02/27/2017   • Disease of thyroid gland     last assessed 02/23/2016   • Diverticulitis 11/2020   • Diverticulitis of colon November 25 July 2016 1st dx   • Dysuria     last assessed 04/28/2017   • ETD (Eustachian tube dysfunction), bilateral     last assessed 02/23/2016   • Fibroid 2012   • GERD (gastroesophageal reflux disease) Occasionally   • Glaucoma     Left   • Hematuria    • History of chemotherapy    • Migraine 1985   • Miscarriage 1990   • Polyp at cervical os    • PONV (postoperative nausea and vomiting)    • Varicella 1966     Past Surgical History:   Procedure Laterality Date   • BREAST BIOPSY Right 2009    2 areas benign   • CATARACT EXTRACTION Bilateral    • COLONOSCOPY     • FL GUIDED CENTRAL VENOUS ACCESS DEVICE INSERTION  11/5/2024   • DC BX/EXC LYMPH NODE OPEN SUPERFICIAL Right 3/28/2025    Procedure: RIGHT BREAST ROSA ISELA LOCALIZED LUMPECTOMY, BIOPSY LYMPH NODE SENTINEL, LYMPHATIC MAPPING & LYMPHOSCINTIGRAPHY, EXISION OF CLIPPED RIGHT AXILLARY NODE, 1100 NUC MED;  Surgeon: Koki Magaña MD;  Location:  [facility];  Service: Surgical Oncology   • ID EXC BREAST LES PREOP PLMT RAD MARKER OPEN 1 LES Right 3/28/2025    Procedure: RIGHT BREAST ROSA ISELA LOCALIZED LUMPECTOMY, BIOPSY LYMPH NODE SENTINEL, LYMPHATIC MAPPING & LYMPHOSCINTIGRAPHY, EXISION OF CLIPPED RIGHT AXILLARY NODE, 1100 NUC MED;  Surgeon: Koki Magaña MD;  Location: [facility];  Service: Surgical Oncology   • ID HYSTEROSCOPY BX ENDOMETRIUM&/POLYPC W/WO D&C N/A 02/23/2024    Procedure: DILATATION AND CURETTAGE (D&C) WITH HYSTEROSCOPY;  Surgeon: Joselin Alaniz MD;  Location: [facility];  Service: Gynecology   • ID HYSTEROSCOPY BX ENDOMETRIUM&/POLYPC W/WO D&C N/A 02/23/2024    Procedure: POLYPECTOMY;  Surgeon: Joselin Alaniz MD;  Location: [facility];  Service: Gynecology   • ID INSJ TUNNELED CTR VAD W/SUBQ PORT AGE 5 YR/> N/A 11/5/2024    Procedure: INSERTION VENOUS PORT ( PORT-A-CATH) IR;  Surgeon: Chandler Ball DO;  [facility];  Service: Interventional Radiology   • ID INTRAOP SENTINEL LYMPH NODE ID W/DYE INJECTION Right 3/28/2025    Procedure: RIGHT BREAST ROSA ISELA LOCALIZED LUMPECTOMY, BIOPSY LYMPH NODE SENTINEL, LYMPHATIC MAPPING & LYMPHOSCINTIGRAPHY, EXISION OF CLIPPED RIGHT AXILLARY NODE, 1100 NUC MED;  Surgeon: Koki Magaña MD;  [facility];  Service: Surgical Oncology   • ID MASTECTOMY PARTIAL Right 3/28/2025    Procedure: RIGHT BREAST ROSA ISELA LOCALIZED LUMPECTOMY, BIOPSY LYMPH NODE SENTINEL, LYMPHATIC MAPPING & LYMPHOSCINTIGRAPHY, EXISION OF CLIPPED RIGHT AXILLARY NODE, 1100 NUC MED;  Surgeon: Koki Magaña MD;  [facility];  Service: Surgical Oncology   • ID RMVL YUVAL CTR VAD W/SUBQ PORT/ CTR/PRPH INSJ Right 3/28/2025    Procedure: REMOVAL VENOUS PORT (PORT-A-CATH);  Surgeon: Koki Magaña MD;  Location[facility];  Service: Surgical Oncology   • TUBAL LIGATION     • US GUIDED BREAST BIOPSY RIGHT COMPLETE Right 10/04/2024   • US GUIDED BREAST LYMPH NODE BIOPSY RIGHT Right 10/04/2024       Current  Outpatient Medications on File Prior to Visit   Medication Sig Dispense Refill   • apixaban (Eliquis) 5 mg Take 1 tablet (5 mg total) by mouth 2 (two) times a day 60 tablet 1   • Bacillus Coagulans-Inulin (Probiotic) 1-250 BILLION-MG CAPS Take by mouth     • calcium citrate (CALCITRATE) 950 (200 Ca) MG tablet Take 1 tablet by mouth daily     • diphenoxylate-atropine (LOMOTIL) 2.5-0.025 mg per tablet Take 1 tablet by mouth 4 (four) times a day as needed for diarrhea 30 tablet 0   • Glucosamine-Chondroitin (GLUCOSAMINE CHONDR COMPLEX PO) Take by mouth daily      • latanoprost (XALATAN) 0.005 % ophthalmic solution Administer 1 drop to both eyes daily at bedtime     • Multiple Vitamins-Minerals (CENTRUM SILVER) tablet Take by mouth     • omeprazole (PriLOSEC) 20 mg delayed release capsule TAKE 1 CAPSULE BY MOUTH EVERY DAY 30 capsule 1   • ondansetron (ZOFRAN-ODT) 8 mg disintegrating tablet Take 1 tablet (8 mg total) by mouth every 8 (eight) hours as needed for nausea or vomiting 30 tablet 2   • pyridoxine (VITAMIN B6) 50 mg tablet Take 50 mg by mouth daily     • timolol (TIMOPTIC) 0.5 % ophthalmic solution Administer 1 drop to both eyes daily     • TURMERIC-GINGER PO Take by mouth     • bimatoprost (LATISSE) 0.03 % ophthalmic solution Administer 1 drop to both eyes daily at bedtime Place one drop on applicator and apply evenly along the skin of the upper eyelid at base of eyelashes once daily at bedtime; repeat procedure for second eye (use a clean applicator). (Patient not taking: Reported on 4/3/2025) 5 mL 1   • clindamycin (CLEOCIN T) 1 % lotion Apply topically 2 (two) times a day Apply to affected areas. (Patient not taking: Reported on 4/3/2025) 60 mL 1   • lidocaine-prilocaine (EMLA) cream Apply topically as needed for mild pain 30 g 2   • methylPREDNISolone 4 MG tablet therapy pack Use as directed on package (Patient not taking: Reported on 4/28/2025) 1 each 0   • olopatadine (PATANOL) 0.1 % ophthalmic solution  Administer 1 drop into the left eye 2 (two) times a day 5 mL 0   • oxyCODONE-acetaminophen (Percocet) 5-325 mg per tablet Take 1 tablet by mouth every 6 (six) hours as needed for moderate pain Max Daily Amount: 4 tablets 12 tablet 0   • PARoxetine (PAXIL) 10 mg tablet Take 1 tablet (10 mg total) by mouth daily (Patient not taking: Reported on 3/21/2025) 90 tablet 3   • prochlorperazine (COMPAZINE) 10 mg tablet Take 1 tablet (10 mg total) by mouth every 6 (six) hours as needed for nausea or vomiting 30 tablet 2     No current facility-administered medications on file prior to visit.      Social History     Tobacco Use   • Smoking status: Never   • Smokeless tobacco: Never   Vaping Use   • Vaping status: Never Used   Substance and Sexual Activity   • Alcohol use: Not Currently     Comment: occasionally. last drink more than a month ago   • Drug use: Not Currently     Types: Marijuana     Comment: medical marijuana   • Sexual activity: Yes     Partners: Male     Birth control/protection: Post-menopausal        Objective   /70   Pulse 72   Temp 98.1 °F (36.7 °C)   Wt 71.2 kg (157 lb)   LMP  (LMP Unknown)   SpO2 98%   BMI 22.53 kg/m²     Pain Screening:  Pain Score: 0-No pain  ECOG  0  Physical Exam  Vitals and nursing note reviewed.   Constitutional:       General: She is not in acute distress.  Cardiovascular:      Rate and Rhythm: Normal rate.   Pulmonary:      Breath sounds: No wheezing, rhonchi or rales.   Chest:          Comments: Breast exam demonstrates breasts are symmetric in size and contour.  There is a well healed medial devan-areolar lumpectomy scar associated with mild retraction in right breast.  Healing ecchymosis lateral to areolar.  Well-healed right axillary scar with small mild irritative erythematous skin changes over scar.  No desquamation, edema, or tender.  No palpable nodules or suspicious skin changes of the breasts bilaterally.  Abdominal:      Palpations: Abdomen is soft.       Tenderness: There is no abdominal tenderness.   Musculoskeletal:      Comments: Full range of motion upper extremities and no edema bilaterally   Lymphadenopathy:      Cervical: No cervical adenopathy.      Upper Body:      Right upper body: No supraclavicular or axillary adenopathy.      Left upper body: No supraclavicular or axillary adenopathy.   Neurological:      Mental Status: She is alert and oriented to person, place, and time.      Gait: Gait normal.          Radiology Results: I have reviewed radiology images/reports described above.  Pathology Results: I have reviewed pathology reports described above.    Administrative Statements   I have spent a total time of 60 minutes in caring for this patient on the day of the visit/encounter including Diagnostic results, Prognosis, Risks and benefits of tx options, Documenting in the medical record, Reviewing/placing orders in the medical record (including tests, medications, and/or procedures), and Obtaining or reviewing history  .

## 2025-04-28 NOTE — ASSESSMENT & PLAN NOTE
Amy Clement 1960 is a 64 y.o. female with a history of clinical stage IIA ([id number]) G2 invasive mammary carcinoma with lobular features of the right breast.  Tumor cells were ER negative, FL low positive, HER2 positive. Axillary node was ER negative, FL positive, and HER2 positive.  She underwent neoadjuvant chemotherapy with TCPH with excellent partial response.  She underwent right breast lumpectomy with sentinel lymph node biopsy on 3/28/25.  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  She continues on Herceptin and Perjeta maintenance with possible AI therapy to follow.      Given her clinical presentation, adjuvant radiation to the right breast with possible axillary tali inclusion.  Total dose of 40Gy with boost to 50Gy or simultaneous integrated boost to 48Gy.  This would provide local regional control benefit and possible survival benefit.  This is as per NCCN guidelines.    The rationale and potential benefits, as well as the risks and acute and late side effects and potential toxicities of radiation were discussed with the patient at length. Side effects discussed included, but were not limited to: Fatigue, skin erythema, hyperpigmentation, desquamation, fibrosis, shrinkage of the breast resulting asymmetry, rib weakening, pulmonary fibrosis, and lymphedema.    The patient was given the opportunity to ask questions and all questions were answered to her satisfaction.   She wished to proceed with the recommended treatment plan.      Regarding her irritation in axilla, this does appear to be related to irritative dermatitis. She notes that her bra rubs this area.  I recommended a barrier emollient for, such as Aquaphor, in an effort to relieve symptoms.    -CT simulation scheduled as she is well-healed.    Orders:    Ambulatory Referral to Radiation Oncology

## 2025-04-29 ENCOUNTER — HOSPITAL ENCOUNTER (OUTPATIENT)
Age: 65
Discharge: HOME | End: 2025-04-29
Payer: COMMERCIAL

## 2025-04-29 VITALS — HEIGHT: 70 IN | BODY MASS INDEX: 22.69 KG/M2

## 2025-04-29 DIAGNOSIS — Z78.0 MENOPAUSE: ICD-10-CM

## 2025-04-29 DIAGNOSIS — C50.311 MALIGNANT NEOPLASM OF LOWER-INNER QUADRANT OF RIGHT BREAST OF FEMALE, ESTROGEN RECEPTOR NEGATIVE (HCC): ICD-10-CM

## 2025-04-29 DIAGNOSIS — Z17.1 MALIGNANT NEOPLASM OF LOWER-INNER QUADRANT OF RIGHT BREAST OF FEMALE, ESTROGEN RECEPTOR NEGATIVE (HCC): ICD-10-CM

## 2025-04-29 DIAGNOSIS — Z17.1 MALIGNANT NEOPLASM OF LOWER-INNER QUADRANT OF RIGHT BREAST OF FEMALE, ESTROGEN RECEPTOR NEGATIVE (HCC): Primary | ICD-10-CM

## 2025-04-29 DIAGNOSIS — C50.311 MALIGNANT NEOPLASM OF LOWER-INNER QUADRANT OF RIGHT BREAST OF FEMALE, ESTROGEN RECEPTOR NEGATIVE (HCC): Primary | ICD-10-CM

## 2025-04-29 PROCEDURE — 77080 DXA BONE DENSITY AXIAL: CPT

## 2025-04-29 RX ORDER — SODIUM CHLORIDE 9 MG/ML
20 INJECTION, SOLUTION INTRAVENOUS ONCE
OUTPATIENT
Start: 2025-05-21

## 2025-04-29 RX ORDER — SODIUM CHLORIDE 9 MG/ML
20 INJECTION, SOLUTION INTRAVENOUS ONCE
OUTPATIENT
Start: 2025-06-11

## 2025-04-30 ENCOUNTER — TELEPHONE (OUTPATIENT)
Age: 65
End: 2025-04-30

## 2025-04-30 NOTE — TELEPHONE ENCOUNTER
Moved appt to 12pm, left  for pt making her aware of new appt time, left infusion center phone # for pt to call with any questions or concerns

## 2025-04-30 NOTE — TELEPHONE ENCOUNTER
Spoke with patient to see if she wanted to continue with the [employer]. Patient is not using [employer] anymore and will be mailing back her cap to the company. Patient would like to change her appts from 7:30am if possible to 9am. I informed her that I will ask and someone will call her back to confirm appt time. Patient verbalized understanding.

## 2025-04-30 NOTE — TELEPHONE ENCOUNTER
Provider:Dr Lisa SHORT:  Pt calling to verify Tx infusion time for 5/1 as 4PM.  Per pt chart time verified for 4PM on 5/1 at [facility] infusion.

## 2025-05-01 ENCOUNTER — HOSPITAL ENCOUNTER (OUTPATIENT)
Dept: INFUSION CENTER | Facility: CLINIC | Age: 65
Discharge: HOME | End: 2025-05-01
Attending: INTERNAL MEDICINE
Payer: COMMERCIAL

## 2025-05-01 VITALS
HEART RATE: 75 BPM | TEMPERATURE: 97.7 F | DIASTOLIC BLOOD PRESSURE: 59 MMHG | SYSTOLIC BLOOD PRESSURE: 120 MMHG | RESPIRATION RATE: 18 BRPM

## 2025-05-01 DIAGNOSIS — Z17.1 MALIGNANT NEOPLASM OF LOWER-INNER QUADRANT OF RIGHT BREAST OF FEMALE, ESTROGEN RECEPTOR NEGATIVE (HCC): Primary | ICD-10-CM

## 2025-05-01 DIAGNOSIS — C50.311 MALIGNANT NEOPLASM OF LOWER-INNER QUADRANT OF RIGHT BREAST OF FEMALE, ESTROGEN RECEPTOR NEGATIVE (HCC): Primary | ICD-10-CM

## 2025-05-01 LAB
ALBUMIN SERPL BCG-MCNC: 4.6 G/DL (ref 3.5–5)
ALP SERPL-CCNC: 68 U/L (ref 34–104)
ALT SERPL W P-5'-P-CCNC: 17 U/L (ref 7–52)
ANION GAP SERPL CALCULATED.3IONS-SCNC: 6 MMOL/L (ref 4–13)
AST SERPL W P-5'-P-CCNC: 17 U/L (ref 13–39)
BASOPHILS # BLD AUTO: 0.02 THOUSANDS/ÂΜL (ref 0–0.1)
BASOPHILS NFR BLD AUTO: 0 % (ref 0–1)
BILIRUB SERPL-MCNC: 0.44 MG/DL (ref 0.2–1)
BUN SERPL-MCNC: 15 MG/DL (ref 5–25)
CALCIUM SERPL-MCNC: 9.9 MG/DL (ref 8.4–10.2)
CHLORIDE SERPL-SCNC: 102 MMOL/L (ref 96–108)
CO2 SERPL-SCNC: 29 MMOL/L (ref 21–32)
CREAT SERPL-MCNC: 0.59 MG/DL (ref 0.6–1.3)
EOSINOPHIL # BLD AUTO: 0.1 THOUSAND/ÂΜL (ref 0–0.61)
EOSINOPHIL NFR BLD AUTO: 2 % (ref 0–6)
ERYTHROCYTE [DISTWIDTH] IN BLOOD BY AUTOMATED COUNT: 11.9 % (ref 11.6–15.1)
GFR SERPL CREATININE-BSD FRML MDRD: 97 ML/MIN/1.73SQ M
GLUCOSE SERPL-MCNC: 95 MG/DL (ref 65–140)
HCT VFR BLD AUTO: 40.6 % (ref 34.8–46.1)
HGB BLD-MCNC: 13.5 G/DL (ref 11.5–15.4)
IMM GRANULOCYTES # BLD AUTO: 0.01 THOUSAND/UL (ref 0–0.2)
IMM GRANULOCYTES NFR BLD AUTO: 0 % (ref 0–2)
LYMPHOCYTES # BLD AUTO: 0.83 THOUSANDS/ÂΜL (ref 0.6–4.47)
LYMPHOCYTES NFR BLD AUTO: 17 % (ref 14–44)
MCH RBC QN AUTO: 31.7 PG (ref 26.8–34.3)
MCHC RBC AUTO-ENTMCNC: 33.3 G/DL (ref 31.4–37.4)
MCV RBC AUTO: 95 FL (ref 82–98)
MONOCYTES # BLD AUTO: 0.34 THOUSAND/ÂΜL (ref 0.17–1.22)
MONOCYTES NFR BLD AUTO: 7 % (ref 4–12)
NEUTROPHILS # BLD AUTO: 3.7 THOUSANDS/ÂΜL (ref 1.85–7.62)
NEUTS SEG NFR BLD AUTO: 74 % (ref 43–75)
NRBC BLD AUTO-RTO: 0 /100 WBCS
PLATELET # BLD AUTO: 215 THOUSANDS/UL (ref 149–390)
PMV BLD AUTO: 9.2 FL (ref 8.9–12.7)
POTASSIUM SERPL-SCNC: 3.9 MMOL/L (ref 3.5–5.3)
PROT SERPL-MCNC: 6.9 G/DL (ref 6.4–8.4)
RBC # BLD AUTO: 4.26 MILLION/UL (ref 3.81–5.12)
SODIUM SERPL-SCNC: 137 MMOL/L (ref 135–147)
WBC # BLD AUTO: 5 THOUSAND/UL (ref 4.31–10.16)

## 2025-05-01 PROCEDURE — 85025 COMPLETE CBC W/AUTO DIFF WBC: CPT | Performed by: INTERNAL MEDICINE

## 2025-05-01 PROCEDURE — 96401 CHEMO ANTI-NEOPL SQ/IM: CPT

## 2025-05-01 PROCEDURE — 80053 COMPREHEN METABOLIC PANEL: CPT | Performed by: INTERNAL MEDICINE

## 2025-05-01 RX ADMIN — PERTUZUMAB, TRASTUZUMAB, AND HYALURONIDASE-ZZXF 15 ML: 1200; 600; 2000 INJECTION, SOLUTION SUBCUTANEOUS at 16:33

## 2025-05-01 NOTE — PROGRESS NOTES
Report taken from Mariana RN, pt tolerated her treatment well, pt kept for a 30 min post observation, Phesgo education sheet given along w/ her AVS, pt aware of her next appt on 5/22 at 7:30 am, vs stable upon D/C, D/C home

## 2025-05-01 NOTE — PROGRESS NOTES
Pt into clinic for Phesgo injection Pt offers no complaints. Pt denies recent infection, illness, abx use.    Repot given to Joselin ORDAZ RN.

## 2025-05-06 ENCOUNTER — APPOINTMENT (OUTPATIENT)
Dept: RADIATION ONCOLOGY | Facility: CLINIC | Age: 65
End: 2025-05-06
Attending: RADIOLOGY
Payer: COMMERCIAL

## 2025-05-06 PROCEDURE — 77290 THER RAD SIMULAJ FIELD CPLX: CPT | Performed by: RADIOLOGY

## 2025-05-06 PROCEDURE — 77332 RADIATION TREATMENT AID(S): CPT | Performed by: RADIOLOGY

## 2025-05-19 PROCEDURE — 77334 RADIATION TREATMENT AID(S): CPT | Performed by: RADIOLOGY

## 2025-05-19 PROCEDURE — 77295 3-D RADIOTHERAPY PLAN: CPT | Performed by: RADIOLOGY

## 2025-05-19 PROCEDURE — 77300 RADIATION THERAPY DOSE PLAN: CPT | Performed by: RADIOLOGY

## 2025-05-20 DIAGNOSIS — Z17.1 MALIGNANT NEOPLASM OF LOWER-INNER QUADRANT OF RIGHT BREAST OF FEMALE, ESTROGEN RECEPTOR NEGATIVE (HCC): Primary | ICD-10-CM

## 2025-05-20 DIAGNOSIS — C50.311 MALIGNANT NEOPLASM OF LOWER-INNER QUADRANT OF RIGHT BREAST OF FEMALE, ESTROGEN RECEPTOR NEGATIVE (HCC): Primary | ICD-10-CM

## 2025-05-21 ENCOUNTER — TELEPHONE (OUTPATIENT)
Dept: INFUSION CENTER | Facility: CLINIC | Age: 65
End: 2025-05-21

## 2025-05-21 ENCOUNTER — RESULTS FOLLOW-UP (OUTPATIENT)
Dept: ENDOCRINOLOGY | Facility: CLINIC | Age: 65
End: 2025-05-21

## 2025-05-21 ENCOUNTER — APPOINTMENT (OUTPATIENT)
Dept: LAB | Facility: HOSPITAL | Age: 65
End: 2025-05-21
Payer: COMMERCIAL

## 2025-05-21 DIAGNOSIS — E05.90 SUBCLINICAL HYPERTHYROIDISM: ICD-10-CM

## 2025-05-21 DIAGNOSIS — Z17.1 MALIGNANT NEOPLASM OF LOWER-INNER QUADRANT OF RIGHT BREAST OF FEMALE, ESTROGEN RECEPTOR NEGATIVE (HCC): ICD-10-CM

## 2025-05-21 DIAGNOSIS — C50.311 MALIGNANT NEOPLASM OF LOWER-INNER QUADRANT OF RIGHT BREAST OF FEMALE, ESTROGEN RECEPTOR NEGATIVE (HCC): ICD-10-CM

## 2025-05-21 LAB
ALBUMIN SERPL BCG-MCNC: 4.2 G/DL (ref 3.5–5)
ALP SERPL-CCNC: 68 U/L (ref 34–104)
ALT SERPL W P-5'-P-CCNC: 18 U/L (ref 7–52)
ANION GAP SERPL CALCULATED.3IONS-SCNC: 5 MMOL/L (ref 4–13)
AST SERPL W P-5'-P-CCNC: 19 U/L (ref 13–39)
BASOPHILS # BLD AUTO: 0.02 THOUSANDS/ÂΜL (ref 0–0.1)
BASOPHILS NFR BLD AUTO: 0 % (ref 0–1)
BILIRUB SERPL-MCNC: 0.36 MG/DL (ref 0.2–1)
BUN SERPL-MCNC: 14 MG/DL (ref 5–25)
CALCIUM SERPL-MCNC: 9.4 MG/DL (ref 8.4–10.2)
CHLORIDE SERPL-SCNC: 106 MMOL/L (ref 96–108)
CO2 SERPL-SCNC: 29 MMOL/L (ref 21–32)
CREAT SERPL-MCNC: 0.51 MG/DL (ref 0.6–1.3)
EOSINOPHIL # BLD AUTO: 0.25 THOUSAND/ÂΜL (ref 0–0.61)
EOSINOPHIL NFR BLD AUTO: 5 % (ref 0–6)
ERYTHROCYTE [DISTWIDTH] IN BLOOD BY AUTOMATED COUNT: 11.9 % (ref 11.6–15.1)
GFR SERPL CREATININE-BSD FRML MDRD: 101 ML/MIN/1.73SQ M
GLUCOSE SERPL-MCNC: 81 MG/DL (ref 65–140)
HCT VFR BLD AUTO: 43.3 % (ref 34.8–46.1)
HGB BLD-MCNC: 14.6 G/DL (ref 11.5–15.4)
IMM GRANULOCYTES # BLD AUTO: 0.01 THOUSAND/UL (ref 0–0.2)
IMM GRANULOCYTES NFR BLD AUTO: 0 % (ref 0–2)
LYMPHOCYTES # BLD AUTO: 0.94 THOUSANDS/ÂΜL (ref 0.6–4.47)
LYMPHOCYTES NFR BLD AUTO: 20 % (ref 14–44)
MCH RBC QN AUTO: 31.7 PG (ref 26.8–34.3)
MCHC RBC AUTO-ENTMCNC: 33.7 G/DL (ref 31.4–37.4)
MCV RBC AUTO: 94 FL (ref 82–98)
MONOCYTES # BLD AUTO: 0.33 THOUSAND/ÂΜL (ref 0.17–1.22)
MONOCYTES NFR BLD AUTO: 7 % (ref 4–12)
NEUTROPHILS # BLD AUTO: 3.23 THOUSANDS/ÂΜL (ref 1.85–7.62)
NEUTS SEG NFR BLD AUTO: 68 % (ref 43–75)
NRBC BLD AUTO-RTO: 0 /100 WBCS
PLATELET # BLD AUTO: 213 THOUSANDS/UL (ref 149–390)
PMV BLD AUTO: 9.3 FL (ref 8.9–12.7)
POTASSIUM SERPL-SCNC: 4 MMOL/L (ref 3.5–5.3)
PROT SERPL-MCNC: 6.7 G/DL (ref 6.4–8.4)
RBC # BLD AUTO: 4.61 MILLION/UL (ref 3.81–5.12)
SODIUM SERPL-SCNC: 140 MMOL/L (ref 135–147)
T4 FREE SERPL-MCNC: 0.67 NG/DL (ref 0.61–1.12)
TSH SERPL DL<=0.05 MIU/L-ACNC: 0.51 UIU/ML (ref 0.45–4.5)
WBC # BLD AUTO: 4.78 THOUSAND/UL (ref 4.31–10.16)

## 2025-05-21 PROCEDURE — 80053 COMPREHEN METABOLIC PANEL: CPT

## 2025-05-21 PROCEDURE — 85025 COMPLETE CBC W/AUTO DIFF WBC: CPT

## 2025-05-21 PROCEDURE — 84443 ASSAY THYROID STIM HORMONE: CPT

## 2025-05-21 NOTE — TELEPHONE ENCOUNTER
Called patient and gave her friendly reminder to please get labs done before her 0730 appt tomorrow. She asked if lab orders were placed, I confirmed that CBC and CMP orders are in and she can go to any [facility] lab to get her labs done.

## 2025-05-22 ENCOUNTER — APPOINTMENT (OUTPATIENT)
Dept: RADIATION ONCOLOGY | Facility: CLINIC | Age: 65
End: 2025-05-22
Attending: STUDENT IN AN ORGANIZED HEALTH CARE EDUCATION/TRAINING PROGRAM
Payer: COMMERCIAL

## 2025-05-22 ENCOUNTER — HOSPITAL ENCOUNTER (OUTPATIENT)
Dept: INFUSION CENTER | Facility: CLINIC | Age: 65
Discharge: HOME | End: 2025-05-22
Attending: INTERNAL MEDICINE
Payer: COMMERCIAL

## 2025-05-22 VITALS
HEART RATE: 86 BPM | RESPIRATION RATE: 18 BRPM | TEMPERATURE: 97.9 F | SYSTOLIC BLOOD PRESSURE: 100 MMHG | DIASTOLIC BLOOD PRESSURE: 68 MMHG

## 2025-05-22 DIAGNOSIS — R21 RASH: Primary | ICD-10-CM

## 2025-05-22 DIAGNOSIS — C50.311 MALIGNANT NEOPLASM OF LOWER-INNER QUADRANT OF RIGHT BREAST OF FEMALE, ESTROGEN RECEPTOR NEGATIVE (HCC): Primary | ICD-10-CM

## 2025-05-22 DIAGNOSIS — Z17.1 MALIGNANT NEOPLASM OF LOWER-INNER QUADRANT OF RIGHT BREAST OF FEMALE, ESTROGEN RECEPTOR NEGATIVE (HCC): Primary | ICD-10-CM

## 2025-05-22 LAB
RAD ONC ARIA COURSE FIRST TREATMENT DATE: NORMAL
RAD ONC ARIA COURSE ID: NORMAL
RAD ONC ARIA COURSE INTENT: NORMAL
RAD ONC ARIA COURSE LAST TREATMENT DATE: NORMAL
RAD ONC ARIA COURSE SESSION NUMBER: 1
RAD ONC ARIA COURSE START DATE: NORMAL
RAD ONC ARIA COURSE TREATMENT ELAPSED DAYS: 0
RAD ONC ARIA PLAN FRACTIONS TREATED TO DATE: 1
RAD ONC ARIA PLAN FRACTIONS TREATED TO DATE: 1
RAD ONC ARIA PLAN ID: NORMAL
RAD ONC ARIA PLAN ID: NORMAL
RAD ONC ARIA PLAN NAME: NORMAL
RAD ONC ARIA PLAN NAME: NORMAL
RAD ONC ARIA PLAN PRESCRIBED DOSE PER FRACTION: 0.53 GY
RAD ONC ARIA PLAN PRESCRIBED DOSE PER FRACTION: 2.67 GY
RAD ONC ARIA PLAN PRIMARY REFERENCE POINT: NORMAL
RAD ONC ARIA PLAN PRIMARY REFERENCE POINT: NORMAL
RAD ONC ARIA PLAN TOTAL FRACTIONS PRESCRIBED: 15
RAD ONC ARIA PLAN TOTAL FRACTIONS PRESCRIBED: 15
RAD ONC ARIA PLAN TOTAL PRESCRIBED DOSE: 4005 CGY
RAD ONC ARIA PLAN TOTAL PRESCRIBED DOSE: 795 CGY
RAD ONC ARIA REFERENCE POINT DOSAGE GIVEN TO DATE: 3.2 GY
RAD ONC ARIA REFERENCE POINT ID: NORMAL
RAD ONC ARIA REFERENCE POINT SESSION DOSAGE GIVEN: 3.2 GY

## 2025-05-22 PROCEDURE — 96401 CHEMO ANTI-NEOPL SQ/IM: CPT

## 2025-05-22 RX ORDER — HYDROXYZINE HYDROCHLORIDE 25 MG/1
25 TABLET, FILM COATED ORAL EVERY 6 HOURS PRN
Qty: 60 TABLET | Refills: 0 | Status: SHIPPED | OUTPATIENT
Start: 2025-05-22

## 2025-05-22 RX ORDER — SODIUM CHLORIDE 9 MG/ML
20 INJECTION, SOLUTION INTRAVENOUS ONCE
Status: DISCONTINUED | OUTPATIENT
Start: 2025-05-22 | End: 2025-05-25 | Stop reason: HOSPADM

## 2025-05-22 RX ADMIN — PERTUZUMAB, TRASTUZUMAB, AND HYALURONIDASE-ZZXF 10 ML: 600; 600; 2000 INJECTION, SOLUTION SUBCUTANEOUS at 08:11

## 2025-05-22 NOTE — PROGRESS NOTES
Patient arrives to infusion center for Phesgo Injection. Patient states she had one episode of diarrhea today but otherwise has no complaints. Injection administered in right thigh without incident. AVS offered.     Next appointment: 6/12/25 @ 2550

## 2025-05-23 ENCOUNTER — APPOINTMENT (OUTPATIENT)
Dept: RADIATION ONCOLOGY | Facility: CLINIC | Age: 65
End: 2025-05-23
Attending: RADIOLOGY
Payer: COMMERCIAL

## 2025-05-24 DIAGNOSIS — K21.9 GASTROESOPHAGEAL REFLUX DISEASE WITHOUT ESOPHAGITIS: ICD-10-CM

## 2025-05-24 LAB — TSI SER-ACNC: <0.1 IU/L (ref 0–0.55)

## 2025-05-26 ENCOUNTER — RESULTS FOLLOW-UP (OUTPATIENT)
Dept: ENDOCRINOLOGY | Facility: CLINIC | Age: 65
End: 2025-05-26

## 2025-05-27 ENCOUNTER — APPOINTMENT (OUTPATIENT)
Dept: RADIATION ONCOLOGY | Facility: CLINIC | Age: 65
End: 2025-05-27
Attending: RADIOLOGY
Payer: COMMERCIAL

## 2025-05-27 LAB
RAD ONC ARIA COURSE FIRST TREATMENT DATE: NORMAL
RAD ONC ARIA COURSE FIRST TREATMENT DATE: NORMAL
RAD ONC ARIA COURSE ID: NORMAL
RAD ONC ARIA COURSE ID: NORMAL
RAD ONC ARIA COURSE INTENT: NORMAL
RAD ONC ARIA COURSE INTENT: NORMAL
RAD ONC ARIA COURSE LAST TREATMENT DATE: NORMAL
RAD ONC ARIA COURSE LAST TREATMENT DATE: NORMAL
RAD ONC ARIA COURSE SESSION NUMBER: 2
RAD ONC ARIA COURSE SESSION NUMBER: 3
RAD ONC ARIA COURSE START DATE: NORMAL
RAD ONC ARIA COURSE START DATE: NORMAL
RAD ONC ARIA COURSE TREATMENT ELAPSED DAYS: 1
RAD ONC ARIA COURSE TREATMENT ELAPSED DAYS: 5
RAD ONC ARIA PLAN FRACTIONS TREATED TO DATE: 2
RAD ONC ARIA PLAN FRACTIONS TREATED TO DATE: 2
RAD ONC ARIA PLAN FRACTIONS TREATED TO DATE: 3
RAD ONC ARIA PLAN FRACTIONS TREATED TO DATE: 3
RAD ONC ARIA PLAN ID: NORMAL
RAD ONC ARIA PLAN NAME: NORMAL
RAD ONC ARIA PLAN PRESCRIBED DOSE PER FRACTION: 0.53 GY
RAD ONC ARIA PLAN PRESCRIBED DOSE PER FRACTION: 0.53 GY
RAD ONC ARIA PLAN PRESCRIBED DOSE PER FRACTION: 2.67 GY
RAD ONC ARIA PLAN PRESCRIBED DOSE PER FRACTION: 2.67 GY
RAD ONC ARIA PLAN PRIMARY REFERENCE POINT: NORMAL
RAD ONC ARIA PLAN TOTAL FRACTIONS PRESCRIBED: 15
RAD ONC ARIA PLAN TOTAL PRESCRIBED DOSE: 4005 CGY
RAD ONC ARIA PLAN TOTAL PRESCRIBED DOSE: 4005 CGY
RAD ONC ARIA PLAN TOTAL PRESCRIBED DOSE: 795 CGY
RAD ONC ARIA PLAN TOTAL PRESCRIBED DOSE: 795 CGY
RAD ONC ARIA REFERENCE POINT DOSAGE GIVEN TO DATE: 6.4 GY
RAD ONC ARIA REFERENCE POINT DOSAGE GIVEN TO DATE: 9.6 GY
RAD ONC ARIA REFERENCE POINT ID: NORMAL
RAD ONC ARIA REFERENCE POINT ID: NORMAL
RAD ONC ARIA REFERENCE POINT SESSION DOSAGE GIVEN: 3.2 GY
RAD ONC ARIA REFERENCE POINT SESSION DOSAGE GIVEN: 3.2 GY

## 2025-05-27 RX ORDER — OMEPRAZOLE 20 MG/1
20 CAPSULE, DELAYED RELEASE ORAL DAILY
Qty: 90 CAPSULE | Refills: 0 | Status: SHIPPED | OUTPATIENT
Start: 2025-05-27

## 2025-05-28 ENCOUNTER — PATIENT OUTREACH (OUTPATIENT)
Dept: HEMATOLOGY ONCOLOGY | Facility: CLINIC | Age: 65
End: 2025-05-28

## 2025-05-28 ENCOUNTER — APPOINTMENT (OUTPATIENT)
Dept: RADIATION ONCOLOGY | Facility: CLINIC | Age: 65
End: 2025-05-28
Attending: RADIOLOGY
Payer: COMMERCIAL

## 2025-05-28 ENCOUNTER — CONSULT (OUTPATIENT)
Dept: VASCULAR SURGERY | Facility: CLINIC | Age: 65
End: 2025-05-28
Payer: COMMERCIAL

## 2025-05-28 VITALS
RESPIRATION RATE: 16 BRPM | SYSTOLIC BLOOD PRESSURE: 122 MMHG | HEART RATE: 82 BPM | HEIGHT: 70 IN | BODY MASS INDEX: 22.9 KG/M2 | DIASTOLIC BLOOD PRESSURE: 74 MMHG | WEIGHT: 160 LBS | OXYGEN SATURATION: 99 %

## 2025-05-28 DIAGNOSIS — I82.91 CHRONIC DEEP VEIN THROMBOSIS (DVT) OF NON-EXTREMITY VEIN: Primary | ICD-10-CM

## 2025-05-28 LAB
RAD ONC ARIA COURSE FIRST TREATMENT DATE: NORMAL
RAD ONC ARIA COURSE ID: NORMAL
RAD ONC ARIA COURSE INTENT: NORMAL
RAD ONC ARIA COURSE LAST TREATMENT DATE: NORMAL
RAD ONC ARIA COURSE SESSION NUMBER: 4
RAD ONC ARIA COURSE START DATE: NORMAL
RAD ONC ARIA COURSE TREATMENT ELAPSED DAYS: 6
RAD ONC ARIA PLAN FRACTIONS TREATED TO DATE: 4
RAD ONC ARIA PLAN FRACTIONS TREATED TO DATE: 4
RAD ONC ARIA PLAN ID: NORMAL
RAD ONC ARIA PLAN ID: NORMAL
RAD ONC ARIA PLAN NAME: NORMAL
RAD ONC ARIA PLAN NAME: NORMAL
RAD ONC ARIA PLAN PRESCRIBED DOSE PER FRACTION: 0.53 GY
RAD ONC ARIA PLAN PRESCRIBED DOSE PER FRACTION: 2.67 GY
RAD ONC ARIA PLAN PRIMARY REFERENCE POINT: NORMAL
RAD ONC ARIA PLAN PRIMARY REFERENCE POINT: NORMAL
RAD ONC ARIA PLAN TOTAL FRACTIONS PRESCRIBED: 15
RAD ONC ARIA PLAN TOTAL FRACTIONS PRESCRIBED: 15
RAD ONC ARIA PLAN TOTAL PRESCRIBED DOSE: 4005 CGY
RAD ONC ARIA PLAN TOTAL PRESCRIBED DOSE: 795 CGY
RAD ONC ARIA REFERENCE POINT DOSAGE GIVEN TO DATE: 12.8 GY
RAD ONC ARIA REFERENCE POINT ID: NORMAL
RAD ONC ARIA REFERENCE POINT SESSION DOSAGE GIVEN: 3.2 GY

## 2025-05-28 PROCEDURE — 99243 OFF/OP CNSLTJ NEW/EST LOW 30: CPT | Performed by: NURSE PRACTITIONER

## 2025-05-28 NOTE — PROGRESS NOTES
Called and LVM for follow up with patient. Mentioned that I wanted to check in with patient, wanting to make sure all is well. Stated that I wanted to address any questions,concerns or any new barriers to care she might have at this time. Stated that should she have any questions or concerns she can reach out to me directly as I was calling from my direct line #[phone].    Patient is my chart active I will send her a my chart message for follow up as well.

## 2025-05-28 NOTE — PROGRESS NOTES
PATIENT RETURNED MY PHONE CALL    I reached out and spoke with Amy to follow up and to review for any new changes in barriers to care and offer supportive services as needed.     Barriers noted previously and outcome of interventions;  Previously no barriers to care and currently no new barriers to care.  Patient had her 4th radiation treatment today she will be continuing radiation until November.  Amy mentioned that currently all is well- she did mention a rash due to her chemotherapy possibly. Patient has an appointment late June with the dermatologist she hopes that they will be able to help her with the rash. Patient mentioned she has been dealing with this rash for a little while now.  Patient is still eating and drinking ok with no issues.  Amy is still driving herself to and from all of her appointments at this time.  Patient did not have any additional questions or concerns for me at this time.  Amy is aware that should she need anything or have any questions or concerns she can reach me directly.   Patient was thankful for my call.    Reviewed for any new barriers as noted below.    Are you having any side effects from your treatment?No    Are you eating and drinking normally? yes    Have you been experiencing any uncontrolled pain related to your cancer diagnosis? No    If not already established, have needs changed for a palliative care referral? no    Do you have a good support system? Yes     Are you interested in any support groups? Not at this time    How are you doing with transportation to your appointments? Patient is currently able to drive herself to and from all of her appointments at this time.    Do you have any questions or concerns regarding your treatment plan? No    Any new financial concerns for your household or medical bills? No.    Do you know when your upcoming appointments are? yes  Future Appointments   Date Time Provider Department Center   5/29/2025  8:45 AM  [facility]   5/30/2025  9:00 AM [facility]   6/2/2025  9:00 AM [facility]   6/3/2025  9:00 AM [facility]   6/3/2025  1:00 PM Will Dasilva MD [facility]   6/4/2025  9:00 AM Nila Forman MD [facility]   6/4/2025  9:00 AM [facility] RAD[facility] Rad Onc [facility]   6/5/2025  9:00 AM [facility]   6/6/2025  9:00 AM [facility] Rad [facility]   6/9/2025  9:00 AM [facility] Rad Onc [facility]   6/9/2025 11:45 AM Joselin Alaniz MD [facility]   6/10/2025  9:00 AM [facility] RAD[facility] RESOURCE [facility] Rad Onc [facility]   6/11/2025  9:00 AM [facility] Rad Onc [facility]   6/12/2025  9:00 AM [facility] RAD[facility] RESOURCE [facility] Rad Onc [facility]   6/12/2025  2:30 PM MO INF CHAIR 2 [facility] Infusion [facility]   6/23/2025 12:00 PM MO US 1 MO US MO HOSP   6/24/2025 12:00 PM Kathrin Gonzalez DO HEM ONC [facility]   6/24/2025  2:00 PM Sanam eMnsah MD OBGYN  P[facility]   6/30/2025 11:40 AM Lucrecia Howard MD DIAB CTR [facility]   6/30/2025  3:00 PM Shannan Maldonado MD DERM [facility]   7/2/2025  9:00 AM [facility] INF CHAIR 6 MO Infusion [facility]   7/24/2025  8:30 AM [facility] INF CHAIR 4 MO Infusion [facility]   8/19/2025  9:00 AM [facility] INF CHAIR 11 MO Infusion [facility]   10/8/2025 11:30 AM MO STEREO RBC 1 MO RBC Mammo MO RBC   10/16/2025 11:00 AM ELY Gayle SURG ONC ALL Practice[facility]Onc          Based on individual needs I will follow up with them in 4/5 weeks. I have provided my direct contact information and welcome them to contact me if their needs as discussed above change. They were appreciative for the call.

## 2025-05-28 NOTE — PROGRESS NOTES
Name: Amy Clement      : 1960      MRN: [id number]  Encounter Provider: ELY Dye  Encounter Date: 2025   Encounter department: [facility] [place]  :  Assessment & Plan  Chronic deep vein thrombosis (DVT) of non-extremity vein  64-year-old female with R breast ca. / axillary lymph node + dx  (chemo/lumpectomy/rads) presents with chronic R IJ DVT s/p acute DVT post port removal in February.    - Presents without complaints.  - Per patient report acute IJ DVT diagnosed early February. (No imaging report in Epic?) Patient started on Eliquis at that time.  - Port removed 3/28/25  - Repeat upper extremity venous duplex 3/31/2025  RIGHT : Evidence suggestive of chronic occlusive DVT in the IJ.    No evidence of superficial thrombophlebitis noted.  Doppler evaluation shows a normal response to augmentation maneuvers.      Plan:  -Provoked R IJ DVT (now chronic) in setting of chemo port and underlying cancer   -Continue anticoagulation (Eliquis 5 mg twice daily )for total of 6 months.  Discontinue August  -No additional vascular imaging indicated  -Follow-up with vascular as needed for new symptoms, questions or concerns.           History of Present Illness   Cc: Patient is here today for a consult of chronic DVT, here to review UEV.  currently asymptomatic. No previous history of DVT.   HPI  Amy Clement is a 64 y.o. female who presents for vascular consult in setting of R IJ chronic DVT.    Patient diagnosed with right breast CA in 2024.  She was treated with chemo via right port-a-cath.  In February she developed neck swelling and diagnosed with acute R IJ DVT.  No imaging or report available in [facility].  Patient's port was removed 3/28 at time of lumpectomy.  Repeat upper extremity venous duplex imaging obtained 3/31 identifying right chronic IJ DVT.    She has been on Eliquis since time of initial acute DVT diagnosis.  We discussed pathophysiology of DVT,  provoking factors.  Given that patient has underlying cancer diagnosis and history of port, this is a provoked DVT.  She denies prior history of DVT.    Patient continues to be treated with radiation.  Recommend 6-month anticoagulation then can discontinue anticoagulation.  No further imaging indicated.    History obtained from: patient    Review of Systems   Constitutional: Negative.    HENT: Negative.     Eyes: Negative.    Respiratory: Negative.     Cardiovascular: Negative.    Gastrointestinal: Negative.    Endocrine: Negative.    Genitourinary: Negative.    Musculoskeletal: Negative.    Skin: Negative.    Allergic/Immunologic: Negative.    Neurological: Negative.    Hematological: Negative.    Psychiatric/Behavioral: Negative.     I have reviewed and made appropriate changes to the review of systems input by the medical assistant.    Medical History Reviewed by provider this encounter:     .  Past Medical History   Past Medical History[1]  Past Surgical History[2]  Family History[3]   reports that she has never smoked. She has never used smokeless tobacco. She reports that she does not currently use alcohol. She reports that she does not currently use drugs after having used the following drugs: Marijuana.  Current Outpatient Medications   Medication Instructions    apixaban (ELIQUIS) 5 mg, Oral, 2 times daily    Bacillus Coagulans-Inulin (Probiotic) 1-250 BILLION-MG CAPS Take by mouth    calcium citrate (CALCITRATE) 950 (200 Ca) MG tablet 1 tablet, Daily    Glucosamine-Chondroitin (GLUCOSAMINE CHONDR COMPLEX PO) Daily    hydrOXYzine HCL (ATARAX) 25 mg, Oral, Every 6 hours PRN    latanoprost (XALATAN) 0.005 % ophthalmic solution 1 drop, Daily at bedtime    lidocaine-prilocaine (EMLA) cream Topical, As needed    Multiple Vitamins-Minerals (CENTRUM SILVER) tablet Take by mouth    omeprazole (PRILOSEC) 20 mg, Oral, Daily    pyridoxine (VITAMIN B6) 50 mg, Daily    timolol (TIMOPTIC) 0.5 % ophthalmic solution 1  "drop, Daily    TURMERIC-MECHE PO Take by mouth   Allergies[4]   Medications Ordered Prior to Encounter[5]   Social History[6]     Objective   /74 (BP Location: Left arm, Patient Position: Sitting, Cuff Size: Standard)   Pulse 82   Resp 16   Ht 5' 10\" (1.778 m)   Wt 72.6 kg (160 lb)   LMP  (LMP Unknown)   SpO2 99%   BMI 22.96 kg/m²      Physical Exam  Vitals reviewed.   Constitutional:       General: She is not in acute distress.     Appearance: Normal appearance. She is normal weight.   HENT:      Head: Normocephalic and atraumatic.     Cardiovascular:      Rate and Rhythm: Normal rate.      Pulses:           Radial pulses are 2+ on the right side.     Musculoskeletal:         General: Normal range of motion.      Cervical back: Normal range of motion and neck supple. No rigidity or tenderness.     Skin:     General: Skin is warm and dry.     Neurological:      Mental Status: She is alert and oriented to person, place, and time.      Sensory: No sensory deficit.      Motor: No weakness.     Psychiatric:         Mood and Affect: Mood normal.         Behavior: Behavior normal.              [1]   Past Medical History:  Diagnosis Date    Acute cystitis     Anxiety     medication for hot flasshes    Breast cancer (HCC)     Breast lump .    last assessed 01/16/2017    Dermatitis     last assessed 02/27/2017    Disease of thyroid gland     last assessed 02/23/2016    Diverticulitis 11/2020    Diverticulitis of colon November 25 July 2016 1st dx    Dysuria     last assessed 04/28/2017    ETD (Eustachian tube dysfunction), bilateral     last assessed 02/23/2016    Fibroid 2012    GERD (gastroesophageal reflux disease) Occasionally    Glaucoma     Left    Hematuria     History of chemotherapy     Migraine 1985    Miscarriage 1990    Polyp at cervical os     PONV (postoperative nausea and vomiting)     Varicella 1966   [2]   Past Surgical History:  Procedure Laterality Date    BREAST BIOPSY Right 2009    2 " areas benign    CATARACT EXTRACTION Bilateral     COLONOSCOPY      FL GUIDED CENTRAL VENOUS ACCESS DEVICE INSERTION  11/5/2024    AZ BX/EXC LYMPH NODE OPEN SUPERFICIAL Right 3/28/2025    Procedure: RIGHT BREAST ROSA ISELA LOCALIZED LUMPECTOMY, BIOPSY LYMPH NODE SENTINEL, LYMPHATIC MAPPING & LYMPHOSCINTIGRAPHY, EXISION OF CLIPPED RIGHT AXILLARY NODE, 1100 NUC MED;  Surgeon: Koki Magaña MD;  [facility];  Service: Surgical Oncology    AZ EXC BREAST LES PREOP PLMT RAD MARKER OPEN 1 LES Right 3/28/2025    Procedure: RIGHT BREAST ROSA ISELA LOCALIZED LUMPECTOMY, BIOPSY LYMPH NODE SENTINEL, LYMPHATIC MAPPING & LYMPHOSCINTIGRAPHY, EXISION OF CLIPPED RIGHT AXILLARY NODE, 1100 NUC MED;  Surgeon: Koki Magaña MD;  [facility];  Service: Surgical Oncology    AZ HYSTEROSCOPY BX ENDOMETRIUM&/POLYPC W/WO D&C N/A 02/23/2024    Procedure: DILATATION AND CURETTAGE (D&C) WITH HYSTEROSCOPY;  Surgeon: Joselin Alaniz MD;  [facility];  Service: Gynecology    AZ HYSTEROSCOPY BX ENDOMETRIUM&/POLYPC W/WO D&C N/A 02/23/2024    Procedure: POLYPECTOMY;  Surgeon: Joselin Alaniz MD;  [facility];  Service: Gynecology    AZ INSJ TUNNELED CTR VAD W/SUBQ PORT AGE 5 YR/> N/A 11/5/2024    Procedure: INSERTION VENOUS PORT ( PORT-A-CATH) IR;  Surgeon: Chandler Ball DO;  [facility];  Service: Interventional Radiology    AZ INTRAOP SENTINEL LYMPH NODE ID W/DYE INJECTION Right 3/28/2025    Procedure: RIGHT BREAST ROSA ISELA LOCALIZED LUMPECTOMY, BIOPSY LYMPH NODE SENTINEL, LYMPHATIC MAPPING & LYMPHOSCINTIGRAPHY, EXISION OF CLIPPED RIGHT AXILLARY NODE, 1100 NUC MED;  Surgeon: Koki Magaña MD;  [facility];  Service: Surgical Oncology    AZ MASTECTOMY PARTIAL Right 3/28/2025    Procedure: RIGHT BREAST ROSA ISELA LOCALIZED LUMPECTOMY, BIOPSY LYMPH NODE SENTINEL, LYMPHATIC MAPPING & LYMPHOSCINTIGRAPHY, EXISION OF CLIPPED RIGHT AXILLARY NODE, 1100 NUC MED;  Surgeon: Koki Magaña MD;  [facility];  Service: Surgical Oncology    VT RMVL YUVAL CTR VAD W/SUBQ PORT/ CTR/PRPH INSJ Right 3/28/2025    Procedure: REMOVAL VENOUS PORT (PORT-A-CATH);  Surgeon: Koki Magaña MD;  [facility];  Service: Surgical Oncology    TUBAL LIGATION      US GUIDED BREAST BIOPSY RIGHT COMPLETE Right 10/04/2024    US GUIDED BREAST LYMPH NODE BIOPSY RIGHT Right 10/04/2024   [3]   Family History  Problem Relation Name Age of Onset    Breast cancer Mother Mother 47    Cancer Mother Mother         Breast cancer    Hypertension Father Father         Late life dx    Rectal cancer Father Father     Migraines Father Father 85    No Known Problems Sister      Throat cancer Brother      Cancer Brother          bladder    Kidney cancer Son Khai Clement 38    No Known Problems Daughter      No Known Problems Paternal Aunt      Heart disease Maternal Grandmother Grandmother     No Known Problems Paternal Grandmother     [4]   Allergies  Allergen Reactions    Monistat [Miconazole] Itching    Pollen Extract Itching, Swelling and Sneezing   [5]   Current Outpatient Medications on File Prior to Visit   Medication Sig Dispense Refill    apixaban (Eliquis) 5 mg Take 1 tablet (5 mg total) by mouth 2 (two) times a day 60 tablet 1    Bacillus Coagulans-Inulin (Probiotic) 1-250 BILLION-MG CAPS Take by mouth      calcium citrate (CALCITRATE) 950 (200 Ca) MG tablet Take 1 tablet by mouth daily      Glucosamine-Chondroitin (GLUCOSAMINE CHONDR COMPLEX PO) Take by mouth daily       hydrOXYzine HCL (ATARAX) 25 mg tablet Take 1 tablet (25 mg total) by mouth every 6 (six) hours as needed for itching 60 tablet 0    latanoprost (XALATAN) 0.005 % ophthalmic solution Administer 1 drop to both eyes daily at bedtime      lidocaine-prilocaine (EMLA) cream Apply topically as needed for mild pain 30 g 2    Multiple Vitamins-Minerals (CENTRUM SILVER) tablet Take by mouth      omeprazole (PriLOSEC) 20 mg delayed release capsule TAKE 1 CAPSULE BY MOUTH EVERY  DAY 90 capsule 0    pyridoxine (VITAMIN B6) 50 mg tablet Take 50 mg by mouth daily      timolol (TIMOPTIC) 0.5 % ophthalmic solution Administer 1 drop to both eyes daily      TURMERIC-GINGER PO Take by mouth      [DISCONTINUED] bimatoprost (LATISSE) 0.03 % ophthalmic solution Administer 1 drop to both eyes daily at bedtime Place one drop on applicator and apply evenly along the skin of the upper eyelid at base of eyelashes once daily at bedtime; repeat procedure for second eye (use a clean applicator). (Patient not taking: Reported on 4/3/2025) 5 mL 1    [DISCONTINUED] clindamycin (CLEOCIN T) 1 % lotion Apply topically 2 (two) times a day Apply to affected areas. (Patient not taking: Reported on 4/3/2025) 60 mL 1    [DISCONTINUED] diphenoxylate-atropine (LOMOTIL) 2.5-0.025 mg per tablet Take 1 tablet by mouth 4 (four) times a day as needed for diarrhea 30 tablet 0    [DISCONTINUED] methylPREDNISolone 4 MG tablet therapy pack Use as directed on package (Patient not taking: Reported on 4/28/2025) 1 each 0    [DISCONTINUED] olopatadine (PATANOL) 0.1 % ophthalmic solution Administer 1 drop into the left eye 2 (two) times a day 5 mL 0    [DISCONTINUED] ondansetron (ZOFRAN-ODT) 8 mg disintegrating tablet Take 1 tablet (8 mg total) by mouth every 8 (eight) hours as needed for nausea or vomiting 30 tablet 2    [DISCONTINUED] oxyCODONE-acetaminophen (Percocet) 5-325 mg per tablet Take 1 tablet by mouth every 6 (six) hours as needed for moderate pain Max Daily Amount: 4 tablets 12 tablet 0    [DISCONTINUED] PARoxetine (PAXIL) 10 mg tablet Take 1 tablet (10 mg total) by mouth daily (Patient not taking: Reported on 3/21/2025) 90 tablet 3    [DISCONTINUED] prochlorperazine (COMPAZINE) 10 mg tablet Take 1 tablet (10 mg total) by mouth every 6 (six) hours as needed for nausea or vomiting 30 tablet 2     No current facility-administered medications on file prior to visit.   [6]   Social History  Tobacco Use    Smoking status: Never     Smokeless tobacco: Never   Vaping Use    Vaping status: Never Used   Substance and Sexual Activity    Alcohol use: Not Currently     Comment: occasionally. last drink more than a month ago    Drug use: Not Currently     Types: Marijuana     Comment: medical marijuana    Sexual activity: Yes     Partners: Male     Birth control/protection: Post-menopausal

## 2025-05-28 NOTE — ASSESSMENT & PLAN NOTE
64-year-old female with R breast ca. / axillary lymph node + dx October '24 (chemo/lumpectomy/rads) presents with chronic R IJ DVT s/p acute DVT post port removal in February.    - Presents without complaints.  - Per patient report acute IJ DVT diagnosed early February. (No imaging report in Epic?) Patient started on Eliquis at that time.  - Port removed 3/28/25  - Repeat upper extremity venous duplex 3/31/2025  RIGHT : Evidence suggestive of chronic occlusive DVT in the IJ.    No evidence of superficial thrombophlebitis noted.  Doppler evaluation shows a normal response to augmentation maneuvers.      Plan:  -Provoked R IJ DVT (now chronic) in setting of chemo port and underlying cancer   -Continue anticoagulation (Eliquis 5 mg twice daily )for total of 6 months.  Discontinue August  -No additional vascular imaging indicated  -Follow-up with vascular as needed for new symptoms, questions or concerns.

## 2025-05-28 NOTE — PATIENT INSTRUCTIONS
Continue anticoagulation with Eliquis as prescribed for total of 6 months.  Discontinue after that time  No additional anticoagulation indicated for chronic DVT.  No additional vascular imaging indicated  Follow-up as needed with vascular with new symptoms, questions or concerns.

## 2025-05-29 ENCOUNTER — APPOINTMENT (OUTPATIENT)
Dept: RADIATION ONCOLOGY | Facility: CLINIC | Age: 65
End: 2025-05-29
Attending: RADIOLOGY
Payer: COMMERCIAL

## 2025-05-29 ENCOUNTER — TELEPHONE (OUTPATIENT)
Age: 65
End: 2025-05-29

## 2025-05-29 LAB
RAD ONC ARIA COURSE FIRST TREATMENT DATE: NORMAL
RAD ONC ARIA COURSE ID: NORMAL
RAD ONC ARIA COURSE INTENT: NORMAL
RAD ONC ARIA COURSE LAST TREATMENT DATE: NORMAL
RAD ONC ARIA COURSE SESSION NUMBER: 5
RAD ONC ARIA COURSE START DATE: NORMAL
RAD ONC ARIA COURSE TREATMENT ELAPSED DAYS: 7
RAD ONC ARIA PLAN FRACTIONS TREATED TO DATE: 5
RAD ONC ARIA PLAN FRACTIONS TREATED TO DATE: 5
RAD ONC ARIA PLAN ID: NORMAL
RAD ONC ARIA PLAN ID: NORMAL
RAD ONC ARIA PLAN NAME: NORMAL
RAD ONC ARIA PLAN NAME: NORMAL
RAD ONC ARIA PLAN PRESCRIBED DOSE PER FRACTION: 0.53 GY
RAD ONC ARIA PLAN PRESCRIBED DOSE PER FRACTION: 2.67 GY
RAD ONC ARIA PLAN PRIMARY REFERENCE POINT: NORMAL
RAD ONC ARIA PLAN PRIMARY REFERENCE POINT: NORMAL
RAD ONC ARIA PLAN TOTAL FRACTIONS PRESCRIBED: 15
RAD ONC ARIA PLAN TOTAL FRACTIONS PRESCRIBED: 15
RAD ONC ARIA PLAN TOTAL PRESCRIBED DOSE: 4005 CGY
RAD ONC ARIA PLAN TOTAL PRESCRIBED DOSE: 795 CGY
RAD ONC ARIA REFERENCE POINT DOSAGE GIVEN TO DATE: 16 GY
RAD ONC ARIA REFERENCE POINT ID: NORMAL
RAD ONC ARIA REFERENCE POINT SESSION DOSAGE GIVEN: 3.2 GY

## 2025-05-29 NOTE — TELEPHONE ENCOUNTER
The patient called to check for any cancellations/ or sooner appointment then her 6/30 appointment. Nothing available sooner at this time. The patient is on the cancellation list. Advised that if anything becomes available I will keep her in mind.

## 2025-05-30 ENCOUNTER — APPOINTMENT (OUTPATIENT)
Dept: RADIATION ONCOLOGY | Facility: CLINIC | Age: 65
End: 2025-05-30
Attending: RADIOLOGY
Payer: COMMERCIAL

## 2025-05-30 LAB
RAD ONC ARIA COURSE FIRST TREATMENT DATE: NORMAL
RAD ONC ARIA COURSE ID: NORMAL
RAD ONC ARIA COURSE INTENT: NORMAL
RAD ONC ARIA COURSE LAST TREATMENT DATE: NORMAL
RAD ONC ARIA COURSE SESSION NUMBER: 6
RAD ONC ARIA COURSE START DATE: NORMAL
RAD ONC ARIA COURSE TREATMENT ELAPSED DAYS: 8
RAD ONC ARIA PLAN FRACTIONS TREATED TO DATE: 6
RAD ONC ARIA PLAN FRACTIONS TREATED TO DATE: 6
RAD ONC ARIA PLAN ID: NORMAL
RAD ONC ARIA PLAN ID: NORMAL
RAD ONC ARIA PLAN NAME: NORMAL
RAD ONC ARIA PLAN NAME: NORMAL
RAD ONC ARIA PLAN PRESCRIBED DOSE PER FRACTION: 0.53 GY
RAD ONC ARIA PLAN PRESCRIBED DOSE PER FRACTION: 2.67 GY
RAD ONC ARIA PLAN PRIMARY REFERENCE POINT: NORMAL
RAD ONC ARIA PLAN PRIMARY REFERENCE POINT: NORMAL
RAD ONC ARIA PLAN TOTAL FRACTIONS PRESCRIBED: 15
RAD ONC ARIA PLAN TOTAL FRACTIONS PRESCRIBED: 15
RAD ONC ARIA PLAN TOTAL PRESCRIBED DOSE: 4005 CGY
RAD ONC ARIA PLAN TOTAL PRESCRIBED DOSE: 795 CGY
RAD ONC ARIA REFERENCE POINT DOSAGE GIVEN TO DATE: 19.2 GY
RAD ONC ARIA REFERENCE POINT ID: NORMAL
RAD ONC ARIA REFERENCE POINT SESSION DOSAGE GIVEN: 3.2 GY

## 2025-06-02 ENCOUNTER — APPOINTMENT (OUTPATIENT)
Dept: RADIATION ONCOLOGY | Facility: CLINIC | Age: 65
End: 2025-06-02
Attending: RADIOLOGY
Payer: COMMERCIAL

## 2025-06-02 LAB
RAD ONC ARIA COURSE FIRST TREATMENT DATE: NORMAL
RAD ONC ARIA COURSE ID: NORMAL
RAD ONC ARIA COURSE INTENT: NORMAL
RAD ONC ARIA COURSE LAST TREATMENT DATE: NORMAL
RAD ONC ARIA COURSE SESSION NUMBER: 7
RAD ONC ARIA COURSE START DATE: NORMAL
RAD ONC ARIA COURSE TREATMENT ELAPSED DAYS: 11
RAD ONC ARIA PLAN FRACTIONS TREATED TO DATE: 7
RAD ONC ARIA PLAN FRACTIONS TREATED TO DATE: 7
RAD ONC ARIA PLAN ID: NORMAL
RAD ONC ARIA PLAN ID: NORMAL
RAD ONC ARIA PLAN NAME: NORMAL
RAD ONC ARIA PLAN NAME: NORMAL
RAD ONC ARIA PLAN PRESCRIBED DOSE PER FRACTION: 0.53 GY
RAD ONC ARIA PLAN PRESCRIBED DOSE PER FRACTION: 2.67 GY
RAD ONC ARIA PLAN PRIMARY REFERENCE POINT: NORMAL
RAD ONC ARIA PLAN PRIMARY REFERENCE POINT: NORMAL
RAD ONC ARIA PLAN TOTAL FRACTIONS PRESCRIBED: 15
RAD ONC ARIA PLAN TOTAL FRACTIONS PRESCRIBED: 15
RAD ONC ARIA PLAN TOTAL PRESCRIBED DOSE: 4005 CGY
RAD ONC ARIA PLAN TOTAL PRESCRIBED DOSE: 795 CGY
RAD ONC ARIA REFERENCE POINT DOSAGE GIVEN TO DATE: 22.4 GY
RAD ONC ARIA REFERENCE POINT ID: NORMAL
RAD ONC ARIA REFERENCE POINT SESSION DOSAGE GIVEN: 3.2 GY

## 2025-06-02 PROCEDURE — 77387 GUIDANCE FOR RADJ TX DLVR: CPT | Performed by: RADIOLOGY

## 2025-06-02 PROCEDURE — 77014 CHG CT GUIDANCE RADIATION THERAPY FLDS PLACEMENT: CPT | Performed by: RADIOLOGY

## 2025-06-02 PROCEDURE — 77412 RADIATION TX DELIVERY LVL 3: CPT | Performed by: RADIOLOGY

## 2025-06-03 ENCOUNTER — APPOINTMENT (OUTPATIENT)
Dept: RADIATION ONCOLOGY | Facility: CLINIC | Age: 65
End: 2025-06-03
Attending: RADIOLOGY
Payer: COMMERCIAL

## 2025-06-03 ENCOUNTER — OFFICE VISIT (OUTPATIENT)
Age: 65
End: 2025-06-03
Payer: COMMERCIAL

## 2025-06-03 VITALS
HEART RATE: 84 BPM | HEIGHT: 70 IN | SYSTOLIC BLOOD PRESSURE: 104 MMHG | WEIGHT: 157 LBS | RESPIRATION RATE: 16 BRPM | BODY MASS INDEX: 22.48 KG/M2 | DIASTOLIC BLOOD PRESSURE: 78 MMHG | OXYGEN SATURATION: 98 %

## 2025-06-03 DIAGNOSIS — G43.109 MIGRAINE WITH AURA AND WITHOUT STATUS MIGRAINOSUS, NOT INTRACTABLE: ICD-10-CM

## 2025-06-03 DIAGNOSIS — R21 RASH: ICD-10-CM

## 2025-06-03 DIAGNOSIS — I82.91 CHRONIC DEEP VEIN THROMBOSIS (DVT) OF NON-EXTREMITY VEIN: ICD-10-CM

## 2025-06-03 DIAGNOSIS — C50.311 MALIGNANT NEOPLASM OF LOWER-INNER QUADRANT OF RIGHT BREAST OF FEMALE, ESTROGEN RECEPTOR NEGATIVE (HCC): Primary | ICD-10-CM

## 2025-06-03 DIAGNOSIS — M16.11 OSTEOARTHRITIS OF RIGHT HIP, UNSPECIFIED OSTEOARTHRITIS TYPE: ICD-10-CM

## 2025-06-03 DIAGNOSIS — Z00.00 WELL ADULT EXAM: ICD-10-CM

## 2025-06-03 DIAGNOSIS — Z17.1 MALIGNANT NEOPLASM OF LOWER-INNER QUADRANT OF RIGHT BREAST OF FEMALE, ESTROGEN RECEPTOR NEGATIVE (HCC): Primary | ICD-10-CM

## 2025-06-03 DIAGNOSIS — E04.1 THYROID NODULE: ICD-10-CM

## 2025-06-03 LAB
RAD ONC ARIA COURSE FIRST TREATMENT DATE: NORMAL
RAD ONC ARIA COURSE ID: NORMAL
RAD ONC ARIA COURSE INTENT: NORMAL
RAD ONC ARIA COURSE LAST TREATMENT DATE: NORMAL
RAD ONC ARIA COURSE SESSION NUMBER: 8
RAD ONC ARIA COURSE START DATE: NORMAL
RAD ONC ARIA COURSE TREATMENT ELAPSED DAYS: 12
RAD ONC ARIA PLAN FRACTIONS TREATED TO DATE: 8
RAD ONC ARIA PLAN FRACTIONS TREATED TO DATE: 8
RAD ONC ARIA PLAN ID: NORMAL
RAD ONC ARIA PLAN ID: NORMAL
RAD ONC ARIA PLAN NAME: NORMAL
RAD ONC ARIA PLAN NAME: NORMAL
RAD ONC ARIA PLAN PRESCRIBED DOSE PER FRACTION: 0.53 GY
RAD ONC ARIA PLAN PRESCRIBED DOSE PER FRACTION: 2.67 GY
RAD ONC ARIA PLAN PRIMARY REFERENCE POINT: NORMAL
RAD ONC ARIA PLAN PRIMARY REFERENCE POINT: NORMAL
RAD ONC ARIA PLAN TOTAL FRACTIONS PRESCRIBED: 15
RAD ONC ARIA PLAN TOTAL FRACTIONS PRESCRIBED: 15
RAD ONC ARIA PLAN TOTAL PRESCRIBED DOSE: 4005 CGY
RAD ONC ARIA PLAN TOTAL PRESCRIBED DOSE: 795 CGY
RAD ONC ARIA REFERENCE POINT DOSAGE GIVEN TO DATE: 25.6 GY
RAD ONC ARIA REFERENCE POINT ID: NORMAL
RAD ONC ARIA REFERENCE POINT SESSION DOSAGE GIVEN: 3.2 GY

## 2025-06-03 PROCEDURE — 99214 OFFICE O/P EST MOD 30 MIN: CPT | Performed by: INTERNAL MEDICINE

## 2025-06-03 PROCEDURE — 77412 RADIATION TX DELIVERY LVL 3: CPT | Performed by: STUDENT IN AN ORGANIZED HEALTH CARE EDUCATION/TRAINING PROGRAM

## 2025-06-03 PROCEDURE — 77387 GUIDANCE FOR RADJ TX DLVR: CPT | Performed by: STUDENT IN AN ORGANIZED HEALTH CARE EDUCATION/TRAINING PROGRAM

## 2025-06-03 PROCEDURE — 77014 CHG CT GUIDANCE RADIATION THERAPY FLDS PLACEMENT: CPT | Performed by: STUDENT IN AN ORGANIZED HEALTH CARE EDUCATION/TRAINING PROGRAM

## 2025-06-03 NOTE — ASSESSMENT & PLAN NOTE
Has occasional migraines w/ decreasing frequency  Ordered her a refill of Nurtec for acute migraine    Orders:    rimegepant sulfate (NURTEC) 75 mg TBDP; Take 1 tablet (75 mg total) by mouth once for 1 dose

## 2025-06-03 NOTE — PROGRESS NOTES
Name: Amy Clement      : 1960      MRN: [id number]  Encounter Provider: Will Dasilva MD  Encounter Date: 6/3/2025   Encounter department: [facility] [address]  :  Assessment & Plan  Malignant neoplasm of lower-inner quadrant of right breast of female, estrogen receptor negative (HCC)    Currently on immunotherapy q3 weeks  F/b oncology         Rash  Likely due to chemotherapy rash  Has tried multiple treatment modalities: benadryl cream, multiple anti-histamines, multiple topical creams  Currently using hydroxyzine, only at night due to drowsiness.   Following with dermatology later this month          Chronic deep vein thrombosis (DVT) of non-extremity vein    Following up with radiology for clarification of chronic occlusive vs non-occlusive DVT  Continuing on Eliquis currently         Thyroid nodule    Lab Results   Component Value Date    HFM6IBSGRGKT 0.514 2025    FREET4 0.67 2025         Has had previous abnormal thyroid labs  F/b endocrinology   Has chronic stable thyroid nodules  Continue to follow w/ endocrinology    Orders:    TSH, 3rd generation with Free T4 reflex; Future    Osteoarthritis of right hip, unspecified osteoarthritis type    Has had osteoarthritis of the hip  DXA scan completed showed normal bone density           Well adult exam    Can complete labwork in 6 months at next appointment.     Orders:    TSH, 3rd generation with Free T4 reflex; Future    Hemoglobin A1C; Future    Comprehensive metabolic panel; Future    Lipid panel; Future    CBC and differential; Future    Migraine with aura and without status migrainosus, not intractable    Has occasional migraines w/ decreasing frequency  Ordered her a refill of Nurtec for acute migraine    Orders:    rimegepant sulfate (NURTEC) 75 mg TBDP; Take 1 tablet (75 mg total) by mouth once for 1 dose           History of Present Illness   F/u visit  Retired [employer] after 3rd child  4 kids  "Natalie Teran, youngest doing [place] fellowship at [employer] [facility] [employer].  6 grandkids   Lost Guido in June  Enjoys travel, lunch w/ friends, grandkids  Not exercising as much as she used to, still stretching. Getting back into walking. Plans to start swimming after radiation is finished.   Breast CA dx'd 10/24, neoadjubant chemo started 11/6, Herceptin/taxotere  Started w/ Rash 11/17 over port.  Resolved now.  DVT in port site in February, now on Eliquis for a total of 6 months.  Anxiety-stopped the Paxil. Not having as much anxiety now. Initially started for hot flashes at menopause.   Migraines-tried triptans but they don't work as well as Nurtec, wants rx.  Were worse w/ menses, now more r/t wine or other triggers. Hasn't had any for a while, now managed.   GERD-minimal symptoms, but back on omeprazole d/t chemo      Review of Systems   Constitutional:  Positive for fatigue. Negative for chills and fever.   Respiratory:  Negative for cough, shortness of breath and wheezing.    Cardiovascular:  Negative for chest pain, palpitations and leg swelling.   Gastrointestinal:  Positive for diarrhea (improves with imodium) and nausea (occasionally after the immunotherapy). Negative for abdominal pain, blood in stool, constipation and vomiting.   Genitourinary:  Negative for dysuria, frequency, hematuria and urgency.   Musculoskeletal:  Negative for back pain, myalgias and neck pain.   Skin:  Positive for rash.   Neurological:  Positive for headaches. Negative for dizziness and light-headedness.   Psychiatric/Behavioral:  Negative for confusion.        Objective   /78 (BP Location: Left arm, Patient Position: Sitting, Cuff Size: Standard)   Pulse 84   Resp 16   Ht 5' 10\" (1.778 m)   Wt 71.2 kg (157 lb)   LMP  (LMP Unknown)   SpO2 98%   BMI 22.53 kg/m²      Physical Exam  Vitals and nursing note reviewed.   Constitutional:       General: She is not in acute distress.     Appearance: Normal appearance. She is not " ill-appearing, toxic-appearing or diaphoretic.   HENT:      Head: Normocephalic and atraumatic.      Mouth/Throat:      Mouth: Mucous membranes are moist.     Eyes:      General: No scleral icterus.     Extraocular Movements: Extraocular movements intact.       Cardiovascular:      Rate and Rhythm: Normal rate and regular rhythm.      Pulses: Normal pulses.      Heart sounds:      No friction rub. No gallop.   Pulmonary:      Effort: Pulmonary effort is normal. No respiratory distress.      Breath sounds: No rhonchi or rales.   Abdominal:      General: Bowel sounds are normal. There is no distension.      Tenderness: There is no abdominal tenderness. There is no guarding or rebound.     Musculoskeletal:         General: No swelling. Normal range of motion.      Cervical back: Normal range of motion and neck supple.      Right lower leg: No edema.      Left lower leg: No edema.     Skin:     General: Skin is warm.      Coloration: Skin is not jaundiced.      Findings: Rash (b/l arms) present.     Neurological:      General: No focal deficit present.      Mental Status: She is alert and oriented to person, place, and time.      Cranial Nerves: No cranial nerve deficit.     Psychiatric:         Mood and Affect: Mood normal.         Behavior: Behavior normal.         Thought Content: Thought content normal.         Judgment: Judgment normal.

## 2025-06-03 NOTE — ASSESSMENT & PLAN NOTE
Following up with radiology for clarification of chronic occlusive vs non-occlusive DVT  Continuing on Eliquis currently

## 2025-06-03 NOTE — ASSESSMENT & PLAN NOTE
Lab Results   Component Value Date    TSX8JUAOLJKX 0.514 05/21/2025    FREET4 0.67 05/21/2025         Has had previous abnormal thyroid labs  F/b endocrinology   Has chronic stable thyroid nodules  Continue to follow w/ endocrinology    Orders:    TSH, 3rd generation with Free T4 reflex; Future

## 2025-06-04 ENCOUNTER — TELEPHONE (OUTPATIENT)
Age: 65
End: 2025-06-04

## 2025-06-04 ENCOUNTER — APPOINTMENT (OUTPATIENT)
Dept: RADIATION ONCOLOGY | Facility: CLINIC | Age: 65
End: 2025-06-04
Attending: RADIOLOGY
Payer: COMMERCIAL

## 2025-06-04 DIAGNOSIS — Z17.1 MALIGNANT NEOPLASM OF LOWER-INNER QUADRANT OF RIGHT BREAST OF FEMALE, ESTROGEN RECEPTOR NEGATIVE (HCC): Primary | ICD-10-CM

## 2025-06-04 DIAGNOSIS — C50.311 MALIGNANT NEOPLASM OF LOWER-INNER QUADRANT OF RIGHT BREAST OF FEMALE, ESTROGEN RECEPTOR NEGATIVE (HCC): Primary | ICD-10-CM

## 2025-06-04 LAB
RAD ONC ARIA COURSE FIRST TREATMENT DATE: NORMAL
RAD ONC ARIA COURSE ID: NORMAL
RAD ONC ARIA COURSE INTENT: NORMAL
RAD ONC ARIA COURSE LAST TREATMENT DATE: NORMAL
RAD ONC ARIA COURSE SESSION NUMBER: 9
RAD ONC ARIA COURSE START DATE: NORMAL
RAD ONC ARIA COURSE TREATMENT ELAPSED DAYS: 13
RAD ONC ARIA PLAN FRACTIONS TREATED TO DATE: 9
RAD ONC ARIA PLAN FRACTIONS TREATED TO DATE: 9
RAD ONC ARIA PLAN ID: NORMAL
RAD ONC ARIA PLAN ID: NORMAL
RAD ONC ARIA PLAN NAME: NORMAL
RAD ONC ARIA PLAN NAME: NORMAL
RAD ONC ARIA PLAN PRESCRIBED DOSE PER FRACTION: 0.53 GY
RAD ONC ARIA PLAN PRESCRIBED DOSE PER FRACTION: 2.67 GY
RAD ONC ARIA PLAN PRIMARY REFERENCE POINT: NORMAL
RAD ONC ARIA PLAN PRIMARY REFERENCE POINT: NORMAL
RAD ONC ARIA PLAN TOTAL FRACTIONS PRESCRIBED: 15
RAD ONC ARIA PLAN TOTAL FRACTIONS PRESCRIBED: 15
RAD ONC ARIA PLAN TOTAL PRESCRIBED DOSE: 4005 CGY
RAD ONC ARIA PLAN TOTAL PRESCRIBED DOSE: 795 CGY
RAD ONC ARIA REFERENCE POINT DOSAGE GIVEN TO DATE: 28.8 GY
RAD ONC ARIA REFERENCE POINT ID: NORMAL
RAD ONC ARIA REFERENCE POINT SESSION DOSAGE GIVEN: 3.2 GY

## 2025-06-04 PROCEDURE — 77014 CHG CT GUIDANCE RADIATION THERAPY FLDS PLACEMENT: CPT | Performed by: RADIOLOGY

## 2025-06-04 PROCEDURE — 77412 RADIATION TX DELIVERY LVL 3: CPT | Performed by: RADIOLOGY

## 2025-06-04 PROCEDURE — 77387 GUIDANCE FOR RADJ TX DLVR: CPT | Performed by: RADIOLOGY

## 2025-06-04 RX ORDER — TRIAMCINOLONE ACETONIDE 1 MG/G
CREAM TOPICAL 2 TIMES DAILY PRN
Qty: 80 G | Refills: 0 | Status: SHIPPED | OUTPATIENT
Start: 2025-06-04

## 2025-06-04 NOTE — TELEPHONE ENCOUNTER
PA for rimegepant sulfate (NURTEC) 75 mg SUBMITTED to     via    []CMM-KEY:   [x]Surescripts-Case ID # [id number]   []Availity-Auth ID # NDC #   []Faxed to plan   []Other website   []Phone call Case ID #     [x]PA sent as URGENT    All office notes, labs and other pertaining documents and studies sent. Clinical questions answered. Awaiting determination from insurance company.     Turnaround time for your insurance to make a decision on your Prior Authorization can take 7-21 business days.

## 2025-06-05 ENCOUNTER — APPOINTMENT (OUTPATIENT)
Dept: RADIATION ONCOLOGY | Facility: CLINIC | Age: 65
End: 2025-06-05
Attending: RADIOLOGY
Payer: COMMERCIAL

## 2025-06-05 LAB
RAD ONC ARIA COURSE FIRST TREATMENT DATE: NORMAL
RAD ONC ARIA COURSE ID: NORMAL
RAD ONC ARIA COURSE INTENT: NORMAL
RAD ONC ARIA COURSE LAST TREATMENT DATE: NORMAL
RAD ONC ARIA COURSE SESSION NUMBER: 10
RAD ONC ARIA COURSE START DATE: NORMAL
RAD ONC ARIA COURSE TREATMENT ELAPSED DAYS: 14
RAD ONC ARIA PLAN FRACTIONS TREATED TO DATE: 10
RAD ONC ARIA PLAN FRACTIONS TREATED TO DATE: 10
RAD ONC ARIA PLAN ID: NORMAL
RAD ONC ARIA PLAN ID: NORMAL
RAD ONC ARIA PLAN NAME: NORMAL
RAD ONC ARIA PLAN NAME: NORMAL
RAD ONC ARIA PLAN PRESCRIBED DOSE PER FRACTION: 0.53 GY
RAD ONC ARIA PLAN PRESCRIBED DOSE PER FRACTION: 2.67 GY
RAD ONC ARIA PLAN PRIMARY REFERENCE POINT: NORMAL
RAD ONC ARIA PLAN PRIMARY REFERENCE POINT: NORMAL
RAD ONC ARIA PLAN TOTAL FRACTIONS PRESCRIBED: 15
RAD ONC ARIA PLAN TOTAL FRACTIONS PRESCRIBED: 15
RAD ONC ARIA PLAN TOTAL PRESCRIBED DOSE: 4005 CGY
RAD ONC ARIA PLAN TOTAL PRESCRIBED DOSE: 795 CGY
RAD ONC ARIA REFERENCE POINT DOSAGE GIVEN TO DATE: 32 GY
RAD ONC ARIA REFERENCE POINT ID: NORMAL
RAD ONC ARIA REFERENCE POINT SESSION DOSAGE GIVEN: 3.2 GY

## 2025-06-05 PROCEDURE — 77387 GUIDANCE FOR RADJ TX DLVR: CPT | Performed by: INTERNAL MEDICINE

## 2025-06-05 PROCEDURE — 77336 RADIATION PHYSICS CONSULT: CPT | Performed by: RADIOLOGY

## 2025-06-05 PROCEDURE — 77014 CHG CT GUIDANCE RADIATION THERAPY FLDS PLACEMENT: CPT | Performed by: INTERNAL MEDICINE

## 2025-06-05 PROCEDURE — 77412 RADIATION TX DELIVERY LVL 3: CPT | Performed by: INTERNAL MEDICINE

## 2025-06-05 NOTE — TELEPHONE ENCOUNTER
PA for rimegepant sulfate (NURTEC) 75 mg APPROVED     Date(s) approved 6/4/25-6/4/26    Case #[id number]    Patient advised by          []MyChart Message  []Phone call   []LMOM  []L/M to call office as no active Communication consent on file  []Unable to leave detailed message as VM not approved on Communication consent       Pharmacy advised by    []Fax  []Phone call  []Secure Chat    Specialty Pharmacy    []     Approval letter scanned into Media No

## 2025-06-06 ENCOUNTER — APPOINTMENT (OUTPATIENT)
Dept: RADIATION ONCOLOGY | Facility: CLINIC | Age: 65
End: 2025-06-06
Attending: RADIOLOGY
Payer: COMMERCIAL

## 2025-06-06 DIAGNOSIS — G43.109 MIGRAINE WITH AURA AND WITHOUT STATUS MIGRAINOSUS, NOT INTRACTABLE: Primary | ICD-10-CM

## 2025-06-06 RX ORDER — RIMEGEPANT SULFATE 75 MG/75MG
TABLET, ORALLY DISINTEGRATING ORAL
Qty: 10 TABLET | Refills: 5 | Status: SHIPPED | OUTPATIENT
Start: 2025-06-06

## 2025-06-09 ENCOUNTER — ANNUAL EXAM (OUTPATIENT)
Dept: OBGYN CLINIC | Facility: CLINIC | Age: 65
End: 2025-06-09
Payer: COMMERCIAL

## 2025-06-09 ENCOUNTER — APPOINTMENT (OUTPATIENT)
Dept: RADIATION ONCOLOGY | Facility: CLINIC | Age: 65
End: 2025-06-09
Attending: RADIOLOGY
Payer: COMMERCIAL

## 2025-06-09 VITALS
DIASTOLIC BLOOD PRESSURE: 78 MMHG | BODY MASS INDEX: 22.48 KG/M2 | HEIGHT: 70 IN | SYSTOLIC BLOOD PRESSURE: 110 MMHG | WEIGHT: 157 LBS

## 2025-06-09 DIAGNOSIS — F41.9 ANXIETY: ICD-10-CM

## 2025-06-09 DIAGNOSIS — Z01.419 ENCOUNTER FOR GYNECOLOGICAL EXAMINATION: Primary | ICD-10-CM

## 2025-06-09 DIAGNOSIS — Z12.39 SCREENING FOR BREAST CANCER USING NON-MAMMOGRAM MODALITY: ICD-10-CM

## 2025-06-09 DIAGNOSIS — R92.30 DENSE BREAST TISSUE: ICD-10-CM

## 2025-06-09 DIAGNOSIS — Z12.4 ENCOUNTER FOR SCREENING FOR CERVICAL CANCER: ICD-10-CM

## 2025-06-09 LAB
RAD ONC ARIA COURSE FIRST TREATMENT DATE: NORMAL
RAD ONC ARIA COURSE ID: NORMAL
RAD ONC ARIA COURSE INTENT: NORMAL
RAD ONC ARIA COURSE LAST TREATMENT DATE: NORMAL
RAD ONC ARIA COURSE SESSION NUMBER: 11
RAD ONC ARIA COURSE START DATE: NORMAL
RAD ONC ARIA COURSE TREATMENT ELAPSED DAYS: 18
RAD ONC ARIA PLAN FRACTIONS TREATED TO DATE: 11
RAD ONC ARIA PLAN FRACTIONS TREATED TO DATE: 11
RAD ONC ARIA PLAN ID: NORMAL
RAD ONC ARIA PLAN ID: NORMAL
RAD ONC ARIA PLAN NAME: NORMAL
RAD ONC ARIA PLAN NAME: NORMAL
RAD ONC ARIA PLAN PRESCRIBED DOSE PER FRACTION: 0.53 GY
RAD ONC ARIA PLAN PRESCRIBED DOSE PER FRACTION: 2.67 GY
RAD ONC ARIA PLAN PRIMARY REFERENCE POINT: NORMAL
RAD ONC ARIA PLAN PRIMARY REFERENCE POINT: NORMAL
RAD ONC ARIA PLAN TOTAL FRACTIONS PRESCRIBED: 15
RAD ONC ARIA PLAN TOTAL FRACTIONS PRESCRIBED: 15
RAD ONC ARIA PLAN TOTAL PRESCRIBED DOSE: 4005 CGY
RAD ONC ARIA PLAN TOTAL PRESCRIBED DOSE: 795 CGY
RAD ONC ARIA REFERENCE POINT DOSAGE GIVEN TO DATE: 35.2 GY
RAD ONC ARIA REFERENCE POINT ID: NORMAL
RAD ONC ARIA REFERENCE POINT SESSION DOSAGE GIVEN: 3.2 GY

## 2025-06-09 PROCEDURE — 77412 RADIATION TX DELIVERY LVL 3: CPT | Performed by: STUDENT IN AN ORGANIZED HEALTH CARE EDUCATION/TRAINING PROGRAM

## 2025-06-09 PROCEDURE — 77387 GUIDANCE FOR RADJ TX DLVR: CPT | Performed by: STUDENT IN AN ORGANIZED HEALTH CARE EDUCATION/TRAINING PROGRAM

## 2025-06-09 PROCEDURE — 77014 CHG CT GUIDANCE RADIATION THERAPY FLDS PLACEMENT: CPT | Performed by: STUDENT IN AN ORGANIZED HEALTH CARE EDUCATION/TRAINING PROGRAM

## 2025-06-09 PROCEDURE — G0145 SCR C/V CYTO,THINLAYER,RESCR: HCPCS | Performed by: OBSTETRICS & GYNECOLOGY

## 2025-06-09 PROCEDURE — S0612 ANNUAL GYNECOLOGICAL EXAMINA: HCPCS | Performed by: OBSTETRICS & GYNECOLOGY

## 2025-06-09 PROCEDURE — 77427 RADIATION TX MANAGEMENT X5: CPT | Performed by: STUDENT IN AN ORGANIZED HEALTH CARE EDUCATION/TRAINING PROGRAM

## 2025-06-09 PROCEDURE — G0476 HPV COMBO ASSAY CA SCREEN: HCPCS | Performed by: OBSTETRICS & GYNECOLOGY

## 2025-06-09 RX ORDER — PAROXETINE 10 MG/1
10 TABLET, FILM COATED ORAL DAILY
Qty: 90 TABLET | Refills: 3 | Status: SHIPPED | OUTPATIENT
Start: 2025-06-09

## 2025-06-09 NOTE — PROGRESS NOTES
Name: Amy Clement      : 1960      MRN: [id number]  Encounter Provider: Joselin Alaniz MD  Encounter Date: 2025   Encounter department: [facility]  :  Assessment & Plan  Encounter for gynecological examination  Pap/HPV collected  Mammogram: h.o breast cancer, under treatment at this time.  Diagnostic mammo scheduled by Dr. Magaña, patient request for ABUS.     Colonoscopy current  DEXA: current,normal    Encourage healthy diet, exercise, Calcium 1200mg per day and at least 800 iu Vitamin D daily.  :        Encounter for screening for cervical cancer    Orders:    Liquid-based pap, screening    Dense breast tissue    Orders:    US breast screening bilateral complete (ABUS); Future    Screening for breast cancer using non-mammogram modality    Orders:    US breast screening bilateral complete (ABUS); Future        History of Present Illness   Gynecologic Exam  She reports no genital itching, genital lesions, genital odor, genital rash, pelvic pain, vaginal bleeding or vaginal discharge. The patient is experiencing no pain. Pertinent negatives include no chills, constipation, diarrhea, fever, nausea, sore throat or vomiting.     Amy Clement is a 64 y.o. female who presents for a routine annual visit    Hx of R breast cancer  Menarche- 14 Y/O  Last Pap Smear- 10/26/2020 NILM/HPV-  . Denies PMB.  Mammogram- 10/04/2024 Incomplete - U/S done 10/04/2024 BI-RADS 4 - Bx done. Malignant. Next mammo scheduled on 10/8  Colonoscopy- 08/15/2022 repeat in 5 years   DEXA- 2025 - Normal  GP   Non smoker  Social drinker  Not currently sexually active  +Fhx of breast cancer in mother    Vaginal atrohy - discussion of OTC treatments    Request for ABUS.   No concerns/questions for today's visit      Review of Systems   Constitutional:  Negative for activity change, appetite change, chills, fatigue and fever.   HENT:  Negative for rhinorrhea, sneezing  "and sore throat.    Eyes:  Negative for visual disturbance.   Respiratory:  Negative for cough, shortness of breath and wheezing.    Cardiovascular:  Negative for chest pain, palpitations and leg swelling.   Gastrointestinal:  Negative for abdominal distention, constipation, diarrhea, nausea and vomiting.   Genitourinary:  Negative for difficulty urinating, pelvic pain and vaginal discharge.   Neurological:  Negative for syncope and light-headedness.          Objective   /78 (BP Location: Left arm, Patient Position: Sitting, Cuff Size: Adult)   Ht 5' 10\" (1.778 m)   Wt 71.2 kg (157 lb)   LMP  (LMP Unknown)   BMI 22.53 kg/m²      Physical Exam  Constitutional:       General: She is not in acute distress.     Appearance: She is well-developed. She is not diaphoretic.   Chest:   Breasts:     Breasts are symmetrical.      Right: No inverted nipple, mass, nipple discharge, skin change or tenderness.      Left: No inverted nipple, mass, nipple discharge, skin change or tenderness.      Comments: S/p right partial mastectomy   Abdominal:      General: Bowel sounds are normal. There is no distension.      Palpations: Abdomen is soft. There is no mass.      Tenderness: There is no abdominal tenderness. There is no guarding or rebound.   Genitourinary:     Labia:         Right: No rash, tenderness, lesion or injury.         Left: No rash, tenderness, lesion or injury.       Vagina: No vaginal discharge, erythema, tenderness or bleeding.      Cervix: No cervical motion tenderness, discharge or friability.      Uterus: Not deviated, not enlarged, not fixed and not tender.       Adnexa:         Right: No mass, tenderness or fullness.          Left: No mass, tenderness or fullness.        Comments: Urethral meatus- no lesions, non tender  Urethra non tender  Bladder non tender  Thin, atrophic vaginal mucosa    Skin:     General: Skin is warm and dry.      Coloration: Skin is not pale.      Findings: No erythema or rash. "

## 2025-06-10 ENCOUNTER — APPOINTMENT (OUTPATIENT)
Dept: RADIATION ONCOLOGY | Facility: CLINIC | Age: 65
End: 2025-06-10
Attending: RADIOLOGY
Payer: COMMERCIAL

## 2025-06-10 LAB
HPV HR 12 DNA CVX QL NAA+PROBE: NEGATIVE
HPV16 DNA CVX QL NAA+PROBE: NEGATIVE
HPV18 DNA CVX QL NAA+PROBE: NEGATIVE
RAD ONC ARIA COURSE FIRST TREATMENT DATE: NORMAL
RAD ONC ARIA COURSE ID: NORMAL
RAD ONC ARIA COURSE INTENT: NORMAL
RAD ONC ARIA COURSE LAST TREATMENT DATE: NORMAL
RAD ONC ARIA COURSE SESSION NUMBER: 12
RAD ONC ARIA COURSE START DATE: NORMAL
RAD ONC ARIA COURSE TREATMENT ELAPSED DAYS: 19
RAD ONC ARIA PLAN FRACTIONS TREATED TO DATE: 12
RAD ONC ARIA PLAN FRACTIONS TREATED TO DATE: 12
RAD ONC ARIA PLAN ID: NORMAL
RAD ONC ARIA PLAN ID: NORMAL
RAD ONC ARIA PLAN NAME: NORMAL
RAD ONC ARIA PLAN NAME: NORMAL
RAD ONC ARIA PLAN PRESCRIBED DOSE PER FRACTION: 0.53 GY
RAD ONC ARIA PLAN PRESCRIBED DOSE PER FRACTION: 2.67 GY
RAD ONC ARIA PLAN PRIMARY REFERENCE POINT: NORMAL
RAD ONC ARIA PLAN PRIMARY REFERENCE POINT: NORMAL
RAD ONC ARIA PLAN TOTAL FRACTIONS PRESCRIBED: 15
RAD ONC ARIA PLAN TOTAL FRACTIONS PRESCRIBED: 15
RAD ONC ARIA PLAN TOTAL PRESCRIBED DOSE: 4005 CGY
RAD ONC ARIA PLAN TOTAL PRESCRIBED DOSE: 795 CGY
RAD ONC ARIA REFERENCE POINT DOSAGE GIVEN TO DATE: 38.4 GY
RAD ONC ARIA REFERENCE POINT ID: NORMAL
RAD ONC ARIA REFERENCE POINT SESSION DOSAGE GIVEN: 3.2 GY

## 2025-06-10 PROCEDURE — 77387 GUIDANCE FOR RADJ TX DLVR: CPT | Performed by: STUDENT IN AN ORGANIZED HEALTH CARE EDUCATION/TRAINING PROGRAM

## 2025-06-10 PROCEDURE — 77014 CHG CT GUIDANCE RADIATION THERAPY FLDS PLACEMENT: CPT | Performed by: STUDENT IN AN ORGANIZED HEALTH CARE EDUCATION/TRAINING PROGRAM

## 2025-06-10 PROCEDURE — 77412 RADIATION TX DELIVERY LVL 3: CPT | Performed by: STUDENT IN AN ORGANIZED HEALTH CARE EDUCATION/TRAINING PROGRAM

## 2025-06-11 ENCOUNTER — APPOINTMENT (OUTPATIENT)
Dept: RADIATION ONCOLOGY | Facility: CLINIC | Age: 65
End: 2025-06-11
Attending: RADIOLOGY
Payer: COMMERCIAL

## 2025-06-11 ENCOUNTER — APPOINTMENT (OUTPATIENT)
Dept: LAB | Facility: HOSPITAL | Age: 65
End: 2025-06-11
Payer: COMMERCIAL

## 2025-06-11 ENCOUNTER — TELEPHONE (OUTPATIENT)
Dept: INFUSION CENTER | Facility: CLINIC | Age: 65
End: 2025-06-11

## 2025-06-11 DIAGNOSIS — Z17.1 MALIGNANT NEOPLASM OF LOWER-INNER QUADRANT OF RIGHT BREAST OF FEMALE, ESTROGEN RECEPTOR NEGATIVE (HCC): Primary | ICD-10-CM

## 2025-06-11 DIAGNOSIS — Z17.1 MALIGNANT NEOPLASM OF LOWER-INNER QUADRANT OF RIGHT BREAST OF FEMALE, ESTROGEN RECEPTOR NEGATIVE (HCC): ICD-10-CM

## 2025-06-11 DIAGNOSIS — C50.311 MALIGNANT NEOPLASM OF LOWER-INNER QUADRANT OF RIGHT BREAST OF FEMALE, ESTROGEN RECEPTOR NEGATIVE (HCC): ICD-10-CM

## 2025-06-11 DIAGNOSIS — C50.311 MALIGNANT NEOPLASM OF LOWER-INNER QUADRANT OF RIGHT BREAST OF FEMALE, ESTROGEN RECEPTOR NEGATIVE (HCC): Primary | ICD-10-CM

## 2025-06-11 LAB
ALBUMIN SERPL BCG-MCNC: 4 G/DL (ref 3.5–5)
ALP SERPL-CCNC: 62 U/L (ref 34–104)
ALT SERPL W P-5'-P-CCNC: 17 U/L (ref 7–52)
ANION GAP SERPL CALCULATED.3IONS-SCNC: 5 MMOL/L (ref 4–13)
AST SERPL W P-5'-P-CCNC: 17 U/L (ref 13–39)
BASOPHILS # BLD AUTO: 0.02 THOUSANDS/ÂΜL (ref 0–0.1)
BASOPHILS NFR BLD AUTO: 0 % (ref 0–1)
BILIRUB SERPL-MCNC: 0.44 MG/DL (ref 0.2–1)
BUN SERPL-MCNC: 13 MG/DL (ref 5–25)
CALCIUM SERPL-MCNC: 9 MG/DL (ref 8.4–10.2)
CHLORIDE SERPL-SCNC: 106 MMOL/L (ref 96–108)
CO2 SERPL-SCNC: 29 MMOL/L (ref 21–32)
CREAT SERPL-MCNC: 0.57 MG/DL (ref 0.6–1.3)
EOSINOPHIL # BLD AUTO: 0.17 THOUSAND/ÂΜL (ref 0–0.61)
EOSINOPHIL NFR BLD AUTO: 4 % (ref 0–6)
ERYTHROCYTE [DISTWIDTH] IN BLOOD BY AUTOMATED COUNT: 12 % (ref 11.6–15.1)
GFR SERPL CREATININE-BSD FRML MDRD: 98 ML/MIN/1.73SQ M
GLUCOSE SERPL-MCNC: 89 MG/DL (ref 65–140)
HCT VFR BLD AUTO: 38.1 % (ref 34.8–46.1)
HGB BLD-MCNC: 13 G/DL (ref 11.5–15.4)
IMM GRANULOCYTES # BLD AUTO: 0.01 THOUSAND/UL (ref 0–0.2)
IMM GRANULOCYTES NFR BLD AUTO: 0 % (ref 0–2)
LYMPHOCYTES # BLD AUTO: 0.4 THOUSANDS/ÂΜL (ref 0.6–4.47)
LYMPHOCYTES NFR BLD AUTO: 8 % (ref 14–44)
MCH RBC QN AUTO: 31.9 PG (ref 26.8–34.3)
MCHC RBC AUTO-ENTMCNC: 34.1 G/DL (ref 31.4–37.4)
MCV RBC AUTO: 93 FL (ref 82–98)
MONOCYTES # BLD AUTO: 0.43 THOUSAND/ÂΜL (ref 0.17–1.22)
MONOCYTES NFR BLD AUTO: 9 % (ref 4–12)
NEUTROPHILS # BLD AUTO: 3.78 THOUSANDS/ÂΜL (ref 1.85–7.62)
NEUTS SEG NFR BLD AUTO: 79 % (ref 43–75)
NRBC BLD AUTO-RTO: 0 /100 WBCS
PLATELET # BLD AUTO: 192 THOUSANDS/UL (ref 149–390)
PMV BLD AUTO: 9.6 FL (ref 8.9–12.7)
POTASSIUM SERPL-SCNC: 3.8 MMOL/L (ref 3.5–5.3)
PROT SERPL-MCNC: 6.1 G/DL (ref 6.4–8.4)
RAD ONC ARIA COURSE FIRST TREATMENT DATE: NORMAL
RAD ONC ARIA COURSE ID: NORMAL
RAD ONC ARIA COURSE INTENT: NORMAL
RAD ONC ARIA COURSE LAST TREATMENT DATE: NORMAL
RAD ONC ARIA COURSE SESSION NUMBER: 13
RAD ONC ARIA COURSE START DATE: NORMAL
RAD ONC ARIA COURSE TREATMENT ELAPSED DAYS: 20
RAD ONC ARIA PLAN FRACTIONS TREATED TO DATE: 13
RAD ONC ARIA PLAN FRACTIONS TREATED TO DATE: 13
RAD ONC ARIA PLAN ID: NORMAL
RAD ONC ARIA PLAN ID: NORMAL
RAD ONC ARIA PLAN NAME: NORMAL
RAD ONC ARIA PLAN NAME: NORMAL
RAD ONC ARIA PLAN PRESCRIBED DOSE PER FRACTION: 0.53 GY
RAD ONC ARIA PLAN PRESCRIBED DOSE PER FRACTION: 2.67 GY
RAD ONC ARIA PLAN PRIMARY REFERENCE POINT: NORMAL
RAD ONC ARIA PLAN PRIMARY REFERENCE POINT: NORMAL
RAD ONC ARIA PLAN TOTAL FRACTIONS PRESCRIBED: 15
RAD ONC ARIA PLAN TOTAL FRACTIONS PRESCRIBED: 15
RAD ONC ARIA PLAN TOTAL PRESCRIBED DOSE: 4005 CGY
RAD ONC ARIA PLAN TOTAL PRESCRIBED DOSE: 795 CGY
RAD ONC ARIA REFERENCE POINT DOSAGE GIVEN TO DATE: 41.6 GY
RAD ONC ARIA REFERENCE POINT ID: NORMAL
RAD ONC ARIA REFERENCE POINT SESSION DOSAGE GIVEN: 3.2 GY
RBC # BLD AUTO: 4.08 MILLION/UL (ref 3.81–5.12)
SODIUM SERPL-SCNC: 140 MMOL/L (ref 135–147)
WBC # BLD AUTO: 4.81 THOUSAND/UL (ref 4.31–10.16)

## 2025-06-11 PROCEDURE — 77412 RADIATION TX DELIVERY LVL 3: CPT | Performed by: STUDENT IN AN ORGANIZED HEALTH CARE EDUCATION/TRAINING PROGRAM

## 2025-06-11 PROCEDURE — 85025 COMPLETE CBC W/AUTO DIFF WBC: CPT

## 2025-06-11 PROCEDURE — 77014 CHG CT GUIDANCE RADIATION THERAPY FLDS PLACEMENT: CPT | Performed by: STUDENT IN AN ORGANIZED HEALTH CARE EDUCATION/TRAINING PROGRAM

## 2025-06-11 PROCEDURE — 36415 COLL VENOUS BLD VENIPUNCTURE: CPT

## 2025-06-11 PROCEDURE — 80053 COMPREHEN METABOLIC PANEL: CPT

## 2025-06-11 PROCEDURE — 77387 GUIDANCE FOR RADJ TX DLVR: CPT | Performed by: STUDENT IN AN ORGANIZED HEALTH CARE EDUCATION/TRAINING PROGRAM

## 2025-06-11 NOTE — TELEPHONE ENCOUNTER
Spoke with patient and reminded her she needs labs for her phesgo appt tomorrow. I also let her know that infusion has a 0800 appt spot open. She stated she has radiation at 0900. I called radiation and they are ok with her coming in at 0930 for tx. Infusion appt for tomorrow moved from 2:30 to 0800. Patient appreciative and aware of the changes to her appt times.

## 2025-06-12 ENCOUNTER — APPOINTMENT (OUTPATIENT)
Dept: RADIATION ONCOLOGY | Facility: CLINIC | Age: 65
End: 2025-06-12
Attending: RADIOLOGY
Payer: COMMERCIAL

## 2025-06-12 ENCOUNTER — HOSPITAL ENCOUNTER (OUTPATIENT)
Dept: INFUSION CENTER | Facility: CLINIC | Age: 65
Discharge: HOME | End: 2025-06-12
Attending: INTERNAL MEDICINE
Payer: COMMERCIAL

## 2025-06-12 VITALS
SYSTOLIC BLOOD PRESSURE: 97 MMHG | TEMPERATURE: 97.4 F | DIASTOLIC BLOOD PRESSURE: 64 MMHG | RESPIRATION RATE: 16 BRPM | HEART RATE: 82 BPM

## 2025-06-12 DIAGNOSIS — C50.311 MALIGNANT NEOPLASM OF LOWER-INNER QUADRANT OF RIGHT BREAST OF FEMALE, ESTROGEN RECEPTOR NEGATIVE (HCC): Primary | ICD-10-CM

## 2025-06-12 DIAGNOSIS — Z17.1 MALIGNANT NEOPLASM OF LOWER-INNER QUADRANT OF RIGHT BREAST OF FEMALE, ESTROGEN RECEPTOR NEGATIVE (HCC): Primary | ICD-10-CM

## 2025-06-12 LAB
RAD ONC ARIA COURSE FIRST TREATMENT DATE: NORMAL
RAD ONC ARIA COURSE ID: NORMAL
RAD ONC ARIA COURSE INTENT: NORMAL
RAD ONC ARIA COURSE LAST TREATMENT DATE: NORMAL
RAD ONC ARIA COURSE SESSION NUMBER: 14
RAD ONC ARIA COURSE START DATE: NORMAL
RAD ONC ARIA COURSE TREATMENT ELAPSED DAYS: 21
RAD ONC ARIA PLAN FRACTIONS TREATED TO DATE: 14
RAD ONC ARIA PLAN FRACTIONS TREATED TO DATE: 14
RAD ONC ARIA PLAN ID: NORMAL
RAD ONC ARIA PLAN ID: NORMAL
RAD ONC ARIA PLAN NAME: NORMAL
RAD ONC ARIA PLAN NAME: NORMAL
RAD ONC ARIA PLAN PRESCRIBED DOSE PER FRACTION: 0.53 GY
RAD ONC ARIA PLAN PRESCRIBED DOSE PER FRACTION: 2.67 GY
RAD ONC ARIA PLAN PRIMARY REFERENCE POINT: NORMAL
RAD ONC ARIA PLAN PRIMARY REFERENCE POINT: NORMAL
RAD ONC ARIA PLAN TOTAL FRACTIONS PRESCRIBED: 15
RAD ONC ARIA PLAN TOTAL FRACTIONS PRESCRIBED: 15
RAD ONC ARIA PLAN TOTAL PRESCRIBED DOSE: 4005 CGY
RAD ONC ARIA PLAN TOTAL PRESCRIBED DOSE: 795 CGY
RAD ONC ARIA REFERENCE POINT DOSAGE GIVEN TO DATE: 44.8 GY
RAD ONC ARIA REFERENCE POINT ID: NORMAL
RAD ONC ARIA REFERENCE POINT SESSION DOSAGE GIVEN: 3.2 GY

## 2025-06-12 PROCEDURE — 77014 CHG CT GUIDANCE RADIATION THERAPY FLDS PLACEMENT: CPT | Performed by: STUDENT IN AN ORGANIZED HEALTH CARE EDUCATION/TRAINING PROGRAM

## 2025-06-12 PROCEDURE — 77412 RADIATION TX DELIVERY LVL 3: CPT | Performed by: STUDENT IN AN ORGANIZED HEALTH CARE EDUCATION/TRAINING PROGRAM

## 2025-06-12 PROCEDURE — 77387 GUIDANCE FOR RADJ TX DLVR: CPT | Performed by: STUDENT IN AN ORGANIZED HEALTH CARE EDUCATION/TRAINING PROGRAM

## 2025-06-12 PROCEDURE — 96401 CHEMO ANTI-NEOPL SQ/IM: CPT

## 2025-06-12 RX ORDER — SODIUM CHLORIDE 9 MG/ML
20 INJECTION, SOLUTION INTRAVENOUS ONCE
Status: DISCONTINUED | OUTPATIENT
Start: 2025-06-12 | End: 2025-06-15 | Stop reason: HOSPADM

## 2025-06-12 RX ADMIN — PERTUZUMAB, TRASTUZUMAB, AND HYALURONIDASE-ZZXF 10 ML: 600; 600; 2000 INJECTION, SOLUTION SUBCUTANEOUS at 08:50

## 2025-06-12 NOTE — PROGRESS NOTES
Pt here for Phesgo injection, states had slight diarrhea controlled with imodium. Phesgo given R thigh tolerated well. AVS declined. Next appt 7/2 830am. Walked out in stable condition.

## 2025-06-13 ENCOUNTER — APPOINTMENT (OUTPATIENT)
Dept: RADIATION ONCOLOGY | Facility: CLINIC | Age: 65
End: 2025-06-13
Attending: RADIOLOGY
Payer: COMMERCIAL

## 2025-06-16 ENCOUNTER — APPOINTMENT (OUTPATIENT)
Dept: RADIATION ONCOLOGY | Facility: CLINIC | Age: 65
End: 2025-06-16
Attending: RADIOLOGY
Payer: COMMERCIAL

## 2025-06-16 DIAGNOSIS — R21 RASH: ICD-10-CM

## 2025-06-16 LAB
RAD ONC ARIA COURSE FIRST TREATMENT DATE: NORMAL
RAD ONC ARIA COURSE ID: NORMAL
RAD ONC ARIA COURSE INTENT: NORMAL
RAD ONC ARIA COURSE LAST TREATMENT DATE: NORMAL
RAD ONC ARIA COURSE SESSION NUMBER: 15
RAD ONC ARIA COURSE START DATE: NORMAL
RAD ONC ARIA COURSE TREATMENT ELAPSED DAYS: 25
RAD ONC ARIA PLAN FRACTIONS TREATED TO DATE: 15
RAD ONC ARIA PLAN FRACTIONS TREATED TO DATE: 15
RAD ONC ARIA PLAN ID: NORMAL
RAD ONC ARIA PLAN ID: NORMAL
RAD ONC ARIA PLAN NAME: NORMAL
RAD ONC ARIA PLAN NAME: NORMAL
RAD ONC ARIA PLAN PRESCRIBED DOSE PER FRACTION: 0.53 GY
RAD ONC ARIA PLAN PRESCRIBED DOSE PER FRACTION: 2.67 GY
RAD ONC ARIA PLAN PRIMARY REFERENCE POINT: NORMAL
RAD ONC ARIA PLAN PRIMARY REFERENCE POINT: NORMAL
RAD ONC ARIA PLAN TOTAL FRACTIONS PRESCRIBED: 15
RAD ONC ARIA PLAN TOTAL FRACTIONS PRESCRIBED: 15
RAD ONC ARIA PLAN TOTAL PRESCRIBED DOSE: 4005 CGY
RAD ONC ARIA PLAN TOTAL PRESCRIBED DOSE: 795 CGY
RAD ONC ARIA REFERENCE POINT DOSAGE GIVEN TO DATE: 48 GY
RAD ONC ARIA REFERENCE POINT ID: NORMAL
RAD ONC ARIA REFERENCE POINT SESSION DOSAGE GIVEN: 3.2 GY

## 2025-06-16 PROCEDURE — 77014 CHG CT GUIDANCE RADIATION THERAPY FLDS PLACEMENT: CPT | Performed by: RADIOLOGY

## 2025-06-16 PROCEDURE — 77412 RADIATION TX DELIVERY LVL 3: CPT | Performed by: RADIOLOGY

## 2025-06-16 PROCEDURE — 77336 RADIATION PHYSICS CONSULT: CPT | Performed by: STUDENT IN AN ORGANIZED HEALTH CARE EDUCATION/TRAINING PROGRAM

## 2025-06-16 PROCEDURE — 77387 GUIDANCE FOR RADJ TX DLVR: CPT | Performed by: RADIOLOGY

## 2025-06-16 RX ORDER — HYDROXYZINE HYDROCHLORIDE 25 MG/1
25 TABLET, FILM COATED ORAL EVERY 6 HOURS PRN
Qty: 60 TABLET | Refills: 0 | Status: SHIPPED | OUTPATIENT
Start: 2025-06-16

## 2025-06-16 NOTE — TELEPHONE ENCOUNTER
Reason for call:   [x] Refill   [] Prior Auth  [x] Other: Patient states this should be filled by Dr Gonzalez     Office:   [] PCP/Provider -   [x] Specialty/Provider - [facility]    Medication: hydrOXYzine HCL (ATARAX) 25 mg tablet     Dose/Frequency:     25 mg, Every 6 hours PRN       Quantity: 60    Pharmacy: [id number]    Local Pharmacy   Does the patient have enough for 3 days?   [] Yes   [x] No - Send as HP to POD    Mail Away Pharmacy   Does the patient have enough for 10 days?   [] Yes   [] No - Send as HP to POD

## 2025-06-17 LAB
LAB AP GYN PRIMARY INTERPRETATION: NORMAL
Lab: NORMAL

## 2025-06-18 ENCOUNTER — RESULTS FOLLOW-UP (OUTPATIENT)
Dept: OBGYN CLINIC | Facility: CLINIC | Age: 65
End: 2025-06-18

## 2025-06-20 ENCOUNTER — TELEPHONE (OUTPATIENT)
Dept: RADIATION ONCOLOGY | Facility: CLINIC | Age: 65
End: 2025-06-20

## 2025-06-20 ENCOUNTER — TELEPHONE (OUTPATIENT)
Age: 65
End: 2025-06-20

## 2025-06-20 NOTE — TELEPHONE ENCOUNTER
REASON FOR CONVERSATION: Rash    SYMPTOMS: Patient stated that she has had a rash on right breast, nipple, and under arm.  Patient has been treating with Triamcinolone cream that Dr. Forman prescribed and Aquaphor.  Patient stated that it has worsened and is experiencing a burning sensation that is painful and itchiness.  Patient takes Hydroxyzine at nighttime and does receive relief.  Patient will upload picture and will send when received.      OTHER HEALTH INFORMATION: Patient completed radiation therapy X1 week ago.      PROTOCOL DISPOSITION: Discuss with provider and call back    CARE ADVICE PROVIDED: Recommended taking Hydroxyzine every six hours as needed and instructed patient not to drive while taking this medication.  Patient verbalized understanding.  Patient inquiring if ok to use cold compresses and hydrocortisone cream.     PRACTICE FOLLOW-UP: Call patient with any further recommendations.

## 2025-06-20 NOTE — TELEPHONE ENCOUNTER
Photo and message reviewed by Dr. Uribe.  Completed RT on 6/16/25.  Advised that skin rashes to worsen even though treatment has recently finished.  Advised to continue with kenalog to itchy areas of breast and chest BID. Aquaphor and remedy elsewhere BID.  Can continue with atarax and tylenol as directed previously.  Refrain from scratching.  Verbalized understanding.  Will call back if symptoms do not improve in next several days

## 2025-06-23 ENCOUNTER — TELEPHONE (OUTPATIENT)
Age: 65
End: 2025-06-23

## 2025-06-23 ENCOUNTER — HOSPITAL ENCOUNTER (OUTPATIENT)
Dept: ULTRASOUND IMAGING | Facility: HOSPITAL | Age: 65
Discharge: HOME | End: 2025-06-23
Attending: INTERNAL MEDICINE
Payer: COMMERCIAL

## 2025-06-23 DIAGNOSIS — E04.1 THYROID NODULE: ICD-10-CM

## 2025-06-23 PROCEDURE — 76536 US EXAM OF HEAD AND NECK: CPT

## 2025-06-23 NOTE — TELEPHONE ENCOUNTER
"Spoke with patient regarding appointment tomorrow. Stated Dr. Gonzalez would prefer to see her in person to adequately assess her prior to treatment on 7/2. Patient verbalized understanding. Also inquired if patient's rash is getting any better. Patient stated rash is about the same. Rad onc ordered patient hydroxyzine which patient stated she \"feels loopy\" when taking and does not want to drive while on it. Patient stated she will either try to find a ride to appointment or will not take the hydroxyzine tonight so she does not feel loopy. Advised patient to call if she cannot make it to the appointment. Patient verbalized understanding and is in agreement with the plan.   "

## 2025-06-23 NOTE — TELEPHONE ENCOUNTER
Pt would like to know if it's okay to change her 12 pm appt tomorrow with Dr. Gonzalez to a virtual? Pt can be reached at [phone]. Thank you.

## 2025-06-24 ENCOUNTER — OFFICE VISIT (OUTPATIENT)
Dept: HEMATOLOGY ONCOLOGY | Facility: CLINIC | Age: 65
End: 2025-06-24
Payer: COMMERCIAL

## 2025-06-24 ENCOUNTER — TELEMEDICINE (OUTPATIENT)
Age: 65
End: 2025-06-24
Attending: INTERNAL MEDICINE
Payer: COMMERCIAL

## 2025-06-24 VITALS
HEIGHT: 70 IN | WEIGHT: 153.8 LBS | OXYGEN SATURATION: 98 % | BODY MASS INDEX: 22.02 KG/M2 | HEART RATE: 89 BPM | TEMPERATURE: 97.8 F | SYSTOLIC BLOOD PRESSURE: 108 MMHG | DIASTOLIC BLOOD PRESSURE: 72 MMHG | RESPIRATION RATE: 16 BRPM

## 2025-06-24 DIAGNOSIS — C77.3 BREAST CANCER METASTASIZED TO AXILLARY LYMPH NODE, RIGHT (HCC): ICD-10-CM

## 2025-06-24 DIAGNOSIS — Z17.1 MALIGNANT NEOPLASM OF LOWER-INNER QUADRANT OF RIGHT BREAST OF FEMALE, ESTROGEN RECEPTOR NEGATIVE (HCC): Primary | ICD-10-CM

## 2025-06-24 DIAGNOSIS — N95.2 VAGINAL ATROPHY: Primary | ICD-10-CM

## 2025-06-24 DIAGNOSIS — Z51.81 THERAPEUTIC DRUG MONITORING: ICD-10-CM

## 2025-06-24 DIAGNOSIS — C50.311 MALIGNANT NEOPLASM OF LOWER-INNER QUADRANT OF RIGHT BREAST OF FEMALE, ESTROGEN RECEPTOR NEGATIVE (HCC): Primary | ICD-10-CM

## 2025-06-24 DIAGNOSIS — C50.911 BREAST CANCER METASTASIZED TO AXILLARY LYMPH NODE, RIGHT (HCC): ICD-10-CM

## 2025-06-24 PROBLEM — M25.551 CHRONIC HIP PAIN, RIGHT: Status: RESOLVED | Noted: 2023-03-02 | Resolved: 2025-06-24

## 2025-06-24 PROBLEM — Z92.21 S/P CHEMOTHERAPY, TIME SINCE LESS THAN 4 WEEKS: Status: RESOLVED | Noted: 2025-02-26 | Resolved: 2025-06-24

## 2025-06-24 PROBLEM — Z98.890 S/P DILATION AND CURETTAGE: Status: RESOLVED | Noted: 2024-02-23 | Resolved: 2025-06-24

## 2025-06-24 PROBLEM — Z13.71 BRCA NEGATIVE: Status: RESOLVED | Noted: 2025-02-26 | Resolved: 2025-06-24

## 2025-06-24 PROBLEM — G89.29 CHRONIC HIP PAIN, RIGHT: Status: RESOLVED | Noted: 2023-03-02 | Resolved: 2025-06-24

## 2025-06-24 PROBLEM — N95.0 POSTMENOPAUSAL BLEEDING: Status: RESOLVED | Noted: 2024-01-23 | Resolved: 2025-06-24

## 2025-06-24 PROBLEM — T45.1X5A CHEMOTHERAPY INDUCED NEUTROPENIA (HCC): Status: RESOLVED | Noted: 2024-11-23 | Resolved: 2025-06-24

## 2025-06-24 PROBLEM — D70.1 CHEMOTHERAPY INDUCED NEUTROPENIA (HCC): Status: RESOLVED | Noted: 2024-11-23 | Resolved: 2025-06-24

## 2025-06-24 PROCEDURE — 99215 OFFICE O/P EST HI 40 MIN: CPT | Performed by: INTERNAL MEDICINE

## 2025-06-24 PROCEDURE — 99215 OFFICE O/P EST HI 40 MIN: CPT | Performed by: OBSTETRICS & GYNECOLOGY

## 2025-06-24 RX ORDER — SODIUM CHLORIDE 9 MG/ML
20 INJECTION, SOLUTION INTRAVENOUS ONCE
OUTPATIENT
Start: 2025-11-06

## 2025-06-24 RX ORDER — SODIUM CHLORIDE 9 MG/ML
20 INJECTION, SOLUTION INTRAVENOUS ONCE
OUTPATIENT
Start: 2025-11-27

## 2025-06-24 RX ORDER — SODIUM CHLORIDE 9 MG/ML
20 INJECTION, SOLUTION INTRAVENOUS ONCE
OUTPATIENT
Start: 2025-10-16

## 2025-06-24 RX ORDER — SODIUM CHLORIDE 9 MG/ML
20 INJECTION, SOLUTION INTRAVENOUS ONCE
OUTPATIENT
Start: 2025-07-03

## 2025-06-24 RX ORDER — SODIUM CHLORIDE 9 MG/ML
20 INJECTION, SOLUTION INTRAVENOUS ONCE
OUTPATIENT
Start: 2025-07-24

## 2025-06-24 RX ORDER — SODIUM CHLORIDE 9 MG/ML
20 INJECTION, SOLUTION INTRAVENOUS ONCE
OUTPATIENT
Start: 2025-09-04

## 2025-06-24 RX ORDER — SODIUM CHLORIDE 9 MG/ML
20 INJECTION, SOLUTION INTRAVENOUS ONCE
OUTPATIENT
Start: 2025-08-14

## 2025-06-24 RX ORDER — ESTRADIOL 10 UG/1
1 TABLET, FILM COATED VAGINAL 2 TIMES WEEKLY
Qty: 8 TABLET | Refills: 11 | Status: SHIPPED | OUTPATIENT
Start: 2025-06-26

## 2025-06-24 RX ORDER — SODIUM CHLORIDE 9 MG/ML
20 INJECTION, SOLUTION INTRAVENOUS ONCE
OUTPATIENT
Start: 2025-09-25

## 2025-06-25 ENCOUNTER — PATIENT OUTREACH (OUTPATIENT)
Dept: HEMATOLOGY ONCOLOGY | Facility: CLINIC | Age: 65
End: 2025-06-25

## 2025-06-25 NOTE — PROGRESS NOTES
"Sent patient a my chart message for follow up stating the following:    \"Rosy Reyes,    Just wanting to check in with you, I hope all is well.  Should you have any questions, concerns or just unsure of something please feel free to reach out to me directly. If I don't know the answer I will find you the answer or at least point you in the right direction.     Just know I'm here for added support and should you run into any issues you can always reach out and I will assist you in anyway I can.      Hope you have a great rest of your day!        Thank you!        Clarissa Hairston MA, PN  Breast Patient Navigator  Oncology Care Coordination   [address] [facility]  [place] [address]  P:[phone]  F:[phone]  [email or web address]\"  "

## 2025-06-27 NOTE — PROGRESS NOTES
Name: Amy Clement      : 1960      MRN: [id number]  Encounter Provider: Kathrin Gonzalez DO  Encounter Date: 2025   Encounter department: [facility] [place]  :  Assessment & Plan  Therapeutic drug monitoring    Orders:    Echo complete w/ contrast if indicated; Future    Malignant neoplasm of lower-inner quadrant of right breast of female, estrogen receptor negative (HCC)    Orders:    CBC and differential; Future    Comprehensive metabolic panel; Future    Lipid panel; Future    64-year-old female with triple positive breast cancer.  I am switching her Phesgo to high lacto which is subcutaneous trastuzumab only.  There does seem to be a temporal relationship with Perjeta exposure and the rash.  Appreciate dermatology's input this week as well.  We will hold off on starting an aromatase inhibitor at the patient's request.  She is due for an echo in 2 months and I will see her at that time with a CBC and CMP prior.  If we are just giving trastuzumab she does not need labs with every dose.  Hopefully she will feel up to a trial of aromatase inhibitor at our next visit.  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  Port has been removed.  She knows to call in the interim with any questions or concerns.    Return in about 2 months (around 2025) for 20 minute follow up visit.    History of Present Illness   Chief Complaint   Patient presents with    Follow-up     64-year-old female triple positive breast cancer.  She had a complete response at surgery and we are treating her in the adjuvant setting with Phesgo.  When the Phesgo was restarted her itchy rash on arms trunk and legs got worse.  Labs prior to visit are normal.  She is seeing dermatology this week.  She also noticed an uptake in her diarrhea when the Phesgo was restarted.  She is still feeling too tired to consider starting an aromatase inhibitor.    Oncology  History   Cancer Staging   Malignant neoplasm of lower-inner quadrant of right breast of female, estrogen receptor negative (HCC)  Staging form: Breast, AJCC 8th Edition  - Clinical stage from 10/4/2024: Stage IIA (cT1c, cN1(f), cM0, G2, ER-, ND+, HER2+) - Signed by Koki Magaña MD on 10/21/2024  Stage prefix: Initial diagnosis  Method of lymph node assessment: Core biopsy  Histologic grading system: 3 grade system  - Pathologic stage from 3/28/2025: ypTis (DCIS), ypN0(sn), cM0 - Signed by Koki Magaña MD on 4/14/2025  Stage prefix: Post-therapy  Method of lymph node assessment: [place] lymph node biopsy  Oncology History Overview Note    10/2024 - right breast and LNBx positive for invasive mammary carcinoma with predominantly lobular features, apocrine and histiocytoid features in less amount, grade 2, ER 1% ND 10% Her2 3+, [id number]     LN biopsy receptors - ER 1% ND 70% Her2 3+     11/8/2024 - start TCHP     12/2024 - cycle 3 - taxotere dose reduced by 10%, carbo dose reduced to AUC 5 due to N/V     2/2025 - cycle 6 - taxotere dose reduced by 20% due to fatigue     Started on eliquis due to right IJ DVT associated with port    3/28/2024 - port removed at surgery, right sided partial mastectomy with SLNBX, [id number]    Will continue the rest of treatment with phesgo - residual chronic DVT in IJ, continuing eliquis until aug 2025    6/2025 - change phesgo to hylecta due to rash     Malignant neoplasm of lower-inner quadrant of right breast of female, estrogen receptor negative (HCC)   10/4/2024 Biopsy    A. Right breast ultrasound-guided biopsy  4 o'clock, 2 cm from nipple (ROSA ISELA)  Invasive mammary carcinoma with predominately lobular features   Apocrine and histiocytoid morphologic features  Grade 2  ER <1, ND 10, HER2 3+  Lymphovascular invasion present    B. Right axillary lymph node biopsy (ROSA ISELA)  Metastatic carcinoma, compatible with mammary origin  ER <1, ND 70, HER2 3+    Concordant. Right malignancy  appears unifocal; biopsy-proven carcinoma measured 1.7 cm on US. Biopsy-proven metastatic disease to right axillary lymph node. Left breast clear.     10/4/2024 -  Cancer Staged    Staging form: Breast, AJCC 8th Edition  - Clinical stage from 10/4/2024: Stage IIA (cT1c, cN1(f), cM0, G2, ER-, AZ+, HER2+) - Signed by Koki Magaña MD on 10/21/2024  Stage prefix: Initial diagnosis  Method of lymph node assessment: Core biopsy  Histologic grading system: 3 grade system       10/15/2024 Genetic Testing    VUS of AXIN2, MSH6  [employer] BRCA[employer] & Cancer +RNAInsight - A total of 59 genes were evaluated with RNA insight: APC, JUAN J, BAP1, BARD1, BMPR1A, BRCA1, BRCA2, BRIP1, CDH1, CDK4, CDKN1B, CDKN2A, CHEK2, DICER1, FH, FLCN, KIF1B, MAX, MEN1, MET, MLH1, MSH2, MSH6, MUTYH, NF1, NTHL1, PALB2, PMS2, POT1, PTEN, RAD51C, RAD51D, RB1, RET, SDHA, SDHAF2, SDHB, SDHC, SDHD, SMAD4, SMARCA4, STK11, JPJM228, TP53, TSC1, TSC2 and VHL (sequencing and deletion/duplication); AXIN2, CTNNA1, EGLN1, HOXB13, KIT, MITF, MSH3, PDGFRA, POLD1 and POLE (sequencing only); EPCAM and GREM1 (deletion/duplication only).      11/8/2024 -  Chemotherapy    trastuzumab-hyaluronidase-oysk (HERCEPTIN HYLECTA), 600 mg of trastuzumab (100 % of original dose 600 mg of trastuzumab), 0 of 8 cycles  Dose modification: 600 mg of trastuzumab (original dose 600 mg of trastuzumab, Cycle 11)  pertuzumab (PERJETA) IVPB, 840 mg, 7 of 7 cycles  Administration: 840 mg (11/8/2024), 420 mg (11/29/2024), 420 mg (12/20/2024), 420 mg (1/10/2025), 420 mg (1/31/2025), 420 mg (2/21/2025), 420 mg (3/20/2025)  CARBOplatin (PARAPLATIN) IVPB (GOG AUC DOSING), 722.4 mg, 6 of 6 cycles  Administration: 722.4 mg (11/8/2024), 722.4 mg (11/29/2024), 602 mg (12/20/2024), 602 mg (1/10/2025), 602 mg (1/31/2025), 602 mg (2/21/2025)  DOCEtaxel (TAXOTERE) chemo infusion, 75 mg/m2 = 144 mg, 6 of 6 cycles  Dose modification: 67 mg/m2 (original dose 75 mg/m2, Cycle 3, Reason: Nausea/Vomiting, Comment:  10%dose reduction due nausea), 60.3 mg/m2 (original dose 75 mg/m2, Cycle 6, Reason: Dose modified as per discussion with consulting physician, Comment: dose reduction), 60 mg/m2 (original dose 75 mg/m2, Cycle 6, Reason: Dose modified as per discussion with consulting physician, Comment: total 20% dose reduction)  Administration: 144 mg (11/8/2024), 144 mg (11/29/2024), 128.6 mg (12/20/2024), 128.6 mg (1/10/2025), 128.6 mg (1/31/2025), 115.2 mg (2/21/2025)  trastuzumab (HERCEPTIN) chemo infusion, 8 mg/kg = 595 mg, 7 of 7 cycles  Administration: 595 mg (11/8/2024), 447 mg (11/29/2024), 447 mg (12/20/2024), 447 mg (1/10/2025), 447 mg (1/31/2025), 447 mg (2/21/2025), 447 mg (3/20/2025)  pertuzumab, trastuzumab, and hyaluronidase (Phesgo) subcutaneous injection Soln, 15 mL (original dose ), 3 of 3 cycles  Dose modification: 15 mL (original dose 15 mL, Cycle 8), 10 mL (original dose 10 mL, Cycle 9)  Administration: 15 mL (5/1/2025), 10 mL (5/22/2025), 10 mL (6/12/2025)     2/24/2025 Observation    Right breast axilla US    At least almost complete imaging response to neoadjuvant chemotherapy. The biopsy-proven carcinoma has markedly decreased in size; it currently measures 6 mm x 2 mm x 5 mm and previously measured 15 mm x 13 mm x 17 mm.  The measurements on the current exam may be artifactual; there may be a complete response. Previously noted adenopathy has resolved; cortical thickness currently measures 3mm and previously measured 11mm     3/28/2025 Surgery    Right breast ROSA ISELA localized lumpectomy w/ SLNB, removal of venous port    DCIS  Grade 3  4mm  No residual invasive carcinoma  All margins negative for DCIS  0/1 lymph node     3/28/2025 -  Cancer Staged    Staging form: Breast, AJCC 8th Edition  - Pathologic stage from 3/28/2025: ypTis (DCIS), pN0(sn), cM0 - Signed by Koki Magaña MD on 4/14/2025  Stage prefix: Post-therapy  Method of lymph node assessment: [place] lymph node biopsy       Breast cancer  "metastasized to axillary lymph node, right (HCC)   10/16/2024 Initial Diagnosis    Breast cancer metastasized to axillary lymph node, right (HCC)        Pertinent Medical History   02/20/25: Patient started on Eliquis for a right sided IJ DVT associated with her port    04/27/25:      06/27/25:       Review of Systems otherwise neg        Objective   /72 (BP Location: Left arm, Patient Position: Sitting, Cuff Size: Adult)   Pulse 89   Temp 97.8 °F (36.6 °C) (Temporal)   Resp 16   Ht 5' 10\" (1.778 m)   Wt 69.8 kg (153 lb 12.8 oz)   LMP  (LMP Unknown)   SpO2 98%   BMI 22.07 kg/m²     Pain Screening:  Pain Score: 0-No pain  ECOG   0  Physical Exam    GEN: Alert, awake oriented x3, in no acute distress  HEENT- No pallor, icterus, cyanosis, no oral mucosal lesions,   LAD - no palpable cervical, clavicle, axillary, inguinal LAD  Heart- normal S1 S2, regular rate and rhythm, No murmur, rubs.   Lungs- clear breathing sound bilateral.   Abdomen- soft, Non tender, bowel sounds present  Extremities- No cyanosis, clubbing, edema  Neuro- No focal neurological deficit  Skin - excoriated rash on arms, trunk and legs    Labs: I have reviewed the following labs:  Lab Results   Component Value Date/Time    WBC 4.81 06/11/2025 01:03 PM    RBC 4.08 06/11/2025 01:03 PM    Hemoglobin 13.0 06/11/2025 01:03 PM    Hematocrit 38.1 06/11/2025 01:03 PM    MCV 93 06/11/2025 01:03 PM    MCH 31.9 06/11/2025 01:03 PM    RDW 12.0 06/11/2025 01:03 PM    Platelets 192 06/11/2025 01:03 PM    Segmented % 79 (H) 06/11/2025 01:03 PM    Lymphocytes % 8 (L) 06/11/2025 01:03 PM    Monocytes % 9 06/11/2025 01:03 PM    Eosinophils Relative 4 06/11/2025 01:03 PM    Basophils Relative 0 06/11/2025 01:03 PM    Immature Grans % 0 06/11/2025 01:03 PM    Absolute Neutrophils 3.78 06/11/2025 01:03 PM     Lab Results   Component Value Date/Time    Potassium 3.8 06/11/2025 01:03 PM    Chloride 106 06/11/2025 01:03 PM    CO2 29 06/11/2025 01:03 PM    BUN " 13 06/11/2025 01:03 PM    Creatinine 0.57 (L) 06/11/2025 01:03 PM    Glucose, Fasting 96 11/06/2024 07:13 AM    Calcium 9.0 06/11/2025 01:03 PM    AST 17 06/11/2025 01:03 PM    ALT 17 06/11/2025 01:03 PM    Alkaline Phosphatase 62 06/11/2025 01:03 PM    Total Protein 6.1 (L) 06/11/2025 01:03 PM    Albumin 4.0 06/11/2025 01:03 PM    Total Bilirubin 0.44 06/11/2025 01:03 PM    eGFR 98 06/11/2025 01:03 PM

## 2025-06-30 ENCOUNTER — TELEPHONE (OUTPATIENT)
Age: 65
End: 2025-06-30

## 2025-06-30 ENCOUNTER — CONSULT (OUTPATIENT)
Age: 65
End: 2025-06-30
Attending: INTERNAL MEDICINE
Payer: COMMERCIAL

## 2025-06-30 ENCOUNTER — TELEMEDICINE (OUTPATIENT)
Dept: ENDOCRINOLOGY | Facility: CLINIC | Age: 65
End: 2025-06-30
Payer: COMMERCIAL

## 2025-06-30 VITALS — BODY MASS INDEX: 22.19 KG/M2 | WEIGHT: 155 LBS | HEIGHT: 70 IN

## 2025-06-30 VITALS — HEIGHT: 70 IN | WEIGHT: 153.88 LBS | TEMPERATURE: 98.5 F | BODY MASS INDEX: 22.03 KG/M2

## 2025-06-30 DIAGNOSIS — E04.1 THYROID NODULE: Primary | ICD-10-CM

## 2025-06-30 DIAGNOSIS — Z00.00 HEALTHCARE MAINTENANCE: ICD-10-CM

## 2025-06-30 DIAGNOSIS — R21 RASH: Primary | ICD-10-CM

## 2025-06-30 PROCEDURE — 99213 OFFICE O/P EST LOW 20 MIN: CPT | Performed by: INTERNAL MEDICINE

## 2025-06-30 PROCEDURE — 99244 OFF/OP CNSLTJ NEW/EST MOD 40: CPT | Performed by: DERMATOLOGY

## 2025-06-30 RX ORDER — CLOBETASOL PROPIONATE 0.5 MG/G
CREAM TOPICAL 2 TIMES DAILY
Qty: 60 G | Refills: 3 | Status: SHIPPED | OUTPATIENT
Start: 2025-06-30

## 2025-06-30 NOTE — PROGRESS NOTES
Virtual Regular Visit  Name: Amy Clement      : 1960      MRN: [id number]  Encounter Provider: Sanam Mensah MD  Encounter Date: 2025   Encounter department: [facility] [place]  :  Assessment & Plan  Breast cancer metastasized to axillary lymph node, right (HCC)    Orders:    Ambulatory Referral to Obstetrics / Gynecology    Vaginal atrophy    Orders:    estradiol (VAGIFEM, YUVAFEM) 10 MCG TABS vaginal tablet; Insert 1 tablet (10 mcg total) into the vagina 2 (two) times a week (Patient not taking: Reported on 2025)      1) I reassured her that the [facility] oncology dept and I had a meeting on this very issue to develop guidelines for treatment. I reassured her that her oncology team will be supportive of estradiol suppository or Estring. No increase in morbidity or mortality with these options.   2) Other options such as vaginal DHEA and Estefania Aminata Laser touch laser therapy are options as well.   3) She accepted Vagifem suppositories, appears skeptical. May discuss with oncology. Follow up prn.     This was a 40 minute visit with greater than 50% of time spent in face to face counseling and coordination of care    Yazmin presents for hormone consult, her first visit with me; referred by Dr. Gonzalez with [facility] oncology. Sees Dr. BUCHANAN with [facility] gyn.   Tawanna is obviously postmenopausal, her complaints:  1) Diagnosed with ER+/AR+ breast cancer Stage IIA, s/p lumpectomy with sentinel node, chemo, and now undergoing XRT with Herceptin.   2) Had hot flashes for awhile, was given Paxil with moderate relief.  3) Her real issue is vaginal atrophy. Tried all the over the counter products with marginal relief.   PMX: as above; migraines, rare; GERD, thyroid nodule; chronic DVT of her port a cath still in place  SHX: denies tobcco, ETOH,   FHX:X mom Breast Ca 47, COD 91 lung dz; Dad COD COVID 100, also with colon ca 88. 3 siblings, laryngeal and bladder Ca. 4 kids 43/40/34/31,  one with kidney Ca.     Review of Systems   Constitutional: Negative.    Gastrointestinal: Negative.    Endocrine: Positive for heat intolerance.   Genitourinary:  Positive for dyspareunia.   Musculoskeletal: Negative.    Neurological: Negative.    Psychiatric/Behavioral: Negative.             Physical Exam    Administrative Statements   Encounter provider Sanam Mensah MD    The Patient is located at Home and in the following state in which I hold an active license [place].    The patient was identified by name and date of birth. Amy Clement was informed that this is a telemedicine visit and that the visit is being conducted through the Epic Embedded platform. She agrees to proceed..  My office door was closed. No one else was in the room.  She acknowledged consent and understanding of privacy and security of the video platform. The patient has agreed to participate and understands they can discontinue the visit at any time.    I have spent a total time of 40 minutes in caring for this patient on the day of the visit/encounter including Impressions, Counseling / Coordination of care, Documenting in the medical record, Reviewing/placing orders in the medical record (including tests, medications, and/or procedures), Obtaining or reviewing history  , and Communicating with other healthcare professionals , not including the time spent for establishing the audio/video connection.

## 2025-06-30 NOTE — PROGRESS NOTES
"Virtual Regular Visit  Name: Amy Clement      : 1960      MRN: [id number]  Encounter Provider: Lucrecia Howard MD  Encounter Date: 2025   Encounter department: [facility] [place]  :  Assessment & Plan  Thyroid nodule  She does note the nodule in her neck, but it is unchanged. May be interested in radioablation in the future. May also be moving to/closer to [place]. Provided names of endocrine and ENT. For now, plan TSH and ultrasound in Spring 2026   Orders:  •  US thyroid; Future  •  TSH, 3rd generation; Future        History of Present Illness     HPI  64yof seen in f/u for thyroid nodules.   Originally thyroid nodules were diagnosed in . She has had an FNA of the left/ isthmus nodule most recently 2015 was read as [place] II. Cytology report in [facility] showed abundant colloid and come changes c/w CLT.      Her history includes the diagnosis of ER neg/RI+/HER2 positive breast CA.  Chemo which included steroids (TCHP), but this and XRT are done. Her is now on herceptin and Phesgo . As part of her staging, she had a CT neck/chest/ab/pelvis the enlarged left lobe with extension to the upper mediastinum and deviated but patent trachea.     Her neck appears enlarged, but there are no changes in voice or dysphagia.   She is euthyroid     Contrast exposure w/ staging CT 10/28/2024     Of note, after chemo, she will likely get surgery and XRT.   Review of Systems   HENT:  Negative for hearing loss, trouble swallowing and voice change.    Respiratory:  Negative for shortness of breath.    Neurological:  Negative for tremors.   Psychiatric/Behavioral:  The patient is not nervous/anxious.        Objective   Ht 5' 10\" (1.778 m)   Wt 70.3 kg (155 lb)   LMP  (LMP Unknown)   BMI 22.24 kg/m²     Physical Exam      Physical Exam   Gen: appears well-developed and well-nourished. No apparent distress.   Head: Normocephalic and atraumatic.   Eyes: no stare or " proptosis, no periorbital edema  E/N/M nl facies, hearing grossly intact  Neck: range of motion nl   Pulmonary/Chest: breathing  comfortably, no accessory muscle use, effort normal.   Musculoskeletal: moves upper extremities  Neurological: alert and oriented to person, place, and time. No upper ext tremor appreciated  Skin: does not appear diaphoretic, no facial plethora  Psychiatric: normal mood and affect; behavior is normal; no gross lapses in memory, answer questions appropriately      Administrative Statements   Encounter provider Lucrecia Howard MD    The Patient is located at Home and in the following state in which I hold an active license [place].    The patient was identified by name and date of birth. Amy Clement was informed that this is a telemedicine visit and that the visit is being conducted through the Epic Embedded platform. She agrees to proceed..  My office door was closed. No one else was in the room.  She acknowledged consent and understanding of privacy and security of the video platform. The patient has agreed to participate and understands they can discontinue the visit at any time.    I have spent a total time of 20 minutes in caring for this patient on the day of the visit/encounter including Documenting in the medical record, Reviewing/placing orders in the medical record (including tests, medications, and/or procedures), and Obtaining or reviewing history  , not including the time spent for establishing the audio/video connection.

## 2025-06-30 NOTE — PROGRESS NOTES
"[facility] Note     Patient Name: Amy Clement  Encounter Date: 6/30/2025       Have you been cared for by a [facility] Dermatologist in the last 3 years and, if so, which description applies to you? NO. I am considered a \"new\" patient and must complete all patient intake questions. I am not of child-bearing potential.     REVIEW OF SYSTEMS:  Have you recently had or currently have any of the following? Recent fever or chills? No  Any non-healing wound? No   PAST MEDICAL HISTORY:  Have you personally ever had or currently have any of the following?  If \"YES,\" then please provide more detail. Skin cancer (such as Melanoma, Basal Cell Carcinoma, Squamous Cell Carcinoma?  No  Tuberculosis, HIV/AIDS, Hepatitis B or C: No  Radiation Treatment YES, last treatment was around 6/16/2025   HISTORY OF IMMUNOSUPPRESSION:   Do you have a history of any of the following:  Systemic Immunosuppression such as Diabetes, Biologic or Immunotherapy, Chemotherapy, Organ Transplantation, Bone Marrow Transplantation or Prednisone?  YES, Chemotherapy for breast cancer      Answering \"YES\" requires the addition of the dotphrase \"IMMUNOSUPPRESSED\" as the first diagnosis of the patient's visit.   FAMILY HISTORY:  Any \"first degree relatives\" (parent, brother, sister, or child) with the following?    Skin Cancer, Pancreatic or Other Cancer? YES, son had kidney cancer, mother had breast cancer, father had colorectal cancer   PATIENT EXPERIENCE:    Do you want the Dermatologist to perform a COMPLETE skin exam today including a clinical examination under the \"bra and underwear\" areas?  NO  If necessary, do we have your permission to call and leave a detailed message on your Preferred Phone number that includes your specific medical information?  Yes      Allergies[1] Current Medications[2]              Whom besides the patient is providing clinical information about today's encounter?   NO ADDITIONAL HISTORIAN (patient " alone provided history)    Physical Exam and Assessment/Plan by Diagnosis:    PRURITUS SECONDARY TO CHEMOTHERAPEUTIC AGENTS    Physical Exam:  (Anatomic Location); (Size and Morphological Description); (Differential Diagnosis):  Arms, bilateral small crusted erosions  Pertinent Positives:  Pertinent Negatives:    Additional History of Present Condition:  64 year old female presents today as a new consult for chemo induced rash. Patient has malignant neoplasm of lower quadrant of the right breast that was diagnosed on 10/2024. Patient reports that rash appeared about 3 treatments in after chemo. Patient was treated in the past with clindamycin, hydrocortisone/ benadryl, antihistamines with little to no results. Patient states that she is currently using triamcinolone 0.1% cream, and [id number] mg which is helping manage itchiness at night.    Assessment and Plan:  Based on a thorough discussion of this condition and the management approach to it (including a comprehensive discussion of the known risks, side effects and potential benefits of treatment), the patient (family) agrees to implement the following specific plan:  Apply clobetasol 0.05% cream, two times a day  Alternatives to hydroxyzine are gabapentin 100 mg tablet at bedtime or CBD gummies and/or creams.  Be aware that all these except the CBD cream could cause grogginess and drowsiness  Over the counter options: Gold bond medicated cream, Cerva anti-itch lotion, tense unit, lidocaine patches   Consider phototherapy if the above treatments do not work, skin type II     Scribe Attestation    I,:  Kate Vann MA am acting as a scribe while in the presence of the attending physician.:       I,:  Shannan Maldonado MD personally performed the services described in this documentation    as scribed in my presence.:                   [1]  Allergies  Allergen Reactions   • Monistat [Miconazole] Itching   • Pollen Extract Itching, Swelling and Sneezing    [2]    Current Outpatient Medications:   •  apixaban (Eliquis) 5 mg, Take 1 tablet (5 mg total) by mouth 2 (two) times a day, Disp: 60 tablet, Rfl: 1  •  Bacillus Coagulans-Inulin (Probiotic) 1-250 BILLION-MG CAPS, Take by mouth, Disp: , Rfl:   •  calcium citrate (CALCITRATE) 950 (200 Ca) MG tablet, Take 1 tablet by mouth in the morning., Disp: , Rfl:   •  clobetasol (TEMOVATE) 0.05 % cream, Apply topically 2 (two) times a day, Disp: 60 g, Rfl: 3  •  gabapentin (NEURONTIN) 100 MG tablet, Take 1 tablet (100 mg total) by mouth 3 (three) times a day, Disp: 90 tablet, Rfl: 11  •  Glucosamine-Chondroitin (GLUCOSAMINE CHONDR COMPLEX PO), Take by mouth in the morning., Disp: , Rfl:   •  hydrOXYzine HCL (ATARAX) 25 mg tablet, Take 1 tablet (25 mg total) by mouth every 6 (six) hours as needed for itching, Disp: 60 tablet, Rfl: 0  •  latanoprost (XALATAN) 0.005 % ophthalmic solution, Administer 1 drop to both eyes daily at bedtime, Disp: , Rfl:   •  Multiple Vitamins-Minerals (CENTRUM SILVER) tablet, Take by mouth, Disp: , Rfl:   •  omeprazole (PriLOSEC) 20 mg delayed release capsule, TAKE 1 CAPSULE BY MOUTH EVERY DAY, Disp: 90 capsule, Rfl: 0  •  PARoxetine (PAXIL) 10 mg tablet, Take 1 tablet (10 mg total) by mouth daily, Disp: 90 tablet, Rfl: 3  •  rimegepant sulfate (Nurtec) 75 mg TBDP, 1 po daily prn migraine headache, Disp: 10 tablet, Rfl: 5  •  timolol (TIMOPTIC) 0.5 % ophthalmic solution, Administer 1 drop to both eyes in the morning., Disp: , Rfl:   •  triamcinolone (KENALOG) 0.1 % cream, Apply topically 2 (two) times a day as needed for rash or irritation, Disp: 80 g, Rfl: 0  •  estradiol (VAGIFEM, YUVAFEM) 10 MCG TABS vaginal tablet, Insert 1 tablet (10 mcg total) into the vagina 2 (two) times a week (Patient not taking: Reported on 6/30/2025), Disp: 8 tablet, Rfl: 11  •  lidocaine-prilocaine (EMLA) cream, Apply topically as needed for mild pain (Patient not taking: Reported on 6/9/2025), Disp: 30 g, Rfl: 2  •   TURMERIC-MECHE PO, Take by mouth, Disp: , Rfl:

## 2025-06-30 NOTE — PATIENT INSTRUCTIONS
RASH    Assessment and Plan:  Based on a thorough discussion of this condition and the management approach to it (including a comprehensive discussion of the known risks, side effects and potential benefits of treatment), the patient (family) agrees to implement the following specific plan:  Apply clobetasol 0.05% cream, two times a day  Start gabapentin 100 mg tablet, take three times a day as needed  Over the counter options: Gold bond medicated cream, Cerva anti-itch lotion, tense unit, lidocaine patches   Consider phototherapy if the above treatments do not work, skin type II

## 2025-06-30 NOTE — ASSESSMENT & PLAN NOTE
She does note the nodule in her neck, but it is unchanged. May be interested in radioablation in the future. May also be moving to/closer to [place]. Provided names of endocrine and ENT. For now, plan TSH and ultrasound in Spring 2026   Orders:  •  US thyroid; Future  •  TSH, 3rd generation; Future

## 2025-07-01 DIAGNOSIS — I82.629 ACUTE DEEP VEIN THROMBOSIS (DVT) OF UPPER EXTREMITY, UNSPECIFIED LATERALITY, UNSPECIFIED VEIN (HCC): ICD-10-CM

## 2025-07-02 ENCOUNTER — HOSPITAL ENCOUNTER (OUTPATIENT)
Dept: INFUSION CENTER | Facility: CLINIC | Age: 65
Discharge: HOME | End: 2025-07-02
Attending: INTERNAL MEDICINE
Payer: COMMERCIAL

## 2025-07-02 VITALS
RESPIRATION RATE: 18 BRPM | TEMPERATURE: 97.2 F | HEART RATE: 92 BPM | SYSTOLIC BLOOD PRESSURE: 105 MMHG | DIASTOLIC BLOOD PRESSURE: 70 MMHG

## 2025-07-02 DIAGNOSIS — C50.311 MALIGNANT NEOPLASM OF LOWER-INNER QUADRANT OF RIGHT BREAST OF FEMALE, ESTROGEN RECEPTOR NEGATIVE (HCC): Primary | ICD-10-CM

## 2025-07-02 DIAGNOSIS — Z17.1 MALIGNANT NEOPLASM OF LOWER-INNER QUADRANT OF RIGHT BREAST OF FEMALE, ESTROGEN RECEPTOR NEGATIVE (HCC): Primary | ICD-10-CM

## 2025-07-02 PROCEDURE — 96401 CHEMO ANTI-NEOPL SQ/IM: CPT

## 2025-07-02 RX ORDER — SODIUM CHLORIDE 9 MG/ML
20 INJECTION, SOLUTION INTRAVENOUS ONCE
Status: DISCONTINUED | OUTPATIENT
Start: 2025-07-02 | End: 2025-07-05 | Stop reason: HOSPADM

## 2025-07-02 RX ADMIN — TRASTUZUMAB AND HYALURONIDASE-OYSK 600 MG OF TRASTUZUMAB: 600; 10000 INJECTION, SOLUTION SUBCUTANEOUS at 08:51

## 2025-07-02 NOTE — PROGRESS NOTES
Pt here for chemo,  complaints of rash which MD is aware of,Pt stated MD thought it could have been the Perjeta which was removed from her plan and she was placed on a cream for the rash, VS stable,  Pt aware of their next appt, 7/24 at 8:30.  AVS declined. Treatment  completed w/o complications, pt D/C home

## 2025-07-15 ENCOUNTER — TELEPHONE (OUTPATIENT)
Age: 65
End: 2025-07-15

## 2025-07-15 DIAGNOSIS — L27.0: Primary | ICD-10-CM

## 2025-07-15 DIAGNOSIS — L29.9 PRURITUS: Primary | ICD-10-CM

## 2025-07-16 ENCOUNTER — OFFICE VISIT (OUTPATIENT)
Dept: RADIATION ONCOLOGY | Facility: CLINIC | Age: 65
End: 2025-07-16
Attending: RADIOLOGY
Payer: COMMERCIAL

## 2025-07-16 ENCOUNTER — TELEPHONE (OUTPATIENT)
Dept: HEMATOLOGY ONCOLOGY | Facility: CLINIC | Age: 65
End: 2025-07-16

## 2025-07-16 ENCOUNTER — HOSPITAL ENCOUNTER (OUTPATIENT)
Dept: NON INVASIVE DIAGNOSTICS | Facility: CLINIC | Age: 65
Discharge: HOME | End: 2025-07-16
Payer: COMMERCIAL

## 2025-07-16 VITALS
WEIGHT: 155 LBS | BODY MASS INDEX: 22.24 KG/M2 | RESPIRATION RATE: 16 BRPM | HEART RATE: 77 BPM | OXYGEN SATURATION: 98 % | TEMPERATURE: 97.3 F | DIASTOLIC BLOOD PRESSURE: 64 MMHG | SYSTOLIC BLOOD PRESSURE: 106 MMHG

## 2025-07-16 VITALS
HEIGHT: 70 IN | DIASTOLIC BLOOD PRESSURE: 64 MMHG | BODY MASS INDEX: 22.19 KG/M2 | WEIGHT: 155 LBS | HEART RATE: 72 BPM | SYSTOLIC BLOOD PRESSURE: 106 MMHG

## 2025-07-16 DIAGNOSIS — Z51.81 THERAPEUTIC DRUG MONITORING: ICD-10-CM

## 2025-07-16 DIAGNOSIS — R93.1 DECREASED CARDIAC EJECTION FRACTION: Primary | ICD-10-CM

## 2025-07-16 DIAGNOSIS — Z08 ENCOUNTER FOR FOLLOW-UP SURVEILLANCE OF BREAST CANCER: Primary | ICD-10-CM

## 2025-07-16 DIAGNOSIS — Z85.3 ENCOUNTER FOR FOLLOW-UP SURVEILLANCE OF BREAST CANCER: Primary | ICD-10-CM

## 2025-07-16 DIAGNOSIS — Z92.3 HISTORY OF THERAPEUTIC RADIATION: ICD-10-CM

## 2025-07-16 LAB
AORTIC ROOT: 3.2 CM
BSA FOR ECHO PROCEDURE: 1.87 M2
DOP CALC LVOT AREA: 2.83 CM2
DOP CALC LVOT DIAMETER: 1.9 CM
E WAVE DECELERATION TIME: 249 MS
E/A RATIO: 0.73
FRACTIONAL SHORTENING: 36 (ref 28–44)
INTERVENTRICULAR SEPTUM IN DIASTOLE (PARASTERNAL SHORT AXIS VIEW): 0.9 CM
INTERVENTRICULAR SEPTUM: 0.9 CM (ref 0.6–1.1)
LAAS-AP2: 22.9 CM2
LAAS-AP4: 20.1 CM2
LEFT ATRIUM SIZE: 3.6 CM
LEFT ATRIUM VOLUME (MOD BIPLANE): 68 ML
LEFT ATRIUM VOLUME INDEX (MOD BIPLANE): 36.4 ML/M2
LEFT INTERNAL DIMENSION IN SYSTOLE: 2.9 CM (ref 2.1–4)
LEFT VENTRICULAR INTERNAL DIMENSION IN DIASTOLE: 4.5 CM (ref 3.5–6)
LEFT VENTRICULAR POSTERIOR WALL IN END DIASTOLE: 0.8 CM
LEFT VENTRICULAR STROKE VOLUME: 59 ML
LV EF US.2D.A4C+ESTIMATED: 55 %
LVSV (TEICH): 59 ML
MV E'TISSUE VEL-SEP: 6 CM/S
MV PEAK A VEL: 0.74 M/S
MV PEAK E VEL: 54 CM/S
MV STENOSIS PRESSURE HALF TIME: 72 MS
MV VALVE AREA P 1/2 METHOD: 3.06
RA PRESSURE ESTIMATED: 15 MMHG
RIGHT ATRIUM AREA SYSTOLE A4C: 14.7 CM2
RIGHT VENTRICLE ID DIMENSION: 2.7 CM
RV PSP: 36 MMHG
SL CV LEFT ATRIUM LENGTH A2C: 5.4 CM
SL CV LV EF: 50
SL CV PED ECHO LEFT VENTRICLE DIASTOLIC VOLUME (MOD BIPLANE) 2D: 91 ML
SL CV PED ECHO LEFT VENTRICLE SYSTOLIC VOLUME (MOD BIPLANE) 2D: 32 ML
TR MAX PG: 21 MMHG
TR PEAK VELOCITY: 2.3 M/S
TRICUSPID ANNULAR PLANE SYSTOLIC EXCURSION: 1.8 CM
TRICUSPID VALVE PEAK REGURGITATION VELOCITY: 2.32 M/S

## 2025-07-16 PROCEDURE — 93356 MYOCRD STRAIN IMG SPCKL TRCK: CPT

## 2025-07-16 PROCEDURE — 93306 TTE W/DOPPLER COMPLETE: CPT | Performed by: INTERNAL MEDICINE

## 2025-07-16 PROCEDURE — 99024 POSTOP FOLLOW-UP VISIT: CPT | Performed by: RADIOLOGY

## 2025-07-16 PROCEDURE — 99211 OFF/OP EST MAY X REQ PHY/QHP: CPT | Performed by: RADIOLOGY

## 2025-07-16 PROCEDURE — 93356 MYOCRD STRAIN IMG SPCKL TRCK: CPT | Performed by: INTERNAL MEDICINE

## 2025-07-16 PROCEDURE — 93306 TTE W/DOPPLER COMPLETE: CPT

## 2025-07-16 NOTE — PROGRESS NOTES
Follow-up Visit   Name: Amy Clement      : 1960      MRN: [id number]  Encounter Provider: Nila Forman MD  Encounter Date: 2025   Encounter department: [facility] RADIATION ONCOLOGY  :  Assessment & Plan  Encounter for follow-up surveillance of breast cancer  Amy Clement 1960 is a 64 y.o. female with a history of clinical stage IIA ([id number]) G2 invasive mammary carcinoma with lobular features of the right breast.  Tumor cells were ER negative, NE low positive, HER2 positive. Axillary node was ER negative, NE positive, and HER2 positive.  She underwent neoadjuvant chemotherapy with TCPH with excellent partial response.  She underwent right breast lumpectomy with sentinel lymph node biopsy on 3/28/25.  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 She completed a course of radiation therapy to the breast only on 25.     Clinically BRANDIN  -Maintained on trastuzumab.  AI planned in future.  -Patient compliant with Medical Oncology follow-up.  -Surgical Oncology follow-up is scheduled 2025.   -She will be due for surveillance imaging end of 2025.    -Follow-up 6 months.       History of therapeutic radiation  Instructed to practice sun protection to radiated skin.  Recommended daily emollient use and light to moderate breast massage daily for 6 months to promote continued tissue healing.    Regarding PUVA therapy  -We discussed that UV radiation over radiated breast tissue can result in increased hyperpigmentation and risk of skin cancer going forward.  If breast is not included in the UVA fields, then she may proceed with likely small risk.  She would likely benefit from delay of psoralen sensitizing agent.  Would recommend 6 months, but defer to Dermatology if this is felt low risk, then her skin is healed and she may proceed with treatment earlier.  In general, delay from time of completion of  ionizing radiation is helpful in tissue and skin recovery and tolerance to further radiation.    Patient expressed understanding.  She will continue with antibiotics and re-evaluate.   Refer back to Dermatology for continued management and treatment.           History of Present Illness   Chief Complaint   Patient presents with    Breast Cancer    Follow-up   Pertinent Medical History   Amy Clement 1960 is a 64 y.o. female with a history of clinical stage IIA ([id number]) G2 invasive mammary carcinoma with lobular features of the right breast.  Tumor cells were ER negative, LA low positive, HER2 positive. Axillary node was ER negative, LA positive, and HER2 positive.  She underwent neoadjuvant chemotherapy with TCPH with excellent partial response.  She underwent right breast lumpectomy with sentinel lymph node biopsy on 3/28/25.  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 She completed a course of radiation therapy on 6/16/25. Maintained on trastuzumab, AI on hold.  She returns today for follow up.     6/24/25 Dr. Gonzalez  switching her Phesgo to high lacto which is subcutaneous trastuzumab only. There does seem to be a temporal relationship with Perjeta exposure and the rash.   will hold off on starting an aromatase inhibitor at the patient's request.   Hopefully she will feel up to a trial of aromatase inhibitor at our next visit. 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 Port has been removed.      The patient continues to experience pruritic rash bilateral arms, worse on right than left.  Dermatology following.  Doxycycline started.  Considering PUVA therapy.      Patient notes skin has healed well. She continues emollients daily.  She denies itching, tenderness, palpable nodules or suspicious skin changes of the breasts bilaterally.  She notes there is more thickening inferior medial right  breast.  No upper extremity edema.  No shoulder restriction.    Upcomin25 Dr. Gonzalez  10/8/25 Mammogram  10/16/25 Surg Onc     Oncology History   Cancer Staging   Malignant neoplasm of lower-inner quadrant of right breast of female, estrogen receptor negative (HCC)  Staging form: Breast, AJCC 8th Edition  - Clinical stage from 10/4/2024: Stage IIA (cT1c, cN1(f), cM0, G2, ER-, IN+, HER2+) - Signed by Koki Magaña MD on 10/21/2024  Stage prefix: Initial diagnosis  Method of lymph node assessment: Core biopsy  Histologic grading system: 3 grade system  - Pathologic stage from 3/28/2025: ypTis (DCIS), ypN0(sn), cM0 - Signed by Koki Magaña MD on 2025  Stage prefix: Post-therapy  Method of lymph node assessment: [place] lymph node biopsy  Oncology History   Malignant neoplasm of lower-inner quadrant of right breast of female, estrogen receptor negative (HCC)   10/4/2024 Biopsy    A. Right breast ultrasound-guided biopsy  4 o'clock, 2 cm from nipple (ROSA ISELA)  Invasive mammary carcinoma with predominately lobular features   Apocrine and histiocytoid morphologic features  Grade 2  ER <1, IN 10, HER2 3+  Lymphovascular invasion present    B. Right axillary lymph node biopsy (ROSA ISELA)  Metastatic carcinoma, compatible with mammary origin  ER <1, IN 70, HER2 3+    Concordant. Right malignancy appears unifocal; biopsy-proven carcinoma measured 1.7 cm on US. Biopsy-proven metastatic disease to right axillary lymph node. Left breast clear.     10/4/2024 -  Cancer Staged    Staging form: Breast, AJCC 8th Edition  - Clinical stage from 10/4/2024: Stage IIA (cT1c, cN1(f), cM0, G2, ER-, IN+, HER2+) - Signed by Koki Magaña MD on 10/21/2024  Stage prefix: Initial diagnosis  Method of lymph node assessment: Core biopsy  Histologic grading system: 3 grade system       10/15/2024 Genetic Testing    VUS of AXIN2, MSH6  [facility] +RNAInsight - A total of 59 genes were evaluated with RNA insight: APC, JUAN J, BAP1,  BARD1, BMPR1A, BRCA1, BRCA2, BRIP1, CDH1, CDK4, CDKN1B, CDKN2A, CHEK2, DICER1, FH, FLCN, KIF1B, MAX, MEN1, MET, MLH1, MSH2, MSH6, MUTYH, NF1, NTHL1, PALB2, PMS2, POT1, PTEN, RAD51C, RAD51D, RB1, RET, SDHA, SDHAF2, SDHB, SDHC, SDHD, SMAD4, SMARCA4, STK11, VMSD941, TP53, TSC1, TSC2 and VHL (sequencing and deletion/duplication); AXIN2, CTNNA1, EGLN1, HOXB13, KIT, MITF, MSH3, PDGFRA, POLD1 and POLE (sequencing only); EPCAM and GREM1 (deletion/duplication only).      11/8/2024 -  Chemotherapy    trastuzumab-hyaluronidase-oysk (HERCEPTIN HYLECTA), 600 mg of trastuzumab (100 % of original dose 600 mg of trastuzumab), 1 of 8 cycles  Dose modification: 600 mg of trastuzumab (original dose 600 mg of trastuzumab, Cycle 11)  Administration: 600 mg of trastuzumab (7/2/2025)  pertuzumab (PERJETA) IVPB, 840 mg, 7 of 7 cycles  Administration: 840 mg (11/8/2024), 420 mg (11/29/2024), 420 mg (12/20/2024), 420 mg (1/10/2025), 420 mg (1/31/2025), 420 mg (2/21/2025), 420 mg (3/20/2025)  CARBOplatin (PARAPLATIN) IVPB (GOG AUC DOSING), 722.4 mg, 6 of 6 cycles  Administration: 722.4 mg (11/8/2024), 722.4 mg (11/29/2024), 602 mg (12/20/2024), 602 mg (1/10/2025), 602 mg (1/31/2025), 602 mg (2/21/2025)  DOCEtaxel (TAXOTERE) chemo infusion, 75 mg/m2 = 144 mg, 6 of 6 cycles  Dose modification: 67 mg/m2 (original dose 75 mg/m2, Cycle 3, Reason: Nausea/Vomiting, Comment: 10%dose reduction due nausea), 60.3 mg/m2 (original dose 75 mg/m2, Cycle 6, Reason: Dose modified as per discussion with consulting physician, Comment: dose reduction), 60 mg/m2 (original dose 75 mg/m2, Cycle 6, Reason: Dose modified as per discussion with consulting physician, Comment: total 20% dose reduction)  Administration: 144 mg (11/8/2024), 144 mg (11/29/2024), 128.6 mg (12/20/2024), 128.6 mg (1/10/2025), 128.6 mg (1/31/2025), 115.2 mg (2/21/2025)  trastuzumab (HERCEPTIN) chemo infusion, 8 mg/kg = 595 mg, 7 of 7 cycles  Administration: 595 mg (11/8/2024), 447 mg  (11/29/2024), 447 mg (12/20/2024), 447 mg (1/10/2025), 447 mg (1/31/2025), 447 mg (2/21/2025), 447 mg (3/20/2025)  pertuzumab, trastuzumab, and hyaluronidase (Phesgo) subcutaneous injection Soln, 15 mL (original dose ), 3 of 3 cycles  Dose modification: 15 mL (original dose 15 mL, Cycle 8), 10 mL (original dose 10 mL, Cycle 9)  Administration: 15 mL (5/1/2025), 10 mL (5/22/2025), 10 mL (6/12/2025)     2/24/2025 Observation    Right breast axilla US    At least almost complete imaging response to neoadjuvant chemotherapy. The biopsy-proven carcinoma has markedly decreased in size; it currently measures 6 mm x 2 mm x 5 mm and previously measured 15 mm x 13 mm x 17 mm.  The measurements on the current exam may be artifactual; there may be a complete response. Previously noted adenopathy has resolved; cortical thickness currently measures 3mm and previously measured 11mm     3/28/2025 Surgery    Right breast ROSA ISELA localized lumpectomy w/ SLNB, removal of venous port    DCIS  Grade 3  4mm  No residual invasive carcinoma  All margins negative for DCIS  0/1 lymph node     3/28/2025 -  Cancer Staged    Staging form: Breast, AJCC 8th Edition  - Pathologic stage from 3/28/2025: ypTis (DCIS), pN0(sn), cM0 - Signed by Koki Magaña MD on 4/14/2025  Stage prefix: Post-therapy  Method of lymph node assessment: [place] lymph node biopsy       5/22/2025 - 6/16/2025 Radiation    Treatment:  Course: C1    Plan ID Energy Fractions Dose per Fraction (cGy) Dose Correction (cGy) Total Dose Delivered (cGy) Elapsed Days   R Breast 10X/6X 15 / 15 267 0 4,005 25   Right SIB 6X 15 / 15 53 0 795 25      Treatment dates:  C1: 5/22/2025 - 6/16/2025     Breast cancer metastasized to axillary lymph node, right (HCC)   10/16/2024 Initial Diagnosis    Breast cancer metastasized to axillary lymph node, right (HCC)        Review of Systems Refer to nursing note.    Medications Ordered Prior to Encounter[1]   Social History[2]      Objective   BP  "106/64   Pulse 77   Temp (!) 97.3 °F (36.3 °C)   Resp 16   Wt 70.3 kg (155 lb)   LMP  (LMP Unknown)   SpO2 98%   BMI 22.24 kg/m²     Pain Screening:  Pain Score: 0-No pain  ECOG   0  Physical Exam  Vitals and nursing note reviewed.   Constitutional:       General: She is not in acute distress.     Appearance: She is well-developed.     Cardiovascular:      Rate and Rhythm: Normal rate.   Pulmonary:      Breath sounds: No wheezing, rhonchi or rales.   Chest:        Comments: Breast examination demonstrates breasts are symmetric in size.  There is a well-healed inner periareolar scar associated with retraction.  There is diffuse very mild hyperpigmentation and fibrosis of the right breast.  No palpable nodules or suspicious skin changes of the breasts bilaterally.    Musculoskeletal:      Right lower leg: No edema.      Left lower leg: No edema.      Comments: Full range of motion upper extremities bilaterally.  No upper extremity edema bilaterally.  Excoriations over macular/papular rash noted bilateral arms up to shoulders.  None over chest wall or breasts bilaterally.   Lymphadenopathy:      Cervical: No cervical adenopathy.      Upper Body:      Right upper body: No supraclavicular or axillary adenopathy.      Left upper body: No supraclavicular or axillary adenopathy.     Neurological:      Mental Status: She is alert and oriented to person, place, and time.            Administrative Statements   I have spent a total time of 24 minutes in caring for this patient on the day of the visit/encounter .  Portions of the record may have been created with voice recognition software.  Occasional wrong word or \"sound a like\" substitutions may have occurred due to the inherent limitations of voice recognition software.  Read the chart carefully and recognize, using context, where substitutions have occurred.       [1]   Current Outpatient Medications on File Prior to Visit   Medication Sig Dispense Refill    apixaban " (Eliquis) 5 mg Take 1 tablet (5 mg total) by mouth 2 (two) times a day 60 tablet 5    Bacillus Coagulans-Inulin (Probiotic) 1-250 BILLION-MG CAPS Take by mouth      calcium citrate (CALCITRATE) 950 (200 Ca) MG tablet Take 1 tablet by mouth in the morning.      clobetasol (TEMOVATE) 0.05 % cream Apply topically 2 (two) times a day 60 g 3    Glucosamine-Chondroitin (GLUCOSAMINE CHONDR COMPLEX PO) Take by mouth in the morning.      latanoprost (XALATAN) 0.005 % ophthalmic solution Administer 1 drop to both eyes daily at bedtime      Multiple Vitamins-Minerals (CENTRUM SILVER) tablet Take by mouth      omeprazole (PriLOSEC) 20 mg delayed release capsule TAKE 1 CAPSULE BY MOUTH EVERY DAY 90 capsule 0    rimegepant sulfate (Nurtec) 75 mg TBDP 1 po daily prn migraine headache 10 tablet 5    timolol (TIMOPTIC) 0.5 % ophthalmic solution Administer 1 drop to both eyes in the morning.      estradiol (VAGIFEM, YUVAFEM) 10 MCG TABS vaginal tablet Insert 1 tablet (10 mcg total) into the vagina 2 (two) times a week (Patient not taking: Reported on 6/30/2025) 8 tablet 11    gabapentin (NEURONTIN) 100 MG tablet Take 1 tablet (100 mg total) by mouth 3 (three) times a day (Patient not taking: Reported on 7/16/2025) 90 tablet 11    hydrOXYzine HCL (ATARAX) 25 mg tablet Take 1 tablet (25 mg total) by mouth every 6 (six) hours as needed for itching 60 tablet 0    lidocaine-prilocaine (EMLA) cream Apply topically as needed for mild pain (Patient not taking: Reported on 6/9/2025) 30 g 2    PARoxetine (PAXIL) 10 mg tablet Take 1 tablet (10 mg total) by mouth daily (Patient not taking: Reported on 7/16/2025) 90 tablet 3    triamcinolone (KENALOG) 0.1 % cream Apply topically 2 (two) times a day as needed for rash or irritation 80 g 0    TURMERIC-GINGER PO Take by mouth (Patient not taking: Reported on 7/16/2025)       No current facility-administered medications on file prior to visit.   [2]   Social History  Tobacco Use    Smoking status:  Never    Smokeless tobacco: Never   Vaping Use    Vaping status: Never Used   Substance and Sexual Activity    Alcohol use: Not Currently     Alcohol/week: 3.0 standard drinks of alcohol     Types: 3 Glasses of wine per week     Comment: occasionally. last drink more than a month ago    Drug use: Not Currently     Types: Marijuana     Comment: medical marijuana    Sexual activity: Not Currently     Partners: Male     Birth control/protection: Post-menopausal

## 2025-07-16 NOTE — PROGRESS NOTES
Amy Clement 1960 is a 64 y.o. female with a history of clinical stage IIA ([id number]) G2 invasive mammary carcinoma with lobular features of the right breast.  Tumor cells were ER negative, MI low positive, HER2 positive. Axillary node was ER negative, MI positive, and HER2 positive.  She underwent neoadjuvant chemotherapy with TCPH with excellent partial response.  She underwent right breast lumpectomy with sentinel lymph node biopsy on 3/28/25.  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 She completed a course of radiation therapy on 25. She returns today for follow up.      25 Dr. Gonzalez  switching her Phesgo to high lacto which is subcutaneous trastuzumab only. There does seem to be a temporal relationship with Perjeta exposure and the rash.   will hold off on starting an aromatase inhibitor at the patient's request.   Hopefully she will feel up to a trial of aromatase inhibitor at our next visit. 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 Port has been removed.       Upcomin25 Dr. Gonzalez  10/8/25 Mammogram  10/16/25 Surg Onc    Follow up visit     Oncology History   Malignant neoplasm of lower-inner quadrant of right breast of female, estrogen receptor negative (HCC)   10/4/2024 Biopsy    A. Right breast ultrasound-guided biopsy  4 o'clock, 2 cm from nipple (ROSA ISELA)  Invasive mammary carcinoma with predominately lobular features   Apocrine and histiocytoid morphologic features  Grade 2  ER <1, MI 10, HER2 3+  Lymphovascular invasion present    B. Right axillary lymph node biopsy (ROSA ISELA)  Metastatic carcinoma, compatible with mammary origin  ER <1, MI 70, HER2 3+    Concordant. Right malignancy appears unifocal; biopsy-proven carcinoma measured 1.7 cm on US. Biopsy-proven metastatic disease to right axillary lymph node. Left breast clear.     10/4/2024 -  Cancer Staged     Staging form: Breast, AJCC 8th Edition  - Clinical stage from 10/4/2024: Stage IIA (cT1c, cN1(f), cM0, G2, ER-, VA+, HER2+) - Signed by Koki Magaña MD on 10/21/2024  Stage prefix: Initial diagnosis  Method of lymph node assessment: Core biopsy  Histologic grading system: 3 grade system       10/15/2024 Genetic Testing    VUS of AXIN2, MSH6  [facility] +RNAInsight - A total of 59 genes were evaluated with RNA insight: APC, JUAN J, BAP1, BARD1, BMPR1A, BRCA1, BRCA2, BRIP1, CDH1, CDK4, CDKN1B, CDKN2A, CHEK2, DICER1, FH, FLCN, KIF1B, MAX, MEN1, MET, MLH1, MSH2, MSH6, MUTYH, NF1, NTHL1, PALB2, PMS2, POT1, PTEN, RAD51C, RAD51D, RB1, RET, SDHA, SDHAF2, SDHB, SDHC, SDHD, SMAD4, SMARCA4, STK11, VZVL419, TP53, TSC1, TSC2 and VHL (sequencing and deletion/duplication); AXIN2, CTNNA1, EGLN1, HOXB13, KIT, MITF, MSH3, PDGFRA, POLD1 and POLE (sequencing only); EPCAM and GREM1 (deletion/duplication only).      11/8/2024 -  Chemotherapy    trastuzumab-hyaluronidase-oysk (HERCEPTIN HYLECTA), 600 mg of trastuzumab (100 % of original dose 600 mg of trastuzumab), 1 of 8 cycles  Dose modification: 600 mg of trastuzumab (original dose 600 mg of trastuzumab, Cycle 11)  Administration: 600 mg of trastuzumab (7/2/2025)  pertuzumab (PERJETA) IVPB, 840 mg, 7 of 7 cycles  Administration: 840 mg (11/8/2024), 420 mg (11/29/2024), 420 mg (12/20/2024), 420 mg (1/10/2025), 420 mg (1/31/2025), 420 mg (2/21/2025), 420 mg (3/20/2025)  CARBOplatin (PARAPLATIN) IVPB (GOG AUC DOSING), 722.4 mg, 6 of 6 cycles  Administration: 722.4 mg (11/8/2024), 722.4 mg (11/29/2024), 602 mg (12/20/2024), 602 mg (1/10/2025), 602 mg (1/31/2025), 602 mg (2/21/2025)  DOCEtaxel (TAXOTERE) chemo infusion, 75 mg/m2 = 144 mg, 6 of 6 cycles  Dose modification: 67 mg/m2 (original dose 75 mg/m2, Cycle 3, Reason: Nausea/Vomiting, Comment: 10%dose reduction due nausea), 60.3 mg/m2 (original dose 75 mg/m2, Cycle 6, Reason: Dose modified as per discussion with consulting  physician, Comment: dose reduction), 60 mg/m2 (original dose 75 mg/m2, Cycle 6, Reason: Dose modified as per discussion with consulting physician, Comment: total 20% dose reduction)  Administration: 144 mg (11/8/2024), 144 mg (11/29/2024), 128.6 mg (12/20/2024), 128.6 mg (1/10/2025), 128.6 mg (1/31/2025), 115.2 mg (2/21/2025)  trastuzumab (HERCEPTIN) chemo infusion, 8 mg/kg = 595 mg, 7 of 7 cycles  Administration: 595 mg (11/8/2024), 447 mg (11/29/2024), 447 mg (12/20/2024), 447 mg (1/10/2025), 447 mg (1/31/2025), 447 mg (2/21/2025), 447 mg (3/20/2025)  pertuzumab, trastuzumab, and hyaluronidase (Phesgo) subcutaneous injection Soln, 15 mL (original dose ), 3 of 3 cycles  Dose modification: 15 mL (original dose 15 mL, Cycle 8), 10 mL (original dose 10 mL, Cycle 9)  Administration: 15 mL (5/1/2025), 10 mL (5/22/2025), 10 mL (6/12/2025)     2/24/2025 Observation    Right breast axilla US    At least almost complete imaging response to neoadjuvant chemotherapy. The biopsy-proven carcinoma has markedly decreased in size; it currently measures 6 mm x 2 mm x 5 mm and previously measured 15 mm x 13 mm x 17 mm.  The measurements on the current exam may be artifactual; there may be a complete response. Previously noted adenopathy has resolved; cortical thickness currently measures 3mm and previously measured 11mm     3/28/2025 Surgery    Right breast ROSA ISELA localized lumpectomy w/ SLNB, removal of venous port    DCIS  Grade 3  4mm  No residual invasive carcinoma  All margins negative for DCIS  0/1 lymph node     3/28/2025 -  Cancer Staged    Staging form: Breast, AJCC 8th Edition  - Pathologic stage from 3/28/2025: ypTis (DCIS), pN0(sn), cM0 - Signed by Koki Magaña MD on 4/14/2025  Stage prefix: Post-therapy  Method of lymph node assessment: Swatara lymph node biopsy       5/22/2025 - 6/16/2025 Radiation    Treatment:  Course: C1    Plan ID Energy Fractions Dose per Fraction (cGy) Dose Correction (cGy) Total Dose Delivered  (cGy) Elapsed Days   R Breast 10X/6X 15 / 15 267 0 4,005 25   Right SIB 6X 15 / 15 53 0 795 25      Treatment dates:  C1: 5/22/2025 - 6/16/2025     Breast cancer metastasized to axillary lymph node, right (HCC)   10/16/2024 Initial Diagnosis    Breast cancer metastasized to axillary lymph node, right (HCC)         Review of Systems:  Review of Systems   Musculoskeletal:         Good ROM right arm   Skin:  Positive for color change (right chest above breast).       Clinical Trial: no    Pregnancy test needed:  not applicable    Teaching: breast massage    Health Maintenance   Topic Date Due    COVID-19 Vaccine (7 - Moderna risk 2024-25 season) 04/19/2025    Influenza Vaccine (1) 09/01/2025    Breast Cancer Screening: Mammogram  10/04/2025    Annual Physical  11/21/2025    Depression Screening  04/28/2026    BMI: Adult  06/30/2026    Colorectal Cancer Screening  08/14/2027    DTaP,Tdap,and Td Vaccines (2 - Td or Tdap) 08/08/2028    Cervical Cancer Screening  06/09/2030    RSV Vaccine for Pregnant Patients and Patients Age 60+ Years  Completed    Zoster Vaccine  Completed    Pneumococcal Vaccine: 50+ Years  Completed    HIV Screening  Addressed    Hepatitis C Screening  Addressed    Meningococcal B Vaccine  Aged Out    RSV Vaccine age 0-20 Months  Aged Out    HIB Vaccine  Aged Out    IPV Vaccine  Aged Out    Hepatitis A Vaccine  Aged Out    Meningococcal ACWY Vaccine  Aged Out    HPV Vaccine  Aged Out     Problem List[1]  Past Medical History[2]  Past Surgical History[3]  Family History[4]  Social History     Socioeconomic History    Marital status: /Civil Union     Spouse name: Not on file    Number of children: Not on file    Years of education: Not on file    Highest education level: Not on file   Occupational History     Comment: Retired   Tobacco Use    Smoking status: Never    Smokeless tobacco: Never   Vaping Use    Vaping status: Never Used   Substance and Sexual Activity    Alcohol use: Not Currently      Alcohol/week: 3.0 standard drinks of alcohol     Types: 3 Glasses of wine per week     Comment: occasionally. last drink more than a month ago    Drug use: Not Currently     Types: Marijuana     Comment: medical marijuana    Sexual activity: Not Currently     Partners: Male     Birth control/protection: Post-menopausal   Other Topics Concern    Not on file   Social History Narrative    Activities:  Dance    Always uses seat belt    Exercise:  Walking    Lives with      Social Drivers of Health     Financial Resource Strain: Not on file   Food Insecurity: Not on file   Transportation Needs: Not on file   Physical Activity: Not on file   Stress: Not on file   Social Connections: Not on file   Intimate Partner Violence: Not on file   Housing Stability: Not on file     Current Medications[5]  Allergies   Allergen Reactions    Monistat [Miconazole] Itching    Pollen Extract Itching, Swelling and Sneezing     Vitals:    07/16/25 0949   Weight: 70.3 kg (155 lb)                [1]   Patient Active Problem List  Diagnosis    Migraine with aura and without status migrainosus, not intractable    Thyroid nodule    Anxiety    GERD (gastroesophageal reflux disease)    Malignant neoplasm of lower-inner quadrant of right breast of female, estrogen receptor negative (HCC)    Breast cancer metastasized to axillary lymph node, right (HCC)    Family history of breast cancer    Port-A-Cath in place    Goiter    Chronic deep vein thrombosis (DVT) of non-extremity vein    Osteoarthritis of right hip   [2]   Past Medical History:  Diagnosis Date    Anxiety     medication for hot flasshes    Breast cancer (HCC)     Disease of thyroid gland     last assessed 02/23/2016    Diverticulitis of colon November 25 July 2016 1st dx    Dysuria     last assessed 04/28/2017    Fibroid 2012    GERD (gastroesophageal reflux disease) Occasionally    Glaucoma     Left    History of chemotherapy     Migraine 1985    PONV (postoperative nausea and  vomiting)    [3]   Past Surgical History:  Procedure Laterality Date    BREAST BIOPSY Right 2009    2 areas benign    BREAST CYST ASPIRATION  Yrs ago    BREAST CYST EXCISION  2009    CATARACT EXTRACTION Bilateral     COLONOSCOPY      FL GUIDED CENTRAL VENOUS ACCESS DEVICE INSERTION  11/05/2024    HYSTEROSCOPY  02/22/24    OK BX/EXC LYMPH NODE OPEN SUPERFICIAL Right 03/28/2025    Procedure: RIGHT BREAST ROSA ISELA LOCALIZED LUMPECTOMY, BIOPSY LYMPH NODE SENTINEL, LYMPHATIC MAPPING & LYMPHOSCINTIGRAPHY, EXISION OF CLIPPED RIGHT AXILLARY NODE, 1100 NUC MED;  Surgeon: Koki Magaña MD;  [facility];  Service: Surgical Oncology    OK EXC BREAST LES PREOP PLMT RAD MARKER OPEN 1 LES Right 03/28/2025    Procedure: RIGHT BREAST ROSA ISELA LOCALIZED LUMPECTOMY, BIOPSY LYMPH NODE SENTINEL, LYMPHATIC MAPPING & LYMPHOSCINTIGRAPHY, EXISION OF CLIPPED RIGHT AXILLARY NODE, 1100 NUC MED;  Surgeon: Koki Magaña MD;  [facility];  Service: Surgical Oncology    OK HYSTEROSCOPY BX ENDOMETRIUM&/POLYPC W/WO D&C N/A 02/23/2024    Procedure: DILATATION AND CURETTAGE (D&C) WITH HYSTEROSCOPY;  Surgeon: Joselin Alaniz MD;  Location: [facility];  Service: Gynecology    OK HYSTEROSCOPY BX ENDOMETRIUM&/POLYPC W/WO D&C N/A 02/23/2024    Procedure: POLYPECTOMY;  Surgeon: Joselin Alaniz MD;  [facility];  Service: Gynecology    OK INSJ TUNNELED CTR VAD W/SUBQ PORT AGE 5 YR/> N/A 11/05/2024    Procedure: INSERTION VENOUS PORT ( PORT-A-CATH) IR;  Surgeon: Chandler Ball DO;  [facility];  Service: Interventional Radiology    OK INTRAOP SENTINEL LYMPH NODE ID W/DYE INJECTION Right 03/28/2025    Procedure: RIGHT BREAST ROSA ISELA LOCALIZED LUMPECTOMY, BIOPSY LYMPH NODE SENTINEL, LYMPHATIC MAPPING & LYMPHOSCINTIGRAPHY, EXISION OF CLIPPED RIGHT AXILLARY NODE, 1100 NUC MED;  Surgeon: Koki Magaña MD;  [facility];  Service: Surgical Oncology    OK MASTECTOMY PARTIAL Right 03/28/2025    Procedure: RIGHT  BREAST ROSA ISELA LOCALIZED LUMPECTOMY, BIOPSY LYMPH NODE SENTINEL, LYMPHATIC MAPPING & LYMPHOSCINTIGRAPHY, EXISION OF CLIPPED RIGHT AXILLARY NODE, 1100 NUC MED;  Surgeon: Koki Magaña MD;  [facility];  Service: Surgical Oncology    DC RMVL YUVAL CTR VAD W/SUBQ PORT/ CTR/PRPH INSJ Right 03/28/2025    Procedure: REMOVAL VENOUS PORT (PORT-A-CATH);  Surgeon: Koki Magaña MD;  [facility];  Service: Surgical Oncology    TONSILLECTOMY  1965    TUBAL LIGATION      UPPER GASTROINTESTINAL ENDOSCOPY  2021    US GUIDED BREAST BIOPSY RIGHT COMPLETE Right 10/04/2024    US GUIDED BREAST LYMPH NODE BIOPSY RIGHT Right 10/04/2024   [4]   Family History  Problem Relation Name Age of Onset    Breast cancer Mother Mother 47    Hypertension Father Father         Late life dx    Rectal cancer Father Father         Onset 85 yrs    Migraines Father Father 85    Cancer Father Father         Colon cancer    Colon polyps Father Father         Rectal low grade ca    No Known Problems Sister      Throat cancer Brother Khai Mastrogiacomo     Cancer Brother Khai Mastrogiacomo         Laryngeal cancer onset 53, bladder cancer onset 69    Heart disease Maternal Grandmother Grandmother     Endometrial cancer Maternal Grandmother Grandmother     No Known Problems Paternal Grandmother      No Known Problems Daughter      Kidney cancer Son Khai Clement 38    Cancer Son Khai Clement         Kidney cancer    No Known Problems Paternal Aunt     [5]   Current Outpatient Medications:     apixaban (Eliquis) 5 mg, Take 1 tablet (5 mg total) by mouth 2 (two) times a day, Disp: 60 tablet, Rfl: 5    Bacillus Coagulans-Inulin (Probiotic) 1-250 BILLION-MG CAPS, Take by mouth, Disp: , Rfl:     calcium citrate (CALCITRATE) 950 (200 Ca) MG tablet, Take 1 tablet by mouth in the morning., Disp: , Rfl:     clobetasol (TEMOVATE) 0.05 % cream, Apply topically 2 (two) times a day, Disp: 60 g, Rfl: 3    estradiol (VAGIFEM, YUVAFEM) 10 MCG TABS  vaginal tablet, Insert 1 tablet (10 mcg total) into the vagina 2 (two) times a week (Patient not taking: Reported on 6/30/2025), Disp: 8 tablet, Rfl: 11    gabapentin (NEURONTIN) 100 MG tablet, Take 1 tablet (100 mg total) by mouth 3 (three) times a day, Disp: 90 tablet, Rfl: 11    Glucosamine-Chondroitin (GLUCOSAMINE CHONDR COMPLEX PO), Take by mouth in the morning., Disp: , Rfl:     hydrOXYzine HCL (ATARAX) 25 mg tablet, Take 1 tablet (25 mg total) by mouth every 6 (six) hours as needed for itching, Disp: 60 tablet, Rfl: 0    latanoprost (XALATAN) 0.005 % ophthalmic solution, Administer 1 drop to both eyes daily at bedtime, Disp: , Rfl:     lidocaine-prilocaine (EMLA) cream, Apply topically as needed for mild pain (Patient not taking: Reported on 6/9/2025), Disp: 30 g, Rfl: 2    Multiple Vitamins-Minerals (CENTRUM SILVER) tablet, Take by mouth, Disp: , Rfl:     omeprazole (PriLOSEC) 20 mg delayed release capsule, TAKE 1 CAPSULE BY MOUTH EVERY DAY, Disp: 90 capsule, Rfl: 0    PARoxetine (PAXIL) 10 mg tablet, Take 1 tablet (10 mg total) by mouth daily, Disp: 90 tablet, Rfl: 3    rimegepant sulfate (Nurtec) 75 mg TBDP, 1 po daily prn migraine headache, Disp: 10 tablet, Rfl: 5    timolol (TIMOPTIC) 0.5 % ophthalmic solution, Administer 1 drop to both eyes in the morning., Disp: , Rfl:     triamcinolone (KENALOG) 0.1 % cream, Apply topically 2 (two) times a day as needed for rash or irritation, Disp: 80 g, Rfl: 0    TURMERIC-GINGER PO, Take by mouth, Disp: , Rfl:

## 2025-07-16 NOTE — ASSESSMENT & PLAN NOTE
Amy Clement 1960 is a 64 y.o. female with a history of clinical stage IIA ([id number]) G2 invasive mammary carcinoma with lobular features of the right breast.  Tumor cells were ER negative, NE low positive, HER2 positive. Axillary node was ER negative, NE positive, and HER2 positive.  She underwent neoadjuvant chemotherapy with TCPH with excellent partial response.  She underwent right breast lumpectomy with sentinel lymph node biopsy on 3/28/25.  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 She completed a course of radiation therapy to the breast only on 6/16/25.     Clinically BRANDIN  -Maintained on trastuzumab.  AI planned in future.  -Patient compliant with Medical Oncology follow-up.  -Surgical Oncology follow-up is scheduled October 2025.   -She will be due for surveillance imaging end of year 2025.    -Follow-up 6 months.

## 2025-07-16 NOTE — ASSESSMENT & PLAN NOTE
Instructed to practice sun protection to radiated skin.  Recommended daily emollient use and light to moderate breast massage daily for 6 months to promote continued tissue healing.    Regarding PUVA therapy  -We discussed that UV radiation over radiated breast tissue can result in increased hyperpigmentation and risk of skin cancer going forward.  If breast is not included in the UVA fields, then she may proceed with likely small risk.  She would likely benefit from delay of psoralen sensitizing agent.  Would recommend 6 months, but defer to Dermatology if this is felt low risk, then her skin is healed and she may proceed with treatment earlier.  In general, delay from time of completion of ionizing radiation is helpful in tissue and skin recovery and tolerance to further radiation.    Patient expressed understanding.  She will continue with antibiotics and re-evaluate.   Refer back to Dermatology for continued management and treatment.

## 2025-07-16 NOTE — TELEPHONE ENCOUNTER
Patient called asking if Dr. Lua can send an script for Doxycyline to [facility] #[id number] - [place] - [address]

## 2025-07-16 NOTE — TELEPHONE ENCOUNTER
I called the patient to let her know that her echo shows a decrease in her ejection fraction of 45 to 50%.  We will put her Herceptin plan on hold and have her see our cardio oncologist before restarting.  I reassured her that if this is real, when holding the Herceptin it often reverses itself.  We will get cardio oncology input before we give her any more and the plan is on hold.  Patient is asymptomatic.  No ankle swelling, shortness of breath or orthopnea.  She is feeling well.

## 2025-07-17 ENCOUNTER — TELEPHONE (OUTPATIENT)
Age: 65
End: 2025-07-17

## 2025-07-17 RX ORDER — DOXYCYCLINE 100 MG/1
100 CAPSULE ORAL 2 TIMES DAILY
Qty: 60 CAPSULE | Refills: 1 | Status: SHIPPED | OUTPATIENT
Start: 2025-07-17 | End: 2025-08-16

## 2025-07-17 NOTE — TELEPHONE ENCOUNTER
Patient called again requesting Dr. Lua to please send the medication ASAP to [facility] - [place], [place] - [address] [place] [address]

## 2025-07-17 NOTE — TELEPHONE ENCOUNTER
"FYI:  Pt calling stated she cannot see Cardio until 8/26.  Was wondering if Dr Gonzalez had any pull to get her in sooner?  Pls advise    Also her rash that she has had now for several months  and is following with Derm Dr Lua and pt feels the rash is \"infected\" from scratching.  She reached out to Derm was told Dr Lua was on vacation.  Advised to call back to Derm and have the covering MD for Dr Lua address the issue.  Pt understood. Pt stated if that gets her no where she will go to  [facility].  "

## 2025-07-17 NOTE — TELEPHONE ENCOUNTER
Patient returned phone call to check if order for doxycycline was ready. I confirmed yes and reviewed pharmacy location with patient.

## 2025-07-17 NOTE — TELEPHONE ENCOUNTER
Patient called again requesting her medication to be sent ASAP  , she called her oncologist and he can not prescribe it .   Per patient any on call doctor or a different provider needs to sign off the prescription .

## 2025-07-17 NOTE — TELEPHONE ENCOUNTER
"Hello,    The following message was sent via e-mail to the leadership team:     Please advise if you can help facilitate the following overbook request:    Patient Name: Amy Clement    Patient MRN: [id number]    Call back #: [phone]    Insurance: [facility]    Department:Cardiology    Speciality: Cardio-Oncology    Reason for overbook request: PATIENT REQUEST    Comments (Write \"N/a\" if no comments): Pt's oncologist had pt have an echo and due to the results, wants to stop one of her treatments until she sees Dr. Schneider. Pt scheduled soonoest appt on [facility]    Requested doctor and location: [facility] location    Date of current appointment: 8/26/25      Thank you.        "

## 2025-07-31 ENCOUNTER — APPOINTMENT (OUTPATIENT)
Dept: LAB | Facility: HOSPITAL | Age: 65
End: 2025-07-31
Payer: COMMERCIAL

## 2025-08-01 ENCOUNTER — TELEPHONE (OUTPATIENT)
Dept: HEMATOLOGY ONCOLOGY | Facility: CLINIC | Age: 65
End: 2025-08-01

## 2025-08-01 ENCOUNTER — OFFICE VISIT (OUTPATIENT)
Dept: HEMATOLOGY ONCOLOGY | Facility: CLINIC | Age: 65
End: 2025-08-01
Payer: COMMERCIAL

## 2025-08-01 ENCOUNTER — OFFICE VISIT (OUTPATIENT)
Dept: CARDIOLOGY CLINIC | Facility: CLINIC | Age: 65
End: 2025-08-01
Attending: INTERNAL MEDICINE
Payer: COMMERCIAL

## 2025-08-01 VITALS
DIASTOLIC BLOOD PRESSURE: 74 MMHG | OXYGEN SATURATION: 98 % | BODY MASS INDEX: 22.19 KG/M2 | HEART RATE: 82 BPM | SYSTOLIC BLOOD PRESSURE: 110 MMHG | WEIGHT: 155 LBS | HEIGHT: 70 IN

## 2025-08-01 VITALS
RESPIRATION RATE: 16 BRPM | HEART RATE: 92 BPM | OXYGEN SATURATION: 98 % | DIASTOLIC BLOOD PRESSURE: 70 MMHG | BODY MASS INDEX: 22.19 KG/M2 | SYSTOLIC BLOOD PRESSURE: 110 MMHG | HEIGHT: 70 IN | WEIGHT: 155 LBS | TEMPERATURE: 97.5 F

## 2025-08-01 DIAGNOSIS — I82.629 DEEP VEIN THROMBOSIS (DVT) OF UPPER EXTREMITY, UNSPECIFIED CHRONICITY, UNSPECIFIED LATERALITY, UNSPECIFIED VEIN (HCC): ICD-10-CM

## 2025-08-01 DIAGNOSIS — Z17.1 MALIGNANT NEOPLASM OF LOWER-INNER QUADRANT OF RIGHT BREAST OF FEMALE, ESTROGEN RECEPTOR NEGATIVE (HCC): Primary | ICD-10-CM

## 2025-08-01 DIAGNOSIS — Z17.1 MALIGNANT NEOPLASM OF LOWER-INNER QUADRANT OF RIGHT BREAST OF FEMALE, ESTROGEN RECEPTOR NEGATIVE (HCC): ICD-10-CM

## 2025-08-01 DIAGNOSIS — C50.311 MALIGNANT NEOPLASM OF LOWER-INNER QUADRANT OF RIGHT BREAST OF FEMALE, ESTROGEN RECEPTOR NEGATIVE (HCC): Primary | ICD-10-CM

## 2025-08-01 DIAGNOSIS — Z79.899 HIGH RISK MEDICATION USE: ICD-10-CM

## 2025-08-01 DIAGNOSIS — I42.7 CARDIOMYOPATHY SECONDARY TO DRUG (HCC): Primary | ICD-10-CM

## 2025-08-01 DIAGNOSIS — Z92.21 STATUS POST ADMINISTRATION OF CARDIOTOXIC CHEMOTHERAPY: ICD-10-CM

## 2025-08-01 DIAGNOSIS — C50.311 MALIGNANT NEOPLASM OF LOWER-INNER QUADRANT OF RIGHT BREAST OF FEMALE, ESTROGEN RECEPTOR NEGATIVE (HCC): ICD-10-CM

## 2025-08-01 DIAGNOSIS — R93.1 DECREASED CARDIAC EJECTION FRACTION: ICD-10-CM

## 2025-08-01 PROCEDURE — 99244 OFF/OP CNSLTJ NEW/EST MOD 40: CPT | Performed by: INTERNAL MEDICINE

## 2025-08-01 PROCEDURE — 93000 ELECTROCARDIOGRAM COMPLETE: CPT | Performed by: INTERNAL MEDICINE

## 2025-08-01 PROCEDURE — 99215 OFFICE O/P EST HI 40 MIN: CPT | Performed by: INTERNAL MEDICINE

## 2025-08-01 RX ORDER — METOPROLOL SUCCINATE 25 MG/1
12.5 TABLET, EXTENDED RELEASE ORAL DAILY
Qty: 15 TABLET | Refills: 11 | Status: SHIPPED | OUTPATIENT
Start: 2025-08-01

## 2025-08-01 RX ORDER — LOSARTAN POTASSIUM 25 MG/1
12.5 TABLET ORAL DAILY
Qty: 15 TABLET | Refills: 11 | Status: SHIPPED | OUTPATIENT
Start: 2025-08-01

## 2025-08-06 ENCOUNTER — OFFICE VISIT (OUTPATIENT)
Age: 65
End: 2025-08-06

## 2025-08-06 VITALS — BODY MASS INDEX: 22.19 KG/M2 | HEIGHT: 70 IN | WEIGHT: 154.98 LBS

## 2025-08-06 DIAGNOSIS — L29.9 PRURITUS: Primary | ICD-10-CM

## 2025-08-06 RX ORDER — PREDNISONE 20 MG/1
TABLET ORAL
Qty: 42 TABLET | Refills: 0 | Status: SHIPPED | OUTPATIENT
Start: 2025-08-06 | End: 2025-08-27

## 2025-08-06 RX ORDER — HYDROXYZINE HYDROCHLORIDE 25 MG/1
25 TABLET, FILM COATED ORAL EVERY 6 HOURS PRN
Qty: 90 TABLET | Refills: 0 | Status: SHIPPED | OUTPATIENT
Start: 2025-08-06

## 2025-08-07 ENCOUNTER — HOSPITAL ENCOUNTER (OUTPATIENT)
Dept: NON INVASIVE DIAGNOSTICS | Facility: HOSPITAL | Age: 65
Discharge: HOME | End: 2025-08-07
Payer: COMMERCIAL

## 2025-08-07 VITALS
HEIGHT: 70 IN | BODY MASS INDEX: 22.05 KG/M2 | HEART RATE: 82 BPM | WEIGHT: 154 LBS | SYSTOLIC BLOOD PRESSURE: 110 MMHG | DIASTOLIC BLOOD PRESSURE: 74 MMHG

## 2025-08-07 DIAGNOSIS — Z92.21 STATUS POST ADMINISTRATION OF CARDIOTOXIC CHEMOTHERAPY: ICD-10-CM

## 2025-08-07 DIAGNOSIS — I42.7 CARDIOMYOPATHY SECONDARY TO DRUG (HCC): ICD-10-CM

## 2025-08-07 LAB
AORTIC ROOT: 3.2 CM
ASCENDING AORTA: 3.4 CM
BSA FOR ECHO PROCEDURE: 1.87 M2
DOP CALC LVOT AREA: 3.8 CM2
DOP CALC LVOT DIAMETER: 2.2 CM
E WAVE DECELERATION TIME: 241 MS
E/A RATIO: 0.89
FRACTIONAL SHORTENING: 37 (ref 28–44)
GLOBAL LONGITUIDAL STRAIN: -16 %
INTERVENTRICULAR SEPTUM IN DIASTOLE (PARASTERNAL SHORT AXIS VIEW): 0.9 CM
INTERVENTRICULAR SEPTUM: 0.9 CM (ref 0.6–1.1)
LAAS-AP2: 20.2 CM2
LAAS-AP4: 19.4 CM2
LEFT ATRIUM SIZE: 3.5 CM
LEFT ATRIUM VOLUME (MOD BIPLANE): 61 ML
LEFT ATRIUM VOLUME INDEX (MOD BIPLANE): 32.6 ML/M2
LEFT INTERNAL DIMENSION IN SYSTOLE: 3.3 CM (ref 2.1–4)
LEFT VENTRICULAR INTERNAL DIMENSION IN DIASTOLE: 5.2 CM (ref 3.5–6)
LEFT VENTRICULAR POSTERIOR WALL IN END DIASTOLE: 0.7 CM
LEFT VENTRICULAR STROKE VOLUME: 88 ML
LV EF US.2D.A4C+ESTIMATED: 46 %
LVSV (TEICH): 88 ML
MV PEAK A VEL: 0.79 M/S
MV PEAK E VEL: 70 CM/S
MV STENOSIS PRESSURE HALF TIME: 70 MS
MV VALVE AREA P 1/2 METHOD: 3.1
RIGHT ATRIUM AREA SYSTOLE A4C: 13.8 CM2
RIGHT VENTRICLE ID DIMENSION: 3.4 CM
SL CV LEFT ATRIUM LENGTH A2C: 5.3 CM
SL CV LV EF: 55
SL CV PED ECHO LEFT VENTRICLE DIASTOLIC VOLUME (MOD BIPLANE) 2D: 131 ML
SL CV PED ECHO LEFT VENTRICLE SYSTOLIC VOLUME (MOD BIPLANE) 2D: 43 ML
TRICUSPID ANNULAR PLANE SYSTOLIC EXCURSION: 1.8 CM

## 2025-08-07 PROCEDURE — 93308 TTE F-UP OR LMTD: CPT | Performed by: INTERNAL MEDICINE

## 2025-08-07 PROCEDURE — 93356 MYOCRD STRAIN IMG SPCKL TRCK: CPT

## 2025-08-07 PROCEDURE — 93308 TTE F-UP OR LMTD: CPT

## 2025-08-07 PROCEDURE — 93325 DOPPLER ECHO COLOR FLOW MAPG: CPT | Performed by: INTERNAL MEDICINE

## 2025-08-07 PROCEDURE — 93321 DOPPLER ECHO F-UP/LMTD STD: CPT

## 2025-08-07 PROCEDURE — 93321 DOPPLER ECHO F-UP/LMTD STD: CPT | Performed by: INTERNAL MEDICINE

## 2025-08-07 PROCEDURE — 93356 MYOCRD STRAIN IMG SPCKL TRCK: CPT | Performed by: INTERNAL MEDICINE

## 2025-08-07 PROCEDURE — 93325 DOPPLER ECHO COLOR FLOW MAPG: CPT

## 2025-08-19 ENCOUNTER — HOSPITAL ENCOUNTER (OUTPATIENT)
Dept: INFUSION CENTER | Facility: CLINIC | Age: 65
Discharge: HOME | End: 2025-08-19
Attending: INTERNAL MEDICINE
Payer: COMMERCIAL

## 2025-08-19 ENCOUNTER — DOCUMENTATION (OUTPATIENT)
Dept: LABOR AND DELIVERY | Facility: HOSPITAL | Age: 65
End: 2025-08-19

## 2025-08-19 VITALS
BODY MASS INDEX: 22.42 KG/M2 | HEIGHT: 70 IN | TEMPERATURE: 97.5 F | HEART RATE: 75 BPM | SYSTOLIC BLOOD PRESSURE: 98 MMHG | RESPIRATION RATE: 18 BRPM | WEIGHT: 156.6 LBS | DIASTOLIC BLOOD PRESSURE: 52 MMHG

## 2025-08-19 DIAGNOSIS — C50.311 MALIGNANT NEOPLASM OF LOWER-INNER QUADRANT OF RIGHT BREAST OF FEMALE, ESTROGEN RECEPTOR NEGATIVE (HCC): Primary | ICD-10-CM

## 2025-08-19 DIAGNOSIS — Z17.1 MALIGNANT NEOPLASM OF LOWER-INNER QUADRANT OF RIGHT BREAST OF FEMALE, ESTROGEN RECEPTOR NEGATIVE (HCC): Primary | ICD-10-CM

## 2025-08-19 RX ORDER — SODIUM CHLORIDE 9 MG/ML
20 INJECTION, SOLUTION INTRAVENOUS ONCE
Status: DISCONTINUED | OUTPATIENT
Start: 2025-08-19 | End: 2025-08-22 | Stop reason: HOSPADM

## 2025-08-19 RX ADMIN — TRASTUZUMAB AND HYALURONIDASE-OYSK 600 MG OF TRASTUZUMAB: 600; 10000 INJECTION, SOLUTION SUBCUTANEOUS at 09:51

## (undated) DEVICE — CLIP APPLIER: Brand: PREMIUM SURGICLIP II

## (undated) DEVICE — STRL ALLENTOWN HYSTEROSCOPY PK: Brand: CARDINAL HEALTH

## (undated) DEVICE — GLOVE SRG BIOGEL 6

## (undated) DEVICE — MINOR PROCEDURE DRAPE: Brand: CONVERTORS

## (undated) DEVICE — MAYO STAND COVER: Brand: CONVERTORS

## (undated) DEVICE — 4-PORT MANIFOLD: Brand: NEPTUNE 2

## (undated) DEVICE — 3000CC GUARDIAN II: Brand: GUARDIAN

## (undated) DEVICE — SUT SILK 2-0 SH 30 IN K833H

## (undated) DEVICE — SURGICEL 4 X 8IN

## (undated) DEVICE — DECANTER: Brand: UNBRANDED

## (undated) DEVICE — ULTRASOUND GEL STERILE FOIL PK

## (undated) DEVICE — SYRINGE 5ML LL

## (undated) DEVICE — TUBING SUCTION 5MM X 12 FT

## (undated) DEVICE — SCD SEQUENTIAL COMPRESSION COMFORT SLEEVE MEDIUM KNEE LENGTH: Brand: KENDALL SCD

## (undated) DEVICE — CHLORAPREP HI-LITE 26ML ORANGE

## (undated) DEVICE — SLIM BODY SKIN STAPLER: Brand: APPOSE ULC

## (undated) DEVICE — REM POLYHESIVE ADULT PATIENT RETURN ELECTRODE: Brand: VALLEYLAB

## (undated) DEVICE — ANTIBACTERIAL UNDYED BRAIDED (POLYGLACTIN 910), SYNTHETIC ABSORBABLE SUTURE: Brand: COATED VICRYL

## (undated) DEVICE — GLOVE INDICATOR PI UNDERGLOVE SZ 6.5 BLUE

## (undated) DEVICE — VSI MICRO-INTRODUCER KITS ARE INTENDED FOR USE IN PERCUTANEOUS INTRODUCTION OF UP TO A 0.018 INCH OR 0.038 INCH GUIDEWIRE OR CATHETER INTO THE VASCULAR SYSTEM FOLLOWING A SMALL GAUGE NEEDLE STICK.: Brand: VSI MICRO-INTRODUCER KIT

## (undated) DEVICE — CHLORHEXIDINE 4PCT 4 OZ

## (undated) DEVICE — LIGHT HANDLE COVER SLEEVE DISP BLUE STELLAR

## (undated) DEVICE — PAD GROUNDING DUAL ADULT

## (undated) DEVICE — EXOFIN PRECISION PEN HIGH VISCOSITY TOPICAL SKIN ADHESIVE: Brand: EXOFIN PRECISION PEN, 1G

## (undated) DEVICE — INTENDED FOR TISSUE SEPARATION, AND OTHER PROCEDURES THAT REQUIRE A SHARP SURGICAL BLADE TO PUNCTURE OR CUT.: Brand: BARD-PARKER SAFETY BLADES SIZE 15, STERILE

## (undated) DEVICE — GLOVE INDICATOR PI UNDERGLOVE SZ 7 BLUE

## (undated) DEVICE — DRAPE EQUIPMENT RF WAND

## (undated) DEVICE — SPECIMEN CONTAINER STERILE PEEL PACK

## (undated) DEVICE — TISSUE REMOVAL SYSTEM FLUID MANAGEMENT ACCESSORIES: Brand: SYMPHION

## (undated) DEVICE — DRAPE TOWEL: Brand: CONVERTORS

## (undated) DEVICE — X-RAY DETECTABLE SPONGES,16 PLY: Brand: VISTEC

## (undated) DEVICE — BETHLEHEM UNIVERSAL MINOR GEN: Brand: CARDINAL HEALTH

## (undated) DEVICE — NEPTUNE E-SEP SMOKE EVACUATION PENCIL, COATED, 70MM BLADE, ROCKER SWITCH: Brand: NEPTUNE E-SEP

## (undated) DEVICE — DRAPE SHEET THREE QUARTER

## (undated) DEVICE — SPONGE 4 X 4 XRAY 16 PLY STRL LF RFD

## (undated) DEVICE — SYRINGE 10ML LL

## (undated) DEVICE — SUT MONOCRYL 3-0 SH 27 IN Y416H

## (undated) DEVICE — NEEDLE 25G X 1 1/2

## (undated) DEVICE — GLOVE SRG BIOGEL 6.5

## (undated) DEVICE — TISSUE REMOVAL SYSTEM RESECTING DEVICE: Brand: SYMPHION

## (undated) DEVICE — PROBE COVER: Brand: STERILE PROBE COVER

## (undated) DEVICE — CYSTO TUBING SINGLE IRRIGATION

## (undated) DEVICE — PACK CCL/IR MINOR PROCEDURE

## (undated) DEVICE — SUT MONOCRYL 4-0 PS-2 27 IN Y426H

## (undated) DEVICE — NEEDLE SPINAL18G X 3.5 IN QUINCKE

## (undated) DEVICE — MEDI-VAC YANKAUER SUCTION HANDLE W/BULBOUS AND CONTROL VENT: Brand: CARDINAL HEALTH

## (undated) DEVICE — SUPER SPONGES,MEDIUM: Brand: KERLIX

## (undated) DEVICE — PVC URETHRAL CATHETER: Brand: DOVER

## (undated) DEVICE — ADHESIVE SKIN CLOSURE SYS EXOFIN FUSION 22CM

## (undated) RX ORDER — LATANOPROST 50 UG/ML
1 SOLUTION/ DROPS OPHTHALMIC EVERY EVENING
Start: 2023-09-18

## (undated) RX ORDER — NEOMYCIN SULFATE, POLYMYXIN B SULFATE AND DEXAMETHASONE 3.5; 10000; 1 MG/ML; [USP'U]/ML; MG/ML: 1 SUSPENSION OPHTHALMIC

## (undated) RX ORDER — AMOXICILLIN AND CLAVULANATE POTASSIUM 875; 125 MG/1; 1/1: 1 TABLET, FILM COATED ORAL TWICE A DAY